# Patient Record
Sex: FEMALE | Race: WHITE | NOT HISPANIC OR LATINO | Employment: OTHER | URBAN - METROPOLITAN AREA
[De-identification: names, ages, dates, MRNs, and addresses within clinical notes are randomized per-mention and may not be internally consistent; named-entity substitution may affect disease eponyms.]

---

## 2018-04-02 ENCOUNTER — LAB VISIT (OUTPATIENT)
Dept: LAB | Facility: HOSPITAL | Age: 58
End: 2018-04-02
Attending: INTERNAL MEDICINE
Payer: MEDICARE

## 2018-04-02 LAB
ALBUMIN SERPL BCP-MCNC: 2.6 G/DL
ALP SERPL-CCNC: 47 U/L
ALT SERPL W/O P-5'-P-CCNC: 7 U/L
ANION GAP SERPL CALC-SCNC: 8 MMOL/L
AST SERPL-CCNC: 22 U/L
BASOPHILS # BLD AUTO: 0 K/UL
BASOPHILS NFR BLD: 0.5 %
BILIRUB SERPL-MCNC: 0.8 MG/DL
BILIRUB UR QL STRIP: NEGATIVE
BUN SERPL-MCNC: 15 MG/DL
CALCIUM SERPL-MCNC: 9.1 MG/DL
CHLORIDE SERPL-SCNC: 110 MMOL/L
CHOLEST SERPL-MCNC: 58 MG/DL
CHOLEST/HDLC SERPL: 6.4 {RATIO}
CLARITY UR: CLEAR
CO2 SERPL-SCNC: 23 MMOL/L
COLOR UR: YELLOW
CREAT SERPL-MCNC: 1.3 MG/DL
CREAT UR-MCNC: 68 MG/DL
DIFFERENTIAL METHOD: ABNORMAL
EOSINOPHIL # BLD AUTO: 0.2 K/UL
EOSINOPHIL NFR BLD: 2.5 %
ERYTHROCYTE [DISTWIDTH] IN BLOOD BY AUTOMATED COUNT: 15.4 %
EST. GFR  (AFRICAN AMERICAN): 53 ML/MIN/1.73 M^2
EST. GFR  (NON AFRICAN AMERICAN): 46 ML/MIN/1.73 M^2
ESTIMATED AVG GLUCOSE: 97 MG/DL
GLUCOSE SERPL-MCNC: 112 MG/DL
GLUCOSE UR QL STRIP: NEGATIVE
HBA1C MFR BLD HPLC: 5 %
HCT VFR BLD AUTO: 34.2 %
HDLC SERPL-MCNC: 9 MG/DL
HDLC SERPL: 15.5 %
HGB BLD-MCNC: 11.4 G/DL
HGB UR QL STRIP: NEGATIVE
KETONES UR QL STRIP: NEGATIVE
LDLC SERPL CALC-MCNC: 23.4 MG/DL
LEUKOCYTE ESTERASE UR QL STRIP: NEGATIVE
LYMPHOCYTES # BLD AUTO: 2.6 K/UL
LYMPHOCYTES NFR BLD: 26.4 %
MCH RBC QN AUTO: 29.4 PG
MCHC RBC AUTO-ENTMCNC: 33.5 G/DL
MCV RBC AUTO: 88 FL
MICROALBUMIN UR DL<=1MG/L-MCNC: 11 UG/ML
MICROALBUMIN/CREATININE RATIO: 16.2 UG/MG
MONOCYTES # BLD AUTO: 0.7 K/UL
MONOCYTES NFR BLD: 7.3 %
NEUTROPHILS # BLD AUTO: 6.3 K/UL
NEUTROPHILS NFR BLD: 63.3 %
NITRITE UR QL STRIP: NEGATIVE
NONHDLC SERPL-MCNC: 49 MG/DL
PH UR STRIP: 5 [PH] (ref 5–8)
PLATELET # BLD AUTO: 292 K/UL
PMV BLD AUTO: 9.3 FL
POTASSIUM SERPL-SCNC: 3.6 MMOL/L
PROT SERPL-MCNC: 7.1 G/DL
PROT UR QL STRIP: NEGATIVE
RBC # BLD AUTO: 3.9 M/UL
SODIUM SERPL-SCNC: 141 MMOL/L
SP GR UR STRIP: 1.02 (ref 1–1.03)
TRIGL SERPL-MCNC: 128 MG/DL
URN SPEC COLLECT METH UR: NORMAL
UROBILINOGEN UR STRIP-ACNC: NEGATIVE EU/DL
WBC # BLD AUTO: 9.9 K/UL

## 2018-04-02 PROCEDURE — 82043 UR ALBUMIN QUANTITATIVE: CPT

## 2018-04-02 PROCEDURE — 83036 HEMOGLOBIN GLYCOSYLATED A1C: CPT

## 2018-04-02 PROCEDURE — 85025 COMPLETE CBC W/AUTO DIFF WBC: CPT

## 2018-04-02 PROCEDURE — 80061 LIPID PANEL: CPT

## 2018-04-02 PROCEDURE — 81003 URINALYSIS AUTO W/O SCOPE: CPT

## 2018-04-02 PROCEDURE — 80053 COMPREHEN METABOLIC PANEL: CPT

## 2018-06-06 ENCOUNTER — LAB VISIT (OUTPATIENT)
Dept: LAB | Facility: HOSPITAL | Age: 58
End: 2018-06-06
Attending: INTERNAL MEDICINE
Payer: MEDICARE

## 2018-06-06 DIAGNOSIS — N18.30 CHRONIC KIDNEY DISEASE, STAGE III (MODERATE): ICD-10-CM

## 2018-06-06 DIAGNOSIS — E11.21 DIABETIC GLOMERULOPATHY: ICD-10-CM

## 2018-06-06 DIAGNOSIS — I51.9 MYXEDEMA HEART DISEASE: ICD-10-CM

## 2018-06-06 DIAGNOSIS — E78.2 MIXED HYPERLIPIDEMIA: ICD-10-CM

## 2018-06-06 DIAGNOSIS — R11.0 NAUSEA: Primary | ICD-10-CM

## 2018-06-06 DIAGNOSIS — E03.9 MYXEDEMA HEART DISEASE: ICD-10-CM

## 2018-06-06 LAB
ALBUMIN SERPL BCP-MCNC: 2.4 G/DL
ALP SERPL-CCNC: 56 U/L
ALT SERPL W/O P-5'-P-CCNC: 8 U/L
AMYLASE SERPL-CCNC: 31 U/L
ANION GAP SERPL CALC-SCNC: 9 MMOL/L
AST SERPL-CCNC: 28 U/L
BACTERIA #/AREA URNS HPF: ABNORMAL /HPF
BASOPHILS # BLD AUTO: 0 K/UL
BASOPHILS NFR BLD: 0.2 %
BILIRUB SERPL-MCNC: 1 MG/DL
BILIRUB UR QL STRIP: NEGATIVE
BUN SERPL-MCNC: 21 MG/DL
CALCIUM SERPL-MCNC: 9.3 MG/DL
CHLORIDE SERPL-SCNC: 106 MMOL/L
CHOLEST SERPL-MCNC: 65 MG/DL
CHOLEST/HDLC SERPL: 8.1 {RATIO}
CLARITY UR: CLEAR
CO2 SERPL-SCNC: 21 MMOL/L
COLOR UR: YELLOW
CREAT SERPL-MCNC: 1.6 MG/DL
CREAT UR-MCNC: 103 MG/DL
DIFFERENTIAL METHOD: ABNORMAL
EOSINOPHIL # BLD AUTO: 0.1 K/UL
EOSINOPHIL NFR BLD: 0.9 %
ERYTHROCYTE [DISTWIDTH] IN BLOOD BY AUTOMATED COUNT: 16.5 %
EST. GFR  (AFRICAN AMERICAN): 41 ML/MIN/1.73 M^2
EST. GFR  (NON AFRICAN AMERICAN): 36 ML/MIN/1.73 M^2
ESTIMATED AVG GLUCOSE: 91 MG/DL
GLUCOSE SERPL-MCNC: 109 MG/DL
GLUCOSE UR QL STRIP: NEGATIVE
HBA1C MFR BLD HPLC: 4.8 %
HCT VFR BLD AUTO: 33.2 %
HDLC SERPL-MCNC: 8 MG/DL
HDLC SERPL: 12.3 %
HGB BLD-MCNC: 11.4 G/DL
HGB UR QL STRIP: NEGATIVE
KETONES UR QL STRIP: NEGATIVE
LDLC SERPL CALC-MCNC: 31.4 MG/DL
LEUKOCYTE ESTERASE UR QL STRIP: ABNORMAL
LIPASE SERPL-CCNC: 61 U/L
LYMPHOCYTES # BLD AUTO: 3 K/UL
LYMPHOCYTES NFR BLD: 19.8 %
MCH RBC QN AUTO: 30.4 PG
MCHC RBC AUTO-ENTMCNC: 34.2 G/DL
MCV RBC AUTO: 89 FL
MICROALBUMIN UR DL<=1MG/L-MCNC: 16 UG/ML
MICROALBUMIN/CREATININE RATIO: 15.5 UG/MG
MICROSCOPIC COMMENT: ABNORMAL
MONOCYTES # BLD AUTO: 1 K/UL
MONOCYTES NFR BLD: 6.6 %
NEUTROPHILS # BLD AUTO: 10.9 K/UL
NEUTROPHILS NFR BLD: 72.5 %
NITRITE UR QL STRIP: NEGATIVE
NONHDLC SERPL-MCNC: 57 MG/DL
PH UR STRIP: 6 [PH] (ref 5–8)
PLATELET # BLD AUTO: 360 K/UL
PMV BLD AUTO: 8.1 FL
POTASSIUM SERPL-SCNC: 4.1 MMOL/L
PROT SERPL-MCNC: 7.5 G/DL
PROT UR QL STRIP: NEGATIVE
RBC # BLD AUTO: 3.74 M/UL
SODIUM SERPL-SCNC: 136 MMOL/L
SP GR UR STRIP: 1.02 (ref 1–1.03)
TRIGL SERPL-MCNC: 128 MG/DL
TSH SERPL DL<=0.005 MIU/L-ACNC: 2.1 UIU/ML
URN SPEC COLLECT METH UR: ABNORMAL
UROBILINOGEN UR STRIP-ACNC: NEGATIVE EU/DL
WBC # BLD AUTO: 15 K/UL
WBC #/AREA URNS HPF: 25 /HPF (ref 0–5)

## 2018-06-06 PROCEDURE — 83690 ASSAY OF LIPASE: CPT

## 2018-06-06 PROCEDURE — 85025 COMPLETE CBC W/AUTO DIFF WBC: CPT

## 2018-06-06 PROCEDURE — 82043 UR ALBUMIN QUANTITATIVE: CPT

## 2018-06-06 PROCEDURE — 87077 CULTURE AEROBIC IDENTIFY: CPT

## 2018-06-06 PROCEDURE — 36415 COLL VENOUS BLD VENIPUNCTURE: CPT

## 2018-06-06 PROCEDURE — 87086 URINE CULTURE/COLONY COUNT: CPT

## 2018-06-06 PROCEDURE — 87088 URINE BACTERIA CULTURE: CPT

## 2018-06-06 PROCEDURE — 80061 LIPID PANEL: CPT

## 2018-06-06 PROCEDURE — 87186 SC STD MICRODIL/AGAR DIL: CPT

## 2018-06-06 PROCEDURE — 83036 HEMOGLOBIN GLYCOSYLATED A1C: CPT

## 2018-06-06 PROCEDURE — 80053 COMPREHEN METABOLIC PANEL: CPT

## 2018-06-06 PROCEDURE — 82150 ASSAY OF AMYLASE: CPT

## 2018-06-06 PROCEDURE — 84443 ASSAY THYROID STIM HORMONE: CPT

## 2018-06-06 PROCEDURE — 81000 URINALYSIS NONAUTO W/SCOPE: CPT

## 2018-06-08 LAB — BACTERIA UR CULT: NORMAL

## 2018-06-09 ENCOUNTER — HOSPITAL ENCOUNTER (OUTPATIENT)
Dept: RADIOLOGY | Facility: HOSPITAL | Age: 58
Discharge: HOME | End: 2018-06-09
Attending: INTERNAL MEDICINE
Payer: MEDICARE

## 2018-06-09 DIAGNOSIS — R11.0 NAUSEA: ICD-10-CM

## 2018-06-09 PROCEDURE — 76705 ECHO EXAM OF ABDOMEN: CPT | Mod: TC

## 2018-06-09 PROCEDURE — 76705 ECHO EXAM OF ABDOMEN: CPT | Mod: 26,,, | Performed by: RADIOLOGY

## 2018-06-18 ENCOUNTER — HOSPITAL ENCOUNTER (OUTPATIENT)
Dept: PREADMISSION TESTING | Facility: HOSPITAL | Age: 58
Discharge: HOME | DRG: 329 | End: 2018-06-18
Attending: SURGERY
Payer: MEDICARE

## 2018-06-18 VITALS — HEIGHT: 59 IN | WEIGHT: 135 LBS | BODY MASS INDEX: 27.21 KG/M2

## 2018-06-18 DIAGNOSIS — K80.00 CALCULUS OF GALLBLADDER WITH ACUTE CHOLECYSTITIS WITHOUT OBSTRUCTION: Primary | ICD-10-CM

## 2018-06-18 LAB
ALBUMIN SERPL BCP-MCNC: 2.3 G/DL
ALP SERPL-CCNC: 60 U/L
ALT SERPL W/O P-5'-P-CCNC: 9 U/L
ANION GAP SERPL CALC-SCNC: 12 MMOL/L
AST SERPL-CCNC: 41 U/L
BASOPHILS # BLD AUTO: 0.1 K/UL
BASOPHILS NFR BLD: 0.7 %
BILIRUB SERPL-MCNC: 0.8 MG/DL
BUN SERPL-MCNC: 26 MG/DL
CALCIUM SERPL-MCNC: 9.7 MG/DL
CHLORIDE SERPL-SCNC: 108 MMOL/L
CO2 SERPL-SCNC: 16 MMOL/L
CREAT SERPL-MCNC: 1.7 MG/DL
DIFFERENTIAL METHOD: ABNORMAL
EOSINOPHIL # BLD AUTO: 0.1 K/UL
EOSINOPHIL NFR BLD: 0.9 %
ERYTHROCYTE [DISTWIDTH] IN BLOOD BY AUTOMATED COUNT: 17 %
EST. GFR  (AFRICAN AMERICAN): 38 ML/MIN/1.73 M^2
EST. GFR  (NON AFRICAN AMERICAN): 33 ML/MIN/1.73 M^2
GLUCOSE SERPL-MCNC: 122 MG/DL
HCT VFR BLD AUTO: 33.4 %
HGB BLD-MCNC: 11.1 G/DL
LYMPHOCYTES # BLD AUTO: 3.8 K/UL
LYMPHOCYTES NFR BLD: 25.3 %
MCH RBC QN AUTO: 30.1 PG
MCHC RBC AUTO-ENTMCNC: 33.4 G/DL
MCV RBC AUTO: 90 FL
MONOCYTES # BLD AUTO: 1.3 K/UL
MONOCYTES NFR BLD: 8.9 %
NEUTROPHILS # BLD AUTO: 9.7 K/UL
NEUTROPHILS NFR BLD: 64.2 %
PLATELET # BLD AUTO: 510 K/UL
PMV BLD AUTO: 7.9 FL
POTASSIUM SERPL-SCNC: 4.5 MMOL/L
PROT SERPL-MCNC: 8.2 G/DL
RBC # BLD AUTO: 3.71 M/UL
SODIUM SERPL-SCNC: 136 MMOL/L
WBC # BLD AUTO: 15.1 K/UL

## 2018-06-18 PROCEDURE — 99900104 DSU ONLY-NO CHARGE-EA ADD'L HR (STAT)

## 2018-06-18 PROCEDURE — 99900103 DSU ONLY-NO CHARGE-INITIAL HR (STAT)

## 2018-06-18 PROCEDURE — 93005 ELECTROCARDIOGRAM TRACING: CPT

## 2018-06-18 PROCEDURE — 93010 ELECTROCARDIOGRAM REPORT: CPT | Mod: ,,, | Performed by: INTERNAL MEDICINE

## 2018-06-18 PROCEDURE — 80053 COMPREHEN METABOLIC PANEL: CPT

## 2018-06-18 PROCEDURE — 36415 COLL VENOUS BLD VENIPUNCTURE: CPT

## 2018-06-18 PROCEDURE — 85025 COMPLETE CBC W/AUTO DIFF WBC: CPT

## 2018-06-18 RX ORDER — FUROSEMIDE 20 MG/1
20 TABLET ORAL 2 TIMES DAILY
COMMUNITY

## 2018-06-18 RX ORDER — VIT C/E/ZN/COPPR/LUTEIN/ZEAXAN 250MG-90MG
1000 CAPSULE ORAL DAILY
COMMUNITY

## 2018-06-18 RX ORDER — CALCIUM CARBONATE 600 MG
600 TABLET ORAL ONCE
COMMUNITY

## 2018-06-18 RX ORDER — SIMVASTATIN 10 MG/1
10 TABLET, FILM COATED ORAL NIGHTLY
COMMUNITY

## 2018-06-18 RX ORDER — SODIUM BICARBONATE 650 MG/1
650 TABLET ORAL 4 TIMES DAILY
COMMUNITY

## 2018-06-18 RX ORDER — DOCUSATE SODIUM 100 MG/1
100 CAPSULE, LIQUID FILLED ORAL 2 TIMES DAILY
COMMUNITY

## 2018-06-18 RX ORDER — LEVOTHYROXINE SODIUM 25 UG/1
25 TABLET ORAL DAILY
COMMUNITY

## 2018-06-18 RX ORDER — LATANOPROST 50 UG/ML
1 SOLUTION/ DROPS OPHTHALMIC NIGHTLY
COMMUNITY

## 2018-06-18 RX ORDER — FLUTICASONE PROPIONATE 50 MCG
1 SPRAY, SUSPENSION (ML) NASAL DAILY
COMMUNITY

## 2018-06-18 RX ORDER — BRINZOLAMIDE 10 MG/ML
1 SUSPENSION/ DROPS OPHTHALMIC 3 TIMES DAILY
COMMUNITY

## 2018-06-18 RX ORDER — FENOFIBRATE 160 MG/1
160 TABLET ORAL DAILY
COMMUNITY

## 2018-06-18 RX ORDER — DORZOLAMIDE HYDROCHLORIDE AND TIMOLOL MALEATE PRESERVATIVE FREE 20; 5 MG/ML; MG/ML
1 SOLUTION/ DROPS OPHTHALMIC 2 TIMES DAILY
COMMUNITY

## 2018-06-19 ENCOUNTER — HOSPITAL ENCOUNTER (OUTPATIENT)
Dept: RADIOLOGY | Facility: HOSPITAL | Age: 58
Discharge: HOME | DRG: 329 | End: 2018-06-19
Attending: INTERNAL MEDICINE
Payer: MEDICARE

## 2018-06-19 ENCOUNTER — ANESTHESIA EVENT (OUTPATIENT)
Dept: SURGERY | Facility: HOSPITAL | Age: 58
End: 2018-06-19

## 2018-06-19 DIAGNOSIS — R10.11 RIGHT UPPER QUADRANT PAIN: Primary | ICD-10-CM

## 2018-06-19 DIAGNOSIS — R10.11 RIGHT UPPER QUADRANT PAIN: ICD-10-CM

## 2018-06-19 DIAGNOSIS — R10.31 ABDOMINAL PAIN, RIGHT LOWER QUADRANT: ICD-10-CM

## 2018-06-19 PROCEDURE — 74176 CT ABD & PELVIS W/O CONTRAST: CPT | Mod: 26,,, | Performed by: RADIOLOGY

## 2018-06-19 PROCEDURE — 74176 CT ABD & PELVIS W/O CONTRAST: CPT | Mod: TC

## 2018-06-19 PROCEDURE — 25500020 PHARM REV CODE 255

## 2018-06-19 RX ADMIN — IOHEXOL 30 ML: 350 INJECTION, SOLUTION INTRAVENOUS at 10:06

## 2018-06-20 ENCOUNTER — HOSPITAL ENCOUNTER (INPATIENT)
Facility: HOSPITAL | Age: 58
LOS: 18 days | Discharge: NURSING, REHAB OR LONG-TERM CARE | DRG: 329 | End: 2018-07-09
Attending: INTERNAL MEDICINE | Admitting: INTERNAL MEDICINE
Payer: MEDICARE

## 2018-06-20 ENCOUNTER — ANESTHESIA (OUTPATIENT)
Dept: SURGERY | Facility: HOSPITAL | Age: 58
End: 2018-06-20

## 2018-06-20 DIAGNOSIS — C17.8: ICD-10-CM

## 2018-06-20 DIAGNOSIS — E11.8 TYPE 2 DIABETES MELLITUS WITH COMPLICATION, WITHOUT LONG-TERM CURRENT USE OF INSULIN: ICD-10-CM

## 2018-06-20 DIAGNOSIS — N17.9 AKI (ACUTE KIDNEY INJURY): ICD-10-CM

## 2018-06-20 DIAGNOSIS — Q02: ICD-10-CM

## 2018-06-20 DIAGNOSIS — R19.00 ABDOMINAL MASS, UNSPECIFIED ABDOMINAL LOCATION: ICD-10-CM

## 2018-06-20 DIAGNOSIS — R19.01 RIGHT UPPER QUADRANT ABDOMINAL MASS: ICD-10-CM

## 2018-06-20 DIAGNOSIS — R10.9 ABDOMINAL PAIN: ICD-10-CM

## 2018-06-20 DIAGNOSIS — L02.211 ABDOMINAL WALL ABSCESS: Primary | ICD-10-CM

## 2018-06-20 PROBLEM — E87.20 METABOLIC ACIDOSIS: Status: ACTIVE | Noted: 2018-06-20

## 2018-06-20 PROBLEM — E11.9 DIABETES MELLITUS: Status: ACTIVE | Noted: 2018-06-20

## 2018-06-20 LAB
CEA SERPL-MCNC: 68.9 NG/ML
CRP SERPL-MCNC: 184.3 MG/L
ESTIMATED AVG GLUCOSE: 80 MG/DL
HBA1C MFR BLD HPLC: 4.4 %
LACTATE SERPL-SCNC: 1.3 MMOL/L
POCT GLUCOSE: 102 MG/DL (ref 70–110)
POCT GLUCOSE: 95 MG/DL (ref 70–110)
TSH SERPL DL<=0.005 MIU/L-ACNC: 2.71 UIU/ML

## 2018-06-20 PROCEDURE — 63600175 PHARM REV CODE 636 W HCPCS: Performed by: INTERNAL MEDICINE

## 2018-06-20 PROCEDURE — 82378 CARCINOEMBRYONIC ANTIGEN: CPT

## 2018-06-20 PROCEDURE — G0379 DIRECT REFER HOSPITAL OBSERV: HCPCS

## 2018-06-20 PROCEDURE — 84443 ASSAY THYROID STIM HORMONE: CPT

## 2018-06-20 PROCEDURE — 87040 BLOOD CULTURE FOR BACTERIA: CPT

## 2018-06-20 PROCEDURE — 25000003 PHARM REV CODE 250: Performed by: INTERNAL MEDICINE

## 2018-06-20 PROCEDURE — 99220 PR INITIAL OBSERVATION CARE,LEVL III: CPT | Mod: ,,, | Performed by: INTERNAL MEDICINE

## 2018-06-20 PROCEDURE — 86140 C-REACTIVE PROTEIN: CPT

## 2018-06-20 PROCEDURE — 83605 ASSAY OF LACTIC ACID: CPT

## 2018-06-20 PROCEDURE — 36415 COLL VENOUS BLD VENIPUNCTURE: CPT

## 2018-06-20 PROCEDURE — 83036 HEMOGLOBIN GLYCOSYLATED A1C: CPT

## 2018-06-20 PROCEDURE — 94761 N-INVAS EAR/PLS OXIMETRY MLT: CPT

## 2018-06-20 PROCEDURE — 99222 1ST HOSP IP/OBS MODERATE 55: CPT | Mod: 57,,, | Performed by: SURGERY

## 2018-06-20 PROCEDURE — G0378 HOSPITAL OBSERVATION PER HR: HCPCS

## 2018-06-20 RX ORDER — INSULIN ASPART 100 [IU]/ML
0-5 INJECTION, SOLUTION INTRAVENOUS; SUBCUTANEOUS
Status: DISCONTINUED | OUTPATIENT
Start: 2018-06-20 | End: 2018-06-30

## 2018-06-20 RX ORDER — ENOXAPARIN SODIUM 100 MG/ML
40 INJECTION SUBCUTANEOUS ONCE
Status: COMPLETED | OUTPATIENT
Start: 2018-06-20 | End: 2018-06-20

## 2018-06-20 RX ORDER — PANTOPRAZOLE SODIUM 40 MG/1
40 TABLET, DELAYED RELEASE ORAL DAILY
Status: DISCONTINUED | OUTPATIENT
Start: 2018-06-20 | End: 2018-06-21 | Stop reason: SDUPTHER

## 2018-06-20 RX ORDER — LEVOTHYROXINE SODIUM 25 UG/1
25 TABLET ORAL
Status: DISCONTINUED | OUTPATIENT
Start: 2018-06-21 | End: 2018-07-09 | Stop reason: HOSPADM

## 2018-06-20 RX ORDER — SODIUM CHLORIDE 0.9 % (FLUSH) 0.9 %
5 SYRINGE (ML) INJECTION
Status: DISCONTINUED | OUTPATIENT
Start: 2018-06-20 | End: 2018-06-28

## 2018-06-20 RX ORDER — SODIUM CHLORIDE 450 MG/100ML
INJECTION, SOLUTION INTRAVENOUS CONTINUOUS
Status: DISCONTINUED | OUTPATIENT
Start: 2018-06-20 | End: 2018-06-25

## 2018-06-20 RX ORDER — IBUPROFEN 200 MG
24 TABLET ORAL
Status: DISCONTINUED | OUTPATIENT
Start: 2018-06-20 | End: 2018-07-09 | Stop reason: HOSPADM

## 2018-06-20 RX ORDER — SODIUM BICARBONATE 650 MG/1
650 TABLET ORAL 4 TIMES DAILY
Status: DISCONTINUED | OUTPATIENT
Start: 2018-06-20 | End: 2018-07-09 | Stop reason: HOSPADM

## 2018-06-20 RX ORDER — BRINZOLAMIDE 10 MG/ML
1 SUSPENSION/ DROPS OPHTHALMIC 3 TIMES DAILY
Status: DISCONTINUED | OUTPATIENT
Start: 2018-06-20 | End: 2018-06-26

## 2018-06-20 RX ORDER — DORZOLAMIDE HYDROCHLORIDE AND TIMOLOL MALEATE 20; 5 MG/ML; MG/ML
1 SOLUTION/ DROPS OPHTHALMIC 2 TIMES DAILY
Status: DISCONTINUED | OUTPATIENT
Start: 2018-06-20 | End: 2018-07-09 | Stop reason: HOSPADM

## 2018-06-20 RX ORDER — FENOFIBRATE 160 MG/1
160 TABLET ORAL DAILY
Status: DISCONTINUED | OUTPATIENT
Start: 2018-06-21 | End: 2018-07-09 | Stop reason: HOSPADM

## 2018-06-20 RX ORDER — ENOXAPARIN SODIUM 100 MG/ML
40 INJECTION SUBCUTANEOUS
Status: DISCONTINUED | OUTPATIENT
Start: 2018-06-20 | End: 2018-06-20

## 2018-06-20 RX ORDER — FUROSEMIDE 20 MG/1
20 TABLET ORAL 2 TIMES DAILY
Status: DISCONTINUED | OUTPATIENT
Start: 2018-06-20 | End: 2018-06-20

## 2018-06-20 RX ORDER — IBUPROFEN 200 MG
16 TABLET ORAL
Status: DISCONTINUED | OUTPATIENT
Start: 2018-06-20 | End: 2018-07-09 | Stop reason: HOSPADM

## 2018-06-20 RX ORDER — SIMVASTATIN 10 MG/1
10 TABLET, FILM COATED ORAL NIGHTLY
Status: DISCONTINUED | OUTPATIENT
Start: 2018-06-20 | End: 2018-07-09 | Stop reason: HOSPADM

## 2018-06-20 RX ORDER — CHOLECALCIFEROL (VITAMIN D3) 25 MCG
1000 TABLET ORAL DAILY
Status: DISCONTINUED | OUTPATIENT
Start: 2018-06-21 | End: 2018-07-09 | Stop reason: HOSPADM

## 2018-06-20 RX ORDER — FLUTICASONE PROPIONATE 50 MCG
1 SPRAY, SUSPENSION (ML) NASAL DAILY
Status: DISCONTINUED | OUTPATIENT
Start: 2018-06-21 | End: 2018-07-09 | Stop reason: HOSPADM

## 2018-06-20 RX ORDER — HYDROCODONE BITARTRATE AND ACETAMINOPHEN 10; 325 MG/1; MG/1
1 TABLET ORAL EVERY 6 HOURS PRN
Status: DISCONTINUED | OUTPATIENT
Start: 2018-06-20 | End: 2018-07-04

## 2018-06-20 RX ORDER — ACETAMINOPHEN 325 MG/1
650 TABLET ORAL EVERY 4 HOURS PRN
Status: DISCONTINUED | OUTPATIENT
Start: 2018-06-20 | End: 2018-06-28

## 2018-06-20 RX ORDER — ONDANSETRON 2 MG/ML
4 INJECTION INTRAMUSCULAR; INTRAVENOUS EVERY 8 HOURS PRN
Status: DISCONTINUED | OUTPATIENT
Start: 2018-06-20 | End: 2018-07-09 | Stop reason: HOSPADM

## 2018-06-20 RX ORDER — CALCIUM CARBONATE 500(1250)
500 TABLET ORAL ONCE
Status: COMPLETED | OUTPATIENT
Start: 2018-06-20 | End: 2018-06-20

## 2018-06-20 RX ORDER — LATANOPROST 50 UG/ML
1 SOLUTION/ DROPS OPHTHALMIC NIGHTLY
Status: DISCONTINUED | OUTPATIENT
Start: 2018-06-20 | End: 2018-07-09 | Stop reason: HOSPADM

## 2018-06-20 RX ORDER — GLUCAGON 1 MG
1 KIT INJECTION
Status: DISCONTINUED | OUTPATIENT
Start: 2018-06-20 | End: 2018-07-09 | Stop reason: HOSPADM

## 2018-06-20 RX ADMIN — PANTOPRAZOLE SODIUM 40 MG: 40 TABLET, DELAYED RELEASE ORAL at 03:06

## 2018-06-20 RX ADMIN — Medication 500 MG: at 04:06

## 2018-06-20 RX ADMIN — SODIUM CHLORIDE: 0.45 INJECTION, SOLUTION INTRAVENOUS at 05:06

## 2018-06-20 RX ADMIN — ENOXAPARIN SODIUM 40 MG: 100 INJECTION SUBCUTANEOUS at 05:06

## 2018-06-20 RX ADMIN — DORZOLAMIDE HYDROCHLORIDE AND TIMOLOL MALEATE 1 DROP: 20; 5 SOLUTION/ DROPS OPHTHALMIC at 08:06

## 2018-06-20 RX ADMIN — SIMVASTATIN 10 MG: 10 TABLET, FILM COATED ORAL at 08:06

## 2018-06-20 RX ADMIN — SODIUM BICARBONATE 650 MG TABLET 650 MG: at 04:06

## 2018-06-20 RX ADMIN — PIPERACILLIN SODIUM AND TAZOBACTAM SODIUM 4.5 G: 4; .5 INJECTION, POWDER, LYOPHILIZED, FOR SOLUTION INTRAVENOUS at 05:06

## 2018-06-20 RX ADMIN — LATANOPROST 1 DROP: 50 SOLUTION OPHTHALMIC at 08:06

## 2018-06-20 RX ADMIN — SODIUM BICARBONATE 650 MG TABLET 650 MG: at 08:06

## 2018-06-20 NOTE — PROGRESS NOTES
Patient has cyst wound to rt labia, bilat lower edema, cognitive deficits, congenital small head, clear lung sounds and BS in all four quads, parents at bedside, allergies noted

## 2018-06-20 NOTE — HPI
Patient is a 57 y.o. female admitted to Hospitalist Service from Dr. Garduno's office with complaint of abdominal pain. Patient reportedly has past medical history significant for Congenital microcephaly and diet controlled DM.     Patient denied chest pain, shortness of breath, abdominal pain, nausea, vomiting, headache, vision changes, focal neuro-deficits, cough or fever.     She states the pain has been present for about six months off and on.  She had an ultrasound which showed gallstones and was scheduled to have lap GB but had persistent elevated WBC and CT was ordered, which showed   CT abdomen (06-19-18): Large subphrenic mass on the right abutting could be involving the liver extending into the right lateral abdominal wall, heterogeneous and complex in appearance suggesting complex fluid collection and containing small foci of air.  Differential includes large abscess, or necrotic mass.  It is indistinguishable from, abutting adjacent colon with colon wall thickening and findings thought most suggestive of abscess secondary to contained right colon perforation from colon cancer or less likely right-sided diverticulitis.  This corresponds to findings on recent ultrasound of 6/9/2018.

## 2018-06-20 NOTE — PLAN OF CARE
06/20/18 1500   Patient Assessment/Suction   Level of Consciousness (AVPU) alert   PRE-TX-O2-ETCO2   O2 Device (Oxygen Therapy) room air   SpO2 100 %   Pulse Oximetry Type Intermittent   $ Pulse Oximetry - Multiple Charge Pulse Oximetry - Multiple   Pulse 78   Resp 18   Temp (!) 64.4 °F (18 °C)

## 2018-06-20 NOTE — ASSESSMENT & PLAN NOTE
CT reviewed with radiology.  Abscess Vs necrotic mass.  As it is only 3 cm below the skin and is 9 cm in diameter, I feel it would be best drained surgically with irrigation and placement of large drain.  Discussed with patient and parent who agree.  For OR in AM  All aspects of procedure including risks and possible complications were discussed in detail with the patient who agrees to proceed with procedure.  All questions were answered and consent was obtained. Preop orders were written.

## 2018-06-20 NOTE — SUBJECTIVE & OBJECTIVE
No current facility-administered medications on file prior to encounter.      Current Outpatient Prescriptions on File Prior to Encounter   Medication Sig    brinzolamide (AZOPT) 1 % ophthalmic suspension 1 drop 3 (three) times daily.    calcium carbonate (OS-WALLY) 600 mg calcium (1,500 mg) Tab Take 600 mg by mouth once.    cholecalciferol, vitamin D3, (VITAMIN D3) 1,000 unit capsule Take 1,000 Units by mouth once daily.    docusate sodium (COLACE) 100 MG capsule Take 100 mg by mouth 2 (two) times daily.    dorzolamide-timolol, PF, (COSOPT PF) 2-0.5 % Dpet ophthalmic solution 1 drop 2 (two) times daily.    fenofibrate (LOFIBRA) 160 MG Tab Take 160 mg by mouth once daily.    fluticasone (FLONASE) 50 mcg/actuation nasal spray 1 spray by Each Nare route once daily.    furosemide (LASIX) 20 MG tablet Take 20 mg by mouth 2 (two) times daily.    latanoprost 0.005 % ophthalmic solution 1 drop every evening.    levothyroxine (SYNTHROID) 25 MCG tablet Take 25 mcg by mouth once daily.    ranitidine (ZANTAC) 150 MG capsule Take 150 mg by mouth 2 (two) times daily.    simvastatin (ZOCOR) 10 MG tablet Take 10 mg by mouth every evening.    sodium bicarbonate 650 MG tablet Take 650 mg by mouth 4 (four) times daily.       Review of patient's allergies indicates:   Allergen Reactions    Sulfa (sulfonamide antibiotics) Rash    Tetracyclines Rash       Past Medical History:   Diagnosis Date    Congenital small head     Diabetes mellitus     dIET CONTROL     No past surgical history on file.  Family History     None        Social History Main Topics    Smoking status: Never Smoker    Smokeless tobacco: Never Used    Alcohol use No    Drug use: No    Sexual activity: Not on file     Review of Systems   Constitutional: Negative for appetite change, chills, diaphoresis, fatigue, fever and unexpected weight change.   HENT: Negative for hearing loss, sore throat, trouble swallowing and voice change.    Eyes: Negative  for visual disturbance.   Respiratory: Negative for cough, shortness of breath and wheezing.    Cardiovascular: Negative for chest pain, palpitations and leg swelling.   Gastrointestinal: Positive for abdominal pain (mild). Negative for abdominal distention, anal bleeding, blood in stool, constipation, diarrhea, nausea, rectal pain and vomiting.   Genitourinary: Negative for difficulty urinating, dysuria, flank pain, frequency, hematuria, menstrual problem and urgency.   Musculoskeletal: Negative for arthralgias, back pain, joint swelling, myalgias and neck pain.   Skin: Negative for pallor and rash.   Neurological: Negative for dizziness, syncope, weakness and headaches.   Hematological: Negative for adenopathy. Does not bruise/bleed easily.   Psychiatric/Behavioral: Negative for suicidal ideas. The patient is not nervous/anxious.      Objective:     Vital Signs (Most Recent):  Temp: 98.1 °F (36.7 °C) (06/20/18 1626)  Pulse: 82 (06/20/18 1626)  Resp: 16 (06/20/18 1626)  BP: (!) 143/65 (06/20/18 1626)  SpO2: 100 % (06/20/18 1626) Vital Signs (24h Range):  Temp:  [64.4 °F (18 °C)-98.1 °F (36.7 °C)] 98.1 °F (36.7 °C)  Pulse:  [78-87] 82  Resp:  [16-18] 16  SpO2:  [100 %] 100 %  BP: (127-143)/(63-65) 143/65        There is no height or weight on file to calculate BMI.    Physical Exam   Constitutional: She is oriented to person, place, and time. She appears well-developed and well-nourished. No distress.   HENT:   Head: Atraumatic. Microcephalic.   Right Ear: External ear normal.   Left Ear: External ear normal.   Eyes: Conjunctivae are normal. Pupils are equal, round, and reactive to light. Right eye exhibits no discharge. Left eye exhibits no discharge.   Neck: No tracheal deviation present. No thyromegaly present.   Cardiovascular: Normal rate and regular rhythm.    Pulmonary/Chest: Effort normal. No respiratory distress.   Abdominal: Soft. She exhibits mass (right flank). She exhibits no distension. There is no  guarding.       Musculoskeletal: She exhibits no edema or tenderness.   Lymphadenopathy:     She has no cervical adenopathy.   Neurological: She is alert and oriented to person, place, and time. No cranial nerve deficit.   Skin: Skin is warm and dry. No rash noted. She is not diaphoretic. No pallor.   Psychiatric: She has a normal mood and affect. Her behavior is normal. Judgment and thought content normal.   Nursing note and vitals reviewed.      Significant Labs:  CBC:   Recent Labs  Lab 06/18/18  1421   WBC 15.10*   RBC 3.71*   HGB 11.1*   HCT 33.4*   *   MCV 90   MCH 30.1   MCHC 33.4     CMP:   Recent Labs  Lab 06/18/18  1421   *   CALCIUM 9.7   ALBUMIN 2.3*   PROT 8.2      K 4.5   CO2 16*      BUN 26*   CREATININE 1.7*   ALKPHOS 60   ALT 9*   AST 41*   BILITOT 0.8     Coagulation: No results for input(s): LABPROT, INR, APTT in the last 168 hours.  No results for input(s): COLORU, CLARITYU, SPECGRAV, PHUR, PROTEINUA, GLUCOSEU, BILIRUBINCON, BLOODU, WBCU, RBCU, BACTERIA, MUCUS, NITRITE, LEUKOCYTESUR, UROBILINOGEN, HYALINECASTS in the last 168 hours.    Significant Diagnostics:  I have reviewed all pertinent imaging results/findings within the past 24 hours.

## 2018-06-20 NOTE — CONSULTS
[facility]  General Surgery  Consult Note    Patient Name: Sis Teixeira  MRN: [id number]  Code Status: Full Code  Admission Date: 6/20/2018  Hospital Length of Stay: 0 days  Attending Physician: Logan Fernandez MD  Primary Care Provider: Mann Garduno MD    Patient information was obtained from patient, parent and ER records.     Inpatient consult to General Surgery  Consult performed by: YOKASTA MARY  Consult ordered by: LOGAN FERNANDEZ        Subjective:     Principal Problem: <principal problem not specified>    History of Present Illness: Patient is a 57 y.o. female admitted to Hospitalist Service from Dr. Garduno's office with complaint of abdominal pain. Patient reportedly has past medical history significant for Congenital microcephaly and diet controlled DM.     Patient denied chest pain, shortness of breath, abdominal pain, nausea, vomiting, headache, vision changes, focal neuro-deficits, cough or fever.     She states the pain has been present for about six months off and on.  She had an ultrasound which showed gallstones and was scheduled to have lap GB but had persistent elevated WBC and CT was ordered, which showed   CT abdomen (06-19-18): Large subphrenic mass on the right abutting could be involving the liver extending into the right lateral abdominal wall, heterogeneous and complex in appearance suggesting complex fluid collection and containing small foci of air.  Differential includes large abscess, or necrotic mass.  It is indistinguishable from, abutting adjacent colon with colon wall thickening and findings thought most suggestive of abscess secondary to contained right colon perforation from colon cancer or less likely right-sided diverticulitis.  This corresponds to findings on recent ultrasound of 6/9/2018.    No current facility-administered medications on file prior to encounter.      Current Outpatient Prescriptions on File Prior to Encounter   Medication Sig     brinzolamide (AZOPT) 1 % ophthalmic suspension 1 drop 3 (three) times daily.    calcium carbonate (OS-WALLY) 600 mg calcium (1,500 mg) Tab Take 600 mg by mouth once.    cholecalciferol, vitamin D3, (VITAMIN D3) 1,000 unit capsule Take 1,000 Units by mouth once daily.    docusate sodium (COLACE) 100 MG capsule Take 100 mg by mouth 2 (two) times daily.    dorzolamide-timolol, PF, (COSOPT PF) 2-0.5 % Dpet ophthalmic solution 1 drop 2 (two) times daily.    fenofibrate (LOFIBRA) 160 MG Tab Take 160 mg by mouth once daily.    fluticasone (FLONASE) 50 mcg/actuation nasal spray 1 spray by Each Nare route once daily.    furosemide (LASIX) 20 MG tablet Take 20 mg by mouth 2 (two) times daily.    latanoprost 0.005 % ophthalmic solution 1 drop every evening.    levothyroxine (SYNTHROID) 25 MCG tablet Take 25 mcg by mouth once daily.    ranitidine (ZANTAC) 150 MG capsule Take 150 mg by mouth 2 (two) times daily.    simvastatin (ZOCOR) 10 MG tablet Take 10 mg by mouth every evening.    sodium bicarbonate 650 MG tablet Take 650 mg by mouth 4 (four) times daily.       Review of patient's allergies indicates:   Allergen Reactions    Sulfa (sulfonamide antibiotics) Rash    Tetracyclines Rash       Past Medical History:   Diagnosis Date    Congenital small head     Diabetes mellitus     dIET CONTROL     No past surgical history on file.  Family History     None        Social History Main Topics    Smoking status: Never Smoker    Smokeless tobacco: Never Used    Alcohol use No    Drug use: No    Sexual activity: Not on file     Review of Systems   Constitutional: Negative for appetite change, chills, diaphoresis, fatigue, fever and unexpected weight change.   HENT: Negative for hearing loss, sore throat, trouble swallowing and voice change.    Eyes: Negative for visual disturbance.   Respiratory: Negative for cough, shortness of breath and wheezing.    Cardiovascular: Negative for chest pain, palpitations and leg  swelling.   Gastrointestinal: Positive for abdominal pain (mild). Negative for abdominal distention, anal bleeding, blood in stool, constipation, diarrhea, nausea, rectal pain and vomiting.   Genitourinary: Negative for difficulty urinating, dysuria, flank pain, frequency, hematuria, menstrual problem and urgency.   Musculoskeletal: Negative for arthralgias, back pain, joint swelling, myalgias and neck pain.   Skin: Negative for pallor and rash.   Neurological: Negative for dizziness, syncope, weakness and headaches.   Hematological: Negative for adenopathy. Does not bruise/bleed easily.   Psychiatric/Behavioral: Negative for suicidal ideas. The patient is not nervous/anxious.      Objective:     Vital Signs (Most Recent):  Temp: 98.1 °F (36.7 °C) (06/20/18 1626)  Pulse: 82 (06/20/18 1626)  Resp: 16 (06/20/18 1626)  BP: (!) 143/65 (06/20/18 1626)  SpO2: 100 % (06/20/18 1626) Vital Signs (24h Range):  Temp:  [64.4 °F (18 °C)-98.1 °F (36.7 °C)] 98.1 °F (36.7 °C)  Pulse:  [78-87] 82  Resp:  [16-18] 16  SpO2:  [100 %] 100 %  BP: (127-143)/(63-65) 143/65        There is no height or weight on file to calculate BMI.    Physical Exam   Constitutional: She is oriented to person, place, and time. She appears well-developed and well-nourished. No distress.   HENT:   Head: Atraumatic. Microcephalic.   Right Ear: External ear normal.   Left Ear: External ear normal.   Eyes: Conjunctivae are normal. Pupils are equal, round, and reactive to light. Right eye exhibits no discharge. Left eye exhibits no discharge.   Neck: No tracheal deviation present. No thyromegaly present.   Cardiovascular: Normal rate and regular rhythm.    Pulmonary/Chest: Effort normal. No respiratory distress.   Abdominal: Soft. She exhibits mass (right flank). She exhibits no distension. There is no guarding.       Musculoskeletal: She exhibits no edema or tenderness.   Lymphadenopathy:     She has no cervical adenopathy.   Neurological: She is alert and  oriented to person, place, and time. No cranial nerve deficit.   Skin: Skin is warm and dry. No rash noted. She is not diaphoretic. No pallor.   Psychiatric: She has a normal mood and affect. Her behavior is normal. Judgment and thought content normal.   Nursing note and vitals reviewed.      Significant Labs:  CBC:   Recent Labs  Lab 06/18/18  1421   WBC 15.10*   RBC 3.71*   HGB 11.1*   HCT 33.4*   *   MCV 90   MCH 30.1   MCHC 33.4     CMP:   Recent Labs  Lab 06/18/18  1421   *   CALCIUM 9.7   ALBUMIN 2.3*   PROT 8.2      K 4.5   CO2 16*      BUN 26*   CREATININE 1.7*   ALKPHOS 60   ALT 9*   AST 41*   BILITOT 0.8     Coagulation: No results for input(s): LABPROT, INR, APTT in the last 168 hours.  No results for input(s): COLORU, CLARITYU, SPECGRAV, PHUR, PROTEINUA, GLUCOSEU, BILIRUBINCON, BLOODU, WBCU, RBCU, BACTERIA, MUCUS, NITRITE, LEUKOCYTESUR, UROBILINOGEN, HYALINECASTS in the last 168 hours.    Significant Diagnostics:  I have reviewed all pertinent imaging results/findings within the past 24 hours.    Assessment/Plan:     Abdominal pain    CT reviewed with radiology.  Abscess Vs necrotic mass.  As it is only 3 cm below the skin and is 9 cm in diameter, I feel it would be best drained surgically with irrigation and placement of large drain.  Discussed with patient and parent who agree.  For OR in AM  All aspects of procedure including risks and possible complications were discussed in detail with the patient who agrees to proceed with procedure.  All questions were answered and consent was obtained. Preop orders were written.            VTE Risk Mitigation         Ordered     enoxaparin injection 40 mg  Once      06/20/18 1608     IP VTE LOW RISK PATIENT  Once      06/20/18 3140          Thank you for your consult. I will follow-up with patient. Please contact us if you have any additional questions.    Kumar Torres MD  General Surgery  [facility]

## 2018-06-20 NOTE — CONSULTS
Chief Complaint:  Abdominal pain    HPI:  57 year old female presented to PCP office with complaints of chronic progressive moderate persistent left sided abdominal pain present for 3 months worse over 1 month associated with 46 lb weight loss over same period of time.  U/S gb showed stones but wbc count was elevated.  CT imaging done and showed large phlegmon abutting the colon and liver prompting hospitalization.      CT abd  Large subphrenic mass on the right abutting could be involving the liver extending into the right lateral abdominal wall, heterogeneous and complex in appearance suggesting complex fluid collection and containing small foci of air.  Differential includes large abscess, or necrotic mass.  It is indistinguishable from, abutting adjacent colon with colon wall thickening and findings thought most suggestive of abscess secondary to contained right colon perforation from colon cancer or less likely right-sided diverticulitis.  This corresponds to findings on recent ultrasound of 6/9/2018Large subphrenic mass on the right abutting could be involving the liver extending into the right lateral abdominal wall, heterogeneous and complex in appearance suggesting complex fluid collection and containing small foci of air.  Differential includes large abscess, or necrotic mass.  It is indistinguishable from, abutting adjacent colon with colon wall thickening and findings thought most suggestive of abscess secondary to contained right colon perforation from colon cancer or less likely right-sided diverticulitis.  This corresponds to findings on recent ultrasound of 6/9/2018    Review of Systems:  Constitutional: +weight loss  Eyes: No vision problems  ENT: No hearing problems, dysphagia  Cardiovascular: No chest pain or palpitations  Respiratory: No breathing problems or cough  GI: No diarrhea or constipation +pain  EVA: No urinary symptoms  Neurologic: No tremor or headaches  Musculoskeletal: No weakness or  pain  Integumentary: no rashes or lesions  Psychiatric: no depression or anxiety  Complete ROS performed and negative unless stated above in HPI    Past Medical History:   Diagnosis Date    Congenital small head     Diabetes mellitus     dIET CONTROL       No past surgical history on file.    No family history on file.    Social History     Social History    Marital status: Single     Spouse name: N/A    Number of children: N/A    Years of education: N/A     Occupational History    Not on file.     Social History Main Topics    Smoking status: Never Smoker    Smokeless tobacco: Never Used    Alcohol use No    Drug use: No    Sexual activity: Not on file     Other Topics Concern    Not on file     Social History Narrative    No narrative on file       Review of patient's allergies indicates:   Allergen Reactions    Sulfa (sulfonamide antibiotics) Rash    Tetracyclines Rash       No current facility-administered medications on file prior to encounter.      Current Outpatient Prescriptions on File Prior to Encounter   Medication Sig Dispense Refill    brinzolamide (AZOPT) 1 % ophthalmic suspension 1 drop 3 (three) times daily.      calcium carbonate (OS-WALLY) 600 mg calcium (1,500 mg) Tab Take 600 mg by mouth once.      cholecalciferol, vitamin D3, (VITAMIN D3) 1,000 unit capsule Take 1,000 Units by mouth once daily.      docusate sodium (COLACE) 100 MG capsule Take 100 mg by mouth 2 (two) times daily.      dorzolamide-timolol, PF, (COSOPT PF) 2-0.5 % Dpet ophthalmic solution 1 drop 2 (two) times daily.      fenofibrate (LOFIBRA) 160 MG Tab Take 160 mg by mouth once daily.      fluticasone (FLONASE) 50 mcg/actuation nasal spray 1 spray by Each Nare route once daily.      furosemide (LASIX) 20 MG tablet Take 20 mg by mouth 2 (two) times daily.      latanoprost 0.005 % ophthalmic solution 1 drop every evening.      levothyroxine (SYNTHROID) 25 MCG tablet Take 25 mcg by mouth once daily.       ranitidine (ZANTAC) 150 MG capsule Take 150 mg by mouth 2 (two) times daily.      simvastatin (ZOCOR) 10 MG tablet Take 10 mg by mouth every evening.      sodium bicarbonate 650 MG tablet Take 650 mg by mouth 4 (four) times daily.         Objective:  BP (!) 143/65 (BP Location: Right arm, Patient Position: Lying)   Pulse 82   Temp 98.1 °F (36.7 °C) (Oral)   Resp 16   SpO2 100%   Breastfeeding? No   General: wd, wn, nad  HE: ncat, perrl, eomi  ENT: op pink and moist without lesions or exudates  CV: +s1s2, no mrg, rrr  Resp: ctapb, no wrr  GI: bs active, abd soft,palpable mass in left upper/left mid abdomen  Skin: no lesions, no rash  Neuro: cn 2-12 in tact, no focal deficits, no asterixis  Psych: regular rate speech, normal affect    Labs:  Recent Results (from the past 2688 hour(s))   CBC W/ AUTO DIFFERENTIAL    Collection Time: 04/02/18  8:03 AM   Result Value Ref Range    WBC 9.90 3.90 - 12.70 K/uL    RBC 3.90 (L) 4.00 - 5.40 M/uL    Hemoglobin 11.4 (L) 12.0 - 16.0 g/dL    Hematocrit 34.2 (L) 37.0 - 48.5 %    MCV 88 82 - 98 fL    MCH 29.4 27.0 - 31.0 pg    MCHC 33.5 32.0 - 36.0 g/dL    RDW 15.4 (H) 11.5 - 14.5 %    Platelets 292 150 - 350 K/uL    MPV 9.3 9.2 - 12.9 fL    Gran # (ANC) 6.3 1.8 - 7.7 K/uL    Lymph # 2.6 1.0 - 4.8 K/uL    Mono # 0.7 0.3 - 1.0 K/uL    Eos # 0.2 0.0 - 0.5 K/uL    Baso # 0.00 0.00 - 0.20 K/uL    Gran% 63.3 38.0 - 73.0 %    Lymph% 26.4 18.0 - 48.0 %    Mono% 7.3 4.0 - 15.0 %    Eosinophil% 2.5 0.0 - 8.0 %    Basophil% 0.5 0.0 - 1.9 %    Differential Method Automated    COMPREHENSIVE METABOLIC PANEL    Collection Time: 04/02/18  8:03 AM   Result Value Ref Range    Sodium 141 136 - 145 mmol/L    Potassium 3.6 3.5 - 5.1 mmol/L    Chloride 110 95 - 110 mmol/L    CO2 23 23 - 29 mmol/L    Glucose 112 (H) 70 - 110 mg/dL    BUN, Bld 15 6 - 20 mg/dL    Creatinine 1.3 0.5 - 1.4 mg/dL    Calcium 9.1 8.7 - 10.5 mg/dL    Total Protein 7.1 6.0 - 8.4 g/dL    Albumin 2.6 (L) 3.5 - 5.2 g/dL     Total Bilirubin 0.8 0.1 - 1.0 mg/dL    Alkaline Phosphatase 47 (L) 55 - 135 U/L    AST 22 10 - 40 U/L    ALT 7 (L) 10 - 44 U/L    Anion Gap 8 8 - 16 mmol/L    eGFR if African American 53 (A) >60 mL/min/1.73 m^2    eGFR if non African American 46 (A) >60 mL/min/1.73 m^2   Hemoglobin A1c    Collection Time: 04/02/18  8:03 AM   Result Value Ref Range    Hemoglobin A1C 5.0 4.0 - 5.6 %    Estimated Avg Glucose 97 68 - 131 mg/dL   Lipid panel    Collection Time: 04/02/18  8:03 AM   Result Value Ref Range    Cholesterol 58 (L) 120 - 199 mg/dL    Triglycerides 128 30 - 150 mg/dL    HDL 9 (L) 40 - 75 mg/dL    LDL Cholesterol 23.4 (L) 63.0 - 159.0 mg/dL    HDL/Chol Ratio 15.5 (L) 20.0 - 50.0 %    Total Cholesterol/HDL Ratio 6.4 (H) 2.0 - 5.0    Non-HDL Cholesterol 49 mg/dL   COMPREHENSIVE METABOLIC PANEL    Collection Time: 04/02/18  8:03 AM   Result Value Ref Range    Sodium 141 136 - 145 mmol/L    Potassium 3.6 3.5 - 5.1 mmol/L    Chloride 109 95 - 110 mmol/L    CO2 22 (L) 23 - 29 mmol/L    Glucose 112 (H) 70 - 110 mg/dL    BUN, Bld 16 6 - 20 mg/dL    Creatinine 1.3 0.5 - 1.4 mg/dL    Calcium 8.9 8.7 - 10.5 mg/dL    Total Protein 7.1 6.0 - 8.4 g/dL    Albumin 2.6 (L) 3.5 - 5.2 g/dL    Total Bilirubin 0.8 0.1 - 1.0 mg/dL    Alkaline Phosphatase 46 (L) 55 - 135 U/L    AST 22 10 - 40 U/L    ALT 7 (L) 10 - 44 U/L    Anion Gap 10 8 - 16 mmol/L    eGFR if African American 53 (A) >60 mL/min/1.73 m^2    eGFR if non African American 46 (A) >60 mL/min/1.73 m^2   CBC W/ AUTO DIFFERENTIAL    Collection Time: 04/02/18  8:03 AM   Result Value Ref Range    WBC 10.00 3.90 - 12.70 K/uL    RBC 3.91 (L) 4.00 - 5.40 M/uL    Hemoglobin 11.4 (L) 12.0 - 16.0 g/dL    Hematocrit 34.2 (L) 37.0 - 48.5 %    MCV 88 82 - 98 fL    MCH 29.2 27.0 - 31.0 pg    MCHC 33.3 32.0 - 36.0 g/dL    RDW 15.3 (H) 11.5 - 14.5 %    Platelets 295 150 - 350 K/uL    MPV 9.4 9.2 - 12.9 fL    Gran # (ANC) 6.3 1.8 - 7.7 K/uL    Lymph # 2.8 1.0 - 4.8 K/uL    Mono # 0.7 0.3 -  1.0 K/uL    Eos # 0.2 0.0 - 0.5 K/uL    Baso # 0.10 0.00 - 0.20 K/uL    Gran% 62.6 38.0 - 73.0 %    Lymph% 27.6 18.0 - 48.0 %    Mono% 6.8 4.0 - 15.0 %    Eosinophil% 2.5 0.0 - 8.0 %    Basophil% 0.5 0.0 - 1.9 %    Differential Method Automated    Microalbumin/creatinine urine ratio    Collection Time: 04/02/18  8:37 AM   Result Value Ref Range    Microalbum.,U,Random 11.0 ug/mL    Creatinine, Random Ur 68.0 15.0 - 325.0 mg/dL    Microalb Creat Ratio 16.2 0.0 - 30.0 ug/mg   Urinalysis    Collection Time: 04/02/18  8:37 AM   Result Value Ref Range    Specimen UA Urine, Clean Catch     Color, UA Yellow Yellow, Straw, Julieta    Appearance, UA Clear Clear    pH, UA 5.0 5.0 - 8.0    Specific Gravity, UA 1.020 1.005 - 1.030    Protein, UA Negative Negative    Glucose, UA Negative Negative    Ketones, UA Negative Negative    Bilirubin (UA) Negative Negative    Occult Blood UA Negative Negative    Nitrite, UA Negative Negative    Urobilinogen, UA Negative <2.0 EU/dL    Leukocytes, UA Negative Negative   CBC auto differential    Collection Time: 06/06/18  7:48 AM   Result Value Ref Range    WBC 15.00 (H) 3.90 - 12.70 K/uL    RBC 3.74 (L) 4.00 - 5.40 M/uL    Hemoglobin 11.4 (L) 12.0 - 16.0 g/dL    Hematocrit 33.2 (L) 37.0 - 48.5 %    MCV 89 82 - 98 fL    MCH 30.4 27.0 - 31.0 pg    MCHC 34.2 32.0 - 36.0 g/dL    RDW 16.5 (H) 11.5 - 14.5 %    Platelets 360 (H) 150 - 350 K/uL    MPV 8.1 (L) 9.2 - 12.9 fL    Gran # (ANC) 10.9 (H) 1.8 - 7.7 K/uL    Lymph # 3.0 1.0 - 4.8 K/uL    Mono # 1.0 0.3 - 1.0 K/uL    Eos # 0.1 0.0 - 0.5 K/uL    Baso # 0.00 0.00 - 0.20 K/uL    Gran% 72.5 38.0 - 73.0 %    Lymph% 19.8 18.0 - 48.0 %    Mono% 6.6 4.0 - 15.0 %    Eosinophil% 0.9 0.0 - 8.0 %    Basophil% 0.2 0.0 - 1.9 %    Differential Method Automated    Comprehensive metabolic panel    Collection Time: 06/06/18  7:48 AM   Result Value Ref Range    Sodium 136 136 - 145 mmol/L    Potassium 4.1 3.5 - 5.1 mmol/L    Chloride 106 95 - 110 mmol/L    CO2  21 (L) 23 - 29 mmol/L    Glucose 109 70 - 110 mg/dL    BUN, Bld 21 (H) 6 - 20 mg/dL    Creatinine 1.6 (H) 0.5 - 1.4 mg/dL    Calcium 9.3 8.7 - 10.5 mg/dL    Total Protein 7.5 6.0 - 8.4 g/dL    Albumin 2.4 (L) 3.5 - 5.2 g/dL    Total Bilirubin 1.0 0.1 - 1.0 mg/dL    Alkaline Phosphatase 56 55 - 135 U/L    AST 28 10 - 40 U/L    ALT 8 (L) 10 - 44 U/L    Anion Gap 9 8 - 16 mmol/L    eGFR if African American 41 (A) >60 mL/min/1.73 m^2    eGFR if non African American 36 (A) >60 mL/min/1.73 m^2   HEMOGLOBIN A1C    Collection Time: 06/06/18  7:48 AM   Result Value Ref Range    Hemoglobin A1C 4.8 4.0 - 5.6 %    Estimated Avg Glucose 91 68 - 131 mg/dL   Lipid panel    Collection Time: 06/06/18  7:48 AM   Result Value Ref Range    Cholesterol 65 (L) 120 - 199 mg/dL    Triglycerides 128 30 - 150 mg/dL    HDL 8 (L) 40 - 75 mg/dL    LDL Cholesterol 31.4 (L) 63.0 - 159.0 mg/dL    HDL/Chol Ratio 12.3 (L) 20.0 - 50.0 %    Total Cholesterol/HDL Ratio 8.1 (H) 2.0 - 5.0    Non-HDL Cholesterol 57 mg/dL   TSH    Collection Time: 06/06/18  7:48 AM   Result Value Ref Range    TSH 2.103 0.400 - 4.000 uIU/mL   Amylase    Collection Time: 06/06/18  7:48 AM   Result Value Ref Range    Amylase 31 20 - 110 U/L   Lipase    Collection Time: 06/06/18  7:48 AM   Result Value Ref Range    Lipase 61 (H) 4 - 60 U/L   Urine culture    Collection Time: 06/06/18  8:08 AM   Result Value Ref Range    Urine Culture, Routine ESCHERICHIA COLI  >100,000 cfu/ml          Susceptibility    Escherichia coli - CULTURE, URINE     Amp/Sulbactam 16/8 Intermediate mcg/mL     Ampicillin >16 Resistant mcg/mL     Amox/K Clav'ate <=8/4 Sensitive mcg/mL     Ceftriaxone <=8 Sensitive mcg/mL     Cefazolin <=8 Sensitive mcg/mL     Ciprofloxacin >2 Resistant mcg/mL     Cefepime <=8 Sensitive mcg/mL     Ertapenem <=2 Sensitive mcg/mL     Nitrofurantoin <=32 Sensitive mcg/mL     Gentamicin <=4 Sensitive mcg/mL     Meropenem <=4 Sensitive mcg/mL     Piperacillin/Tazo <=16 Sensitive  mcg/mL     Trimeth/Sulfa <=2/38 Sensitive mcg/mL     Tetracycline <=4 Sensitive mcg/mL     Tobramycin <=4 Sensitive mcg/mL   Microalbumin/creatinine urine ratio    Collection Time: 06/06/18  8:09 AM   Result Value Ref Range    Microalbum.,U,Random 16.0 ug/mL    Creatinine, Random Ur 103.0 15.0 - 325.0 mg/dL    Microalb Creat Ratio 15.5 0.0 - 30.0 ug/mg   Urinalysis    Collection Time: 06/06/18  8:09 AM   Result Value Ref Range    Specimen UA Urine, Unspecified     Color, UA Yellow Yellow, Straw, Julieta    Appearance, UA Clear Clear    pH, UA 6.0 5.0 - 8.0    Specific Gravity, UA 1.020 1.005 - 1.030    Protein, UA Negative Negative    Glucose, UA Negative Negative    Ketones, UA Negative Negative    Bilirubin (UA) Negative Negative    Occult Blood UA Negative Negative    Nitrite, UA Negative Negative    Urobilinogen, UA Negative <2.0 EU/dL    Leukocytes, UA Trace (A) Negative   Urinalysis Microscopic    Collection Time: 06/06/18  8:09 AM   Result Value Ref Range    WBC, UA 25 (H) 0 - 5 /hpf    Bacteria, UA Few (A) None-Occ /hpf    Microscopic Comment SEE COMMENT    CBC auto differential    Collection Time: 06/18/18  2:21 PM   Result Value Ref Range    WBC 15.10 (H) 3.90 - 12.70 K/uL    RBC 3.71 (L) 4.00 - 5.40 M/uL    Hemoglobin 11.1 (L) 12.0 - 16.0 g/dL    Hematocrit 33.4 (L) 37.0 - 48.5 %    MCV 90 82 - 98 fL    MCH 30.1 27.0 - 31.0 pg    MCHC 33.4 32.0 - 36.0 g/dL    RDW 17.0 (H) 11.5 - 14.5 %    Platelets 510 (H) 150 - 350 K/uL    MPV 7.9 (L) 9.2 - 12.9 fL    Gran # (ANC) 9.7 (H) 1.8 - 7.7 K/uL    Lymph # 3.8 1.0 - 4.8 K/uL    Mono # 1.3 (H) 0.3 - 1.0 K/uL    Eos # 0.1 0.0 - 0.5 K/uL    Baso # 0.10 0.00 - 0.20 K/uL    Gran% 64.2 38.0 - 73.0 %    Lymph% 25.3 18.0 - 48.0 %    Mono% 8.9 4.0 - 15.0 %    Eosinophil% 0.9 0.0 - 8.0 %    Basophil% 0.7 0.0 - 1.9 %    Differential Method Automated    Comprehensive metabolic panel    Collection Time: 06/18/18  2:21 PM   Result Value Ref Range    Sodium 136 136 - 145 mmol/L     Potassium 4.5 3.5 - 5.1 mmol/L    Chloride 108 95 - 110 mmol/L    CO2 16 (L) 23 - 29 mmol/L    Glucose 122 (H) 70 - 110 mg/dL    BUN, Bld 26 (H) 6 - 20 mg/dL    Creatinine 1.7 (H) 0.5 - 1.4 mg/dL    Calcium 9.7 8.7 - 10.5 mg/dL    Total Protein 8.2 6.0 - 8.4 g/dL    Albumin 2.3 (L) 3.5 - 5.2 g/dL    Total Bilirubin 0.8 0.1 - 1.0 mg/dL    Alkaline Phosphatase 60 55 - 135 U/L    AST 41 (H) 10 - 40 U/L    ALT 9 (L) 10 - 44 U/L    Anion Gap 12 8 - 16 mmol/L    eGFR if African American 38 (A) >60 mL/min/1.73 m^2    eGFR if non African American 33 (A) >60 mL/min/1.73 m^2   POCT glucose    Collection Time: 06/20/18  5:27 PM   Result Value Ref Range    POCT Glucose 102 70 - 110 mg/dL       Diagnostic Studies:  CT imaging personally reviewed- large mass vs abscess or combination of both    Assessment:  57 F with mass/abscess abutting colon and liver    Plan:  Agree with surgery in AM  IV abx  Recommend colonoscopy as outpatient in 3-6 months after surgery when cleared by general surgery  Pt instructed to follow up with me as outpatient

## 2018-06-20 NOTE — PROGRESS NOTES
Pt admitted via direct admit from [facility]. MD office visit.  Mother and father at chairside.  Pt ambulates/transfers with one staff assist. Pt reports pain to RUQ.  Admit information to be completed. Will continue to monitor.

## 2018-06-20 NOTE — H&P
PCP: Mann Garduno MD    History & Physical    Chief Complaint: Abdominal pain    History of Present Illness:  Patient is a 57 y.o. female admitted to Hospitalist Service from Dr. Garduno's office with complaint of abdominal pain since December, 2017. Patient reportedly has past medical history significant for Congenital microcephaly and diet controlled DM. Part of the history obtained from patient's parents and Dr. Garduno. Patient has been complaining of RUQ and right flank pain for few months now. She was being worked up for cholelithiasis and possible cholecystectomy planned by Dr. Joel. Patient was note dto have UTI and leukocytosis and completed PO Cipro course. Still having leukocytosis. Patient has a low appetite and has lost almost 50 lbs in 6 months. No fever or chills. Patient denied chest pain, shortness of breath, headache, vision changes, focal neuro-deficits, cough or fever.    Past Medical History:   Diagnosis Date    Congenital small head     Diabetes mellitus     dIET CONTROL     No past surgical history on file.  No family history on file.  Social History   Substance Use Topics    Smoking status: Never Smoker    Smokeless tobacco: Never Used    Alcohol use No      Review of patient's allergies indicates:   Allergen Reactions    Sulfa (sulfonamide antibiotics) Rash    Tetracyclines Rash     PTA Medications   Medication Sig    brinzolamide (AZOPT) 1 % ophthalmic suspension 1 drop 3 (three) times daily.    calcium carbonate (OS-WALLY) 600 mg calcium (1,500 mg) Tab Take 600 mg by mouth once.    cholecalciferol, vitamin D3, (VITAMIN D3) 1,000 unit capsule Take 1,000 Units by mouth once daily.    docusate sodium (COLACE) 100 MG capsule Take 100 mg by mouth 2 (two) times daily.    dorzolamide-timolol, PF, (COSOPT PF) 2-0.5 % Dpet ophthalmic solution 1 drop 2 (two) times daily.    fenofibrate (LOFIBRA) 160 MG Tab Take 160 mg by mouth once daily.    fluticasone (FLONASE) 50 mcg/actuation  nasal spray 1 spray by Each Nare route once daily.    furosemide (LASIX) 20 MG tablet Take 20 mg by mouth 2 (two) times daily.    latanoprost 0.005 % ophthalmic solution 1 drop every evening.    levothyroxine (SYNTHROID) 25 MCG tablet Take 25 mcg by mouth once daily.    ranitidine (ZANTAC) 150 MG capsule Take 150 mg by mouth 2 (two) times daily.    simvastatin (ZOCOR) 10 MG tablet Take 10 mg by mouth every evening.    sodium bicarbonate 650 MG tablet Take 650 mg by mouth 4 (four) times daily.     Review of Systems:  Constitutional: no fever or chills. + Low appetite, weight loss 50 lbs in 6 months  Eyes: no visual changes  Ears, nose, mouth, throat, and face: no nasal congestion or sore throat  Respiratory: no cough or shorness of breath  Cardiovascular: no chest pain or palpitations  Gastrointestinal: see HPI  Genitourinary: no hematuria or dysuria  Integument/breast: no rash or pruritis  Hematologic/lymphatic: no easy bruising or lymphadenopathy  Musculoskeletal: no arthralgias or myalgias  Neurological: no seizures or tremors.  Behavioral/Psych: no auditory or visual hallucinations  Endocrine: no heat or cold intolerance     OBJECTIVE:     Vital Signs (Most Recent)  Temp: 97.8 °F (36.6 °C) (06/20/18 1410)  Pulse: 87 (06/20/18 1410)  Resp: 16 (06/20/18 1410)  BP: 127/63 (06/20/18 1410)  SpO2: 100 % (06/20/18 1410)    Physical Exam:  General appearance: well developed, appears stated age  Head: normocephalic, atraumatic, microcephally  Eyes:  conjunctivae/corneas clear. PERRL.  Nose: Nares normal. Septum midline.  Throat: lips, mucosa, and tongue normal; teeth and gums normal, no throat erythema.  Neck: supple, symmetrical, trachea midline, no JVD and thyroid not enlarged, symmetric, no tenderness/mass/nodules  Lungs:  clear to auscultation bilaterally and normal respiratory effort  Chest wall: no tenderness  Heart: regular rate and rhythm, S1, S2 normal, no murmur, click, rub or gallop  Abdomen: soft,  non-tender non-distented; bowel sounds normal; no masses,  no organomegaly  Extremities: no cyanosis, clubbing or edema.   Pulses: 2+ and symmetric  Skin: Skin color, texture, turgor normal. No rashes or lesions.  Lymph nodes: Cervical, supraclavicular, and axillary nodes normal.  Neurologic: Grossly non-focal. Answers simple question, short sentences.    Laboratory:   CBC:   Recent Labs  Lab 06/18/18  1421   WBC 15.10*   RBC 3.71*   HGB 11.1*   HCT 33.4*   *   MCV 90   MCH 30.1   MCHC 33.4     CMP:   Recent Labs  Lab 06/18/18  1421   *   CALCIUM 9.7   ALBUMIN 2.3*   PROT 8.2      K 4.5   CO2 16*      BUN 26*   CREATININE 1.7*   ALKPHOS 60   ALT 9*   AST 41*   BILITOT 0.8     Microbiology Results (last 7 days)     Procedure Component Value Units Date/Time    Blood culture [[id number]] Collected:  06/20/18 1627    Order Status:  Sent Specimen:  Blood Updated:  06/20/18 1627    Blood culture [[id number]] Collected:  06/20/18 1620    Order Status:  Sent Specimen:  Blood Updated:  06/20/18 1620    Urine culture [[id number]]     Order Status:  No result Specimen:  Urine         Hemoglobin A1C   Date Value Ref Range Status   06/06/2018 4.8 4.0 - 5.6 % Final     Comment:     ADA Screening Guidelines:  5.7-6.4%  Consistent with prediabetes  >or=6.5%  Consistent with diabetes  High levels of fetal hemoglobin interfere with the HbA1C  assay. Heterozygous hemoglobin variants (HbS, HgC, etc)do  not significantly interfere with this assay.   However, presence of multiple variants may affect accuracy.     04/02/2018 5.0 4.0 - 5.6 % Final     Comment:     According to ADA guidelines, hemoglobin A1c <7.0% represents  optimal control in non-pregnant diabetic patients. Different  metrics may apply to specific patient populations.   Standards of Medical Care in Diabetes-2016.  For the purpose of screening for the presence of diabetes:  <5.7%     Consistent with the absence of diabetes  5.7-6.4%  Consistent with  increasing risk for diabetes   (prediabetes)  >or=6.5%  Consistent with diabetes  Currently, no consensus exists for use of hemoglobin A1c  for diagnosis of diabetes for children.  This Hemoglobin A1c assay has significant interference with fetal   hemoglobin   (HbF). The results are invalid for patients with abnormal amounts of   HbF,   including those with known Hereditary Persistence   of Fetal Hemoglobin. Heterozygous hemoglobin variants (HbAS, HbAC,   HbAD, HbAE, HbA2) do not significantly interfere with this assay;   however, presence of multiple variants in a sample may impact the %   interference.       Microbiology Results (last 7 days)     ** No results found for the last 168 hours. **        Diagnostic Results:  CT abdomen (06-19-18): Large subphrenic mass on the right abutting could be involving the liver extending into the right lateral abdominal wall, heterogeneous and complex in appearance suggesting complex fluid collection and containing small foci of air.  Differential includes large abscess, or necrotic mass.  It is indistinguishable from, abutting adjacent colon with colon wall thickening and findings thought most suggestive of abscess secondary to contained right colon perforation from colon cancer or less likely right-sided diverticulitis.  This corresponds to findings on recent ultrasound of 6/9/2018.    US abdomen:   1. Cholelithiasis.  2. 7.5 x 5.4 x 7.3 cm complicated cystic structure interposed between the right hepatic lobe and right kidney, possibly a loop of bowel (such as the hepatic flexure) with internal debris.  Consider additional evaluation with contrast enhanced CT of the abdomen.    Assessment/Plan:     Active Hospital Problems    Diagnosis  POA    *Abdominal pain [R10.9]  Abdominal mass [R19.00]  Broad differential diagnosis - possible perforated diverticulum with contained abscess Vs colonic malignancy.  Discussed with Dr. Garduno, Dr. Joel and Dr. Garduno.  Dr. Zavala to  see.  Clear liquids for now, NPO after MN.  I+D planned for AM.  Check CEA.  Continue IV antibiotics.  Check CRP and TSH.    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    Not Applicable    Diabetes mellitus 2 [E11.9]  Unknown     Liquid diabetic diet.  Check blood glucose level q 6h.  Use Novolog Insulin Sliding Scale as needed.   Check HgA1c.      JAYSHREE (acute kidney injury) [N17.9]  Continue IVF hydration. Follow BMP.  Hold lasix.    Yes    Metabolic acidosis [E87.2]  Check lactate level. Follow blood and urine culture results.  Follow HCO3 level.  Yes           DVT prophylaxis: Lovenox 40 mg SQ q day.    Discussed with patient's parents in detail.      Bekah Fernandez MD  Department of Hospital Medicine   [facility]

## 2018-06-21 ENCOUNTER — ANESTHESIA EVENT (OUTPATIENT)
Dept: SURGERY | Facility: HOSPITAL | Age: 58
DRG: 329 | End: 2018-06-21
Payer: MEDICARE

## 2018-06-21 ENCOUNTER — ANESTHESIA (OUTPATIENT)
Dept: SURGERY | Facility: HOSPITAL | Age: 58
DRG: 329 | End: 2018-06-21
Payer: MEDICARE

## 2018-06-21 LAB
ABO + RH BLD: NORMAL
ALBUMIN SERPL BCP-MCNC: 1.7 G/DL
ALP SERPL-CCNC: 53 U/L
ALT SERPL W/O P-5'-P-CCNC: 8 U/L
ANION GAP SERPL CALC-SCNC: 8 MMOL/L
AST SERPL-CCNC: 28 U/L
BACTERIA #/AREA URNS HPF: NORMAL /HPF
BASOPHILS # BLD AUTO: 0 K/UL
BASOPHILS NFR BLD: 0.4 %
BILIRUB SERPL-MCNC: 0.8 MG/DL
BILIRUB UR QL STRIP: NEGATIVE
BLD GP AB SCN CELLS X3 SERPL QL: NORMAL
BUN SERPL-MCNC: 23 MG/DL
CALCIUM SERPL-MCNC: 8.7 MG/DL
CHLORIDE SERPL-SCNC: 108 MMOL/L
CLARITY UR: CLEAR
CO2 SERPL-SCNC: 20 MMOL/L
COLOR UR: YELLOW
CREAT SERPL-MCNC: 1.6 MG/DL
DIFFERENTIAL METHOD: ABNORMAL
EOSINOPHIL # BLD AUTO: 0.1 K/UL
EOSINOPHIL NFR BLD: 0.6 %
ERYTHROCYTE [DISTWIDTH] IN BLOOD BY AUTOMATED COUNT: 16.1 %
EST. GFR  (AFRICAN AMERICAN): 41 ML/MIN/1.73 M^2
EST. GFR  (NON AFRICAN AMERICAN): 36 ML/MIN/1.73 M^2
FOLATE SERPL-MCNC: 12.3 NG/ML
GLUCOSE SERPL-MCNC: 93 MG/DL
GLUCOSE UR QL STRIP: NEGATIVE
HCT VFR BLD AUTO: 26.1 %
HGB BLD-MCNC: 8.9 G/DL
HGB UR QL STRIP: NEGATIVE
IRON SERPL-MCNC: 29 UG/DL
KETONES UR QL STRIP: NEGATIVE
LEUKOCYTE ESTERASE UR QL STRIP: ABNORMAL
LYMPHOCYTES # BLD AUTO: 2.4 K/UL
LYMPHOCYTES NFR BLD: 22.8 %
MAGNESIUM SERPL-MCNC: 1.8 MG/DL
MCH RBC QN AUTO: 30.7 PG
MCHC RBC AUTO-ENTMCNC: 34.2 G/DL
MCV RBC AUTO: 90 FL
MICROSCOPIC COMMENT: NORMAL
MONOCYTES # BLD AUTO: 1.1 K/UL
MONOCYTES NFR BLD: 10 %
NEUTROPHILS # BLD AUTO: 7 K/UL
NEUTROPHILS NFR BLD: 66.2 %
NITRITE UR QL STRIP: NEGATIVE
PH UR STRIP: 7 [PH] (ref 5–8)
PLATELET # BLD AUTO: 341 K/UL
PMV BLD AUTO: 7.2 FL
POTASSIUM SERPL-SCNC: 3.7 MMOL/L
PROT SERPL-MCNC: 6 G/DL
PROT UR QL STRIP: NEGATIVE
RBC # BLD AUTO: 2.91 M/UL
SATURATED IRON: 12 %
SODIUM SERPL-SCNC: 136 MMOL/L
SP GR UR STRIP: 1.01 (ref 1–1.03)
TOTAL IRON BINDING CAPACITY: 246 UG/DL
TRANSFERRIN SERPL-MCNC: 166 MG/DL
URN SPEC COLLECT METH UR: ABNORMAL
UROBILINOGEN UR STRIP-ACNC: NEGATIVE EU/DL
VIT B12 SERPL-MCNC: 928 PG/ML
WBC # BLD AUTO: 10.6 K/UL
WBC #/AREA URNS HPF: 5 /HPF (ref 0–5)

## 2018-06-21 PROCEDURE — S0030 INJECTION, METRONIDAZOLE: HCPCS | Performed by: INTERNAL MEDICINE

## 2018-06-21 PROCEDURE — 83540 ASSAY OF IRON: CPT

## 2018-06-21 PROCEDURE — 82746 ASSAY OF FOLIC ACID SERUM: CPT

## 2018-06-21 PROCEDURE — D9220A PRA ANESTHESIA: Mod: CRNA,,, | Performed by: NURSE ANESTHETIST, CERTIFIED REGISTERED

## 2018-06-21 PROCEDURE — 87186 SC STD MICRODIL/AGAR DIL: CPT

## 2018-06-21 PROCEDURE — 87077 CULTURE AEROBIC IDENTIFY: CPT

## 2018-06-21 PROCEDURE — 25000242 PHARM REV CODE 250 ALT 637 W/ HCPCS: Performed by: INTERNAL MEDICINE

## 2018-06-21 PROCEDURE — 85025 COMPLETE CBC W/AUTO DIFF WBC: CPT

## 2018-06-21 PROCEDURE — 36415 COLL VENOUS BLD VENIPUNCTURE: CPT

## 2018-06-21 PROCEDURE — 87076 CULTURE ANAEROBE IDENT EACH: CPT

## 2018-06-21 PROCEDURE — 25000003 PHARM REV CODE 250: Performed by: INTERNAL MEDICINE

## 2018-06-21 PROCEDURE — 86920 COMPATIBILITY TEST SPIN: CPT

## 2018-06-21 PROCEDURE — 87086 URINE CULTURE/COLONY COUNT: CPT

## 2018-06-21 PROCEDURE — 36000705 HC OR TIME LEV I EA ADD 15 MIN: Performed by: SURGERY

## 2018-06-21 PROCEDURE — 99900103 DSU ONLY-NO CHARGE-INITIAL HR (STAT): Performed by: SURGERY

## 2018-06-21 PROCEDURE — 87070 CULTURE OTHR SPECIMN AEROBIC: CPT

## 2018-06-21 PROCEDURE — 86901 BLOOD TYPING SEROLOGIC RH(D): CPT

## 2018-06-21 PROCEDURE — 63600175 PHARM REV CODE 636 W HCPCS: Performed by: NURSE ANESTHETIST, CERTIFIED REGISTERED

## 2018-06-21 PROCEDURE — 94799 UNLISTED PULMONARY SVC/PX: CPT

## 2018-06-21 PROCEDURE — 87205 SMEAR GRAM STAIN: CPT

## 2018-06-21 PROCEDURE — 81000 URINALYSIS NONAUTO W/SCOPE: CPT

## 2018-06-21 PROCEDURE — 71000033 HC RECOVERY, INTIAL HOUR: Performed by: SURGERY

## 2018-06-21 PROCEDURE — 0W9F0ZZ DRAINAGE OF ABDOMINAL WALL, OPEN APPROACH: ICD-10-PCS | Performed by: SURGERY

## 2018-06-21 PROCEDURE — 25000003 PHARM REV CODE 250: Performed by: SURGERY

## 2018-06-21 PROCEDURE — 25000003 PHARM REV CODE 250: Performed by: NURSE ANESTHETIST, CERTIFIED REGISTERED

## 2018-06-21 PROCEDURE — 87075 CULTR BACTERIA EXCEPT BLOOD: CPT

## 2018-06-21 PROCEDURE — 99232 SBSQ HOSP IP/OBS MODERATE 35: CPT | Mod: ,,, | Performed by: INTERNAL MEDICINE

## 2018-06-21 PROCEDURE — 83735 ASSAY OF MAGNESIUM: CPT

## 2018-06-21 PROCEDURE — 63600175 PHARM REV CODE 636 W HCPCS: Performed by: INTERNAL MEDICINE

## 2018-06-21 PROCEDURE — 36000704 HC OR TIME LEV I 1ST 15 MIN: Performed by: SURGERY

## 2018-06-21 PROCEDURE — 80053 COMPREHEN METABOLIC PANEL: CPT

## 2018-06-21 PROCEDURE — 49060 DRAIN OPEN RETROPERI ABSCESS: CPT | Mod: ,,, | Performed by: SURGERY

## 2018-06-21 PROCEDURE — 82607 VITAMIN B-12: CPT

## 2018-06-21 PROCEDURE — D9220A PRA ANESTHESIA: Mod: ANES,,, | Performed by: ANESTHESIOLOGY

## 2018-06-21 PROCEDURE — 94761 N-INVAS EAR/PLS OXIMETRY MLT: CPT

## 2018-06-21 PROCEDURE — 37000008 HC ANESTHESIA 1ST 15 MINUTES: Performed by: SURGERY

## 2018-06-21 PROCEDURE — 12000002 HC ACUTE/MED SURGE SEMI-PRIVATE ROOM

## 2018-06-21 PROCEDURE — 37000009 HC ANESTHESIA EA ADD 15 MINS: Performed by: SURGERY

## 2018-06-21 RX ORDER — FENTANYL CITRATE 50 UG/ML
INJECTION, SOLUTION INTRAMUSCULAR; INTRAVENOUS
Status: DISCONTINUED | OUTPATIENT
Start: 2018-06-21 | End: 2018-06-21

## 2018-06-21 RX ORDER — DIPHENHYDRAMINE HYDROCHLORIDE 50 MG/ML
25 INJECTION INTRAMUSCULAR; INTRAVENOUS EVERY 6 HOURS PRN
Status: DISCONTINUED | OUTPATIENT
Start: 2018-06-21 | End: 2018-06-21

## 2018-06-21 RX ORDER — ROCURONIUM BROMIDE 10 MG/ML
INJECTION, SOLUTION INTRAVENOUS
Status: DISCONTINUED | OUTPATIENT
Start: 2018-06-21 | End: 2018-06-21

## 2018-06-21 RX ORDER — SODIUM CHLORIDE, SODIUM LACTATE, POTASSIUM CHLORIDE, CALCIUM CHLORIDE 600; 310; 30; 20 MG/100ML; MG/100ML; MG/100ML; MG/100ML
INJECTION, SOLUTION INTRAVENOUS CONTINUOUS
Status: DISCONTINUED | OUTPATIENT
Start: 2018-06-21 | End: 2018-06-21

## 2018-06-21 RX ORDER — DEXTROSE MONOHYDRATE, SODIUM CHLORIDE, AND POTASSIUM CHLORIDE 50; 1.49; 9 G/1000ML; G/1000ML; G/1000ML
INJECTION, SOLUTION INTRAVENOUS CONTINUOUS
Status: CANCELLED | OUTPATIENT
Start: 2018-06-21

## 2018-06-21 RX ORDER — METRONIDAZOLE 500 MG/100ML
500 INJECTION, SOLUTION INTRAVENOUS
Status: DISCONTINUED | OUTPATIENT
Start: 2018-06-21 | End: 2018-06-26

## 2018-06-21 RX ORDER — ACETAMINOPHEN 325 MG/1
650 TABLET ORAL EVERY 4 HOURS PRN
Status: DISCONTINUED | OUTPATIENT
Start: 2018-06-21 | End: 2018-07-04

## 2018-06-21 RX ORDER — SODIUM CHLORIDE 0.9 % (FLUSH) 0.9 %
3 SYRINGE (ML) INJECTION
Status: DISCONTINUED | OUTPATIENT
Start: 2018-06-21 | End: 2018-06-21

## 2018-06-21 RX ORDER — HYDROCODONE BITARTRATE AND ACETAMINOPHEN 5; 325 MG/1; MG/1
1 TABLET ORAL EVERY 4 HOURS PRN
Status: DISCONTINUED | OUTPATIENT
Start: 2018-06-21 | End: 2018-07-04

## 2018-06-21 RX ORDER — MIDAZOLAM HYDROCHLORIDE 1 MG/ML
INJECTION, SOLUTION INTRAMUSCULAR; INTRAVENOUS
Status: DISCONTINUED | OUTPATIENT
Start: 2018-06-21 | End: 2018-06-21

## 2018-06-21 RX ORDER — MEPERIDINE HYDROCHLORIDE 25 MG/ML
12.5 INJECTION INTRAMUSCULAR; INTRAVENOUS; SUBCUTANEOUS ONCE AS NEEDED
Status: DISCONTINUED | OUTPATIENT
Start: 2018-06-21 | End: 2018-06-21

## 2018-06-21 RX ORDER — PROPOFOL 10 MG/ML
VIAL (ML) INTRAVENOUS
Status: DISCONTINUED | OUTPATIENT
Start: 2018-06-21 | End: 2018-06-21

## 2018-06-21 RX ORDER — ACETAMINOPHEN 325 MG/1
650 TABLET ORAL EVERY 6 HOURS PRN
Status: DISCONTINUED | OUTPATIENT
Start: 2018-06-21 | End: 2018-07-04

## 2018-06-21 RX ORDER — PANTOPRAZOLE SODIUM 40 MG/10ML
40 INJECTION, POWDER, LYOPHILIZED, FOR SOLUTION INTRAVENOUS
Status: DISCONTINUED | OUTPATIENT
Start: 2018-06-22 | End: 2018-07-09 | Stop reason: HOSPADM

## 2018-06-21 RX ORDER — HYDROMORPHONE HYDROCHLORIDE 1 MG/ML
1 INJECTION, SOLUTION INTRAMUSCULAR; INTRAVENOUS; SUBCUTANEOUS
Status: DISCONTINUED | OUTPATIENT
Start: 2018-06-21 | End: 2018-06-27

## 2018-06-21 RX ORDER — PHENYLEPHRINE HYDROCHLORIDE 10 MG/ML
INJECTION INTRAVENOUS
Status: DISCONTINUED | OUTPATIENT
Start: 2018-06-21 | End: 2018-06-21

## 2018-06-21 RX ORDER — OXYCODONE HYDROCHLORIDE 5 MG/1
5 TABLET ORAL
Status: DISCONTINUED | OUTPATIENT
Start: 2018-06-21 | End: 2018-06-21

## 2018-06-21 RX ORDER — ONDANSETRON 2 MG/ML
4 INJECTION INTRAMUSCULAR; INTRAVENOUS DAILY PRN
Status: DISCONTINUED | OUTPATIENT
Start: 2018-06-21 | End: 2018-06-21

## 2018-06-21 RX ORDER — SUCCINYLCHOLINE CHLORIDE 20 MG/ML
INJECTION INTRAMUSCULAR; INTRAVENOUS
Status: DISCONTINUED | OUTPATIENT
Start: 2018-06-21 | End: 2018-06-21

## 2018-06-21 RX ORDER — ONDANSETRON HYDROCHLORIDE 2 MG/ML
INJECTION, SOLUTION INTRAMUSCULAR; INTRAVENOUS
Status: DISCONTINUED | OUTPATIENT
Start: 2018-06-21 | End: 2018-06-21

## 2018-06-21 RX ORDER — DEXAMETHASONE SODIUM PHOSPHATE 4 MG/ML
INJECTION, SOLUTION INTRA-ARTICULAR; INTRALESIONAL; INTRAMUSCULAR; INTRAVENOUS; SOFT TISSUE
Status: DISCONTINUED | OUTPATIENT
Start: 2018-06-21 | End: 2018-06-21

## 2018-06-21 RX ORDER — SODIUM CHLORIDE 0.9 % (FLUSH) 0.9 %
3 SYRINGE (ML) INJECTION EVERY 8 HOURS
Status: DISCONTINUED | OUTPATIENT
Start: 2018-06-21 | End: 2018-06-21

## 2018-06-21 RX ORDER — FENTANYL CITRATE 50 UG/ML
25 INJECTION, SOLUTION INTRAMUSCULAR; INTRAVENOUS EVERY 5 MIN PRN
Status: DISCONTINUED | OUTPATIENT
Start: 2018-06-21 | End: 2018-06-21

## 2018-06-21 RX ORDER — LIDOCAINE HCL/PF 100 MG/5ML
SYRINGE (ML) INTRAVENOUS
Status: DISCONTINUED | OUTPATIENT
Start: 2018-06-21 | End: 2018-06-21

## 2018-06-21 RX ORDER — BUPIVACAINE HYDROCHLORIDE AND EPINEPHRINE 2.5; 5 MG/ML; UG/ML
INJECTION, SOLUTION EPIDURAL; INFILTRATION; INTRACAUDAL; PERINEURAL
Status: DISCONTINUED | OUTPATIENT
Start: 2018-06-21 | End: 2018-06-21 | Stop reason: HOSPADM

## 2018-06-21 RX ORDER — ONDANSETRON 4 MG/1
8 TABLET, ORALLY DISINTEGRATING ORAL EVERY 8 HOURS PRN
Status: DISCONTINUED | OUTPATIENT
Start: 2018-06-21 | End: 2018-07-04

## 2018-06-21 RX ADMIN — SUCCINYLCHOLINE CHLORIDE 100 MG: 20 INJECTION, SOLUTION INTRAMUSCULAR; INTRAVENOUS at 01:06

## 2018-06-21 RX ADMIN — LIDOCAINE HYDROCHLORIDE 75 MG: 20 INJECTION, SOLUTION INTRAVENOUS at 01:06

## 2018-06-21 RX ADMIN — PIPERACILLIN SODIUM AND TAZOBACTAM SODIUM 4.5 G: 4; .5 INJECTION, POWDER, LYOPHILIZED, FOR SOLUTION INTRAVENOUS at 09:06

## 2018-06-21 RX ADMIN — PHENYLEPHRINE HYDROCHLORIDE 200 MCG: 10 INJECTION INTRAVENOUS at 01:06

## 2018-06-21 RX ADMIN — BRINZOLAMIDE 1 DROP: 10 SUSPENSION/ DROPS OPHTHALMIC at 05:06

## 2018-06-21 RX ADMIN — SODIUM CHLORIDE, SODIUM LACTATE, POTASSIUM CHLORIDE, AND CALCIUM CHLORIDE: .6; .31; .03; .02 INJECTION, SOLUTION INTRAVENOUS at 12:06

## 2018-06-21 RX ADMIN — FENTANYL CITRATE 50 MCG: 50 INJECTION, SOLUTION INTRAMUSCULAR; INTRAVENOUS at 01:06

## 2018-06-21 RX ADMIN — SODIUM BICARBONATE 650 MG TABLET 650 MG: at 05:06

## 2018-06-21 RX ADMIN — PROPOFOL 100 MG: 10 INJECTION, EMULSION INTRAVENOUS at 01:06

## 2018-06-21 RX ADMIN — PIPERACILLIN SODIUM AND TAZOBACTAM SODIUM 4.5 G: 4; .5 INJECTION, POWDER, LYOPHILIZED, FOR SOLUTION INTRAVENOUS at 01:06

## 2018-06-21 RX ADMIN — METRONIDAZOLE 500 MG: 500 INJECTION, SOLUTION INTRAVENOUS at 05:06

## 2018-06-21 RX ADMIN — DEXAMETHASONE SODIUM PHOSPHATE 4 MG: 4 INJECTION, SOLUTION INTRAMUSCULAR; INTRAVENOUS at 01:06

## 2018-06-21 RX ADMIN — MIDAZOLAM 2 MG: 1 INJECTION INTRAMUSCULAR; INTRAVENOUS at 01:06

## 2018-06-21 RX ADMIN — FLUTICASONE PROPIONATE 50 MCG: 50 SPRAY, METERED NASAL at 08:06

## 2018-06-21 RX ADMIN — PIPERACILLIN SODIUM AND TAZOBACTAM SODIUM 4.5 G: 4; .5 INJECTION, POWDER, LYOPHILIZED, FOR SOLUTION INTRAVENOUS at 06:06

## 2018-06-21 RX ADMIN — ROCURONIUM BROMIDE 5 MG: 10 INJECTION, SOLUTION INTRAVENOUS at 01:06

## 2018-06-21 RX ADMIN — SODIUM CHLORIDE: 0.45 INJECTION, SOLUTION INTRAVENOUS at 05:06

## 2018-06-21 RX ADMIN — LATANOPROST 1 DROP: 50 SOLUTION OPHTHALMIC at 09:06

## 2018-06-21 RX ADMIN — SODIUM BICARBONATE 650 MG TABLET 650 MG: at 09:06

## 2018-06-21 RX ADMIN — DORZOLAMIDE HYDROCHLORIDE AND TIMOLOL MALEATE 1 DROP: 20; 5 SOLUTION/ DROPS OPHTHALMIC at 08:06

## 2018-06-21 RX ADMIN — DORZOLAMIDE HYDROCHLORIDE AND TIMOLOL MALEATE 1 DROP: 20; 5 SOLUTION/ DROPS OPHTHALMIC at 09:06

## 2018-06-21 RX ADMIN — BRINZOLAMIDE 1 DROP: 10 SUSPENSION/ DROPS OPHTHALMIC at 09:06

## 2018-06-21 RX ADMIN — ONDANSETRON 4 MG: 2 INJECTION, SOLUTION INTRAMUSCULAR; INTRAVENOUS at 01:06

## 2018-06-21 RX ADMIN — SIMVASTATIN 10 MG: 10 TABLET, FILM COATED ORAL at 09:06

## 2018-06-21 RX ADMIN — PHENYLEPHRINE HYDROCHLORIDE 100 MCG: 10 INJECTION INTRAVENOUS at 01:06

## 2018-06-21 NOTE — PLAN OF CARE
I completed the assessment with the pt and her parents at bedside. The pt resides with them. She denies HH or DME. She does not drive. Verified PCP as Dr. Garduno and insurance as Medicare and Medicaid. I educated the family on the blue discharge folder and wrote my name and number on the pts white board. Mirta M[place]NDEZ [facility] [place]     06/21/18 0943   Discharge Assessment   Assessment Type Discharge Planning Assessment   Confirmed/corrected address and phone number on facesheet? Yes   Assessment information obtained from? Patient   Communicated expected length of stay with patient/caregiver no   Prior to hospitilization cognitive status: Alert/Oriented   Prior to hospitalization functional status: Independent   Current cognitive status: Alert/Oriented   Current Functional Status: Independent   Lives With parent(s)   Able to Return to Prior Arrangements yes   Is patient able to care for self after discharge? Yes   Who are your caregiver(s) and their phone number(s)? Lisa Teixeira- mother [phone]   Readmission Within The Last 30 Days no previous admission in last 30 days   Patient currently being followed by outpatient case management? No   Patient currently receives any other outside agency services? No   Equipment Currently Used at Home none   Do you have any problems affording any of your prescribed medications? No   Is the patient taking medications as prescribed? yes   Does the patient have transportation home? Yes   Transportation Available family or friend will provide   Does the patient receive services at the [facility]? No   Discharge Plan A Home with family   Discharge Plan B Home   Patient/Family In Agreement With Plan yes

## 2018-06-21 NOTE — BRIEF OP NOTE
[facility]  Brief Operative Note    SUMMARY     Surgery Date: 6/21/2018     Surgeon(s) and Role:     * Kumar Torres MD - Primary    Assisting Surgeon: None    Pre-op Diagnosis:  Abdominal wall abscess [L02.211]    Post-op Diagnosis:  Post-Op Diagnosis Codes:     * Intra Abdominal  abscess [L02.211]    Procedure(s) (LRB):  INCISION AND DRAINAGE, ABSCESS (Right)    Anesthesia: General    Description of the findings of the procedure: Large abscess extending into right retroperitoneal area    Estimated Blood Loss: 5 mL         Specimens: Cultures of abscess.  Specimen (12h ago through future)    None

## 2018-06-21 NOTE — PLAN OF CARE
Problem: Patient Care Overview  Goal: Plan of Care Review  Outcome: Ongoing (interventions implemented as appropriate)  Plan of care reviewed with mother & patient. IV fluids & IV antibiotics given as per orders. No complaints of pain during night. Instructed mother & patient about surgery tomorrow & to be NPO after MN. Verbalized understanding. HS bg 95, no ss coverage needed. Remains free from falls/injury. Instructed to call for assistance as needed during night. Verbalized understanding, call light in reach. Bed in low & locked position. Bed alarm in use.

## 2018-06-21 NOTE — ANESTHESIA POSTPROCEDURE EVALUATION
"Anesthesia Post Evaluation    Patient: Sis Teixeira    Procedure(s) Performed: Procedure(s) (LRB):  INCISION AND DRAINAGE, ABSCESS (Right)    Final Anesthesia Type: general  Patient location during evaluation: PACU  Patient participation: Yes- Able to Participate  Level of consciousness: awake and alert and oriented  Post-procedure vital signs: reviewed and stable  Pain management: adequate  Airway patency: patent  PONV status at discharge: No PONV  Anesthetic complications: no      Cardiovascular status: blood pressure returned to baseline and stable  Respiratory status: unassisted and spontaneous ventilation  Hydration status: euvolemic  Follow-up not needed.        Visit Vitals  /60   Pulse 67   Temp 36.7 °C (98 °F)   Resp 18   Ht 4' 11" (1.499 m)   Wt 61.2 kg (135 lb)   SpO2 100%   Breastfeeding? No   BMI 27.27 kg/m²       Pain/Storm Score: Pain Assessment Performed: Yes (6/21/2018  2:15 PM)  Presence of Pain: denies (6/21/2018  2:45 PM)  Storm Score: 10 (6/21/2018  2:45 PM)      "

## 2018-06-21 NOTE — TRANSFER OF CARE
Anesthesia Transfer of Care Note    Patient: Sis Teixeira    Procedure(s) Performed: Procedure(s) (LRB):  INCISION AND DRAINAGE, ABSCESS (Right)    Patient location: PACU    Anesthesia Type: general    Transport from OR: Transported from OR on 2-3 L/min O2 by NC with adequate spontaneous ventilation    Post pain: adequate analgesia    Post assessment: no apparent anesthetic complications and tolerated procedure well    Post vital signs: stable    Level of consciousness: awake, alert and oriented    Nausea/Vomiting: no nausea/vomiting    Complications: none    Transfer of care protocol was followed      Last vitals:   Visit Vitals  /63   Pulse 71   Temp 36.5 °C (97.7 °F)   Resp 20   SpO2 98%   Breastfeeding? No

## 2018-06-21 NOTE — ANESTHESIA PREPROCEDURE EVALUATION
06/21/2018  Sis Teixeira is a 57 y.o., female.    Anesthesia Evaluation    I have reviewed the Patient Summary Reports.    I have reviewed the Nursing Notes.   I have reviewed the Medications.     Review of Systems  Anesthesia Hx:  No problems with previous Anesthesia    Social:  Non-Smoker    Cardiovascular:  Cardiovascular Normal     Pulmonary:  Pulmonary Normal    Renal/:   Chronic Renal Disease    Neurological:  Neurology Normal Congenital Small Head  Diminished Cognitive ability   Endocrine:   Diabetes, well controlled, type 2        Physical Exam  General:  Well nourished    Airway/Jaw/Neck:  Airway Findings: Mouth Opening: Normal Tongue: Normal  General Airway Assessment: Adult  Oropharynx Findings:  Mallampati: II  Jaw/Neck Findings:  Neck ROM: Normal ROM     Eyes/Ears/Nose:  Eyes/Ears/Nose Findings:    Dental:  Dental Findings: In tact   Chest/Lungs:  Chest/Lungs Findings: Clear to auscultation, Normal Respiratory Rate     Heart/Vascular:  Heart Findings: Rate: Normal  Rhythm: Regular Rhythm        Mental Status:  Mental Status Findings:  Cooperative, Alert and Oriented         Anesthesia Plan  Type of Anesthesia, risks & benefits discussed:  Anesthesia Type:  general  Patient's Preference:   Intra-op Monitoring Plan:   Intra-op Monitoring Plan Comments:   Post Op Pain Control Plan: multimodal analgesia  Post Op Pain Control Plan Comments:   Induction:   IV  Beta Blocker:  Patient is not currently on a Beta-Blocker (No further documentation required).       Informed Consent: Patient representative understands risks and agrees with Anesthesia plan.  Questions answered. Anesthesia consent signed with patient representative.  ASA Score: 3     Day of Surgery Review of History & Physical:  There are no significant changes.   H&P completed by Anesthesiologist.   Anesthesia Plan Notes: Pt is cared  for my her mother and has diminished cognitive ability at exam.  H&P & Consent with mother and Pt.        Ready For Surgery From Anesthesia Perspective.

## 2018-06-21 NOTE — UM SECONDARY REVIEW
Physician Advisor External    Level of Care Issue    Approved Inpatient for admit 6/20/2018 per Dr. Renner at [facility].

## 2018-06-22 LAB
ALBUMIN SERPL BCP-MCNC: 1.5 G/DL
ALP SERPL-CCNC: 41 U/L
ALT SERPL W/O P-5'-P-CCNC: 8 U/L
ANION GAP SERPL CALC-SCNC: 9 MMOL/L
AST SERPL-CCNC: 27 U/L
BACTERIA UR CULT: NO GROWTH
BASOPHILS # BLD AUTO: 0 K/UL
BASOPHILS NFR BLD: 0.3 %
BILIRUB SERPL-MCNC: 0.5 MG/DL
BUN SERPL-MCNC: 21 MG/DL
CALCIUM SERPL-MCNC: 7.9 MG/DL
CHLORIDE SERPL-SCNC: 108 MMOL/L
CO2 SERPL-SCNC: 17 MMOL/L
CREAT SERPL-MCNC: 1.5 MG/DL
DIFFERENTIAL METHOD: ABNORMAL
EOSINOPHIL # BLD AUTO: 0 K/UL
EOSINOPHIL NFR BLD: 0.1 %
ERYTHROCYTE [DISTWIDTH] IN BLOOD BY AUTOMATED COUNT: 16.4 %
EST. GFR  (AFRICAN AMERICAN): 45 ML/MIN/1.73 M^2
EST. GFR  (NON AFRICAN AMERICAN): 39 ML/MIN/1.73 M^2
GLUCOSE SERPL-MCNC: 176 MG/DL
GRAM STN SPEC: NORMAL
HCT VFR BLD AUTO: 27.6 %
HGB BLD-MCNC: 9.2 G/DL
LYMPHOCYTES # BLD AUTO: 2.6 K/UL
LYMPHOCYTES NFR BLD: 23 %
MAGNESIUM SERPL-MCNC: 2 MG/DL
MCH RBC QN AUTO: 30.3 PG
MCHC RBC AUTO-ENTMCNC: 33.2 G/DL
MCV RBC AUTO: 91 FL
MONOCYTES # BLD AUTO: 0.6 K/UL
MONOCYTES NFR BLD: 5.6 %
NEUTROPHILS # BLD AUTO: 7.9 K/UL
NEUTROPHILS NFR BLD: 71 %
PLATELET # BLD AUTO: 415 K/UL
PMV BLD AUTO: 7.1 FL
POTASSIUM SERPL-SCNC: 3.9 MMOL/L
PROT SERPL-MCNC: 5.3 G/DL
RBC # BLD AUTO: 3.03 M/UL
SODIUM SERPL-SCNC: 134 MMOL/L
WBC # BLD AUTO: 11.1 K/UL

## 2018-06-22 PROCEDURE — 12000002 HC ACUTE/MED SURGE SEMI-PRIVATE ROOM

## 2018-06-22 PROCEDURE — 25000003 PHARM REV CODE 250: Performed by: INTERNAL MEDICINE

## 2018-06-22 PROCEDURE — 36415 COLL VENOUS BLD VENIPUNCTURE: CPT

## 2018-06-22 PROCEDURE — 99232 SBSQ HOSP IP/OBS MODERATE 35: CPT | Mod: ,,, | Performed by: INTERNAL MEDICINE

## 2018-06-22 PROCEDURE — C9113 INJ PANTOPRAZOLE SODIUM, VIA: HCPCS | Performed by: SURGERY

## 2018-06-22 PROCEDURE — C1751 CATH, INF, PER/CENT/MIDLINE: HCPCS

## 2018-06-22 PROCEDURE — 80053 COMPREHEN METABOLIC PANEL: CPT

## 2018-06-22 PROCEDURE — 36569 INSJ PICC 5 YR+ W/O IMAGING: CPT

## 2018-06-22 PROCEDURE — S0030 INJECTION, METRONIDAZOLE: HCPCS | Performed by: INTERNAL MEDICINE

## 2018-06-22 PROCEDURE — 63600175 PHARM REV CODE 636 W HCPCS: Performed by: SURGERY

## 2018-06-22 PROCEDURE — 76937 US GUIDE VASCULAR ACCESS: CPT

## 2018-06-22 PROCEDURE — 85025 COMPLETE CBC W/AUTO DIFF WBC: CPT

## 2018-06-22 PROCEDURE — 02HV33Z INSERTION OF INFUSION DEVICE INTO SUPERIOR VENA CAVA, PERCUTANEOUS APPROACH: ICD-10-PCS | Performed by: SURGERY

## 2018-06-22 PROCEDURE — 83735 ASSAY OF MAGNESIUM: CPT

## 2018-06-22 PROCEDURE — 94799 UNLISTED PULMONARY SVC/PX: CPT

## 2018-06-22 PROCEDURE — 94761 N-INVAS EAR/PLS OXIMETRY MLT: CPT

## 2018-06-22 PROCEDURE — 63600175 PHARM REV CODE 636 W HCPCS: Performed by: INTERNAL MEDICINE

## 2018-06-22 RX ADMIN — FLUTICASONE PROPIONATE 50 MCG: 50 SPRAY, METERED NASAL at 09:06

## 2018-06-22 RX ADMIN — PANTOPRAZOLE SODIUM 40 MG: 40 INJECTION, POWDER, LYOPHILIZED, FOR SOLUTION INTRAVENOUS at 07:06

## 2018-06-22 RX ADMIN — SODIUM BICARBONATE 650 MG TABLET 650 MG: at 09:06

## 2018-06-22 RX ADMIN — METRONIDAZOLE 500 MG: 500 INJECTION, SOLUTION INTRAVENOUS at 05:06

## 2018-06-22 RX ADMIN — VITAMIN D, TAB 1000IU (100/BT) 1000 UNITS: 25 TAB at 09:06

## 2018-06-22 RX ADMIN — METRONIDAZOLE 500 MG: 500 INJECTION, SOLUTION INTRAVENOUS at 01:06

## 2018-06-22 RX ADMIN — SODIUM BICARBONATE 650 MG TABLET 650 MG: at 08:06

## 2018-06-22 RX ADMIN — BRINZOLAMIDE 1 DROP: 10 SUSPENSION/ DROPS OPHTHALMIC at 03:06

## 2018-06-22 RX ADMIN — PIPERACILLIN SODIUM AND TAZOBACTAM SODIUM 4.5 G: 4; .5 INJECTION, POWDER, LYOPHILIZED, FOR SOLUTION INTRAVENOUS at 01:06

## 2018-06-22 RX ADMIN — BRINZOLAMIDE 1 DROP: 10 SUSPENSION/ DROPS OPHTHALMIC at 09:06

## 2018-06-22 RX ADMIN — SIMVASTATIN 10 MG: 10 TABLET, FILM COATED ORAL at 08:06

## 2018-06-22 RX ADMIN — SODIUM BICARBONATE 650 MG TABLET 650 MG: at 05:06

## 2018-06-22 RX ADMIN — PIPERACILLIN SODIUM AND TAZOBACTAM SODIUM 4.5 G: 4; .5 INJECTION, POWDER, LYOPHILIZED, FOR SOLUTION INTRAVENOUS at 09:06

## 2018-06-22 RX ADMIN — DORZOLAMIDE HYDROCHLORIDE AND TIMOLOL MALEATE 1 DROP: 20; 5 SOLUTION/ DROPS OPHTHALMIC at 09:06

## 2018-06-22 RX ADMIN — PIPERACILLIN SODIUM AND TAZOBACTAM SODIUM 4.5 G: 4; .5 INJECTION, POWDER, LYOPHILIZED, FOR SOLUTION INTRAVENOUS at 05:06

## 2018-06-22 RX ADMIN — LATANOPROST 1 DROP: 50 SOLUTION OPHTHALMIC at 08:06

## 2018-06-22 RX ADMIN — BRINZOLAMIDE 1 DROP: 10 SUSPENSION/ DROPS OPHTHALMIC at 08:06

## 2018-06-22 RX ADMIN — METRONIDAZOLE 500 MG: 500 INJECTION, SOLUTION INTRAVENOUS at 09:06

## 2018-06-22 RX ADMIN — LEVOTHYROXINE SODIUM 25 MCG: 25 TABLET ORAL at 07:06

## 2018-06-22 RX ADMIN — SODIUM BICARBONATE 650 MG TABLET 650 MG: at 01:06

## 2018-06-22 RX ADMIN — DORZOLAMIDE HYDROCHLORIDE AND TIMOLOL MALEATE 1 DROP: 20; 5 SOLUTION/ DROPS OPHTHALMIC at 08:06

## 2018-06-22 NOTE — PROGRESS NOTES
Progress Note  Hospital Medicine  Patient Name:Sis Teixeira  MRN:  [id number]  Patient Class: IP- Inpatient  Admit Date: 6/20/2018  Length of Stay: 1 days  Expected Discharge Date:   Attending Physician: Bekah Fernandez MD  Primary Care Provider:  Mann Garduno MD    SUBJECTIVE:     Principal Problem: Abdominal pain  Initial history of present illness: Patient is a 57 y.o. female admitted to Hospitalist Service from Dr. Garduno's office with complaint of abdominal pain since December, 2017. Patient reportedly has past medical history significant for Congenital microcephaly and diet controlled DM. Part of the history obtained from patient's parents and Dr. Garduno. Patient has been complaining of RUQ and right flank pain for few months now. She was being worked up for cholelithiasis and possible cholecystectomy planned by Dr. Joel. Patient was note dto have UTI and leukocytosis and completed PO Cipro course. Still having leukocytosis. Patient has a low appetite and has lost almost 50 lbs in 6 months. No fever or chills. Patient denied chest pain, shortness of breath, headache, vision changes, focal neuro-deficits, cough or fever.    PMH/PSH/SH/FH/Meds: reviewed.    Symptoms/Review of Systems: s/p right retroperitoneal abscess I+D. No shortness of breath, cough, chest pain or headache, fever. Chronic right sided abdominal pain is better.  Diet: Clear liquids  Activity level: Normal.    Pain:  NAD      OBJECTIVE:   Vital Signs (Most Recent):      Temp: 96.4 °F (35.8 °C) (06/22/18 0810)  Pulse: 64 (06/22/18 0810)  Resp: 18 (06/22/18 0810)  BP: 136/61 (06/22/18 0810)  SpO2: 100 % (06/22/18 0810)       Vital Signs Range (Last 24H):  Temp:  [96.4 °F (35.8 °C)-98.6 °F (37 °C)]   Pulse:  [64-76]   Resp:  [15-22]   BP: (107-142)/(52-63)   SpO2:  [96 %-100 %]     Weight: 61.4 kg (135 lb 5.8 oz)  Body mass index is 27.34 kg/m².    Intake/Output Summary (Last 24 hours) at 06/22/18 100  Last data filed at 06/22/18 9975    Gross per 24 hour   Intake             3525 ml   Output             1105 ml   Net             2420 ml     Physical Examination:  General appearance: well developed, appears stated age  Head: normocephalic, atraumatic, microcephally  Eyes:  conjunctivae/corneas clear. PERRL.  Nose: Nares normal. Septum midline.  Throat: lips, mucosa, and tongue normal; teeth and gums normal, no throat erythema.  Neck: supple, symmetrical, trachea midline, no JVD and thyroid not enlarged, symmetric, no tenderness/mass/nodules  Lungs:  clear to auscultation bilaterally and normal respiratory effort  Chest wall: no tenderness  Heart: regular rate and rhythm, S1, S2 normal, no murmur, click, rub or gallop  Abdomen: soft, non-tender non-distented; bowel sounds normal; no masses,  no organomegaly  Extremities: no cyanosis, clubbing or edema.   Pulses: 2+ and symmetric  Skin: Skin color, texture, turgor normal. No rashes or lesions.  Lymph nodes: Cervical, supraclavicular, and axillary nodes normal.  Neurologic: Grossly non-focal. Answers simple question, short sentences.     CBC:    Recent Labs  Lab 06/18/18  1421 06/21/18  0535 06/22/18  0826   WBC 15.10* 10.60 11.10   RBC 3.71* 2.91* 3.03*   HGB 11.1* 8.9* 9.2*   HCT 33.4* 26.1* 27.6*   * 341 415*   MCV 90 90 91   MCH 30.1 30.7 30.3   MCHC 33.4 34.2 33.2   BMP    Recent Labs  Lab 06/18/18  1421 06/21/18  0535 06/22/18  0540   * 93 176*    136 134*   K 4.5 3.7 3.9    108 108   CO2 16* 20* 17*   BUN 26* 23* 21*   CREATININE 1.7* 1.6* 1.5*   CALCIUM 9.7 8.7 7.9*   MG  --  1.8 2.0      Diagnostic Results:  Microbiology Results (last 7 days)     Procedure Component Value Units Date/Time    Urine culture [[id number]]     Order Status:  No result Specimen:  Urine     Gram stain [[id number]] Collected:  06/21/18 1355    Order Status:  Completed Specimen:  Abscess from Abdomen Updated:  06/22/18 0624     Gram Stain Result No WBC's      Rare Gram positive cocci       Moderate Gram negative rods    Blood culture [[id number]] Collected:  06/20/18 1627    Order Status:  Completed Specimen:  Blood from Antecubital, Right  Arm Updated:  06/21/18 2212     Blood Culture, Routine No Growth to date     Blood Culture, Routine No Growth to date    Blood culture [[id number]] Collected:  06/20/18 1620    Order Status:  Completed Specimen:  Blood from Antecubital, Left  Arm Updated:  06/21/18 2212     Blood Culture, Routine No Growth to date     Blood Culture, Routine No Growth to date    Aerobic culture [[id number]] Collected:  06/21/18 1355    Order Status:  Sent Specimen:  Abscess from Abdomen Updated:  06/21/18 2050    Culture, Anaerobic [[id number]] Collected:  06/21/18 1355    Order Status:  Sent Specimen:  Abscess from Abdomen Updated:  06/21/18 2050    Urine culture [[id number]] Collected:  06/21/18 1000    Order Status:  Sent Specimen:  Urine from Urine, Clean Catch Updated:  06/21/18 1909         CT abdomen (06-19-18): Large subphrenic mass on the right abutting could be involving the liver extending into the right lateral abdominal wall, heterogeneous and complex in appearance suggesting complex fluid collection and containing small foci of air.  Differential includes large abscess, or necrotic mass.  It is indistinguishable from, abutting adjacent colon with colon wall thickening and findings thought most suggestive of abscess secondary to contained right colon perforation from colon cancer or less likely right-sided diverticulitis.  This corresponds to findings on recent ultrasound of 6/9/2018.     US abdomen:   1. Cholelithiasis.  2. 7.5 x 5.4 x 7.3 cm complicated cystic structure interposed between the right hepatic lobe and right kidney, possibly a loop of bowel (such as the hepatic flexure) with internal debris.  Consider additional evaluation with contrast enhanced CT of the abdomen.    Assessment/Plan:      *Abdominal pain [R10.9]  Right Retroperitoneal abscess s/p I+D  [R19.00]  Follow Dr. Torres's recommendations. Case discussed with Dr. Torres, will place PICC line. Follow microbiology results.  Continue IV Zosyn and Flagyl.   Patient may need dressing change under anesthesia tomorrow.     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      Not Applicable    Diabetes mellitus 2 [E11.9]   Unknown       Liquid diabetic diet.  Check blood glucose level q 6h.  Use Novolog Insulin Sliding Scale as needed.        JAYSHREE (acute kidney injury) [N17.9]  Continue IVF hydration. Follow BMP.  Hold lasix.    Anemia - likely related to nutritional deficiencies  Follow CBC. Check Iron, TIBC, B12 and Folate.    Severe Malnutrition  Nutrition consulted. Encourage maximal PO intake. Diet supplementation ordered per nutrition approval. Will encourage PO and monitor closely for weight changes      Yes    Metabolic acidosis [E87.2] - better  Follow blood and urine culture results. Lactate WNL.  Follow HCO3 level.   Yes                DVT prophylaxis: Lovenox 40 mg SQ q day post-operatively once okay with Dr. Torres.     Discussed with patient's parents.     Bekah Fernandez MD  Department of Hospital Medicine   [facility]

## 2018-06-22 NOTE — PROGRESS NOTES
06/22/18 1120   Patient Assessment/Suction   Level of Consciousness (AVPU) alert   PRE-TX-O2-ETCO2   O2 Device (Oxygen Therapy) room air   SpO2 100 %   Pulse Oximetry Type Intermittent   $ Pulse Oximetry - Multiple Charge Pulse Oximetry - Multiple   Incentive Spirometer   $ Incentive Spirometer Charges done with encouragement   Administration (Incentive Spirometer) done with encouragement   Number of Repetitions (Incentive Spirometer) 10   Level (mL) (Incentive Spirometer) 500   Patient Tolerance fair

## 2018-06-22 NOTE — PLAN OF CARE
Problem: Patient Care Overview  Goal: Plan of Care Review  Outcome: Ongoing (interventions implemented as appropriate)  Plan of care reviewed with patient she and mother verbalized understanding alert and oriented x 2 s/p cyst drainage with Dr Torres. Dressing reinforced due to drainage. Cont on IV ABT no ADR noted. Call light within reach will cont to monitor.

## 2018-06-22 NOTE — PROGRESS NOTES
[facility] Surgery  Progress Note    Subjective:     History of Present Illness:  Patient is a 57 y.o. female admitted to Hospitalist Service from Dr. Garduno's office with complaint of abdominal pain. Patient reportedly has past medical history significant for Congenital microcephaly and diet controlled DM.     Patient denied chest pain, shortness of breath, abdominal pain, nausea, vomiting, headache, vision changes, focal neuro-deficits, cough or fever.     She states the pain has been present for about six months off and on.  She had an ultrasound which showed gallstones and was scheduled to have lap GB but had persistent elevated WBC and CT was ordered, which showed   CT abdomen (06-19-18): Large subphrenic mass on the right abutting could be involving the liver extending into the right lateral abdominal wall, heterogeneous and complex in appearance suggesting complex fluid collection and containing small foci of air.  Differential includes large abscess, or necrotic mass.  It is indistinguishable from, abutting adjacent colon with colon wall thickening and findings thought most suggestive of abscess secondary to contained right colon perforation from colon cancer or less likely right-sided diverticulitis.  This corresponds to findings on recent ultrasound of 6/9/2018.    Post-Op Info:  Procedure(s) (LRB):  INCISION AND DRAINAGE, ABSCESS (Right)   1 Day Post-Op     Interval History: POD 1  Afebrile, WBC down.  No complaints    Medications:  Continuous Infusions:   sodium chloride 0.45% 75 mL/hr at 06/21/18 1701     Scheduled Meds:   brinzolamide  1 drop Both Eyes TID    dorzolamide-timolol 2-0.5%  1 drop Both Eyes BID    fenofibrate  160 mg Oral Daily    fluticasone  1 spray Each Nare Daily    latanoprost  1 drop Both Eyes QHS    levothyroxine  25 mcg Oral Before breakfast    metronidazole  500 mg Intravenous Q8H    pantoprazole  40 mg Intravenous Before breakfast     piperacillin-tazobactam 4.5 g in dextrose 5 % 100 mL IVPB (ready to mix system)  4.5 g Intravenous Q8H    simvastatin  10 mg Oral QHS    sodium bicarbonate  650 mg Oral QID    vitamin D  1,000 Units Oral Daily     PRN Meds:acetaminophen, acetaminophen, acetaminophen, dextrose 50%, dextrose 50%, glucagon (human recombinant), glucose, glucose, HYDROcodone-acetaminophen, HYDROcodone-acetaminophen, HYDROmorphone, insulin aspart U-100, ondansetron, ondansetron, sodium chloride 0.9%     Review of patient's allergies indicates:   Allergen Reactions    Sulfa (sulfonamide antibiotics) Rash    Tetracyclines Rash     Objective:     Vital Signs (Most Recent):  Temp: 96.9 °F (36.1 °C) (06/22/18 1159)  Pulse: 70 (06/22/18 1159)  Resp: 18 (06/22/18 1159)  BP: 139/70 (06/22/18 1159)  SpO2: 98 % (06/22/18 1159) Vital Signs (24h Range):  Temp:  [96.4 °F (35.8 °C)-98 °F (36.7 °C)] 96.9 °F (36.1 °C)  Pulse:  [64-71] 70  Resp:  [16-18] 18  SpO2:  [98 %-100 %] 98 %  BP: (107-139)/(58-70) 139/70     Weight: 61.4 kg (135 lb 5.8 oz)  Body mass index is 27.34 kg/m².    Intake/Output - Last 3 Shifts       06/20 0700 - 06/21 0659 06/21 0700 - 06/22 0659 06/22 0700 - 06/23 0659    P.O. 480 250 300    I.V. (mL/kg) 600 600 (9.8) 1875 (30.5)    IV Piggyback 200  500    Total Intake(mL/kg) 1280 850 (13.9) 2675 (43.6)    Urine (mL/kg/hr) 2 750 (0.5) 350 (0.7)    Emesis/NG output  0 (0)     Blood  5 (0)     Total Output 2 755 350    Net +1278 +95 +2325           Urine Occurrence 3 x            Physical Exam   Constitutional: She is oriented to person, place, and time. She appears well-developed and well-nourished. No distress.   HENT:   Head: Normocephalic and atraumatic.   Right Ear: External ear normal.   Left Ear: External ear normal.   Eyes: Conjunctivae are normal. Pupils are equal, round, and reactive to light. Right eye exhibits no discharge. Left eye exhibits no discharge.   Neck: No tracheal deviation present. No thyromegaly present.    Cardiovascular: Normal rate and regular rhythm.    Pulmonary/Chest: Effort normal. No respiratory distress.   Abdominal: Soft. She exhibits no distension. There is no guarding.   Drainage from wound, dressing changed   Musculoskeletal: She exhibits no edema or tenderness.   Lymphadenopathy:     She has no cervical adenopathy.   Neurological: She is alert and oriented to person, place, and time. No cranial nerve deficit.   Skin: Skin is warm and dry. No rash noted. She is not diaphoretic. No pallor.   Psychiatric: She has a normal mood and affect. Her behavior is normal. Judgment and thought content normal.   Nursing note and vitals reviewed.      Significant Labs:  CBC:   Recent Labs  Lab 06/22/18  0826   WBC 11.10   RBC 3.03*   HGB 9.2*   HCT 27.6*   *   MCV 91   MCH 30.3   MCHC 33.2     CMP:   Recent Labs  Lab 06/22/18  0540   *   CALCIUM 7.9*   ALBUMIN 1.5*   PROT 5.3*   *   K 3.9   CO2 17*      BUN 21*   CREATININE 1.5*   ALKPHOS 41*   ALT 8*   AST 27   BILITOT 0.5       Significant Diagnostics:  I have reviewed all pertinent imaging results/findings within the past 24 hours.    Assessment/Plan:     * Abdominal pain    Dressing changed.  Will advance packing in AM              Kumar Torres MD  General Surgery  [facility]

## 2018-06-22 NOTE — PROCEDURES
"Sis Teixeira is a 58 y.o. female patient.    Temp: 96.9 °F (36.1 °C) (06/22/18 1159)  Pulse: 70 (06/22/18 1159)  Resp: 18 (06/22/18 1159)  BP: 139/70 (06/22/18 1159)  SpO2: 98 % (06/22/18 1159)  Weight: 61.4 kg (135 lb 5.8 oz) (06/22/18 0700)  Height: 4' 11" (149.9 cm) (06/21/18 1411)    PICC  Date/Time: 6/22/2018 5:17 PM  Performed by: EMA CLAYTON  Consent Done: Yes  Time out: Immediately prior to procedure a time out was called to verify the correct patient, procedure, equipment, support staff and site/side marked as required  Indications: med administration and vascular access  Anesthesia: local infiltration  Local anesthetic: lidocaine 1% without epinephrine  Anesthetic Total (mL): 3  Preparation: skin prepped with ChloraPrep  Skin prep agent dried: skin prep agent completely dried prior to procedure  Sterile barriers: all five maximum sterile barriers used - cap, mask, sterile gown, sterile gloves, and large sterile sheet  Hand hygiene: hand hygiene performed prior to central venous catheter insertion  Location details: right basilic  Catheter type: double lumen  Catheter size: 5 Fr  Catheter Length: 34cm    Ultrasound guidance: yes  Vessel Caliber: medium and patent, compressibility normal  Needle advanced into vessel with real time Ultrasound guidance.  Guidewire confirmed in vessel.  Image recorded and saved.  Sterile sheath used.  Number of attempts: 1  Post-procedure: blood return through all ports, chlorhexidine patch and sterile dressing applied  Estimated blood loss (mL): 1    Assessment: placement verified by x-ray, no pneumothorax on x-ray, tip termination and successful placement  Tip Termination Explanation: SVC  Complications: none          Ema Clayton  6/22/2018    "

## 2018-06-22 NOTE — PLAN OF CARE
06/21/18 2006   Patient Assessment/Suction   Level of Consciousness (AVPU) alert   Respiratory Effort Normal;Unlabored   Expansion/Accessory Muscles/Retractions expansion symmetric;no retractions;no use of accessory muscles   PRE-TX-O2-ETCO2   O2 Device (Oxygen Therapy) room air   SpO2 99 %   Pulse Oximetry Type Intermittent   Pulse 71   Resp 16   Incentive Spirometer   $ Incentive Spirometer Charges done with encouragement   Administration (Incentive Spirometer) done with encouragement   Number of Repetitions (Incentive Spirometer) 10   Level (mL) (Incentive Spirometer) 500   Patient Tolerance fair

## 2018-06-22 NOTE — SUBJECTIVE & OBJECTIVE
Interval History: POD 1  Afebrile, WBC down.  No complaints    Medications:  Continuous Infusions:   sodium chloride 0.45% 75 mL/hr at 06/21/18 1701     Scheduled Meds:   brinzolamide  1 drop Both Eyes TID    dorzolamide-timolol 2-0.5%  1 drop Both Eyes BID    fenofibrate  160 mg Oral Daily    fluticasone  1 spray Each Nare Daily    latanoprost  1 drop Both Eyes QHS    levothyroxine  25 mcg Oral Before breakfast    metronidazole  500 mg Intravenous Q8H    pantoprazole  40 mg Intravenous Before breakfast    piperacillin-tazobactam 4.5 g in dextrose 5 % 100 mL IVPB (ready to mix system)  4.5 g Intravenous Q8H    simvastatin  10 mg Oral QHS    sodium bicarbonate  650 mg Oral QID    vitamin D  1,000 Units Oral Daily     PRN Meds:acetaminophen, acetaminophen, acetaminophen, dextrose 50%, dextrose 50%, glucagon (human recombinant), glucose, glucose, HYDROcodone-acetaminophen, HYDROcodone-acetaminophen, HYDROmorphone, insulin aspart U-100, ondansetron, ondansetron, sodium chloride 0.9%     Review of patient's allergies indicates:   Allergen Reactions    Sulfa (sulfonamide antibiotics) Rash    Tetracyclines Rash     Objective:     Vital Signs (Most Recent):  Temp: 96.9 °F (36.1 °C) (06/22/18 1159)  Pulse: 70 (06/22/18 1159)  Resp: 18 (06/22/18 1159)  BP: 139/70 (06/22/18 1159)  SpO2: 98 % (06/22/18 1159) Vital Signs (24h Range):  Temp:  [96.4 °F (35.8 °C)-98 °F (36.7 °C)] 96.9 °F (36.1 °C)  Pulse:  [64-71] 70  Resp:  [16-18] 18  SpO2:  [98 %-100 %] 98 %  BP: (107-139)/(58-70) 139/70     Weight: 61.4 kg (135 lb 5.8 oz)  Body mass index is 27.34 kg/m².    Intake/Output - Last 3 Shifts       06/20 0700 - 06/21 0659 06/21 0700 - 06/22 0659 06/22 0700 - 06/23 0659    P.O. 480 250 300    I.V. (mL/kg) 600 600 (9.8) 1875 (30.5)    IV Piggyback 200  500    Total Intake(mL/kg) 1280 850 (13.9) 2675 (43.6)    Urine (mL/kg/hr) 2 750 (0.5) 350 (0.7)    Emesis/NG output  0 (0)     Blood  5 (0)     Total Output 2 755 350     Net +1278 +95 +2325           Urine Occurrence 3 x            Physical Exam   Constitutional: She is oriented to person, place, and time. She appears well-developed and well-nourished. No distress.   HENT:   Head: Normocephalic and atraumatic.   Right Ear: External ear normal.   Left Ear: External ear normal.   Eyes: Conjunctivae are normal. Pupils are equal, round, and reactive to light. Right eye exhibits no discharge. Left eye exhibits no discharge.   Neck: No tracheal deviation present. No thyromegaly present.   Cardiovascular: Normal rate and regular rhythm.    Pulmonary/Chest: Effort normal. No respiratory distress.   Abdominal: Soft. She exhibits no distension. There is no guarding.   Drainage from wound, dressing changed   Musculoskeletal: She exhibits no edema or tenderness.   Lymphadenopathy:     She has no cervical adenopathy.   Neurological: She is alert and oriented to person, place, and time. No cranial nerve deficit.   Skin: Skin is warm and dry. No rash noted. She is not diaphoretic. No pallor.   Psychiatric: She has a normal mood and affect. Her behavior is normal. Judgment and thought content normal.   Nursing note and vitals reviewed.      Significant Labs:  CBC:   Recent Labs  Lab 06/22/18  0826   WBC 11.10   RBC 3.03*   HGB 9.2*   HCT 27.6*   *   MCV 91   MCH 30.3   MCHC 33.2     CMP:   Recent Labs  Lab 06/22/18  0540   *   CALCIUM 7.9*   ALBUMIN 1.5*   PROT 5.3*   *   K 3.9   CO2 17*      BUN 21*   CREATININE 1.5*   ALKPHOS 41*   ALT 8*   AST 27   BILITOT 0.5       Significant Diagnostics:  I have reviewed all pertinent imaging results/findings within the past 24 hours.

## 2018-06-22 NOTE — PLAN OF CARE
Problem: Patient Care Overview  Goal: Plan of Care Review  Outcome: Ongoing (interventions implemented as appropriate)  Plan of care reviewed with patient she verbalized understanding. S/P cyst drainage with Dr. Torres post op day 1. Outside dressing changed per MD. Diet advanced, pt cont on IV ABT no ADR noted. PIV in right hand CDI ambulates to rest room with assist x 1. Pt remains free from falls call light within reach will cont to monitor.

## 2018-06-22 NOTE — PLAN OF CARE
Problem: Patient Care Overview  Goal: Plan of Care Review  Outcome: Ongoing (interventions implemented as appropriate)  Patient oriented to self only, alert, and VSS. IVF and abx infusing as ordered. Dressing reinforced with fluff gauze and abd pads. Pt verbalized understanding of POC.  Purposeful rounding done during shift to promote patient safety. Patient free from falls and injury during shift.  Non skid socks on when OOB.  Bed in lowest position, brakes locked, and call light within reach.  Will continue to monitor.

## 2018-06-23 LAB
ALBUMIN SERPL BCP-MCNC: 1.5 G/DL
ALP SERPL-CCNC: 46 U/L
ALT SERPL W/O P-5'-P-CCNC: 7 U/L
ANION GAP SERPL CALC-SCNC: 7 MMOL/L
AST SERPL-CCNC: 19 U/L
BASOPHILS # BLD AUTO: 0 K/UL
BASOPHILS NFR BLD: 0.2 %
BILIRUB SERPL-MCNC: 0.4 MG/DL
BUN SERPL-MCNC: 23 MG/DL
CALCIUM SERPL-MCNC: 7.5 MG/DL
CHLORIDE SERPL-SCNC: 107 MMOL/L
CO2 SERPL-SCNC: 20 MMOL/L
CREAT SERPL-MCNC: 1.4 MG/DL
DIFFERENTIAL METHOD: ABNORMAL
EOSINOPHIL # BLD AUTO: 0.1 K/UL
EOSINOPHIL NFR BLD: 1.3 %
ERYTHROCYTE [DISTWIDTH] IN BLOOD BY AUTOMATED COUNT: 15.9 %
EST. GFR  (AFRICAN AMERICAN): 48 ML/MIN/1.73 M^2
EST. GFR  (NON AFRICAN AMERICAN): 42 ML/MIN/1.73 M^2
GLUCOSE SERPL-MCNC: 101 MG/DL
HCT VFR BLD AUTO: 22 %
HGB BLD-MCNC: 7.3 G/DL
LYMPHOCYTES # BLD AUTO: 2.8 K/UL
LYMPHOCYTES NFR BLD: 33.4 %
MAGNESIUM SERPL-MCNC: 1.6 MG/DL
MCH RBC QN AUTO: 30.2 PG
MCHC RBC AUTO-ENTMCNC: 33.4 G/DL
MCV RBC AUTO: 91 FL
MONOCYTES # BLD AUTO: 0.7 K/UL
MONOCYTES NFR BLD: 8 %
NEUTROPHILS # BLD AUTO: 4.7 K/UL
NEUTROPHILS NFR BLD: 57.1 %
PLATELET # BLD AUTO: 342 K/UL
PMV BLD AUTO: 6.8 FL
POTASSIUM SERPL-SCNC: 3.1 MMOL/L
PROT SERPL-MCNC: 4.9 G/DL
RBC # BLD AUTO: 2.43 M/UL
SODIUM SERPL-SCNC: 134 MMOL/L
WBC # BLD AUTO: 8.3 K/UL

## 2018-06-23 PROCEDURE — 83735 ASSAY OF MAGNESIUM: CPT

## 2018-06-23 PROCEDURE — 85025 COMPLETE CBC W/AUTO DIFF WBC: CPT

## 2018-06-23 PROCEDURE — C9113 INJ PANTOPRAZOLE SODIUM, VIA: HCPCS | Performed by: SURGERY

## 2018-06-23 PROCEDURE — 63700000 PHARM REV CODE 250 ALT 637 W/O HCPCS: Performed by: INTERNAL MEDICINE

## 2018-06-23 PROCEDURE — 25000003 PHARM REV CODE 250: Performed by: INTERNAL MEDICINE

## 2018-06-23 PROCEDURE — 94799 UNLISTED PULMONARY SVC/PX: CPT

## 2018-06-23 PROCEDURE — S0030 INJECTION, METRONIDAZOLE: HCPCS | Performed by: INTERNAL MEDICINE

## 2018-06-23 PROCEDURE — 63600175 PHARM REV CODE 636 W HCPCS: Performed by: INTERNAL MEDICINE

## 2018-06-23 PROCEDURE — 12000002 HC ACUTE/MED SURGE SEMI-PRIVATE ROOM

## 2018-06-23 PROCEDURE — 80053 COMPREHEN METABOLIC PANEL: CPT

## 2018-06-23 PROCEDURE — 94761 N-INVAS EAR/PLS OXIMETRY MLT: CPT

## 2018-06-23 PROCEDURE — 25000003 PHARM REV CODE 250: Performed by: HOSPITALIST

## 2018-06-23 PROCEDURE — 63600175 PHARM REV CODE 636 W HCPCS: Performed by: SURGERY

## 2018-06-23 RX ORDER — POTASSIUM CHLORIDE 20 MEQ/1
40 TABLET, EXTENDED RELEASE ORAL ONCE
Status: COMPLETED | OUTPATIENT
Start: 2018-06-23 | End: 2018-06-23

## 2018-06-23 RX ADMIN — METRONIDAZOLE 500 MG: 500 INJECTION, SOLUTION INTRAVENOUS at 05:06

## 2018-06-23 RX ADMIN — FLUTICASONE PROPIONATE 50 MCG: 50 SPRAY, METERED NASAL at 08:06

## 2018-06-23 RX ADMIN — DORZOLAMIDE HYDROCHLORIDE AND TIMOLOL MALEATE 1 DROP: 20; 5 SOLUTION/ DROPS OPHTHALMIC at 08:06

## 2018-06-23 RX ADMIN — SODIUM CHLORIDE: 0.45 INJECTION, SOLUTION INTRAVENOUS at 02:06

## 2018-06-23 RX ADMIN — LEVOTHYROXINE SODIUM 25 MCG: 25 TABLET ORAL at 06:06

## 2018-06-23 RX ADMIN — BRINZOLAMIDE 1 DROP: 10 SUSPENSION/ DROPS OPHTHALMIC at 08:06

## 2018-06-23 RX ADMIN — SODIUM BICARBONATE 650 MG TABLET 650 MG: at 08:06

## 2018-06-23 RX ADMIN — POTASSIUM CHLORIDE 40 MEQ: 20 TABLET, EXTENDED RELEASE ORAL at 05:06

## 2018-06-23 RX ADMIN — FENOFIBRATE 160 MG: 160 TABLET ORAL at 08:06

## 2018-06-23 RX ADMIN — SIMVASTATIN 10 MG: 10 TABLET, FILM COATED ORAL at 08:06

## 2018-06-23 RX ADMIN — PIPERACILLIN SODIUM AND TAZOBACTAM SODIUM 4.5 G: 4; .5 INJECTION, POWDER, LYOPHILIZED, FOR SOLUTION INTRAVENOUS at 08:06

## 2018-06-23 RX ADMIN — METRONIDAZOLE 500 MG: 500 INJECTION, SOLUTION INTRAVENOUS at 08:06

## 2018-06-23 RX ADMIN — VITAMIN D, TAB 1000IU (100/BT) 1000 UNITS: 25 TAB at 08:06

## 2018-06-23 RX ADMIN — PIPERACILLIN SODIUM AND TAZOBACTAM SODIUM 4.5 G: 4; .5 INJECTION, POWDER, LYOPHILIZED, FOR SOLUTION INTRAVENOUS at 02:06

## 2018-06-23 RX ADMIN — METRONIDAZOLE 500 MG: 500 INJECTION, SOLUTION INTRAVENOUS at 02:06

## 2018-06-23 RX ADMIN — PANTOPRAZOLE SODIUM 40 MG: 40 INJECTION, POWDER, LYOPHILIZED, FOR SOLUTION INTRAVENOUS at 06:06

## 2018-06-23 RX ADMIN — SODIUM BICARBONATE 650 MG TABLET 650 MG: at 06:06

## 2018-06-23 RX ADMIN — PIPERACILLIN SODIUM AND TAZOBACTAM SODIUM 4.5 G: 4; .5 INJECTION, POWDER, LYOPHILIZED, FOR SOLUTION INTRAVENOUS at 05:06

## 2018-06-23 RX ADMIN — LATANOPROST 1 DROP: 50 SOLUTION OPHTHALMIC at 08:06

## 2018-06-23 RX ADMIN — SODIUM BICARBONATE 650 MG TABLET 650 MG: at 01:06

## 2018-06-23 NOTE — PROGRESS NOTES
06/23/18 0805   Patient Assessment/Suction   Level of Consciousness (AVPU) alert   PRE-TX-O2-ETCO2   O2 Device (Oxygen Therapy) room air   SpO2 100 %   Pulse Oximetry Type Intermittent   $ Pulse Oximetry - Multiple Charge Pulse Oximetry - Multiple   Incentive Spirometer   $ Incentive Spirometer Charges done with encouragement   Administration (Incentive Spirometer) done with encouragement   Number of Repetitions (Incentive Spirometer) 15   Level (mL) (Incentive Spirometer) 500   Patient Tolerance good

## 2018-06-23 NOTE — PLAN OF CARE
06/22/18 1930   Patient Assessment/Suction   Level of Consciousness (AVPU) alert   PRE-TX-O2-ETCO2   O2 Device (Oxygen Therapy) room air   SpO2 99 %   Pulse Oximetry Type Intermittent   Pulse 74   Resp 18   Incentive Spirometer   $ Incentive Spirometer Charges done with encouragement   Administration (Incentive Spirometer) done with encouragement   Number of Repetitions (Incentive Spirometer) 10   Level (mL) (Incentive Spirometer) 500   Patient Tolerance good

## 2018-06-23 NOTE — PLAN OF CARE
Problem: Patient Care Overview  Goal: Plan of Care Review  Outcome: Ongoing (interventions implemented as appropriate)   Resting quietly during rounding for patient safety. Family remains at bedside through out the night. Dsg to R Lateral abd. Dry & intact. Pain controlled with PRN pain medication. Antibiotic therapy in progress. No falls or trauma this shift. Family verbalizes understanding of their plan of care.

## 2018-06-23 NOTE — OP NOTE
DATE OF PROCEDURE:  06/21/2018    PREOPERATIVE DIAGNOSIS:  Abscess of the right upper quadrant extending out the   abdominal wall.    POSTOPERATIVE DIAGNOSIS:  Abscess of the right upper quadrant extending out the   abdominal wall, etiology unknown.    OPERATION:  Incision and drainage of a retroperitoneal right flank abscess.    SURGEON:  Kumar Torres M.D.    ANESTHESIA:  General.    PROCEDURE AND FINDINGS:  With the patient in the supine position with the right   flank elevated, the right flank was prepped and draped in the usual manner for   surgery.  The patient had an obvious bulging in the right subcostal area, which   on CT scan revealed a large abscess approximately 9 x 6 x 7 cm in size.  This   was aspirated with a guide needle and specimen removed for cultures and   sensitivities.  Following this, the skin and subcutaneous tissue was infiltrated   with local anesthetic solution and a transverse skin incision made over the   palpable mass.  This was carried down through the skin and subcutaneous fat to   the flank musculature.  The abscess was not through the musculature; however, it   was causing bulging of the muscles secondary to pressure.  Using a Katherine clamp,   the muscles were then  with spontaneous discharge of a large amount of   foul smelling purulent discharge.  Probably several 100 mL in size.  Using   digital manipulation, the area was stretched in order to provide a large opening   for drainage and exploration.  The abscess cavity was then suctioned using a   pool sucker.  The area copiously irrigated until clear.  As stated above, the   pus was very foul smelling consistent with anaerobic bacteria.  After digital   exploration did not reveal any entrance into the abdominal cavity itself, it was   elected to pack the wound with a Betadine gauze packing, which was done filling   the entire cavity.  Sterile dressing was applied.  The patient tolerated the   procedure well and was sent to  Recovery in stable condition.    ESTIMATED BLOOD LOSS:  25 mL.    COMPLICATIONS:  None.    DRAINS:  Open gauze packing x1.    SPECIMENS:  Consisting of cultures were sent to Pathology.      VERONICA  dd: 06/22/2018 15:28:44 (CDT)  td: 06/22/2018 19:53:10 (CDT)  Doc ID   #[id number]  Job ID #[id number]    CC:

## 2018-06-23 NOTE — PROGRESS NOTES
[facility] Surgery  Progress Note    Subjective:     History of Present Illness:  Patient is a 57 y.o. female admitted to Hospitalist Service from Dr. Garduno's office with complaint of abdominal pain. Patient reportedly has past medical history significant for Congenital microcephaly and diet controlled DM.     Patient denied chest pain, shortness of breath, abdominal pain, nausea, vomiting, headache, vision changes, focal neuro-deficits, cough or fever.     She states the pain has been present for about six months off and on.  She had an ultrasound which showed gallstones and was scheduled to have lap GB but had persistent elevated WBC and CT was ordered, which showed   CT abdomen (06-19-18): Large subphrenic mass on the right abutting could be involving the liver extending into the right lateral abdominal wall, heterogeneous and complex in appearance suggesting complex fluid collection and containing small foci of air.  Differential includes large abscess, or necrotic mass.  It is indistinguishable from, abutting adjacent colon with colon wall thickening and findings thought most suggestive of abscess secondary to contained right colon perforation from colon cancer or less likely right-sided diverticulitis.  This corresponds to findings on recent ultrasound of 6/9/2018.    Post-Op Info:  Procedure(s) (LRB):  INCISION AND DRAINAGE, ABSCESS (Right)   2 Days Post-Op     Interval History: Afebrile, feels well    Medications:  Continuous Infusions:   sodium chloride 0.45% 75 mL/hr at 06/23/18 0212     Scheduled Meds:   brinzolamide  1 drop Both Eyes TID    dorzolamide-timolol 2-0.5%  1 drop Both Eyes BID    fenofibrate  160 mg Oral Daily    fluticasone  1 spray Each Nare Daily    latanoprost  1 drop Both Eyes QHS    levothyroxine  25 mcg Oral Before breakfast    metronidazole  500 mg Intravenous Q8H    pantoprazole  40 mg Intravenous Before breakfast    piperacillin-tazobactam  4.5 g in dextrose 5 % 100 mL IVPB (ready to mix system)  4.5 g Intravenous Q8H    simvastatin  10 mg Oral QHS    sodium bicarbonate  650 mg Oral QID    vitamin D  1,000 Units Oral Daily     PRN Meds:acetaminophen, acetaminophen, acetaminophen, dextrose 50%, dextrose 50%, glucagon (human recombinant), glucose, glucose, HYDROcodone-acetaminophen, HYDROcodone-acetaminophen, HYDROmorphone, insulin aspart U-100, ondansetron, ondansetron, sodium chloride 0.9%     Review of patient's allergies indicates:   Allergen Reactions    Sulfa (sulfonamide antibiotics) Rash    Tetracyclines Rash     Objective:     Vital Signs (Most Recent):  Temp: 97.5 °F (36.4 °C) (06/23/18 0820)  Pulse: 76 (06/23/18 0820)  Resp: 16 (06/23/18 0820)  BP: (!) 112/53 (06/23/18 0820)  SpO2: 96 % (06/23/18 0820) Vital Signs (24h Range):  Temp:  [96.9 °F (36.1 °C)-98.6 °F (37 °C)] 97.5 °F (36.4 °C)  Pulse:  [68-77] 76  Resp:  [16-20] 16  SpO2:  [96 %-100 %] 96 %  BP: ()/(53-70) 112/53     Weight: 61.4 kg (135 lb 5.8 oz)  Body mass index is 27.34 kg/m².    Intake/Output - Last 3 Shifts       06/21 0700 - 06/22 0659 06/22 0700 - 06/23 0659 06/23 0700 - 06/24 0659    P.O. 250 1540     I.V. (mL/kg) 600 (9.8) 3637.5 (59.2)     IV Piggyback  1100     Total Intake(mL/kg) 850 (13.9) 6277.5 (102.2)     Urine (mL/kg/hr) 750 (0.5) 350 (0.2)     Emesis/NG output 0 (0)      Blood 5 (0)      Total Output 755 350      Net +95 +5927.5             Urine Occurrence  5 x     Stool Occurrence  3 x           Physical Exam   Constitutional: She is oriented to person, place, and time. She appears well-developed and well-nourished. No distress.   HENT:   Head: Atraumatic.   Right Ear: External ear normal.   Left Ear: External ear normal.   Eyes: Conjunctivae are normal. Pupils are equal, round, and reactive to light. Right eye exhibits no discharge. Left eye exhibits no discharge.   Neck: No tracheal deviation present. No thyromegaly present.   Cardiovascular: Normal  rate and regular rhythm.    Pulmonary/Chest: Effort normal. No respiratory distress.   Abdominal: Soft. She exhibits no distension. There is no tenderness. There is no guarding.   Wound clean, no odor   Musculoskeletal: She exhibits no edema or tenderness.   Lymphadenopathy:     She has no cervical adenopathy.   Neurological: She is alert and oriented to person, place, and time. No cranial nerve deficit.   Skin: Skin is warm and dry. No rash noted. She is not diaphoretic. No pallor.   Psychiatric: She has a normal mood and affect. Her behavior is normal. Judgment and thought content normal.   Nursing note and vitals reviewed.      Significant Labs:  CBC:   Recent Labs  Lab 06/23/18 0459   WBC 8.30   RBC 2.43*   HGB 7.3*   HCT 22.0*      MCV 91   MCH 30.2   MCHC 33.4     CMP:   Recent Labs  Lab 06/23/18 0459      CALCIUM 7.5*   ALBUMIN 1.5*   PROT 4.9*   *   K 3.1*   CO2 20*      BUN 23*   CREATININE 1.4   ALKPHOS 46*   ALT 7*   AST 19   BILITOT 0.4     Microbiology Results (last 7 days)     Procedure Component Value Units Date/Time    Aerobic culture [[id number]] Collected:  06/21/18 1355    Order Status:  Completed Specimen:  Abscess from Abdomen Updated:  06/23/18 1011     Aerobic Bacterial Culture --     GRAM NEGATIVE RYAN, LACTOSE   Many  Identification and susceptibility pending      Blood culture [[id number]] Collected:  06/20/18 1627    Order Status:  Completed Specimen:  Blood from Antecubital, Right  Arm Updated:  06/22/18 2212     Blood Culture, Routine No Growth to date     Blood Culture, Routine No Growth to date     Blood Culture, Routine No Growth to date    Blood culture [[id number]] Collected:  06/20/18 1620    Order Status:  Completed Specimen:  Blood from Antecubital, Left  Arm Updated:  06/22/18 2212     Blood Culture, Routine No Growth to date     Blood Culture, Routine No Growth to date     Blood Culture, Routine No Growth to date    Urine culture [[id number]]  Collected:  06/21/18 1000    Order Status:  Completed Specimen:  Urine from Urine, Clean Catch Updated:  06/22/18 2158     Urine Culture, Routine No growth    Urine culture [[id number]]     Order Status:  No result Specimen:  Urine     Gram stain [[id number]] Collected:  06/21/18 1355    Order Status:  Completed Specimen:  Abscess from Abdomen Updated:  06/22/18 0624     Gram Stain Result No WBC's      Rare Gram positive cocci      Moderate Gram negative rods    Culture, Anaerobic [[id number]] Collected:  06/21/18 1358    Order Status:  Sent Specimen:  Abscess from Abdomen Updated:  06/21/18 2050          Significant Diagnostics:  I have reviewed all pertinent imaging results/findings within the past 24 hours.    Assessment/Plan:     * Abdominal pain    Packing advanced, removing 2 feet of gauze.  No odor.  Cultures pending.  Redressed.                Kumar Torres MD  General Surgery  [facility]

## 2018-06-23 NOTE — SUBJECTIVE & OBJECTIVE
Interval History: Afebrile, feels well    Medications:  Continuous Infusions:   sodium chloride 0.45% 75 mL/hr at 06/23/18 0212     Scheduled Meds:   brinzolamide  1 drop Both Eyes TID    dorzolamide-timolol 2-0.5%  1 drop Both Eyes BID    fenofibrate  160 mg Oral Daily    fluticasone  1 spray Each Nare Daily    latanoprost  1 drop Both Eyes QHS    levothyroxine  25 mcg Oral Before breakfast    metronidazole  500 mg Intravenous Q8H    pantoprazole  40 mg Intravenous Before breakfast    piperacillin-tazobactam 4.5 g in dextrose 5 % 100 mL IVPB (ready to mix system)  4.5 g Intravenous Q8H    simvastatin  10 mg Oral QHS    sodium bicarbonate  650 mg Oral QID    vitamin D  1,000 Units Oral Daily     PRN Meds:acetaminophen, acetaminophen, acetaminophen, dextrose 50%, dextrose 50%, glucagon (human recombinant), glucose, glucose, HYDROcodone-acetaminophen, HYDROcodone-acetaminophen, HYDROmorphone, insulin aspart U-100, ondansetron, ondansetron, sodium chloride 0.9%     Review of patient's allergies indicates:   Allergen Reactions    Sulfa (sulfonamide antibiotics) Rash    Tetracyclines Rash     Objective:     Vital Signs (Most Recent):  Temp: 97.5 °F (36.4 °C) (06/23/18 0820)  Pulse: 76 (06/23/18 0820)  Resp: 16 (06/23/18 0820)  BP: (!) 112/53 (06/23/18 0820)  SpO2: 96 % (06/23/18 0820) Vital Signs (24h Range):  Temp:  [96.9 °F (36.1 °C)-98.6 °F (37 °C)] 97.5 °F (36.4 °C)  Pulse:  [68-77] 76  Resp:  [16-20] 16  SpO2:  [96 %-100 %] 96 %  BP: ()/(53-70) 112/53     Weight: 61.4 kg (135 lb 5.8 oz)  Body mass index is 27.34 kg/m².    Intake/Output - Last 3 Shifts       06/21 0700 - 06/22 0659 06/22 0700 - 06/23 0659 06/23 0700 - 06/24 0659    P.O. 250 1540     I.V. (mL/kg) 600 (9.8) 3637.5 (59.2)     IV Piggyback  1100     Total Intake(mL/kg) 850 (13.9) 6277.5 (102.2)     Urine (mL/kg/hr) 750 (0.5) 350 (0.2)     Emesis/NG output 0 (0)      Blood 5 (0)      Total Output 755 350      Net +95 +5927.5              Urine Occurrence  5 x     Stool Occurrence  3 x           Physical Exam   Constitutional: She is oriented to person, place, and time. She appears well-developed and well-nourished. No distress.   HENT:   Head: Atraumatic.   Right Ear: External ear normal.   Left Ear: External ear normal.   Eyes: Conjunctivae are normal. Pupils are equal, round, and reactive to light. Right eye exhibits no discharge. Left eye exhibits no discharge.   Neck: No tracheal deviation present. No thyromegaly present.   Cardiovascular: Normal rate and regular rhythm.    Pulmonary/Chest: Effort normal. No respiratory distress.   Abdominal: Soft. She exhibits no distension. There is no tenderness. There is no guarding.   Wound clean, no odor   Musculoskeletal: She exhibits no edema or tenderness.   Lymphadenopathy:     She has no cervical adenopathy.   Neurological: She is alert and oriented to person, place, and time. No cranial nerve deficit.   Skin: Skin is warm and dry. No rash noted. She is not diaphoretic. No pallor.   Psychiatric: She has a normal mood and affect. Her behavior is normal. Judgment and thought content normal.   Nursing note and vitals reviewed.      Significant Labs:  CBC:   Recent Labs  Lab 06/23/18  0459   WBC 8.30   RBC 2.43*   HGB 7.3*   HCT 22.0*      MCV 91   MCH 30.2   MCHC 33.4     CMP:   Recent Labs  Lab 06/23/18  0459      CALCIUM 7.5*   ALBUMIN 1.5*   PROT 4.9*   *   K 3.1*   CO2 20*      BUN 23*   CREATININE 1.4   ALKPHOS 46*   ALT 7*   AST 19   BILITOT 0.4     Microbiology Results (last 7 days)     Procedure Component Value Units Date/Time    Aerobic culture [[id number]] Collected:  06/21/18 1355    Order Status:  Completed Specimen:  Abscess from Abdomen Updated:  06/23/18 1011     Aerobic Bacterial Culture --     GRAM NEGATIVE RYAN, LACTOSE   Many  Identification and susceptibility pending      Blood culture [[id number]] Collected:  06/20/18 1627    Order Status:   Completed Specimen:  Blood from Antecubital, Right  Arm Updated:  06/22/18 2212     Blood Culture, Routine No Growth to date     Blood Culture, Routine No Growth to date     Blood Culture, Routine No Growth to date    Blood culture [[id number]] Collected:  06/20/18 1620    Order Status:  Completed Specimen:  Blood from Antecubital, Left  Arm Updated:  06/22/18 2212     Blood Culture, Routine No Growth to date     Blood Culture, Routine No Growth to date     Blood Culture, Routine No Growth to date    Urine culture [[id number]] Collected:  06/21/18 1000    Order Status:  Completed Specimen:  Urine from Urine, Clean Catch Updated:  06/22/18 2158     Urine Culture, Routine No growth    Urine culture [[id number]]     Order Status:  No result Specimen:  Urine     Gram stain [[id number]] Collected:  06/21/18 1355    Order Status:  Completed Specimen:  Abscess from Abdomen Updated:  06/22/18 0624     Gram Stain Result No WBC's      Rare Gram positive cocci      Moderate Gram negative rods    Culture, Anaerobic [[id number]] Collected:  06/21/18 1355    Order Status:  Sent Specimen:  Abscess from Abdomen Updated:  06/21/18 2050          Significant Diagnostics:  I have reviewed all pertinent imaging results/findings within the past 24 hours.

## 2018-06-24 LAB
ALBUMIN SERPL BCP-MCNC: 1.5 G/DL
ALP SERPL-CCNC: 32 U/L
ALT SERPL W/O P-5'-P-CCNC: 8 U/L
ANION GAP SERPL CALC-SCNC: 7 MMOL/L
AST SERPL-CCNC: 27 U/L
BASOPHILS # BLD AUTO: 0 K/UL
BASOPHILS NFR BLD: 0.4 %
BILIRUB SERPL-MCNC: 0.4 MG/DL
BLD PROD TYP BPU: NORMAL
BLOOD UNIT EXPIRATION DATE: NORMAL
BLOOD UNIT TYPE CODE: 6200
BLOOD UNIT TYPE: NORMAL
BUN SERPL-MCNC: 18 MG/DL
CALCIUM SERPL-MCNC: 7.7 MG/DL
CHLORIDE SERPL-SCNC: 111 MMOL/L
CO2 SERPL-SCNC: 21 MMOL/L
CODING SYSTEM: NORMAL
CREAT SERPL-MCNC: 1.2 MG/DL
DIFFERENTIAL METHOD: ABNORMAL
DISPENSE STATUS: NORMAL
EOSINOPHIL # BLD AUTO: 0.1 K/UL
EOSINOPHIL NFR BLD: 2.2 %
ERYTHROCYTE [DISTWIDTH] IN BLOOD BY AUTOMATED COUNT: 16.1 %
EST. GFR  (AFRICAN AMERICAN): 58 ML/MIN/1.73 M^2
EST. GFR  (NON AFRICAN AMERICAN): 50 ML/MIN/1.73 M^2
GLUCOSE SERPL-MCNC: 92 MG/DL
HCT VFR BLD AUTO: 21.6 %
HGB BLD-MCNC: 7.2 G/DL
LYMPHOCYTES # BLD AUTO: 2 K/UL
LYMPHOCYTES NFR BLD: 36.4 %
MAGNESIUM SERPL-MCNC: 1.8 MG/DL
MCH RBC QN AUTO: 29.9 PG
MCHC RBC AUTO-ENTMCNC: 33.2 G/DL
MCV RBC AUTO: 90 FL
MONOCYTES # BLD AUTO: 0.5 K/UL
MONOCYTES NFR BLD: 9.4 %
NEUTROPHILS # BLD AUTO: 2.9 K/UL
NEUTROPHILS NFR BLD: 51.6 %
NUM UNITS TRANS PACKED RBC: NORMAL
PLATELET # BLD AUTO: 327 K/UL
PMV BLD AUTO: 6.4 FL
POTASSIUM SERPL-SCNC: 3.6 MMOL/L
PROT SERPL-MCNC: 4.7 G/DL
RBC # BLD AUTO: 2.39 M/UL
SODIUM SERPL-SCNC: 139 MMOL/L
WBC # BLD AUTO: 5.6 K/UL

## 2018-06-24 PROCEDURE — 25000003 PHARM REV CODE 250: Performed by: SURGERY

## 2018-06-24 PROCEDURE — P9016 RBC LEUKOCYTES REDUCED: HCPCS

## 2018-06-24 PROCEDURE — 63600175 PHARM REV CODE 636 W HCPCS: Performed by: SURGERY

## 2018-06-24 PROCEDURE — 94799 UNLISTED PULMONARY SVC/PX: CPT

## 2018-06-24 PROCEDURE — 25000003 PHARM REV CODE 250: Performed by: INTERNAL MEDICINE

## 2018-06-24 PROCEDURE — S0030 INJECTION, METRONIDAZOLE: HCPCS | Performed by: INTERNAL MEDICINE

## 2018-06-24 PROCEDURE — 63700000 PHARM REV CODE 250 ALT 637 W/O HCPCS: Performed by: INTERNAL MEDICINE

## 2018-06-24 PROCEDURE — 80053 COMPREHEN METABOLIC PANEL: CPT

## 2018-06-24 PROCEDURE — 36430 TRANSFUSION BLD/BLD COMPNT: CPT

## 2018-06-24 PROCEDURE — 94761 N-INVAS EAR/PLS OXIMETRY MLT: CPT

## 2018-06-24 PROCEDURE — C9113 INJ PANTOPRAZOLE SODIUM, VIA: HCPCS | Performed by: SURGERY

## 2018-06-24 PROCEDURE — 63600175 PHARM REV CODE 636 W HCPCS: Performed by: INTERNAL MEDICINE

## 2018-06-24 PROCEDURE — 12000002 HC ACUTE/MED SURGE SEMI-PRIVATE ROOM

## 2018-06-24 PROCEDURE — 36415 COLL VENOUS BLD VENIPUNCTURE: CPT

## 2018-06-24 PROCEDURE — 83735 ASSAY OF MAGNESIUM: CPT

## 2018-06-24 PROCEDURE — 85025 COMPLETE CBC W/AUTO DIFF WBC: CPT

## 2018-06-24 RX ORDER — HYDROCODONE BITARTRATE AND ACETAMINOPHEN 500; 5 MG/1; MG/1
TABLET ORAL
Status: DISCONTINUED | OUTPATIENT
Start: 2018-06-24 | End: 2018-07-09 | Stop reason: HOSPADM

## 2018-06-24 RX ADMIN — PIPERACILLIN SODIUM AND TAZOBACTAM SODIUM 4.5 G: 4; .5 INJECTION, POWDER, LYOPHILIZED, FOR SOLUTION INTRAVENOUS at 09:06

## 2018-06-24 RX ADMIN — SODIUM BICARBONATE 650 MG TABLET 650 MG: at 02:06

## 2018-06-24 RX ADMIN — BRINZOLAMIDE 1 DROP: 10 SUSPENSION/ DROPS OPHTHALMIC at 08:06

## 2018-06-24 RX ADMIN — METRONIDAZOLE 500 MG: 500 INJECTION, SOLUTION INTRAVENOUS at 01:06

## 2018-06-24 RX ADMIN — HYDROCODONE BITARTRATE AND ACETAMINOPHEN 1 TABLET: 5; 325 TABLET ORAL at 09:06

## 2018-06-24 RX ADMIN — PANTOPRAZOLE SODIUM 40 MG: 40 INJECTION, POWDER, LYOPHILIZED, FOR SOLUTION INTRAVENOUS at 06:06

## 2018-06-24 RX ADMIN — DORZOLAMIDE HYDROCHLORIDE AND TIMOLOL MALEATE 1 DROP: 20; 5 SOLUTION/ DROPS OPHTHALMIC at 08:06

## 2018-06-24 RX ADMIN — METRONIDAZOLE 500 MG: 500 INJECTION, SOLUTION INTRAVENOUS at 09:06

## 2018-06-24 RX ADMIN — SODIUM BICARBONATE 650 MG TABLET 650 MG: at 04:06

## 2018-06-24 RX ADMIN — LATANOPROST 1 DROP: 50 SOLUTION OPHTHALMIC at 09:06

## 2018-06-24 RX ADMIN — SIMVASTATIN 10 MG: 10 TABLET, FILM COATED ORAL at 09:06

## 2018-06-24 RX ADMIN — FLUTICASONE PROPIONATE 50 MCG: 50 SPRAY, METERED NASAL at 08:06

## 2018-06-24 RX ADMIN — DORZOLAMIDE HYDROCHLORIDE AND TIMOLOL MALEATE 1 DROP: 20; 5 SOLUTION/ DROPS OPHTHALMIC at 09:06

## 2018-06-24 RX ADMIN — SODIUM BICARBONATE 650 MG TABLET 650 MG: at 09:06

## 2018-06-24 RX ADMIN — FENOFIBRATE 160 MG: 160 TABLET ORAL at 08:06

## 2018-06-24 RX ADMIN — PIPERACILLIN SODIUM AND TAZOBACTAM SODIUM 4.5 G: 4; .5 INJECTION, POWDER, LYOPHILIZED, FOR SOLUTION INTRAVENOUS at 04:06

## 2018-06-24 RX ADMIN — LEVOTHYROXINE SODIUM 25 MCG: 25 TABLET ORAL at 06:06

## 2018-06-24 RX ADMIN — VITAMIN D, TAB 1000IU (100/BT) 1000 UNITS: 25 TAB at 08:06

## 2018-06-24 RX ADMIN — SODIUM BICARBONATE 650 MG TABLET 650 MG: at 08:06

## 2018-06-24 RX ADMIN — METRONIDAZOLE 500 MG: 500 INJECTION, SOLUTION INTRAVENOUS at 04:06

## 2018-06-24 RX ADMIN — PIPERACILLIN SODIUM AND TAZOBACTAM SODIUM 4.5 G: 4; .5 INJECTION, POWDER, LYOPHILIZED, FOR SOLUTION INTRAVENOUS at 01:06

## 2018-06-24 RX ADMIN — BRINZOLAMIDE 1 DROP: 10 SUSPENSION/ DROPS OPHTHALMIC at 09:06

## 2018-06-24 NOTE — PROGRESS NOTES
06/24/18 0738   Patient Assessment/Suction   Level of Consciousness (AVPU) alert   PRE-TX-O2-ETCO2   O2 Device (Oxygen Therapy) room air   SpO2 100 %   Pulse Oximetry Type Intermittent   $ Pulse Oximetry - Multiple Charge Pulse Oximetry - Multiple   Incentive Spirometer   $ Incentive Spirometer Charges done with encouragement   Administration (Incentive Spirometer) done with encouragement   Number of Repetitions (Incentive Spirometer) 10   Level (mL) (Incentive Spirometer) 750   Patient Tolerance good

## 2018-06-24 NOTE — PROGRESS NOTES
Progress Note  Hospital Medicine  Patient Name:Sis Teixeira  MRN:  [id number]  Patient Class: IP- Inpatient  Admit Date: 6/20/2018  Length of Stay: 2 days  Expected Discharge Date:   Attending Physician: Bekah Fernandez MD  Primary Care Provider:  Mann Garduno MD    SUBJECTIVE:     Principal Problem: Abdominal pain  Initial history of present illness: Patient is a 57 y.o. female admitted to Hospitalist Service from Dr. Garduno's office with complaint of abdominal pain since December, 2017. Patient reportedly has past medical history significant for Congenital microcephaly and diet controlled DM. Part of the history obtained from patient's parents and Dr. Garduno. Patient has been complaining of RUQ and right flank pain for few months now. She was being worked up for cholelithiasis and possible cholecystectomy planned by Dr. Joel. Patient was noted to have UTI and leukocytosis and completed PO Cipro course. Still having leukocytosis. Patient has a low appetite and has lost almost 50 lbs in 6 months. No fever or chills. Patient denied chest pain, shortness of breath, headache, vision changes, focal neuro-deficits, cough or fever.    PMH/PSH/SH/FH/Meds: reviewed.    Symptoms/Review of Systems: No acute complaints on my evaluation.  Diet: Clear liquids  Activity level: Normal.    Pain:  NAD      OBJECTIVE:   Vital Signs (Most Recent):      Temp: 97.4 °F (36.3 °C) (06/23/18 1905)  Pulse: 73 (06/23/18 1905)  Resp: 16 (06/23/18 1905)  BP: (!) 120/57 (06/23/18 1905)  SpO2: 100 % (06/23/18 1905)       Vital Signs Range (Last 24H):  Temp:  [97 °F (36.1 °C)-98.6 °F (37 °C)]   Pulse:  [68-79]   Resp:  [16-20]   BP: ()/(53-70)   SpO2:  [96 %-100 %]     Weight: 61.4 kg (135 lb 5.8 oz)  Body mass index is 27.34 kg/m².    Intake/Output Summary (Last 24 hours) at 06/23/18 2046  Last data filed at 06/23/18 0630   Gross per 24 hour   Intake           2402.5 ml   Output                0 ml   Net           2402.5 ml      Physical Examination:  General appearance: well developed, appears stated age  Head: atraumatic, microcephaly  Eyes:  conjunctivae/corneas clear. PERRL.  Nose: Nares normal. Septum midline.  Throat: lips, mucosa, and tongue normal; teeth and gums normal, no throat erythema.  Neck: supple, symmetrical, trachea midline, no JVD and thyroid not enlarged, symmetric, no tenderness/mass/nodules  Lungs:  clear to auscultation bilaterally and normal respiratory effort  Chest wall: no tenderness  Heart: regular rate and rhythm, S1, S2 normal, no murmur, click, rub or gallop  Abdomen: soft, non-tender, surgical area bandaged.  Extremities: no cyanosis, clubbing or edema.   Pulses: 2+ and symmetric  Skin: Skin color, texture, turgor normal. No rashes or lesions.  Neurologic: Grossly non-focal. Answers simple question, short sentences.     CBC:    Recent Labs  Lab 06/21/18  0535 06/22/18  0826 06/23/18  0459   WBC 10.60 11.10 8.30   RBC 2.91* 3.03* 2.43*   HGB 8.9* 9.2* 7.3*   HCT 26.1* 27.6* 22.0*    415* 342   MCV 90 91 91   MCH 30.7 30.3 30.2   MCHC 34.2 33.2 33.4   BMP    Recent Labs  Lab 06/21/18  0535 06/22/18  0540 06/23/18  0459   GLU 93 176* 101    134* 134*   K 3.7 3.9 3.1*    108 107   CO2 20* 17* 20*   BUN 23* 21* 23*   CREATININE 1.6* 1.5* 1.4   CALCIUM 8.7 7.9* 7.5*   MG 1.8 2.0 1.6      Diagnostic Results:  Microbiology Results (last 7 days)     Procedure Component Value Units Date/Time    Aerobic culture [[id number]] Collected:  06/21/18 1355    Order Status:  Completed Specimen:  Abscess from Abdomen Updated:  06/23/18 1428     Aerobic Bacterial Culture --     GRAM NEGATIVE RYAN, LACTOSE   Many  Identification and susceptibility pending       Aerobic Bacterial Culture --     STREPTOCOCCUS CONSTELLATUS  Many  Susceptibility testing not routinely performed      Blood culture [[id number]] Collected:  06/20/18 1627    Order Status:  Completed Specimen:  Blood from Antecubital, Right  Arm  Updated:  06/22/18 2212     Blood Culture, Routine No Growth to date     Blood Culture, Routine No Growth to date     Blood Culture, Routine No Growth to date    Blood culture [[id number]] Collected:  06/20/18 1620    Order Status:  Completed Specimen:  Blood from Antecubital, Left  Arm Updated:  06/22/18 2212     Blood Culture, Routine No Growth to date     Blood Culture, Routine No Growth to date     Blood Culture, Routine No Growth to date    Urine culture [[id number]] Collected:  06/21/18 1000    Order Status:  Completed Specimen:  Urine from Urine, Clean Catch Updated:  06/22/18 2158     Urine Culture, Routine No growth    Urine culture [[id number]]     Order Status:  No result Specimen:  Urine     Gram stain [[id number]] Collected:  06/21/18 1355    Order Status:  Completed Specimen:  Abscess from Abdomen Updated:  06/22/18 0624     Gram Stain Result No WBC's      Rare Gram positive cocci      Moderate Gram negative rods    Culture, Anaerobic [[id number]] Collected:  06/21/18 1355    Order Status:  Sent Specimen:  Abscess from Abdomen Updated:  06/21/18 2050         CT abdomen (06-19-18): Large subphrenic mass on the right abutting could be involving the liver extending into the right lateral abdominal wall, heterogeneous and complex in appearance suggesting complex fluid collection and containing small foci of air.  Differential includes large abscess, or necrotic mass.  It is indistinguishable from, abutting adjacent colon with colon wall thickening and findings thought most suggestive of abscess secondary to contained right colon perforation from colon cancer or less likely right-sided diverticulitis.  This corresponds to findings on recent ultrasound of 6/9/2018.     US abdomen:   1. Cholelithiasis.  2. 7.5 x 5.4 x 7.3 cm complicated cystic structure interposed between the right hepatic lobe and right kidney, possibly a loop of bowel (such as the hepatic flexure) with internal debris.  Consider additional  evaluation with contrast enhanced CT of the abdomen.    Assessment/Plan:      *Abdominal pain [R10.9]  Right Retroperitoneal abscess s/p I+D [R19.00]  Follow Dr. Torres's recommendations. Case discussed with Dr. Torres. Follow microbiology results - GNR, lactose  from aerobic culture  Continue IV Zosyn and Flagyl.      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      Not Applicable    Diabetes mellitus 2 [E11.9]   Unknown       Liquid diabetic diet.  Check blood glucose level q 6h.  Use Novolog Insulin Sliding Scale as needed.        JAYSHREE (acute kidney injury) [N17.9]  Continue IVF hydration. Follow BMP.  Hold lasix.    Anemia - likely related to nutritional deficiencies  Follow CBC. Check Iron, TIBC, B12 and Folate.    Severe Malnutrition  Nutrition consulted. Encourage maximal PO intake. Diet supplementation ordered per nutrition approval. Will encourage PO and monitor closely for weight changes      Yes    Metabolic acidosis [E87.2] - better  Follow blood and urine culture results. Lactate WNL.  Follow HCO3 level.   Yes                DVT prophylaxis: Lovenox 40 mg SQ q day post-operatively once okay with Dr. Torres.     Plan discussed with family at bedside.     Nilesh Lin MD  Department of Hospital Medicine   [facility]

## 2018-06-24 NOTE — SUBJECTIVE & OBJECTIVE
Interval History: Doing well, no complaints    Medications:  Continuous Infusions:   sodium chloride 0.45% 75 mL/hr at 06/23/18 0212     Scheduled Meds:   brinzolamide  1 drop Both Eyes TID    dorzolamide-timolol 2-0.5%  1 drop Both Eyes BID    fenofibrate  160 mg Oral Daily    fluticasone  1 spray Each Nare Daily    latanoprost  1 drop Both Eyes QHS    levothyroxine  25 mcg Oral Before breakfast    metronidazole  500 mg Intravenous Q8H    pantoprazole  40 mg Intravenous Before breakfast    piperacillin-tazobactam 4.5 g in dextrose 5 % 100 mL IVPB (ready to mix system)  4.5 g Intravenous Q8H    simvastatin  10 mg Oral QHS    sodium bicarbonate  650 mg Oral QID    vitamin D  1,000 Units Oral Daily     PRN Meds:acetaminophen, acetaminophen, acetaminophen, dextrose 50%, dextrose 50%, glucagon (human recombinant), glucose, glucose, HYDROcodone-acetaminophen, HYDROcodone-acetaminophen, HYDROmorphone, insulin aspart U-100, ondansetron, ondansetron, sodium chloride 0.9%     Review of patient's allergies indicates:   Allergen Reactions    Sulfa (sulfonamide antibiotics) Rash    Tetracyclines Rash     Objective:     Vital Signs (Most Recent):  Temp: 98 °F (36.7 °C) (06/24/18 0757)  Pulse: 71 (06/24/18 0757)  Resp: 16 (06/24/18 0757)  BP: (!) 114/56 (06/24/18 0757)  SpO2: 100 % (06/24/18 0757) Vital Signs (24h Range):  Temp:  [96.1 °F (35.6 °C)-98 °F (36.7 °C)] 98 °F (36.7 °C)  Pulse:  [66-79] 71  Resp:  [16-20] 16  SpO2:  [98 %-100 %] 100 %  BP: (105-120)/(53-70) 114/56     Weight: 61.4 kg (135 lb 5.8 oz)  Body mass index is 27.34 kg/m².    Intake/Output - Last 3 Shifts       06/22 0700 - 06/23 0659 06/23 0700 - 06/24 0659 06/24 0700 - 06/25 0659    P.O. 1540      I.V. (mL/kg) 3637.5 (59.2)      IV Piggyback 1100      Total Intake(mL/kg) 6277.5 (102.2)      Urine (mL/kg/hr) 350 (0.2)      Total Output 350        Net +5927.5               Urine Occurrence 5 x      Stool Occurrence 3 x            Physical Exam    Constitutional: She is oriented to person, place, and time. She appears well-nourished. No distress.   HENT:   Head: Normocephalic and atraumatic.   Right Ear: External ear normal.   Left Ear: External ear normal.   Eyes: Conjunctivae are normal. Pupils are equal, round, and reactive to light. Right eye exhibits no discharge. Left eye exhibits no discharge.   Neck: No tracheal deviation present. No thyromegaly present.   Cardiovascular: Normal rate and regular rhythm.    Pulmonary/Chest: Effort normal. No respiratory distress.   Abdominal: Soft. She exhibits no distension. There is no guarding.   Musculoskeletal: She exhibits no edema or tenderness.   Lymphadenopathy:     She has no cervical adenopathy.   Neurological: She is alert and oriented to person, place, and time. No cranial nerve deficit.   Skin: Skin is warm and dry. No rash noted. She is not diaphoretic. No pallor.   Psychiatric: She has a normal mood and affect. Her behavior is normal. Judgment and thought content normal.   Nursing note and vitals reviewed.      Significant Labs:  CBC:   Recent Labs  Lab 06/24/18  0459   WBC 5.60   RBC 2.39*   HGB 7.2*   HCT 21.6*      MCV 90   MCH 29.9   MCHC 33.2     CMP:   Recent Labs  Lab 06/24/18  0459   GLU 92   CALCIUM 7.7*   ALBUMIN 1.5*   PROT 4.7*      K 3.6   CO2 21*   *   BUN 18   CREATININE 1.2   ALKPHOS 32*   ALT 8*   AST 27   BILITOT 0.4       Significant Diagnostics:  I have reviewed all pertinent imaging results/findings within the past 24 hours.

## 2018-06-24 NOTE — PROGRESS NOTES
Patient up frequent to bathroom, incision dressing changed twice, no c/o pain, makes needs known, clear speech, Pt remains free of falls, VS stable, RR even and unlabored, Abd non-distended, BS in all four quadrants, clear speech, makes needs known, bed in low position with wheels locked call light in reach.

## 2018-06-24 NOTE — PROGRESS NOTES
[facility] Surgery  Progress Note    Subjective:     History of Present Illness:  Patient is a 57 y.o. female admitted to Hospitalist Service from Dr. Garduno's office with complaint of abdominal pain. Patient reportedly has past medical history significant for Congenital microcephaly and diet controlled DM.     Patient denied chest pain, shortness of breath, abdominal pain, nausea, vomiting, headache, vision changes, focal neuro-deficits, cough or fever.     She states the pain has been present for about six months off and on.  She had an ultrasound which showed gallstones and was scheduled to have lap GB but had persistent elevated WBC and CT was ordered, which showed   CT abdomen (06-19-18): Large subphrenic mass on the right abutting could be involving the liver extending into the right lateral abdominal wall, heterogeneous and complex in appearance suggesting complex fluid collection and containing small foci of air.  Differential includes large abscess, or necrotic mass.  It is indistinguishable from, abutting adjacent colon with colon wall thickening and findings thought most suggestive of abscess secondary to contained right colon perforation from colon cancer or less likely right-sided diverticulitis.  This corresponds to findings on recent ultrasound of 6/9/2018.    Post-Op Info:  Procedure(s) (LRB):  INCISION AND DRAINAGE, ABSCESS (Right)   3 Days Post-Op     Interval History: Doing well, no complaints    Medications:  Continuous Infusions:   sodium chloride 0.45% 75 mL/hr at 06/23/18 0212     Scheduled Meds:   brinzolamide  1 drop Both Eyes TID    dorzolamide-timolol 2-0.5%  1 drop Both Eyes BID    fenofibrate  160 mg Oral Daily    fluticasone  1 spray Each Nare Daily    latanoprost  1 drop Both Eyes QHS    levothyroxine  25 mcg Oral Before breakfast    metronidazole  500 mg Intravenous Q8H    pantoprazole  40 mg Intravenous Before breakfast     piperacillin-tazobactam 4.5 g in dextrose 5 % 100 mL IVPB (ready to mix system)  4.5 g Intravenous Q8H    simvastatin  10 mg Oral QHS    sodium bicarbonate  650 mg Oral QID    vitamin D  1,000 Units Oral Daily     PRN Meds:acetaminophen, acetaminophen, acetaminophen, dextrose 50%, dextrose 50%, glucagon (human recombinant), glucose, glucose, HYDROcodone-acetaminophen, HYDROcodone-acetaminophen, HYDROmorphone, insulin aspart U-100, ondansetron, ondansetron, sodium chloride 0.9%     Review of patient's allergies indicates:   Allergen Reactions    Sulfa (sulfonamide antibiotics) Rash    Tetracyclines Rash     Objective:     Vital Signs (Most Recent):  Temp: 98 °F (36.7 °C) (06/24/18 0757)  Pulse: 71 (06/24/18 0757)  Resp: 16 (06/24/18 0757)  BP: (!) 114/56 (06/24/18 0757)  SpO2: 100 % (06/24/18 0757) Vital Signs (24h Range):  Temp:  [96.1 °F (35.6 °C)-98 °F (36.7 °C)] 98 °F (36.7 °C)  Pulse:  [66-79] 71  Resp:  [16-20] 16  SpO2:  [98 %-100 %] 100 %  BP: (105-120)/(53-70) 114/56     Weight: 61.4 kg (135 lb 5.8 oz)  Body mass index is 27.34 kg/m².    Intake/Output - Last 3 Shifts       06/22 0700 - 06/23 0659 06/23 0700 - 06/24 0659 06/24 0700 - 06/25 0659    P.O. 1540      I.V. (mL/kg) 3637.5 (59.2)      IV Piggyback 1100      Total Intake(mL/kg) 6277.5 (102.2)      Urine (mL/kg/hr) 350 (0.2)      Total Output 350        Net +5927.5               Urine Occurrence 5 x      Stool Occurrence 3 x            Physical Exam   Constitutional: She is oriented to person, place, and time. She appears well-nourished. No distress.   HENT:   Head: Normocephalic and atraumatic.   Right Ear: External ear normal.   Left Ear: External ear normal.   Eyes: Conjunctivae are normal. Pupils are equal, round, and reactive to light. Right eye exhibits no discharge. Left eye exhibits no discharge.   Neck: No tracheal deviation present. No thyromegaly present.   Cardiovascular: Normal rate and regular rhythm.    Pulmonary/Chest: Effort  normal. No respiratory distress.   Abdominal: Soft. She exhibits no distension. There is no guarding.   Musculoskeletal: She exhibits no edema or tenderness.   Lymphadenopathy:     She has no cervical adenopathy.   Neurological: She is alert and oriented to person, place, and time. No cranial nerve deficit.   Skin: Skin is warm and dry. No rash noted. She is not diaphoretic. No pallor.   Psychiatric: She has a normal mood and affect. Her behavior is normal. Judgment and thought content normal.   Nursing note and vitals reviewed.      Significant Labs:  CBC:   Recent Labs  Lab 06/24/18  0459   WBC 5.60   RBC 2.39*   HGB 7.2*   HCT 21.6*      MCV 90   MCH 29.9   MCHC 33.2     CMP:   Recent Labs  Lab 06/24/18  0459   GLU 92   CALCIUM 7.7*   ALBUMIN 1.5*   PROT 4.7*      K 3.6   CO2 21*   *   BUN 18   CREATININE 1.2   ALKPHOS 32*   ALT 8*   AST 27   BILITOT 0.4       Significant Diagnostics:  I have reviewed all pertinent imaging results/findings within the past 24 hours.    Assessment/Plan:     * Abdominal pain    Packing removed and replaced  Repeat CT in AM              Kumar Torres MD  General Surgery  [facility]

## 2018-06-24 NOTE — PLAN OF CARE
Problem: Patient Care Overview  Goal: Plan of Care Review  Outcome: Ongoing (interventions implemented as appropriate)   Resting quietly during rounding for patient safety. Pain controlled with PRN pain medication. Decreased oral intake, poor appetite. Antibiotic therapy in progress. No falls or trauma this shift. Dsg to R side dry & intact. Pt. Verbalizes understanding of their plan of care.

## 2018-06-25 PROBLEM — E43 SEVERE MALNUTRITION: Status: ACTIVE | Noted: 2018-06-25

## 2018-06-25 LAB
ALBUMIN SERPL BCP-MCNC: 1.5 G/DL
ALP SERPL-CCNC: 30 U/L
ALT SERPL W/O P-5'-P-CCNC: 8 U/L
ANION GAP SERPL CALC-SCNC: 8 MMOL/L
AST SERPL-CCNC: 24 U/L
BACTERIA BLD CULT: NORMAL
BACTERIA BLD CULT: NORMAL
BACTERIA SPEC AEROBE CULT: NORMAL
BACTERIA SPEC AEROBE CULT: NORMAL
BASOPHILS # BLD AUTO: 0 K/UL
BASOPHILS NFR BLD: 0.6 %
BILIRUB SERPL-MCNC: 0.8 MG/DL
BUN SERPL-MCNC: 15 MG/DL
CALCIUM SERPL-MCNC: 7.8 MG/DL
CHLORIDE SERPL-SCNC: 111 MMOL/L
CO2 SERPL-SCNC: 21 MMOL/L
CREAT SERPL-MCNC: 1.1 MG/DL
DIFFERENTIAL METHOD: ABNORMAL
EOSINOPHIL # BLD AUTO: 0.2 K/UL
EOSINOPHIL NFR BLD: 3 %
ERYTHROCYTE [DISTWIDTH] IN BLOOD BY AUTOMATED COUNT: 16 %
EST. GFR  (AFRICAN AMERICAN): >60 ML/MIN/1.73 M^2
EST. GFR  (NON AFRICAN AMERICAN): 56 ML/MIN/1.73 M^2
GLUCOSE SERPL-MCNC: 93 MG/DL
HCT VFR BLD AUTO: 26.5 %
HGB BLD-MCNC: 8.9 G/DL
LYMPHOCYTES # BLD AUTO: 2.7 K/UL
LYMPHOCYTES NFR BLD: 44.5 %
MAGNESIUM SERPL-MCNC: 1.6 MG/DL
MCH RBC QN AUTO: 29.9 PG
MCHC RBC AUTO-ENTMCNC: 33.5 G/DL
MCV RBC AUTO: 89 FL
MONOCYTES # BLD AUTO: 0.7 K/UL
MONOCYTES NFR BLD: 11.2 %
NEUTROPHILS # BLD AUTO: 2.4 K/UL
NEUTROPHILS NFR BLD: 40.7 %
PLATELET # BLD AUTO: 282 K/UL
PMV BLD AUTO: 6.5 FL
POTASSIUM SERPL-SCNC: 3.4 MMOL/L
PROT SERPL-MCNC: 4.7 G/DL
RBC # BLD AUTO: 2.97 M/UL
SODIUM SERPL-SCNC: 140 MMOL/L
WBC # BLD AUTO: 6 K/UL

## 2018-06-25 PROCEDURE — 63700000 PHARM REV CODE 250 ALT 637 W/O HCPCS: Performed by: INTERNAL MEDICINE

## 2018-06-25 PROCEDURE — 97802 MEDICAL NUTRITION INDIV IN: CPT

## 2018-06-25 PROCEDURE — 94799 UNLISTED PULMONARY SVC/PX: CPT

## 2018-06-25 PROCEDURE — 99232 SBSQ HOSP IP/OBS MODERATE 35: CPT | Mod: ,,, | Performed by: INTERNAL MEDICINE

## 2018-06-25 PROCEDURE — 36415 COLL VENOUS BLD VENIPUNCTURE: CPT

## 2018-06-25 PROCEDURE — 12000002 HC ACUTE/MED SURGE SEMI-PRIVATE ROOM

## 2018-06-25 PROCEDURE — 85025 COMPLETE CBC W/AUTO DIFF WBC: CPT

## 2018-06-25 PROCEDURE — 25000003 PHARM REV CODE 250: Performed by: INTERNAL MEDICINE

## 2018-06-25 PROCEDURE — 63600175 PHARM REV CODE 636 W HCPCS: Performed by: INTERNAL MEDICINE

## 2018-06-25 PROCEDURE — 25500020 PHARM REV CODE 255

## 2018-06-25 PROCEDURE — C9113 INJ PANTOPRAZOLE SODIUM, VIA: HCPCS | Performed by: SURGERY

## 2018-06-25 PROCEDURE — 94761 N-INVAS EAR/PLS OXIMETRY MLT: CPT

## 2018-06-25 PROCEDURE — S0030 INJECTION, METRONIDAZOLE: HCPCS | Performed by: INTERNAL MEDICINE

## 2018-06-25 PROCEDURE — 63600175 PHARM REV CODE 636 W HCPCS: Performed by: SURGERY

## 2018-06-25 PROCEDURE — 83735 ASSAY OF MAGNESIUM: CPT

## 2018-06-25 PROCEDURE — 25000003 PHARM REV CODE 250: Performed by: SURGERY

## 2018-06-25 PROCEDURE — 80053 COMPREHEN METABOLIC PANEL: CPT

## 2018-06-25 RX ORDER — SODIUM CHLORIDE 9 MG/ML
INJECTION, SOLUTION INTRAVENOUS
Status: DISPENSED
Start: 2018-06-25 | End: 2018-06-26

## 2018-06-25 RX ORDER — POTASSIUM CHLORIDE 20 MEQ/1
40 TABLET, EXTENDED RELEASE ORAL ONCE
Status: COMPLETED | OUTPATIENT
Start: 2018-06-25 | End: 2018-06-25

## 2018-06-25 RX ADMIN — FLUTICASONE PROPIONATE 50 MCG: 50 SPRAY, METERED NASAL at 08:06

## 2018-06-25 RX ADMIN — PIPERACILLIN SODIUM AND TAZOBACTAM SODIUM 4.5 G: 4; .5 INJECTION, POWDER, LYOPHILIZED, FOR SOLUTION INTRAVENOUS at 11:06

## 2018-06-25 RX ADMIN — FENOFIBRATE 160 MG: 160 TABLET ORAL at 08:06

## 2018-06-25 RX ADMIN — HYDROCODONE BITARTRATE AND ACETAMINOPHEN 1 TABLET: 5; 325 TABLET ORAL at 02:06

## 2018-06-25 RX ADMIN — BRINZOLAMIDE 1 DROP: 10 SUSPENSION/ DROPS OPHTHALMIC at 08:06

## 2018-06-25 RX ADMIN — PANTOPRAZOLE SODIUM 40 MG: 40 INJECTION, POWDER, LYOPHILIZED, FOR SOLUTION INTRAVENOUS at 06:06

## 2018-06-25 RX ADMIN — LATANOPROST 1 DROP: 50 SOLUTION OPHTHALMIC at 08:06

## 2018-06-25 RX ADMIN — IOHEXOL 15 ML: 350 INJECTION, SOLUTION INTRAVENOUS at 03:06

## 2018-06-25 RX ADMIN — DORZOLAMIDE HYDROCHLORIDE AND TIMOLOL MALEATE 1 DROP: 20; 5 SOLUTION/ DROPS OPHTHALMIC at 08:06

## 2018-06-25 RX ADMIN — METRONIDAZOLE 500 MG: 500 INJECTION, SOLUTION INTRAVENOUS at 02:06

## 2018-06-25 RX ADMIN — METRONIDAZOLE 500 MG: 500 INJECTION, SOLUTION INTRAVENOUS at 10:06

## 2018-06-25 RX ADMIN — SODIUM BICARBONATE 650 MG TABLET 650 MG: at 05:06

## 2018-06-25 RX ADMIN — LEVOTHYROXINE SODIUM 25 MCG: 25 TABLET ORAL at 06:06

## 2018-06-25 RX ADMIN — VITAMIN D, TAB 1000IU (100/BT) 1000 UNITS: 25 TAB at 08:06

## 2018-06-25 RX ADMIN — IOHEXOL 75 ML: 350 INJECTION, SOLUTION INTRAVENOUS at 03:06

## 2018-06-25 RX ADMIN — BRINZOLAMIDE 1 DROP: 10 SUSPENSION/ DROPS OPHTHALMIC at 02:06

## 2018-06-25 RX ADMIN — PIPERACILLIN SODIUM AND TAZOBACTAM SODIUM 4.5 G: 4; .5 INJECTION, POWDER, LYOPHILIZED, FOR SOLUTION INTRAVENOUS at 02:06

## 2018-06-25 RX ADMIN — POTASSIUM CHLORIDE 40 MEQ: 20 TABLET, EXTENDED RELEASE ORAL at 10:06

## 2018-06-25 RX ADMIN — PIPERACILLIN SODIUM AND TAZOBACTAM SODIUM 4.5 G: 4; .5 INJECTION, POWDER, LYOPHILIZED, FOR SOLUTION INTRAVENOUS at 05:06

## 2018-06-25 RX ADMIN — SODIUM BICARBONATE 650 MG TABLET 650 MG: at 08:06

## 2018-06-25 RX ADMIN — METRONIDAZOLE 500 MG: 500 INJECTION, SOLUTION INTRAVENOUS at 05:06

## 2018-06-25 RX ADMIN — SIMVASTATIN 10 MG: 10 TABLET, FILM COATED ORAL at 08:06

## 2018-06-25 NOTE — PROGRESS NOTES
Progress Note  Hospital Medicine  Patient Name:Sis Teixeira  MRN:  [id number]  Patient Class: IP- Inpatient  Admit Date: 6/20/2018  Length of Stay: 4 days  Expected Discharge Date:   Attending Physician: Bekah Fernandez MD  Primary Care Provider:  Mann Garduno MD    SUBJECTIVE:     Principal Problem: Abdominal pain  Initial history of present illness: Patient is a 57 y.o. female admitted to Hospitalist Service from Dr. Garduno's office with complaint of abdominal pain since December, 2017. Patient reportedly has past medical history significant for Congenital microcephaly and diet controlled DM. Part of the history obtained from patient's parents and Dr. Garduno. Patient has been complaining of RUQ and right flank pain for few months now. She was being worked up for cholelithiasis and possible cholecystectomy planned by Dr. Joel. Patient was note dto have UTI and leukocytosis and completed PO Cipro course. Still having leukocytosis. Patient has a low appetite and has lost almost 50 lbs in 6 months. No fever or chills. Patient denied chest pain, shortness of breath, headache, vision changes, focal neuro-deficits, cough or fever.    PMH/PSH/SH/FH/Meds: reviewed.    Symptoms/Review of Systems: s/p right retroperitoneal abscess I+D. Patient having dressing changes by Dr. Torres. Patient to get a repeat CT abdomen today. Per mother patient has been having rectal bleedings and passing blood clots for the past 2 days. No shortness of breath, cough, chest pain or headache, fever. Chronic right sided abdominal pain is better.  Diet: Diabetic diet  Activity level: Normal.    Pain:  NAD      OBJECTIVE:   Vital Signs (Most Recent):      Temp: 97 °F (36.1 °C) (06/25/18 0757)  Pulse: 66 (06/25/18 0757)  Resp: 19 (06/25/18 0757)  BP: (!) 126/57 (06/25/18 0757)  SpO2: 100 % (06/25/18 0757)       Vital Signs Range (Last 24H):  Temp:  [97 °F (36.1 °C)-98 °F (36.7 °C)]   Pulse:  [62-79]   Resp:  [16-19]   BP: (109-159)/(53-65)    SpO2:  [99 %-100 %]     Weight: 61.4 kg (135 lb 5.8 oz)  Body mass index is 27.34 kg/m².    Intake/Output Summary (Last 24 hours) at 06/25/18 1018  Last data filed at 06/25/18 0424   Gross per 24 hour   Intake          1161.67 ml   Output                0 ml   Net          1161.67 ml     Physical Examination:  General appearance: well developed, appears stated age  Head: normocephalic, atraumatic, microcephally  Eyes:  conjunctivae/corneas clear. PERRL.  Nose: Nares normal. Septum midline.  Throat: lips, mucosa, and tongue normal; teeth and gums normal, no throat erythema.  Neck: supple, symmetrical, trachea midline, no JVD and thyroid not enlarged, symmetric, no tenderness/mass/nodules  Lungs:  clear to auscultation bilaterally and normal respiratory effort  Chest wall: no tenderness  Heart: regular rate and rhythm, S1, S2 normal, no murmur, click, rub or gallop  Abdomen: soft, non-tender non-distented; bowel sounds normal; no masses,  no organomegaly. Abdominal dressing C/D/I.  Extremities: no cyanosis, clubbing or edema.   Pulses: 2+ and symmetric  Skin: Skin color, texture, turgor normal. No rashes or lesions.  Lymph nodes: Cervical, supraclavicular, and axillary nodes normal.  Neurologic: Grossly non-focal. Answers simple question, short sentences.     CBC:    Recent Labs  Lab 06/23/18 0459 06/24/18  0459 06/25/18  0411   WBC 8.30 5.60 6.00   RBC 2.43* 2.39* 2.97*   HGB 7.3* 7.2* 8.9*   HCT 22.0* 21.6* 26.5*    327 282   MCV 91 90 89   MCH 30.2 29.9 29.9   MCHC 33.4 33.2 33.5   BMP    Recent Labs  Lab 06/23/18  0459 06/24/18  0459 06/25/18  0411    92 93   * 139 140   K 3.1* 3.6 3.4*    111* 111*   CO2 20* 21* 21*   BUN 23* 18 15   CREATININE 1.4 1.2 1.1   CALCIUM 7.5* 7.7* 7.8*   MG 1.6 1.8 1.6      Diagnostic Results:  Microbiology Results (last 7 days)     Procedure Component Value Units Date/Time    Aerobic culture [[id number]]  (Susceptibility) Collected:  06/21/18 1355    Order  Status:  Completed Specimen:  Abscess from Abdomen Updated:  06/25/18 0851     Aerobic Bacterial Culture --     ESCHERICHIA COLI  Many       Aerobic Bacterial Culture --     STREPTOCOCCUS CONSTELLATUS  Many  Susceptibility testing not routinely performed      Blood culture [[id number]] Collected:  06/20/18 1627    Order Status:  Completed Specimen:  Blood from Antecubital, Right  Arm Updated:  06/24/18 2212     Blood Culture, Routine No Growth to date     Blood Culture, Routine No Growth to date     Blood Culture, Routine No Growth to date     Blood Culture, Routine No Growth to date     Blood Culture, Routine No Growth to date    Blood culture [[id number]] Collected:  06/20/18 1620    Order Status:  Completed Specimen:  Blood from Antecubital, Left  Arm Updated:  06/24/18 2212     Blood Culture, Routine No Growth to date     Blood Culture, Routine No Growth to date     Blood Culture, Routine No Growth to date     Blood Culture, Routine No Growth to date     Blood Culture, Routine No Growth to date    Urine culture [[id number]] Collected:  06/21/18 1000    Order Status:  Completed Specimen:  Urine from Urine, Clean Catch Updated:  06/22/18 2158     Urine Culture, Routine No growth    Urine culture [[id number]]     Order Status:  Canceled Specimen:  Urine     Gram stain [[id number]] Collected:  06/21/18 1355    Order Status:  Completed Specimen:  Abscess from Abdomen Updated:  06/22/18 0624     Gram Stain Result No WBC's      Rare Gram positive cocci      Moderate Gram negative rods    Culture, Anaerobic [[id number]] Collected:  06/21/18 1355    Order Status:  Sent Specimen:  Abscess from Abdomen Updated:  06/21/18 2050         CT abdomen (06-19-18): Large subphrenic mass on the right abutting could be involving the liver extending into the right lateral abdominal wall, heterogeneous and complex in appearance suggesting complex fluid collection and containing small foci of air.  Differential includes large abscess, or  necrotic mass.  It is indistinguishable from, abutting adjacent colon with colon wall thickening and findings thought most suggestive of abscess secondary to contained right colon perforation from colon cancer or less likely right-sided diverticulitis.  This corresponds to findings on recent ultrasound of 6/9/2018.     US abdomen:   1. Cholelithiasis.  2. 7.5 x 5.4 x 7.3 cm complicated cystic structure interposed between the right hepatic lobe and right kidney, possibly a loop of bowel (such as the hepatic flexure) with internal debris.  Consider additional evaluation with contrast enhanced CT of the abdomen.    Assessment/Plan:      *Abdominal pain [R10.9]  Right Retroperitoneal abscess with E. Coli sp s/p I+D [R19.00]  Follow Dr. Torres's recommendations.   Follow microbiology results - E Coli - Continue IV Zosyn and Flagyl.      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    Rectal Bleeding  Elevated CEA level  Discussed CEA results and rectal bleeding episodes. Follow CT abdomen results and he will see the patient this evening.      Not Applicable    Diabetes mellitus 2 [E11.9]   Unknown       Liquid diabetic diet.  Check blood glucose level q 6h.  Use Novolog Insulin Sliding Scale as needed.        JAYSHREE (acute kidney injury) [N17.9] - resolved  Follow BMP.  Off lasix.    Anemia - Iron deficiency anemia  Needs Iron supplementation upon DC.    Hypokalemia  Replete KCl. Follow BMP.    Severe Malnutrition  Encourage maximal PO intake. Diet supplementation ordered per nutrition approval. Will encourage PO and monitor closely for weight changes      Yes                      DVT prophylaxis: Use SCD and TEDs. Lovenox stopped due to GI bleeding.      Discussed with patient's parents.     Bekah Fernandez MD  Department of Hospital Medicine   [facility]

## 2018-06-25 NOTE — PROGRESS NOTES
"Gastroenterology    CC: rectal bleeding    S:  Pt developed several episodes of rectal bleeding last night characterized as intermittent mild some clot present associated with cramping anemia and elevated CEA.  Pt post op from abscess drainage.      Past Medical History:   Diagnosis Date    Congenital small head     Diabetes mellitus     dIET CONTROL       Review of Systems  Pt poor historian    Physical Examination  /60 mmHg  Pulse 67  Temp(Src) 97.9 °F (36.6 °C) (Oral)  Resp 15  Ht 5' 8" (1.727 m)  Wt 74.9 kg (165 lb 2 oz)  BMI 25.11 kg/m2  SpO2 100%  LMP  (LMP Unknown)  Breastfeeding? No  General appearance: alert, cooperative, no distress  HENT: Normocephalic, atraumatic, neck symmetrical, no nasal discharge   Lungs: clear to auscultation bilaterally, no dullness to percussion bilaterally  Heart: regular rate and rhythm without rub; no displacement of the PMI   Abdomen: soft, non-tender; bowel sounds normoactive; bandage to right flank.   Extremities: extremities symmetric; no clubbing, cyanosis, or edema  Neurologic: Alert and oriented X 3, normal strength   RECTAL- poor tone.  Small hemorrhoid.  Thin red blood present. No clots. No mass palpated.    Labs:  Lab Results   Component Value Date    WBC 6.00 06/25/2018    HGB 8.9 (L) 06/25/2018    HCT 26.5 (L) 06/25/2018    MCV 89 06/25/2018     06/25/2018     CMP  Sodium   Date Value Ref Range Status   06/25/2018 140 136 - 145 mmol/L Final     Potassium   Date Value Ref Range Status   06/25/2018 3.4 (L) 3.5 - 5.1 mmol/L Final     Chloride   Date Value Ref Range Status   06/25/2018 111 (H) 95 - 110 mmol/L Final     CO2   Date Value Ref Range Status   06/25/2018 21 (L) 23 - 29 mmol/L Final     Glucose   Date Value Ref Range Status   06/25/2018 93 70 - 110 mg/dL Final     BUN, Bld   Date Value Ref Range Status   06/25/2018 15 6 - 20 mg/dL Final     Creatinine   Date Value Ref Range Status   06/25/2018 1.1 0.5 - 1.4 mg/dL Final     Calcium "   Date Value Ref Range Status   06/25/2018 7.8 (L) 8.7 - 10.5 mg/dL Final     Total Protein   Date Value Ref Range Status   06/25/2018 4.7 (L) 6.0 - 8.4 g/dL Final     Albumin   Date Value Ref Range Status   06/25/2018 1.5 (L) 3.5 - 5.2 g/dL Final     Total Bilirubin   Date Value Ref Range Status   06/25/2018 0.8 0.1 - 1.0 mg/dL Final     Comment:     For infants and newborns, interpretation of results should be based  on gestational age, weight and in agreement with clinical  observations.  Premature Infant recommended reference ranges:  Up to 24 hours.............<8.0 mg/dL  Up to 48 hours............<12.0 mg/dL  3-5 days..................<15.0 mg/dL  6-29 days.................<15.0 mg/dL       Alkaline Phosphatase   Date Value Ref Range Status   06/25/2018 30 (L) 55 - 135 U/L Final     AST   Date Value Ref Range Status   06/25/2018 24 10 - 40 U/L Final     ALT   Date Value Ref Range Status   06/25/2018 8 (L) 10 - 44 U/L Final     Anion Gap   Date Value Ref Range Status   06/25/2018 8 8 - 16 mmol/L Final     eGFR if    Date Value Ref Range Status   06/25/2018 >60 >60 mL/min/1.73 m^2 Final     eGFR if non    Date Value Ref Range Status   06/25/2018 56 (A) >60 mL/min/1.73 m^2 Final     Comment:     Calculation used to obtain the estimated glomerular filtration  rate (eGFR) is the CKD-EPI equation.          Imaging:  Previously reviewed    Assessment:   58 y.o. female with large abscess post drainage with elevated CEA, anemia, bleeding.  Overall findings concerning for contained perforation of cecal mass vs less likely diverticular bleeding     Plan:  Await CT scan results  Discussed case with dr mcguire.  Possible colonoscopy in next couple days  Recommend trending h/h and transfuse to keep hgb around 8    Kirill Pulido  [facility]  Cell: [phone]

## 2018-06-25 NOTE — PLAN OF CARE
Problem: Nutrition, Imbalanced: Inadequate Oral Intake (Adult)  Intervention: Promote/Optimize Nutrition  Recommendations    1) consider relaxing diet to regular 2) continue Boost Plus with meals,  Consider POCT checks.     3) If pt continues to be unable to consume adequate energy po with wt loss, consider  Enteral: Impact Peptide 1.5 @ 10 mls/hr to advance by 10 mls Q 6 hrs to goal rate 40 mls/hr. Flush with 100 mls fluid Q 4 hrs.  At goal provides: 1440 calories, 90 gms protein, 134 gms CHO, 739 mls free water.    4 ) If unable to consume adequate energy using GI tract consider   Parenteral: Clinimix E 5/15 @ 25 mls/hr for the first 24-48 hr, then progress to goal 55 mls/hr.  Add 20% lipids (250 mls)  At goal provides 1437 calories, 66 gms protein, 1320 mls fluid, GIR 2.5.    Goals: Pt will consume/receive at least 50% EEN by RD f/u.  Nutrition Goal Status: new  Communication of [facility] Recs: reviewed with RN (POC, sticky note)

## 2018-06-25 NOTE — PLAN OF CARE
Problem: Patient Care Overview  Goal: Plan of Care Review  Outcome: Ongoing (interventions implemented as appropriate)  Patient averaging only 500-750 on IS.  Hr 70 and 02 saturation 100% on room air.

## 2018-06-25 NOTE — PLAN OF CARE
Right flank dressing dry and intact with no new drainage noted.  IV antibiotics continue as ordered.  Pain well controlled with ordered pain medicines.   Unit of PRBC's infusing at this time.  Labs, including CBC, ordered for AM.

## 2018-06-25 NOTE — CONSULTS
[facility]  Adult Nutrition  Consult Note    SUMMARY     Recommendations    1) consider relaxing diet to regular 2) continue Boost Plus with meals,  Consider POCT checks.     3) If pt continues to be unable to consume adequate energy po with wt loss, consider  Enteral: Impact Peptide 1.5 @ 10 mls/hr to advance by 10 mls Q 6 hrs to goal rate 40 mls/hr. Flush with 100 mls fluid Q 4 hrs.  At goal provides: 1440 calories, 90 gms protein, 134 gms CHO, 739 mls free water.    4 ) If unable to consume adequate energy using GI tract consider   Parenteral: Clinimix E 5/15 @ 25 mls/hr for the first 24-48 hr, then progress to goal 55 mls/hr.  Add 20% lipids (250 mls)  At goal provides 1437 calories, 66 gms protein, 1320 mls fluid, GIR 2.5.    Goals: Pt will consume/receive at least 50% EEN by RD f/u.  Nutrition Goal Status: new  Communication of AKOS[facility] Recs: reviewed with RN (POC, sticky note)    Reason for Assessment    Reason for Assessment: identified at risk by screening criteria  1. Abdominal wall abscess    2. Abdominal pain      Past Medical History:   Diagnosis Date    Congenital small head     Diabetes mellitus     dIET CONTROL     General Information Comments: Admitted with abdominal pain.  s/p surgery to drain large abscess.  Family reports poor intake x > 1 month and has not eaten well since admit.  Per MD H&P, pt has lost 50 lbs x 6 months. Pt's mom reports wt of 181 x 3 months ago.     Pt had procedure this afternoon, where a possible cecal mass vs less likely diverticular bleeding was found per MD note.Consult for PICC line sent.     Nutrition Risk Screen    Nutrition Risk Screen: other (see comments)    Nutrition/Diet History    Patient Reported Diet/Restrictions/Preferences: general  Food Preferences: No spicy foods.  Uses [place] Instant Breakfast at home to increase calories.   Do you have any cultural, spiritual, Jainism conflicts, given your current situation?: Hindu  Food  "Allergies: NKFA  Factors Affecting Nutritional Intake: abdominal pain, decreased appetite    Anthropometrics    Temp: 97.8 °F (36.6 °C)  Height Method: Stated  Height: 4' 11"  Height (inches): 59 in  Weight Method: Bed Scale  Weight: 61.4 kg (135 lb 5.8 oz)  Weight (lb): 135.36 lb  Ideal Body Weight (IBW), Female: 95 lb  % Ideal Body Weight, Female (lb): 142.48 lb  BMI (Calculated): 27.4  BMI Grade: 25 - 29.9 - overweight  Weight Loss: unintentional  Usual Body Weight (UBW), k.45 kg (per MD H&P note of wt loss of 50 lbs x 6 months)  % Usual Body Weight: 72.86  % Weight Change From Usual Weight: -27.29 %       Lab/Procedures/Meds    Pertinent Labs Reviewed: reviewed  Lab Results   Component Value Date    ALBUMIN 1.5 (L) 2018     Lab Results   Component Value Date    .3 (H) 2018     No results for input(s): POCTGLUCOSE in the last 24 hours.    Pertinent Medications Reviewed: reviewed  Scheduled Meds:   brinzolamide  1 drop Both Eyes TID    dorzolamide-timolol 2-0.5%  1 drop Both Eyes BID    fenofibrate  160 mg Oral Daily    fluticasone  1 spray Each Nare Daily    latanoprost  1 drop Both Eyes QHS    levothyroxine  25 mcg Oral Before breakfast    metronidazole  500 mg Intravenous Q8H    pantoprazole  40 mg Intravenous Before breakfast    piperacillin-tazobactam 4.5 g in dextrose 5 % 100 mL IVPB (ready to mix system)  4.5 g Intravenous Q8H    simvastatin  10 mg Oral QHS    sodium bicarbonate  650 mg Oral QID    sodium chloride 0.9%        vitamin D  1,000 Units Oral Daily     Continuous Infusions:  PRN Meds:.sodium chloride, acetaminophen, acetaminophen, acetaminophen, dextrose 50%, dextrose 50%, glucagon (human recombinant), glucose, glucose, HYDROcodone-acetaminophen, HYDROcodone-acetaminophen, HYDROmorphone, insulin aspart U-100, ondansetron, ondansetron, sodium chloride 0.9%    Physical Findings/Assessment    Overall Physical Appearance: nourished (NFPE completed.  No fat or " muscle loss noted. )  Skin:  (Wolfgang score 17)    Estimated/Assessed Needs    Weight Used For Calorie Calculations: 61.4 kg (135 lb 5.8 oz)     Energy Need Method: [place]: 1104.62 x 1.2-1.3=1326-1436     Weight Used For Protein Calculations: 61.4 kg (135 lb 5.8 oz)   1.2-1.5 gm/kg/day: 74-92 (wt loss)     Fluid Need Method: RDA Method (or per MD rec)     CHO Requirement: 45-50% EEN      Nutrition Prescription Ordered    Current Diet Order: consistent carbohydrate    Evaluation of Received Nutrient/Fluid Intake    IV Fluid (mL): 75 (mls/hr)  Energy Calories Required: not meeting needs  Protein Required: not meeting needs  Fluid Required: meeting needs  % Intake of Estimated Energy Needs: 25 - 50 %  % Meal Intake: 25 - 50 %    Nutrition Risk    Level of Risk/Frequency of Follow-up:  (2 x wkly)     Assessment and Plan    Severe malnutrition    Malnutrition in the context of acute illness    Related to (etiology):  Decreased intake 2/2 abdominal pain    Signs and Symptoms (as evidenced by):  Energy Intake: <50% of estimated energy requirement for >5 days  Body Fat Depletion: none noted  Muscle Mass Depletion: none noted  Weight Loss: 27.29% x 6 months (per MD H&P note)  Fluid Accumulation: 2+ edema    Interventions/Recommendations (treatment strategy):  1) relax diet to regular 2) continue Boost Plus, tid 3) If unable to consume adequate energy PO, recommend parenteral nutrition    Nutrition Diagnosis Status:  New                 Monitor and Evaluation    Food and Nutrient Intake: energy intake  Food and Nutrient Adminstration: diet order  Anthropometric Measurements: weight, weight change  Biochemical Data, Medical Tests and Procedures: glucose/endocrine profile, inflammatory profile  Nutrition-Focused Physical Findings: overall appearance, skin     Discharge Plan    To be determined

## 2018-06-25 NOTE — ASSESSMENT & PLAN NOTE
Malnutrition in the context of acute illness    Related to (etiology):  Decreased intake 2/2 abdominal pain    Signs and Symptoms (as evidenced by):  Energy Intake: <50% of estimated energy requirement for >5 days  Body Fat Depletion: none noted  Muscle Mass Depletion: none noted  Weight Loss: 27.29% x 6 months (per MD H&P note)  Fluid Accumulation: 2+ edema    Interventions/Recommendations (treatment strategy):  1) relax diet to regular 2) continue Boost Plus, tid 3) If unable to consume adequate energy PO, recommend parenteral nutrition    Nutrition Diagnosis Status:  New

## 2018-06-25 NOTE — PROGRESS NOTES
Progress Note  Hospital Medicine  Patient Name:Sis Teixeira  MRN:  [id number]  Patient Class: IP- Inpatient  Admit Date: 6/20/2018  Length of Stay: 3 days  Expected Discharge Date:   Attending Physician: Bekah Fernandez MD  Primary Care Provider:  Mann Garduno MD    SUBJECTIVE:     Principal Problem: Abdominal pain  Initial history of present illness: Patient is a 57 y.o. female admitted to Hospitalist Service from Dr. Garduno's office with complaint of abdominal pain since December, 2017. Patient reportedly has past medical history significant for Congenital microcephaly and diet controlled DM. Part of the history obtained from patient's parents and Dr. Garduno. Patient has been complaining of RUQ and right flank pain for few months now. She was being worked up for cholelithiasis and possible cholecystectomy planned by Dr. Joel. Patient was noted to have UTI and leukocytosis and completed PO Cipro course.. Patient has a low appetite and has lost almost 50 lbs in 6 months. No fever or chills. Patient denied chest pain, shortness of breath, headache, vision changes, focal neuro-deficits, cough or fever.    PMH/PSH/SH/FH/Meds: reviewed.    Symptoms/Review of Systems: Reportedly a small amount of blood in the stool per the mother. Patient denied any acute complaints.  Diet: Clear liquids  Activity level: Normal.    Pain:  NAD      OBJECTIVE:   Vital Signs (Most Recent):      Temp: 97 °F (36.1 °C) (06/24/18 1943)  Pulse: 75 (06/24/18 1943)  Resp: 18 (06/24/18 1943)  BP: (!) 115/54 (06/24/18 1943)  SpO2: 100 % (06/24/18 1943)       Vital Signs Range (Last 24H):  Temp:  [96.1 °F (35.6 °C)-98 °F (36.7 °C)]   Pulse:  [66-79]   Resp:  [16-20]   BP: (105-159)/(53-65)   SpO2:  [99 %-100 %]     Weight: 61.4 kg (135 lb 5.8 oz)  Body mass index is 27.34 kg/m².  No intake or output data in the 24 hours ending 06/24/18 2154  Physical Examination:  General appearance: well developed, appears stated age  Head: atraumatic,  microcephaly  Eyes:  conjunctivae/corneas clear. PERRL.  Nose: Nares normal.   Throat: lips, mucosa, and tongue normal;   Neck: supple, symmetrical, trachea midline, no JVD and thyroid not enlarged,   Lungs:  clear to auscultation bilaterally and normal respiratory effort  Chest wall: no tenderness  Heart: regular rate and rhythm, S1, S2 normal, no murmur, click, rub or gallop  Abdomen: soft, non-tender, surgical area bandaged.  Extremities: no cyanosis, clubbing or edema.   Pulses: 2+ and symmetric  Skin: Skin color, texture, turgor normal. No rashes or lesions.  Neurologic: Grossly non-focal. Answers simple question, short sentences.     CBC:    Recent Labs  Lab 06/22/18  0826 06/23/18  0459 06/24/18  0459   WBC 11.10 8.30 5.60   RBC 3.03* 2.43* 2.39*   HGB 9.2* 7.3* 7.2*   HCT 27.6* 22.0* 21.6*   * 342 327   MCV 91 91 90   MCH 30.3 30.2 29.9   MCHC 33.2 33.4 33.2   BMP    Recent Labs  Lab 06/22/18  0540 06/23/18  0459 06/24/18  0459   * 101 92   * 134* 139   K 3.9 3.1* 3.6    107 111*   CO2 17* 20* 21*   BUN 21* 23* 18   CREATININE 1.5* 1.4 1.2   CALCIUM 7.9* 7.5* 7.7*   MG 2.0 1.6 1.8      Diagnostic Results:  Microbiology Results (last 7 days)     Procedure Component Value Units Date/Time    Aerobic culture [[id number]]  (Susceptibility) Collected:  06/21/18 1355    Order Status:  Completed Specimen:  Abscess from Abdomen Updated:  06/24/18 1403     Aerobic Bacterial Culture --     ESCHERICHIA COLI  Many       Aerobic Bacterial Culture --     STREPTOCOCCUS CONSTELLATUS  Many  Susceptibility testing not routinely performed      Blood culture [[id number]] Collected:  06/20/18 1620    Order Status:  Completed Specimen:  Blood from Antecubital, Left  Arm Updated:  06/23/18 2212     Blood Culture, Routine No Growth to date     Blood Culture, Routine No Growth to date     Blood Culture, Routine No Growth to date     Blood Culture, Routine No Growth to date    Blood culture [[id number]]  Collected:  06/20/18 1627    Order Status:  Completed Specimen:  Blood from Antecubital, Right  Arm Updated:  06/23/18 2212     Blood Culture, Routine No Growth to date     Blood Culture, Routine No Growth to date     Blood Culture, Routine No Growth to date     Blood Culture, Routine No Growth to date    Urine culture [[id number]] Collected:  06/21/18 1000    Order Status:  Completed Specimen:  Urine from Urine, Clean Catch Updated:  06/22/18 2158     Urine Culture, Routine No growth    Urine culture [[id number]]     Order Status:  Canceled Specimen:  Urine     Gram stain [[id number]] Collected:  06/21/18 1355    Order Status:  Completed Specimen:  Abscess from Abdomen Updated:  06/22/18 0624     Gram Stain Result No WBC's      Rare Gram positive cocci      Moderate Gram negative rods    Culture, Anaerobic [[id number]] Collected:  06/21/18 1355    Order Status:  Sent Specimen:  Abscess from Abdomen Updated:  06/21/18 2050         CT abdomen (06-19-18): Large subphrenic mass on the right abutting could be involving the liver extending into the right lateral abdominal wall, heterogeneous and complex in appearance suggesting complex fluid collection and containing small foci of air.  Differential includes large abscess, or necrotic mass.  It is indistinguishable from, abutting adjacent colon with colon wall thickening and findings thought most suggestive of abscess secondary to contained right colon perforation from colon cancer or less likely right-sided diverticulitis.  This corresponds to findings on recent ultrasound of 6/9/2018.     US abdomen:   1. Cholelithiasis.  2. 7.5 x 5.4 x 7.3 cm complicated cystic structure interposed between the right hepatic lobe and right kidney, possibly a loop of bowel (such as the hepatic flexure) with internal debris.  Consider additional evaluation with contrast enhanced CT of the abdomen.    Assessment/Plan:      *Abdominal pain [R10.9]  Right Retroperitoneal abscess s/p I+D  [R19.00]  Follow Dr. Torres's recommendations. Case discussed with Dr. Torres. Follow microbiology results - E Coli - Continue IV Zosyn and Flagyl.      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      Not Applicable    Diabetes mellitus 2 [E11.9]   Unknown       Liquid diabetic diet.  Check blood glucose level q 6h.  Use Novolog Insulin Sliding Scale as needed.        JAYSHREE (acute kidney injury) [N17.9]  Continue IVF hydration. Follow BMP.  Hold lasix.    Anemia - likely related to nutritional deficiencies  Follow CBC. Will transfuse 1 unit of PRBC today as the h/h has dropped.    Severe Malnutrition  Nutrition consulted. Encourage maximal PO intake. Diet supplementation ordered per nutrition approval. Will encourage PO and monitor closely for weight changes      Yes    Metabolic acidosis [E87.2] - better  Follow blood and urine culture results. Lactate WNL.  Follow HCO3 level.   Yes                DVT prophylaxis: Lovenox 40 mg SQ q day post-operatively once okay with Dr. Torres.     Plan discussed with family at bedside.     Nilesh Lin MD  Department of Hospital Medicine   [facility]

## 2018-06-26 LAB
ALBUMIN SERPL BCP-MCNC: 1.6 G/DL
ALP SERPL-CCNC: 36 U/L
ALT SERPL W/O P-5'-P-CCNC: 6 U/L
ANION GAP SERPL CALC-SCNC: 7 MMOL/L
AST SERPL-CCNC: 26 U/L
BACTERIA SPEC ANAEROBE CULT: NORMAL
BASOPHILS # BLD AUTO: 0 K/UL
BASOPHILS NFR BLD: 0.6 %
BILIRUB SERPL-MCNC: 0.5 MG/DL
BUN SERPL-MCNC: 13 MG/DL
CALCIUM SERPL-MCNC: 8.1 MG/DL
CHLORIDE SERPL-SCNC: 110 MMOL/L
CO2 SERPL-SCNC: 22 MMOL/L
CREAT SERPL-MCNC: 1 MG/DL
DIFFERENTIAL METHOD: ABNORMAL
EOSINOPHIL # BLD AUTO: 0.3 K/UL
EOSINOPHIL NFR BLD: 3.7 %
ERYTHROCYTE [DISTWIDTH] IN BLOOD BY AUTOMATED COUNT: 16.5 %
EST. GFR  (AFRICAN AMERICAN): >60 ML/MIN/1.73 M^2
EST. GFR  (NON AFRICAN AMERICAN): >60 ML/MIN/1.73 M^2
GLUCOSE SERPL-MCNC: 97 MG/DL
HCT VFR BLD AUTO: 28.6 %
HGB BLD-MCNC: 9.8 G/DL
LYMPHOCYTES # BLD AUTO: 3.4 K/UL
LYMPHOCYTES NFR BLD: 41.2 %
MAGNESIUM SERPL-MCNC: 1.7 MG/DL
MCH RBC QN AUTO: 30.5 PG
MCHC RBC AUTO-ENTMCNC: 34.1 G/DL
MCV RBC AUTO: 89 FL
MONOCYTES # BLD AUTO: 0.7 K/UL
MONOCYTES NFR BLD: 8.7 %
NEUTROPHILS # BLD AUTO: 3.8 K/UL
NEUTROPHILS NFR BLD: 45.8 %
PLATELET # BLD AUTO: 311 K/UL
PMV BLD AUTO: 6.8 FL
POTASSIUM SERPL-SCNC: 4.1 MMOL/L
PROT SERPL-MCNC: 5.1 G/DL
RBC # BLD AUTO: 3.2 M/UL
SODIUM SERPL-SCNC: 139 MMOL/L
WBC # BLD AUTO: 8.2 K/UL

## 2018-06-26 PROCEDURE — 85025 COMPLETE CBC W/AUTO DIFF WBC: CPT

## 2018-06-26 PROCEDURE — C9113 INJ PANTOPRAZOLE SODIUM, VIA: HCPCS | Performed by: SURGERY

## 2018-06-26 PROCEDURE — 12000002 HC ACUTE/MED SURGE SEMI-PRIVATE ROOM

## 2018-06-26 PROCEDURE — 94799 UNLISTED PULMONARY SVC/PX: CPT

## 2018-06-26 PROCEDURE — 25000003 PHARM REV CODE 250: Performed by: SURGERY

## 2018-06-26 PROCEDURE — B4185 PARENTERAL SOL 10 GM LIPIDS: HCPCS | Performed by: INTERNAL MEDICINE

## 2018-06-26 PROCEDURE — 36415 COLL VENOUS BLD VENIPUNCTURE: CPT

## 2018-06-26 PROCEDURE — 99233 SBSQ HOSP IP/OBS HIGH 50: CPT | Mod: ,,, | Performed by: INTERNAL MEDICINE

## 2018-06-26 PROCEDURE — 25000003 PHARM REV CODE 250: Performed by: INTERNAL MEDICINE

## 2018-06-26 PROCEDURE — 63600175 PHARM REV CODE 636 W HCPCS: Performed by: INTERNAL MEDICINE

## 2018-06-26 PROCEDURE — 94761 N-INVAS EAR/PLS OXIMETRY MLT: CPT

## 2018-06-26 PROCEDURE — 83735 ASSAY OF MAGNESIUM: CPT

## 2018-06-26 PROCEDURE — 63700000 PHARM REV CODE 250 ALT 637 W/O HCPCS: Performed by: INTERNAL MEDICINE

## 2018-06-26 PROCEDURE — 80053 COMPREHEN METABOLIC PANEL: CPT

## 2018-06-26 PROCEDURE — 63600175 PHARM REV CODE 636 W HCPCS: Performed by: SURGERY

## 2018-06-26 PROCEDURE — S0030 INJECTION, METRONIDAZOLE: HCPCS | Performed by: INTERNAL MEDICINE

## 2018-06-26 RX ORDER — FUROSEMIDE 20 MG/1
20 TABLET ORAL DAILY
Status: DISCONTINUED | OUTPATIENT
Start: 2018-06-27 | End: 2018-06-29

## 2018-06-26 RX ORDER — SYRING-NEEDL,DISP,INSUL,0.3 ML 29 G X1/2"
296 SYRINGE, EMPTY DISPOSABLE MISCELLANEOUS ONCE
Status: COMPLETED | OUTPATIENT
Start: 2018-06-26 | End: 2018-06-26

## 2018-06-26 RX ORDER — FLUCONAZOLE 2 MG/ML
200 INJECTION, SOLUTION INTRAVENOUS
Status: DISCONTINUED | OUTPATIENT
Start: 2018-06-26 | End: 2018-07-09 | Stop reason: HOSPADM

## 2018-06-26 RX ORDER — BRINZOLAMIDE 10 MG/ML
1 SUSPENSION/ DROPS OPHTHALMIC 2 TIMES DAILY
Status: DISCONTINUED | OUTPATIENT
Start: 2018-06-26 | End: 2018-06-27

## 2018-06-26 RX ADMIN — BRINZOLAMIDE 1 DROP: 10 SUSPENSION/ DROPS OPHTHALMIC at 11:06

## 2018-06-26 RX ADMIN — SIMVASTATIN 10 MG: 10 TABLET, FILM COATED ORAL at 09:06

## 2018-06-26 RX ADMIN — LEVOTHYROXINE SODIUM 25 MCG: 25 TABLET ORAL at 05:06

## 2018-06-26 RX ADMIN — MAGESIUM CITRATE 296 ML: 1.75 LIQUID ORAL at 09:06

## 2018-06-26 RX ADMIN — LEUCINE, PHENYLALANINE, LYSINE, METHIONINE, ISOLEUCINE, VALINE, HISTIDINE, THREONINE, TRYPTOPHAN, ALANINE, GLYCINE, ARGININE, PROLINE, SERINE, TYROSINE, SODIUM ACETATE, DIBASIC POTASSIUM PHOSPHATE, MAGNESIUM CHLORIDE, SODIUM CHLORIDE, CALCIUM CHLORIDE, DEXTROSE
365; 280; 290; 200; 300; 290; 240; 210; 90; 1035; 515; 575; 340; 250; 20; 340; 261; 51; 59; 33; 15 INJECTION INTRAVENOUS at 05:06

## 2018-06-26 RX ADMIN — METRONIDAZOLE 500 MG: 500 INJECTION, SOLUTION INTRAVENOUS at 02:06

## 2018-06-26 RX ADMIN — FENOFIBRATE 160 MG: 160 TABLET ORAL at 08:06

## 2018-06-26 RX ADMIN — PIPERACILLIN SODIUM AND TAZOBACTAM SODIUM 4.5 G: 4; .5 INJECTION, POWDER, LYOPHILIZED, FOR SOLUTION INTRAVENOUS at 11:06

## 2018-06-26 RX ADMIN — SODIUM BICARBONATE 650 MG TABLET 650 MG: at 09:06

## 2018-06-26 RX ADMIN — HYDROCODONE BITARTRATE AND ACETAMINOPHEN 1 TABLET: 5; 325 TABLET ORAL at 09:06

## 2018-06-26 RX ADMIN — SODIUM BICARBONATE 650 MG TABLET 650 MG: at 01:06

## 2018-06-26 RX ADMIN — DORZOLAMIDE HYDROCHLORIDE AND TIMOLOL MALEATE 1 DROP: 20; 5 SOLUTION/ DROPS OPHTHALMIC at 09:06

## 2018-06-26 RX ADMIN — LATANOPROST 1 DROP: 50 SOLUTION OPHTHALMIC at 09:06

## 2018-06-26 RX ADMIN — FLUCONAZOLE IN SODIUM CHLORIDE 200 MG: 2 INJECTION, SOLUTION INTRAVENOUS at 04:06

## 2018-06-26 RX ADMIN — PANTOPRAZOLE SODIUM 40 MG: 40 INJECTION, POWDER, LYOPHILIZED, FOR SOLUTION INTRAVENOUS at 05:06

## 2018-06-26 RX ADMIN — PIPERACILLIN SODIUM AND TAZOBACTAM SODIUM 4.5 G: 4; .5 INJECTION, POWDER, LYOPHILIZED, FOR SOLUTION INTRAVENOUS at 05:06

## 2018-06-26 RX ADMIN — ONDANSETRON 4 MG: 2 INJECTION INTRAMUSCULAR; INTRAVENOUS at 09:06

## 2018-06-26 RX ADMIN — METRONIDAZOLE 500 MG: 500 INJECTION, SOLUTION INTRAVENOUS at 11:06

## 2018-06-26 RX ADMIN — FLUTICASONE PROPIONATE 50 MCG: 50 SPRAY, METERED NASAL at 08:06

## 2018-06-26 RX ADMIN — DORZOLAMIDE HYDROCHLORIDE AND TIMOLOL MALEATE 1 DROP: 20; 5 SOLUTION/ DROPS OPHTHALMIC at 08:06

## 2018-06-26 RX ADMIN — I.V. FAT EMULSION 250 ML: 20 EMULSION INTRAVENOUS at 09:06

## 2018-06-26 RX ADMIN — BRINZOLAMIDE 1 DROP: 10 SUSPENSION/ DROPS OPHTHALMIC at 09:06

## 2018-06-26 RX ADMIN — VITAMIN D, TAB 1000IU (100/BT) 1000 UNITS: 25 TAB at 08:06

## 2018-06-26 RX ADMIN — SODIUM BICARBONATE 650 MG TABLET 650 MG: at 04:06

## 2018-06-26 RX ADMIN — HYDROCODONE BITARTRATE AND ACETAMINOPHEN 1 TABLET: 5; 325 TABLET ORAL at 05:06

## 2018-06-26 RX ADMIN — SODIUM BICARBONATE 650 MG TABLET 650 MG: at 08:06

## 2018-06-26 RX ADMIN — ONDANSETRON 8 MG: 4 TABLET, ORALLY DISINTEGRATING ORAL at 07:06

## 2018-06-26 RX ADMIN — PIPERACILLIN SODIUM AND TAZOBACTAM SODIUM 4.5 G: 4; .5 INJECTION, POWDER, LYOPHILIZED, FOR SOLUTION INTRAVENOUS at 02:06

## 2018-06-26 NOTE — CONSULTS
Consult Note  Infectious Disease    Reason for Consult:  Outpatient IV antibiotics?    HPI: Sis Teixeira is a pleasant but disabled 58 y.o. female who was admitted on 6/20 with 6 months of abdominal pain. She was evaluated for gall bladder disease but a UTI delayed intervention. She has had a 50# weight loss. CT scan on admission showed a huge fluid collection in the RUQ, adjacent to thickened right colon. She was taken to surgery where this abscess drained hundreds of ccs of pus. She was treated with zosyn and flagyl. The abscess culture has grown Ecoli, strep constellatus and fusobacterium. While there was no visible connection to the colon it is presumed that she had a contained colon perforation. She has had CEA measured and it is very high, supportive of a GI malignancy. GI has seen her as well and feels that colonoscopy is currently contraindicated. She is afebrile, not eating, not spending much time out of bed. Albumin is less than 2.0.    Review of patient's allergies indicates:   Allergen Reactions    Sulfa (sulfonamide antibiotics) Rash    Tetracyclines Rash     Past Medical History:   Diagnosis Date    Congenital small head     Diabetes mellitus     dIET CONTROL     Past Surgical History:   Procedure Laterality Date    INCISION AND DRAINAGE OF ABSCESS Right 6/21/2018    Procedure: INCISION AND DRAINAGE, ABSCESS;  Surgeon: Kumar Torres MD;  Location: [facility];  Service: General;  Laterality: Right;     Social History     Social History    Marital status: Single     Spouse name: N/A    Number of children: N/A    Years of education: N/A     Social History Main Topics    Smoking status: Never Smoker    Smokeless tobacco: Never Used    Alcohol use No    Drug use: No    Sexual activity: Not Asked     Other Topics Concern    None     Social History Narrative    None     History reviewed. No pertinent family history.    Pertinent medications noted:     Review of Systems:   Unable to give a  reliable history. She does not want to eat, cannot explain why. No SOB, pain control is adequate. Does not describe nausea. No diarrhea. Family reports blood per rectum. Is not sitting up in the chair    EXAM & DIAGNOSTICS REVIEWED:   Vitals:     Temp:  [96.4 °F (35.8 °C)-98.4 °F (36.9 °C)]   Temp: 98.4 °F (36.9 °C) (06/26/18 1519)  Pulse: 80 (06/26/18 1519)  Resp: 18 (06/26/18 1519)  BP: (!) 102/50 (06/26/18 1519)  SpO2: 100 % (06/26/18 1519)    Intake/Output Summary (Last 24 hours) at 06/26/18 1533  Last data filed at 06/26/18 0600   Gross per 24 hour   Intake             5295 ml   Output                0 ml   Net             5295 ml       General:  In NAD. Looks non toxic. Alert and attentive, cooperative. Microcephaly. Childlike. Does not understand the gravity of her situation. Happy with the family present for her.   Eyes:  Anicteric, PERRL, EOMI  ENT:  Mouth w/ pink MMM, no lesions/exudate  Neck:  Trachea midline, supple, no adenopathy appreciated  Lungs: clear  Heart:  RRR, no gallop/murmur noted  Abd:  soft, mild RUQ discomfort,  ND, normal BS, no masses/organomegaly appreciated.  :  Voids  Musc:  Joints without effusion, swelling,  erythema, synovitis, but musculature is wasted and has edema  Skin:  Generally warm, dry, normal for color. No rashes. No palmar or plantar    lesions. No subungual petechiae  Wound: Right UQ wound is packed with gauze. I did not remove the packing. No abd wall cellulitis  Neuro: AAOx3, speech clear, moves all extrems equally but muscles look wasted including intrinsic muscles of hands. ?always like that or in the last few months  Extrem: Mild generalized edema,no  erythema, phlebitis, cellulitis,   VAD:  Right arm picc    Lines/Tubes/Drains:    General Labs reviewed:    Recent Labs  Lab 06/26/18  0601   WBC 8.20   RBC 3.20*   HGB 9.8*   HCT 28.6*      MCV 89   MCH 30.5   MCHC 34.1       Recent Labs  Lab 06/26/18  0601   CALCIUM 8.1*   PROT 5.1*      K 4.1   CO2  22*      BUN 13   CREATININE 1.0   ALKPHOS 36*   ALT 6*   AST 26   BILITOT 0.5     Micro:  Microbiology Results (last 7 days)     Procedure Component Value Units Date/Time    Culture, Anaerobic [[id number]] Collected:  06/21/18 1355    Order Status:  Completed Specimen:  Abscess from Abdomen Updated:  06/26/18 1421     Anaerobic Culture --     FUSOBACTERIUM SPECIES  Moderate      Blood culture [[id number]] Collected:  06/20/18 1620    Order Status:  Completed Specimen:  Blood from Antecubital, Left  Arm Updated:  06/25/18 2212     Blood Culture, Routine No growth after 5 days.    Blood culture [[id number]] Collected:  06/20/18 1627    Order Status:  Completed Specimen:  Blood from Antecubital, Right  Arm Updated:  06/25/18 2212     Blood Culture, Routine No growth after 5 days.    Aerobic culture [[id number]]  (Susceptibility) Collected:  06/21/18 1355    Order Status:  Completed Specimen:  Abscess from Abdomen Updated:  06/25/18 0851     Aerobic Bacterial Culture --     ESCHERICHIA COLI  Many       Aerobic Bacterial Culture --     STREPTOCOCCUS CONSTELLATUS  Many  Susceptibility testing not routinely performed      Urine culture [[id number]] Collected:  06/21/18 1000    Order Status:  Completed Specimen:  Urine from Urine, Clean Catch Updated:  06/22/18 2158     Urine Culture, Routine No growth    Urine culture [[id number]]     Order Status:  Canceled Specimen:  Urine     Gram stain [[id number]] Collected:  06/21/18 1355    Order Status:  Completed Specimen:  Abscess from Abdomen Updated:  06/22/18 0624     Gram Stain Result No WBC's      Rare Gram positive cocci      Moderate Gram negative rods        Imaging Reviewed:   CXR and abdominal CT      IMPRESSION & PLAN     Imp; large RUQ abscess from contained perforation of right colon(Ecoli, strep and fusobacterium = all colon organisms), with mass suspicious for colon malignancy with 50# weight loss, hypoalbuminemia and elevated CEA and LGI bleeding   : microcephaly,  childlike, parents are decision makers   : malnutrition, severe, due to prolonged illness   : anorexia due to malignancy, likely      Rec: will discuss with you. Favor resection of colon as soon as surgery deems safe              Continue zosyn, can stop flagyl            Consider TPN and add diflucan prophylaxis

## 2018-06-26 NOTE — PROGRESS NOTES
CT imaging reviewed  Contained perforation present likely related to cecal mass (elevated cea) vs less likely diverticular bleeding.      Colonoscopy contraindicated from GI perspective.  Would allow healing and repeat ct as outpatient with plan for scope in 6-8 weeks.  If endoscopy is advised by surgery would defer to general surgery.

## 2018-06-26 NOTE — PROGRESS NOTES
Progress Note  Hospital Medicine  Patient Name:Sis Teixeira  MRN:  [id number]  Patient Class: IP- Inpatient  Admit Date: 6/20/2018  Length of Stay: 5 days  Expected Discharge Date:   Attending Physician: Bekah Fernandez MD  Primary Care Provider:  Mann Garduno MD    SUBJECTIVE:     Principal Problem: Abdominal pain  Initial history of present illness: Patient is a 57 y.o. female admitted to Hospitalist Service from Dr. Garduno's office with complaint of abdominal pain since December, 2017. Patient reportedly has past medical history significant for Congenital microcephaly and diet controlled DM. Part of the history obtained from patient's parents and Dr. Garduno. Patient has been complaining of RUQ and right flank pain for few months now. She was being worked up for cholelithiasis and possible cholecystectomy planned by Dr. Joel. Patient was note dto have UTI and leukocytosis and completed PO Cipro course. Still having leukocytosis. Patient has a low appetite and has lost almost 50 lbs in 6 months. No fever or chills. Patient denied chest pain, shortness of breath, headache, vision changes, focal neuro-deficits, cough or fever.    PMH/PSH/SH/FH/Meds: reviewed.    Symptoms/Review of Systems: s/p right retroperitoneal abscess I+D. No further rectal bleeding reported. Abdominal pain is stable. No shortness of breath, cough, chest pain or headache, fever. Chronic right sided abdominal pain is better.  Diet: Diabetic diet  Activity level: Normal.    Pain:  NAD      OBJECTIVE:   Vital Signs (Most Recent):      Temp: 97 °F (36.1 °C) (06/26/18 0724)  Pulse: 71 (06/26/18 0724)  Resp: 18 (06/26/18 0724)  BP: (!) 118/57 (06/26/18 0724)  SpO2: 100 % (06/26/18 0724)       Vital Signs Range (Last 24H):  Temp:  [96.4 °F (35.8 °C)-97.8 °F (36.6 °C)]   Pulse:  [66-74]   Resp:  [14-19]   BP: ()/(46-64)   SpO2:  [96 %-100 %]     Weight: 61.4 kg (135 lb 5.8 oz)  Body mass index is 27.34 kg/m².    Intake/Output Summary (Last  24 hours) at 06/26/18 0741  Last data filed at 06/26/18 0600   Gross per 24 hour   Intake             5775 ml   Output                0 ml   Net             5775 ml     Physical Examination:  General appearance: well developed, appears stated age  Head: normocephalic, atraumatic, microcephally  Eyes:  conjunctivae/corneas clear. PERRL.  Nose: Nares normal. Septum midline.  Throat: lips, mucosa, and tongue normal; teeth and gums normal, no throat erythema.  Neck: supple, symmetrical, trachea midline, no JVD and thyroid not enlarged, symmetric, no tenderness/mass/nodules  Lungs:  clear to auscultation bilaterally and normal respiratory effort  Chest wall: no tenderness  Heart: regular rate and rhythm, S1, S2 normal, no murmur, click, rub or gallop  Abdomen: soft, non-tender non-distented; bowel sounds normal; no masses,  no organomegaly. Abdominal dressing C/D/I.  Extremities: no cyanosis, clubbing or edema.   Pulses: 2+ and symmetric  Skin: Skin color, texture, turgor normal. No rashes or lesions.  Lymph nodes: Cervical, supraclavicular, and axillary nodes normal.  Neurologic: Grossly non-focal. Answers simple question, short sentences.     CBC:    Recent Labs  Lab 06/24/18  0459 06/25/18  0411 06/26/18  0601   WBC 5.60 6.00 8.20   RBC 2.39* 2.97* 3.20*   HGB 7.2* 8.9* 9.8*   HCT 21.6* 26.5* 28.6*    282 311   MCV 90 89 89   MCH 29.9 29.9 30.5   MCHC 33.2 33.5 34.1   BMP    Recent Labs  Lab 06/24/18  0459 06/25/18  0411 06/26/18  0601   GLU 92 93 97    140 139   K 3.6 3.4* 4.1   * 111* 110   CO2 21* 21* 22*   BUN 18 15 13   CREATININE 1.2 1.1 1.0   CALCIUM 7.7* 7.8* 8.1*   MG 1.8 1.6 1.7      Diagnostic Results:  Microbiology Results (last 7 days)     Procedure Component Value Units Date/Time    Blood culture [[id number]] Collected:  06/20/18 1620    Order Status:  Completed Specimen:  Blood from Antecubital, Left  Arm Updated:  06/25/18 9303     Blood Culture, Routine No growth after 5 days.     Blood culture [[id number]] Collected:  06/20/18 1627    Order Status:  Completed Specimen:  Blood from Antecubital, Right  Arm Updated:  06/25/18 2212     Blood Culture, Routine No growth after 5 days.    Aerobic culture [[id number]]  (Susceptibility) Collected:  06/21/18 1355    Order Status:  Completed Specimen:  Abscess from Abdomen Updated:  06/25/18 0851     Aerobic Bacterial Culture --     ESCHERICHIA COLI  Many       Aerobic Bacterial Culture --     STREPTOCOCCUS CONSTELLATUS  Many  Susceptibility testing not routinely performed      Urine culture [[id number]] Collected:  06/21/18 1000    Order Status:  Completed Specimen:  Urine from Urine, Clean Catch Updated:  06/22/18 2158     Urine Culture, Routine No growth    Urine culture [[id number]]     Order Status:  Canceled Specimen:  Urine     Gram stain [[id number]] Collected:  06/21/18 1355    Order Status:  Completed Specimen:  Abscess from Abdomen Updated:  06/22/18 0624     Gram Stain Result No WBC's      Rare Gram positive cocci      Moderate Gram negative rods    Culture, Anaerobic [[id number]] Collected:  06/21/18 1355    Order Status:  Sent Specimen:  Abscess from Abdomen Updated:  06/21/18 2050         CT abdomen (06-19-18): Large subphrenic mass on the right abutting could be involving the liver extending into the right lateral abdominal wall, heterogeneous and complex in appearance suggesting complex fluid collection and containing small foci of air.  Differential includes large abscess, or necrotic mass.  It is indistinguishable from, abutting adjacent colon with colon wall thickening and findings thought most suggestive of abscess secondary to contained right colon perforation from colon cancer or less likely right-sided diverticulitis.  This corresponds to findings on recent ultrasound of 6/9/2018.     US abdomen:   1. Cholelithiasis.  2. 7.5 x 5.4 x 7.3 cm complicated cystic structure interposed between the right hepatic lobe and right kidney, possibly  a loop of bowel (such as the hepatic flexure) with internal debris.  Consider additional evaluation with contrast enhanced CT of the abdomen.    CT abdomen with contrast: Interval drainage of the large right flank abscess.  It is now predominant air-filled with small amount of residual fluid with air-fluid level.  This abuts and is indistinguishable from the right colon, distal descending duodenum, caudal tip of the liver and the wall of the adjacent colon appears thick and irregular in findings thought most suggestive of sequela from localized perforation from colon cancer.  Diverticular disease include in the differential. Small amount of ascites increased compared the prior exam.  Interval development of small left pleural effusion additional findings as detailed above including 2.2 cm calcification right side of the pelvis possibly dermoid or exophytic uterine fibroid.  Diverticulosis.  Cholelithiasis.    Assessment/Plan:      *Abdominal pain [R10.9]  Right Retroperitoneal abscess with Coliform bacteria I+D [R19.00]  Follow Dr. Torres's recommendations.   Follow microbiology results - E Coli - Continue IV Zosyn and Flagyl.    Discussed with Dr. Zavala who does not feel colonoscopy is indicated at this time. Discussed with Dr. Bautista. Will start IV TPN and will discuss with Dr. Torres for further plan of care.      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    Rectal Bleeding  Elevated CEA level  Discussed CEA results and rectal bleeding episodes. Follow CT abdomen results and he will see the patient this evening.      Not Applicable    Diabetes mellitus 2 [E11.9]   Unknown       Liquid diabetic diet.  Check blood glucose level q 6h.  Use Novolog Insulin Sliding Scale as needed.        JAYSHREE (acute kidney injury) [N17.9] - resolved  Follow BMP.  Resume Lasix at 20 mg daily.    Anemia - Iron deficiency anemia  Needs Iron supplementation upon DC.    Hypokalemia  Replete KCl. Follow BMP.    Severe  Malnutrition  Encourage maximal PO intake. Diet supplementation ordered per nutrition approval. Will encourage PO and monitor closely for weight changes      Yes                      DVT prophylaxis: Use SCD and TEDs. Lovenox stopped due to GI bleeding.      Discussed with patient's parents.   Discussed with Dr. Garduno.    Bekah Fernandez MD  Department of Hospital Medicine   [facility]

## 2018-06-26 NOTE — PLAN OF CARE
Problem: Patient Care Overview  Goal: Plan of Care Review  Outcome: Ongoing (interventions implemented as appropriate)  AA. Oriented to time and place. Up with assist to bathroom. No complaints of pain. Dx CDI. IV and atx infusing per order. PICC line KVO. Pt slept well through the night. Mother at bedside. Q2 hour rounding utilized. Pain and comfort assessed. Bed low, brakes locked, SR up, call light within reach. POC reviewed. Pt & mother verbalized understanding. Will continue to monitor.

## 2018-06-26 NOTE — PROGRESS NOTES
[facility] Surgery  Progress Note    Subjective:     History of Present Illness:  Patient is a 57 y.o. female admitted to Hospitalist Service from Dr. Garduno's office with complaint of abdominal pain. Patient reportedly has past medical history significant for Congenital microcephaly and diet controlled DM.     Patient denied chest pain, shortness of breath, abdominal pain, nausea, vomiting, headache, vision changes, focal neuro-deficits, cough or fever.     She states the pain has been present for about six months off and on.  She had an ultrasound which showed gallstones and was scheduled to have lap GB but had persistent elevated WBC and CT was ordered, which showed   CT abdomen (06-19-18): Large subphrenic mass on the right abutting could be involving the liver extending into the right lateral abdominal wall, heterogeneous and complex in appearance suggesting complex fluid collection and containing small foci of air.  Differential includes large abscess, or necrotic mass.  It is indistinguishable from, abutting adjacent colon with colon wall thickening and findings thought most suggestive of abscess secondary to contained right colon perforation from colon cancer or less likely right-sided diverticulitis.  This corresponds to findings on recent ultrasound of 6/9/2018.    Post-Op Info:  Procedure(s) (LRB):  INCISION AND DRAINAGE, ABSCESS (Right)   5 Days Post-Op     Interval History: Doing well, no recta bleeding today.  Appreciate Dr. Pulido's and Michele's notes    Medications:  Continuous Infusions:   Amino acid 5% - dextrose 15% (CLINIMIX-E) solution with additives (1L  provides 510 kcal/L dextrose, with 50 gm AA, 150 gm CHO, Na 35, K 30, Mg 5, Ca 4.5, Acetate 80, Cl 39, Phos 15) 100 mL/hr at 06/26/18 1705     Scheduled Meds:   brinzolamide  1 drop Both Eyes BID    dorzolamide-timolol 2-0.5%  1 drop Both Eyes BID    fat emulsion 20%  250 mL Intravenous Daily    fenofibrate   160 mg Oral Daily    fluconazole (DIFLUCAN) IVPB  200 mg Intravenous Q24H    fluticasone  1 spray Each Nare Daily    [START ON 6/27/2018] furosemide  20 mg Oral Daily    latanoprost  1 drop Both Eyes QHS    levothyroxine  25 mcg Oral Before breakfast    pantoprazole  40 mg Intravenous Before breakfast    piperacillin-tazobactam 4.5 g in dextrose 5 % 100 mL IVPB (ready to mix system)  4.5 g Intravenous Q8H    simvastatin  10 mg Oral QHS    sodium bicarbonate  650 mg Oral QID    vitamin D  1,000 Units Oral Daily     PRN Meds:sodium chloride, acetaminophen, acetaminophen, acetaminophen, dextrose 50%, dextrose 50%, glucagon (human recombinant), glucose, glucose, HYDROcodone-acetaminophen, HYDROcodone-acetaminophen, HYDROmorphone, insulin aspart U-100, ondansetron, ondansetron, sodium chloride 0.9%     Review of patient's allergies indicates:   Allergen Reactions    Sulfa (sulfonamide antibiotics) Rash    Tetracyclines Rash     Objective:     Vital Signs (Most Recent):  Temp: 98.4 °F (36.9 °C) (06/26/18 1519)  Pulse: 80 (06/26/18 1519)  Resp: 18 (06/26/18 1519)  BP: (!) 102/50 (06/26/18 1519)  SpO2: 100 % (06/26/18 1519) Vital Signs (24h Range):  Temp:  [96.4 °F (35.8 °C)-98.4 °F (36.9 °C)] 98.4 °F (36.9 °C)  Pulse:  [67-80] 80  Resp:  [14-18] 18  SpO2:  [96 %-100 %] 100 %  BP: (102-147)/(46-61) 102/50     Weight: 61.4 kg (135 lb 5.8 oz)  Body mass index is 27.34 kg/m².    Intake/Output - Last 3 Shifts       06/24 0700 - 06/25 0659 06/25 0700 - 06/26 0659 06/26 0700 - 06/27 0659    P.O.  750     I.V. (mL/kg) 600 (9.8) 4425 (72.1)     Blood 361.7      IV Piggyback 200 600     Total Intake(mL/kg) 1161.7 (18.9) 5775 (94.1)     Net +1161.7 +5775             Urine Occurrence 2 x 5 x     Stool Occurrence  3 x           Physical Exam   Constitutional: She is oriented to person, place, and time. She appears well-developed and well-nourished. No distress.   HENT:   Head: Atraumatic.   Right Ear: External ear normal.    Left Ear: External ear normal.   Eyes: Conjunctivae are normal. Pupils are equal, round, and reactive to light. Right eye exhibits no discharge. Left eye exhibits no discharge.   Neck: No tracheal deviation present. No thyromegaly present.   Cardiovascular: Normal rate and regular rhythm.    Pulmonary/Chest: Effort normal. No respiratory distress.   Abdominal: Soft. She exhibits no distension. There is no guarding.   Minimal drainage from wound   Musculoskeletal: She exhibits no edema or tenderness.   Lymphadenopathy:     She has no cervical adenopathy.   Neurological: She is alert and oriented to person, place, and time. No cranial nerve deficit.   Skin: Skin is warm and dry. No rash noted. She is not diaphoretic. No pallor.   Psychiatric: She has a normal mood and affect. Her behavior is normal. Judgment and thought content normal.   Nursing note and vitals reviewed.      Significant Labs:  CBC:   Recent Labs  Lab 06/26/18  0601   WBC 8.20   RBC 3.20*   HGB 9.8*   HCT 28.6*      MCV 89   MCH 30.5   MCHC 34.1     CMP:   Recent Labs  Lab 06/26/18  0601   GLU 97   CALCIUM 8.1*   ALBUMIN 1.6*   PROT 5.1*      K 4.1   CO2 22*      BUN 13   CREATININE 1.0   ALKPHOS 36*   ALT 6*   AST 26   BILITOT 0.5     Microbiology Results (last 7 days)     Procedure Component Value Units Date/Time    Culture, Anaerobic [[id number]] Collected:  06/21/18 1355    Order Status:  Completed Specimen:  Abscess from Abdomen Updated:  06/26/18 1421     Anaerobic Culture --     FUSOBACTERIUM SPECIES  Moderate      Blood culture [[id number]] Collected:  06/20/18 1620    Order Status:  Completed Specimen:  Blood from Antecubital, Left  Arm Updated:  06/25/18 2212     Blood Culture, Routine No growth after 5 days.    Blood culture [[id number]] Collected:  06/20/18 1627    Order Status:  Completed Specimen:  Blood from Antecubital, Right  Arm Updated:  06/25/18 2212     Blood Culture, Routine No growth after 5 days.    Aerobic  culture [[id number]]  (Susceptibility) Collected:  06/21/18 1355    Order Status:  Completed Specimen:  Abscess from Abdomen Updated:  06/25/18 0851     Aerobic Bacterial Culture --     ESCHERICHIA COLI  Many       Aerobic Bacterial Culture --     STREPTOCOCCUS CONSTELLATUS  Many  Susceptibility testing not routinely performed      Urine culture [[id number]] Collected:  06/21/18 1000    Order Status:  Completed Specimen:  Urine from Urine, Clean Catch Updated:  06/22/18 2158     Urine Culture, Routine No growth    Urine culture [[id number]]     Order Status:  Canceled Specimen:  Urine     Gram stain [[id number]] Collected:  06/21/18 1355    Order Status:  Completed Specimen:  Abscess from Abdomen Updated:  06/22/18 0624     Gram Stain Result No WBC's      Rare Gram positive cocci      Moderate Gram negative rods          Significant Diagnostics:  I have reviewed all pertinent imaging results/findings within the past 24 hours.    Assessment/Plan:     * Abdominal pain    Patient clinically improved.  Will get gastrografin enema tomorrow to evaluate for any evidence of impending obstruction.  Would like to defer surgery until infection is cleared unless signs of active leak on BE.  Discussed with pt, mother and Dr. Fernandez.              Kumar Torres MD  General Surgery  [facility]

## 2018-06-26 NOTE — ASSESSMENT & PLAN NOTE
Patient clinically improved.  Will get gastrografin enema tomorrow to evaluate for any evidence of impending obstruction.  Would like to defer surgery until infection is cleared unless signs of active leak on BE.  Discussed with pt, mother and Dr. Fernandez.

## 2018-06-26 NOTE — SUBJECTIVE & OBJECTIVE
Interval History: Doing well, no recta bleeding today.  Appreciate Dr. Pulido's and Michele's notes    Medications:  Continuous Infusions:   Amino acid 5% - dextrose 15% (CLINIMIX-E) solution with additives (1L  provides 510 kcal/L dextrose, with 50 gm AA, 150 gm CHO, Na 35, K 30, Mg 5, Ca 4.5, Acetate 80, Cl 39, Phos 15) 100 mL/hr at 06/26/18 1705     Scheduled Meds:   brinzolamide  1 drop Both Eyes BID    dorzolamide-timolol 2-0.5%  1 drop Both Eyes BID    fat emulsion 20%  250 mL Intravenous Daily    fenofibrate  160 mg Oral Daily    fluconazole (DIFLUCAN) IVPB  200 mg Intravenous Q24H    fluticasone  1 spray Each Nare Daily    [START ON 6/27/2018] furosemide  20 mg Oral Daily    latanoprost  1 drop Both Eyes QHS    levothyroxine  25 mcg Oral Before breakfast    pantoprazole  40 mg Intravenous Before breakfast    piperacillin-tazobactam 4.5 g in dextrose 5 % 100 mL IVPB (ready to mix system)  4.5 g Intravenous Q8H    simvastatin  10 mg Oral QHS    sodium bicarbonate  650 mg Oral QID    vitamin D  1,000 Units Oral Daily     PRN Meds:sodium chloride, acetaminophen, acetaminophen, acetaminophen, dextrose 50%, dextrose 50%, glucagon (human recombinant), glucose, glucose, HYDROcodone-acetaminophen, HYDROcodone-acetaminophen, HYDROmorphone, insulin aspart U-100, ondansetron, ondansetron, sodium chloride 0.9%     Review of patient's allergies indicates:   Allergen Reactions    Sulfa (sulfonamide antibiotics) Rash    Tetracyclines Rash     Objective:     Vital Signs (Most Recent):  Temp: 98.4 °F (36.9 °C) (06/26/18 1519)  Pulse: 80 (06/26/18 1519)  Resp: 18 (06/26/18 1519)  BP: (!) 102/50 (06/26/18 1519)  SpO2: 100 % (06/26/18 1519) Vital Signs (24h Range):  Temp:  [96.4 °F (35.8 °C)-98.4 °F (36.9 °C)] 98.4 °F (36.9 °C)  Pulse:  [67-80] 80  Resp:  [14-18] 18  SpO2:  [96 %-100 %] 100 %  BP: (102-147)/(46-61) 102/50     Weight: 61.4 kg (135 lb 5.8 oz)  Body mass index is 27.34 kg/m².    Intake/Output -  Last 3 Shifts       06/24 0700 - 06/25 0659 06/25 0700 - 06/26 0659 06/26 0700 - 06/27 0659    P.O.  750     I.V. (mL/kg) 600 (9.8) 4425 (72.1)     Blood 361.7      IV Piggyback 200 600     Total Intake(mL/kg) 1161.7 (18.9) 5775 (94.1)     Net +1161.7 +5775             Urine Occurrence 2 x 5 x     Stool Occurrence  3 x           Physical Exam   Constitutional: She is oriented to person, place, and time. She appears well-developed and well-nourished. No distress.   HENT:   Head: Atraumatic.   Right Ear: External ear normal.   Left Ear: External ear normal.   Eyes: Conjunctivae are normal. Pupils are equal, round, and reactive to light. Right eye exhibits no discharge. Left eye exhibits no discharge.   Neck: No tracheal deviation present. No thyromegaly present.   Cardiovascular: Normal rate and regular rhythm.    Pulmonary/Chest: Effort normal. No respiratory distress.   Abdominal: Soft. She exhibits no distension. There is no guarding.   Minimal drainage from wound   Musculoskeletal: She exhibits no edema or tenderness.   Lymphadenopathy:     She has no cervical adenopathy.   Neurological: She is alert and oriented to person, place, and time. No cranial nerve deficit.   Skin: Skin is warm and dry. No rash noted. She is not diaphoretic. No pallor.   Psychiatric: She has a normal mood and affect. Her behavior is normal. Judgment and thought content normal.   Nursing note and vitals reviewed.      Significant Labs:  CBC:   Recent Labs  Lab 06/26/18  0601   WBC 8.20   RBC 3.20*   HGB 9.8*   HCT 28.6*      MCV 89   MCH 30.5   MCHC 34.1     CMP:   Recent Labs  Lab 06/26/18  0601   GLU 97   CALCIUM 8.1*   ALBUMIN 1.6*   PROT 5.1*      K 4.1   CO2 22*      BUN 13   CREATININE 1.0   ALKPHOS 36*   ALT 6*   AST 26   BILITOT 0.5     Microbiology Results (last 7 days)     Procedure Component Value Units Date/Time    Culture, Anaerobic [[id number]] Collected:  06/21/18 1355    Order Status:  Completed Specimen:   Abscess from Abdomen Updated:  06/26/18 1421     Anaerobic Culture --     FUSOBACTERIUM SPECIES  Moderate      Blood culture [[id number]] Collected:  06/20/18 1620    Order Status:  Completed Specimen:  Blood from Antecubital, Left  Arm Updated:  06/25/18 2212     Blood Culture, Routine No growth after 5 days.    Blood culture [[id number]] Collected:  06/20/18 1627    Order Status:  Completed Specimen:  Blood from Antecubital, Right  Arm Updated:  06/25/18 2212     Blood Culture, Routine No growth after 5 days.    Aerobic culture [[id number]]  (Susceptibility) Collected:  06/21/18 1355    Order Status:  Completed Specimen:  Abscess from Abdomen Updated:  06/25/18 0851     Aerobic Bacterial Culture --     ESCHERICHIA COLI  Many       Aerobic Bacterial Culture --     STREPTOCOCCUS CONSTELLATUS  Many  Susceptibility testing not routinely performed      Urine culture [[id number]] Collected:  06/21/18 1000    Order Status:  Completed Specimen:  Urine from Urine, Clean Catch Updated:  06/22/18 2158     Urine Culture, Routine No growth    Urine culture [[id number]]     Order Status:  Canceled Specimen:  Urine     Gram stain [[id number]] Collected:  06/21/18 1355    Order Status:  Completed Specimen:  Abscess from Abdomen Updated:  06/22/18 0624     Gram Stain Result No WBC's      Rare Gram positive cocci      Moderate Gram negative rods          Significant Diagnostics:  I have reviewed all pertinent imaging results/findings within the past 24 hours.

## 2018-06-27 LAB
ALBUMIN SERPL BCP-MCNC: 1.5 G/DL
ALP SERPL-CCNC: 34 U/L
ALT SERPL W/O P-5'-P-CCNC: 8 U/L
ANION GAP SERPL CALC-SCNC: 5 MMOL/L
AST SERPL-CCNC: 21 U/L
BASOPHILS # BLD AUTO: 0 K/UL
BASOPHILS NFR BLD: 0.4 %
BILIRUB SERPL-MCNC: 0.3 MG/DL
BUN SERPL-MCNC: 17 MG/DL
CALCIUM SERPL-MCNC: 8.1 MG/DL
CHLORIDE SERPL-SCNC: 110 MMOL/L
CO2 SERPL-SCNC: 23 MMOL/L
CREAT SERPL-MCNC: 1.2 MG/DL
DIFFERENTIAL METHOD: ABNORMAL
EOSINOPHIL # BLD AUTO: 0.2 K/UL
EOSINOPHIL NFR BLD: 2 %
ERYTHROCYTE [DISTWIDTH] IN BLOOD BY AUTOMATED COUNT: 16.5 %
EST. GFR  (AFRICAN AMERICAN): 58 ML/MIN/1.73 M^2
EST. GFR  (NON AFRICAN AMERICAN): 50 ML/MIN/1.73 M^2
GLUCOSE SERPL-MCNC: 263 MG/DL
HCT VFR BLD AUTO: 26.8 %
HGB BLD-MCNC: 8.9 G/DL
LYMPHOCYTES # BLD AUTO: 3.1 K/UL
LYMPHOCYTES NFR BLD: 29.1 %
MAGNESIUM SERPL-MCNC: 1.9 MG/DL
MCH RBC QN AUTO: 30 PG
MCHC RBC AUTO-ENTMCNC: 33.2 G/DL
MCV RBC AUTO: 91 FL
MONOCYTES # BLD AUTO: 1 K/UL
MONOCYTES NFR BLD: 8.9 %
NEUTROPHILS # BLD AUTO: 6.4 K/UL
NEUTROPHILS NFR BLD: 59.6 %
PLATELET # BLD AUTO: 297 K/UL
PMV BLD AUTO: 6.7 FL
POCT GLUCOSE: 280 MG/DL (ref 70–110)
POTASSIUM SERPL-SCNC: 3.8 MMOL/L
PROT SERPL-MCNC: 4.8 G/DL
RBC # BLD AUTO: 2.96 M/UL
SODIUM SERPL-SCNC: 138 MMOL/L
WBC # BLD AUTO: 10.7 K/UL

## 2018-06-27 PROCEDURE — 36415 COLL VENOUS BLD VENIPUNCTURE: CPT

## 2018-06-27 PROCEDURE — 25000242 PHARM REV CODE 250 ALT 637 W/ HCPCS: Performed by: INTERNAL MEDICINE

## 2018-06-27 PROCEDURE — 63600175 PHARM REV CODE 636 W HCPCS: Performed by: SURGERY

## 2018-06-27 PROCEDURE — 63600175 PHARM REV CODE 636 W HCPCS: Performed by: INTERNAL MEDICINE

## 2018-06-27 PROCEDURE — C9113 INJ PANTOPRAZOLE SODIUM, VIA: HCPCS | Performed by: SURGERY

## 2018-06-27 PROCEDURE — 25000003 PHARM REV CODE 250: Performed by: INTERNAL MEDICINE

## 2018-06-27 PROCEDURE — 99233 SBSQ HOSP IP/OBS HIGH 50: CPT | Mod: ,,, | Performed by: INTERNAL MEDICINE

## 2018-06-27 PROCEDURE — 94761 N-INVAS EAR/PLS OXIMETRY MLT: CPT

## 2018-06-27 PROCEDURE — 99900035 HC TECH TIME PER 15 MIN (STAT)

## 2018-06-27 PROCEDURE — 80053 COMPREHEN METABOLIC PANEL: CPT

## 2018-06-27 PROCEDURE — 25000003 PHARM REV CODE 250: Performed by: SURGERY

## 2018-06-27 PROCEDURE — 25500020 PHARM REV CODE 255

## 2018-06-27 PROCEDURE — 25500020 PHARM REV CODE 255: Performed by: INTERNAL MEDICINE

## 2018-06-27 PROCEDURE — 12000002 HC ACUTE/MED SURGE SEMI-PRIVATE ROOM

## 2018-06-27 PROCEDURE — 63700000 PHARM REV CODE 250 ALT 637 W/O HCPCS: Performed by: INTERNAL MEDICINE

## 2018-06-27 PROCEDURE — 85025 COMPLETE CBC W/AUTO DIFF WBC: CPT

## 2018-06-27 PROCEDURE — 83735 ASSAY OF MAGNESIUM: CPT

## 2018-06-27 RX ORDER — HYDROMORPHONE HYDROCHLORIDE 1 MG/ML
0.5 INJECTION, SOLUTION INTRAMUSCULAR; INTRAVENOUS; SUBCUTANEOUS EVERY 6 HOURS PRN
Status: DISCONTINUED | OUTPATIENT
Start: 2018-06-27 | End: 2018-07-04

## 2018-06-27 RX ORDER — BRINZOLAMIDE 10 MG/ML
1 SUSPENSION/ DROPS OPHTHALMIC 2 TIMES DAILY
Status: DISCONTINUED | OUTPATIENT
Start: 2018-06-27 | End: 2018-07-09 | Stop reason: HOSPADM

## 2018-06-27 RX ADMIN — FLUCONAZOLE IN SODIUM CHLORIDE 200 MG: 2 INJECTION, SOLUTION INTRAVENOUS at 03:06

## 2018-06-27 RX ADMIN — PANTOPRAZOLE SODIUM 40 MG: 40 INJECTION, POWDER, LYOPHILIZED, FOR SOLUTION INTRAVENOUS at 06:06

## 2018-06-27 RX ADMIN — FENOFIBRATE 160 MG: 160 TABLET ORAL at 10:06

## 2018-06-27 RX ADMIN — DIATRIZOATE MEGLUMINE AND DIATRIZOATE SODIUM 120 ML: 660; 100 LIQUID ORAL; RECTAL at 10:06

## 2018-06-27 RX ADMIN — PIPERACILLIN SODIUM AND TAZOBACTAM SODIUM 4.5 G: 4; .5 INJECTION, POWDER, LYOPHILIZED, FOR SOLUTION INTRAVENOUS at 02:06

## 2018-06-27 RX ADMIN — FUROSEMIDE 20 MG: 20 TABLET ORAL at 10:06

## 2018-06-27 RX ADMIN — PIPERACILLIN SODIUM AND TAZOBACTAM SODIUM 4.5 G: 4; .5 INJECTION, POWDER, LYOPHILIZED, FOR SOLUTION INTRAVENOUS at 07:06

## 2018-06-27 RX ADMIN — DIATRIZOATE MEGLUMINE AND DIATRIZOATE SODIUM 120 ML: 600; 100 SOLUTION ORAL; RECTAL at 10:06

## 2018-06-27 RX ADMIN — Medication 0.5 MG: at 12:06

## 2018-06-27 RX ADMIN — INSULIN ASPART 3 UNITS: 100 INJECTION, SOLUTION INTRAVENOUS; SUBCUTANEOUS at 07:06

## 2018-06-27 RX ADMIN — LEUCINE, PHENYLALANINE, LYSINE, METHIONINE, ISOLEUCINE, VALINE, HISTIDINE, THREONINE, TRYPTOPHAN, ALANINE, GLYCINE, ARGININE, PROLINE, SERINE, TYROSINE, SODIUM ACETATE, DIBASIC POTASSIUM PHOSPHATE, MAGNESIUM CHLORIDE, SODIUM CHLORIDE, CALCIUM CHLORIDE, DEXTROSE
365; 280; 290; 200; 300; 290; 240; 210; 90; 1035; 515; 575; 340; 250; 20; 340; 261; 51; 59; 33; 15 INJECTION INTRAVENOUS at 03:06

## 2018-06-27 RX ADMIN — SODIUM BICARBONATE 650 MG TABLET 650 MG: at 10:06

## 2018-06-27 RX ADMIN — DORZOLAMIDE HYDROCHLORIDE AND TIMOLOL MALEATE 1 DROP: 20; 5 SOLUTION/ DROPS OPHTHALMIC at 09:06

## 2018-06-27 RX ADMIN — SIMVASTATIN 10 MG: 10 TABLET, FILM COATED ORAL at 09:06

## 2018-06-27 RX ADMIN — FLUTICASONE PROPIONATE 50 MCG: 50 SPRAY, METERED NASAL at 12:06

## 2018-06-27 RX ADMIN — BRINZOLAMIDE 1 DROP: 10 SUSPENSION/ DROPS OPHTHALMIC at 10:06

## 2018-06-27 RX ADMIN — LATANOPROST 1 DROP: 50 SOLUTION OPHTHALMIC at 09:06

## 2018-06-27 RX ADMIN — VITAMIN D, TAB 1000IU (100/BT) 1000 UNITS: 25 TAB at 10:06

## 2018-06-27 RX ADMIN — BRINZOLAMIDE 1 DROP: 10 SUSPENSION/ DROPS OPHTHALMIC at 09:06

## 2018-06-27 RX ADMIN — SODIUM BICARBONATE 650 MG TABLET 650 MG: at 04:06

## 2018-06-27 RX ADMIN — ONDANSETRON 4 MG: 2 INJECTION INTRAMUSCULAR; INTRAVENOUS at 03:06

## 2018-06-27 RX ADMIN — PIPERACILLIN SODIUM AND TAZOBACTAM SODIUM 4.5 G: 4; .5 INJECTION, POWDER, LYOPHILIZED, FOR SOLUTION INTRAVENOUS at 10:06

## 2018-06-27 RX ADMIN — HYDROCODONE BITARTRATE AND ACETAMINOPHEN 1 TABLET: 5; 325 TABLET ORAL at 09:06

## 2018-06-27 RX ADMIN — SODIUM BICARBONATE 650 MG TABLET 650 MG: at 09:06

## 2018-06-27 RX ADMIN — LEVOTHYROXINE SODIUM 25 MCG: 25 TABLET ORAL at 06:06

## 2018-06-27 NOTE — CONSULTS
"The  offered emotional support and spiritual care to the patient and her family, with RN present, and prayed with them at bedside at their request, asking for "God's loving, healing presence to be with Dia and her family", while acknowledging the need to "trust in God--even when we cannot know the outcome", and to understand that "sometimes 'healing' cannot mean 'cure'." The family thanked the  for care and cued the patient, who also said "thank you"; they welcomed the 's ongoing prayers and subsequent visits. The  will continue to care for patient and family.     "

## 2018-06-27 NOTE — PROGRESS NOTES
Progress Note  Hospital Medicine  Patient Name:Sis Teixeira  MRN:  [id number]  Patient Class: IP- Inpatient  Admit Date: 6/20/2018  Length of Stay: 6 days  Expected Discharge Date:   Attending Physician: Bekah Fernandez MD  Primary Care Provider:  Mann Garduno MD    SUBJECTIVE:     Principal Problem: Abdominal pain  Initial history of present illness: Patient is a 57 y.o. female admitted to Hospitalist Service from Dr. Garduno's office with complaint of abdominal pain since December, 2017. Patient reportedly has past medical history significant for Congenital microcephaly and diet controlled DM. Part of the history obtained from patient's parents and Dr. Garduno. Patient has been complaining of RUQ and right flank pain for few months now. She was being worked up for cholelithiasis and possible cholecystectomy planned by Dr. Joel. Patient was note dto have UTI and leukocytosis and completed PO Cipro course. Still having leukocytosis. Patient has a low appetite and has lost almost 50 lbs in 6 months. No fever or chills. Patient denied chest pain, shortness of breath, headache, vision changes, focal neuro-deficits, cough or fever.    PMH/PSH/SH/FH/Meds: reviewed.    Symptoms/Review of Systems: s/p right retroperitoneal abscess I+D. No further rectal bleeding reported. Afebrile. Scheduled for gastrograffin enema. Abdominal pain is stable. No shortness of breath, cough, chest pain or headache, fever. Chronic right sided abdominal pain is better.  Diet: TPN  Activity level: Normal.    Pain:  NAD      OBJECTIVE:   Vital Signs (Most Recent):      Temp: 97.5 °F (36.4 °C) (06/27/18 0825)  Pulse: 81 (06/27/18 0825)  Resp: 16 (06/27/18 0825)  BP: 130/61 (06/27/18 0825)  SpO2: 100 % (06/27/18 0825)       Vital Signs Range (Last 24H):  Temp:  [96.9 °F (36.1 °C)-98.6 °F (37 °C)]   Pulse:  [73-81]   Resp:  [16-20]   BP: (102-137)/(50-65)   SpO2:  [99 %-100 %]     Weight: 61.4 kg (135 lb 5.8 oz)  Body mass index is 27.34  kg/m².    Intake/Output Summary (Last 24 hours) at 06/27/18 1030  Last data filed at 06/27/18 0600   Gross per 24 hour   Intake           2160.5 ml   Output                7 ml   Net           2153.5 ml     Physical Examination:  General appearance: well developed, appears stated age  Head: normocephalic, atraumatic, microcephally  Eyes:  conjunctivae/corneas clear. PERRL.  Nose: Nares normal. Septum midline.  Throat: lips, mucosa, and tongue normal; teeth and gums normal, no throat erythema.  Neck: supple, symmetrical, trachea midline, no JVD and thyroid not enlarged, symmetric, no tenderness/mass/nodules  Lungs:  clear to auscultation bilaterally and normal respiratory effort  Chest wall: no tenderness  Heart: regular rate and rhythm, S1, S2 normal, no murmur, click, rub or gallop  Abdomen: soft, non-tender non-distented; bowel sounds normal; no masses,  no organomegaly. Abdominal dressing C/D/I.  Extremities: no cyanosis, clubbing or edema.   Pulses: 2+ and symmetric  Skin: Skin color, texture, turgor normal. No rashes or lesions.  Lymph nodes: Cervical, supraclavicular, and axillary nodes normal.  Neurologic: Grossly non-focal. Answers simple question, short sentences.     CBC:    Recent Labs  Lab 06/25/18 0411 06/26/18  0601 06/27/18  0453   WBC 6.00 8.20 10.70   RBC 2.97* 3.20* 2.96*   HGB 8.9* 9.8* 8.9*   HCT 26.5* 28.6* 26.8*    311 297   MCV 89 89 91   MCH 29.9 30.5 30.0   MCHC 33.5 34.1 33.2   BMP    Recent Labs  Lab 06/25/18  0411 06/26/18  0601 06/27/18  0453   GLU 93 97 263*    139 138   K 3.4* 4.1 3.8   * 110 110   CO2 21* 22* 23   BUN 15 13 17   CREATININE 1.1 1.0 1.2   CALCIUM 7.8* 8.1* 8.1*   MG 1.6 1.7 1.9      Diagnostic Results:  Microbiology Results (last 7 days)     Procedure Component Value Units Date/Time    Culture, Anaerobic [[id number]] Collected:  06/21/18 1352    Order Status:  Completed Specimen:  Abscess from Abdomen Updated:  06/26/18 1421     Anaerobic Culture --      FUSOBACTERIUM SPECIES  Moderate      Blood culture [[id number]] Collected:  06/20/18 1620    Order Status:  Completed Specimen:  Blood from Antecubital, Left  Arm Updated:  06/25/18 2212     Blood Culture, Routine No growth after 5 days.    Blood culture [[id number]] Collected:  06/20/18 1627    Order Status:  Completed Specimen:  Blood from Antecubital, Right  Arm Updated:  06/25/18 2212     Blood Culture, Routine No growth after 5 days.    Aerobic culture [[id number]]  (Susceptibility) Collected:  06/21/18 1355    Order Status:  Completed Specimen:  Abscess from Abdomen Updated:  06/25/18 0851     Aerobic Bacterial Culture --     ESCHERICHIA COLI  Many       Aerobic Bacterial Culture --     STREPTOCOCCUS CONSTELLATUS  Many  Susceptibility testing not routinely performed      Urine culture [[id number]] Collected:  06/21/18 1000    Order Status:  Completed Specimen:  Urine from Urine, Clean Catch Updated:  06/22/18 2158     Urine Culture, Routine No growth    Urine culture [[id number]]     Order Status:  Canceled Specimen:  Urine     Gram stain [[id number]] Collected:  06/21/18 1355    Order Status:  Completed Specimen:  Abscess from Abdomen Updated:  06/22/18 0624     Gram Stain Result No WBC's      Rare Gram positive cocci      Moderate Gram negative rods         CT abdomen (06-19-18): Large subphrenic mass on the right abutting could be involving the liver extending into the right lateral abdominal wall, heterogeneous and complex in appearance suggesting complex fluid collection and containing small foci of air.  Differential includes large abscess, or necrotic mass.  It is indistinguishable from, abutting adjacent colon with colon wall thickening and findings thought most suggestive of abscess secondary to contained right colon perforation from colon cancer or less likely right-sided diverticulitis.  This corresponds to findings on recent ultrasound of 6/9/2018.     US abdomen:   1. Cholelithiasis.  2. 7.5 x 5.4 x  7.3 cm complicated cystic structure interposed between the right hepatic lobe and right kidney, possibly a loop of bowel (such as the hepatic flexure) with internal debris.  Consider additional evaluation with contrast enhanced CT of the abdomen.    CT abdomen with contrast: Interval drainage of the large right flank abscess.  It is now predominant air-filled with small amount of residual fluid with air-fluid level.  This abuts and is indistinguishable from the right colon, distal descending duodenum, caudal tip of the liver and the wall of the adjacent colon appears thick and irregular in findings thought most suggestive of sequela from localized perforation from colon cancer.  Diverticular disease include in the differential. Small amount of ascites increased compared the prior exam.  Interval development of small left pleural effusion additional findings as detailed above including 2.2 cm calcification right side of the pelvis possibly dermoid or exophytic uterine fibroid.  Diverticulosis.  Cholelithiasis.    Assessment/Plan:      *Abdominal pain [R10.9]  Right Retroperitoneal abscess with Coliform bacteria I+D [R19.00]  Follow Dr. Torres's recommendations.   Follow microbiology results - E Coli - Continue IV Zosyn and Diflucan, follow Dr. Bautista's recommendations.  Continue IV TPN. Follow Gastrograffin enema results and Dr. Torres's recommendations. Case discussed with Dr. Torres and Dr. Bautista. Intestinal surgery is being planned.      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    Rectal Bleeding  Elevated CEA level  Resolved      Not Applicable    Diabetes mellitus 2 [E11.9]   Unknown       Liquid diabetic diet.  Check blood glucose level q 6h.  Use Novolog Insulin Sliding Scale as needed.        JAYSHREE (acute kidney injury) [N17.9] - resolved  Follow BMP.  Continue Lasix at 20 mg daily.    Anemia - Iron deficiency anemia  Needs Iron supplementation upon DC.    Hypokalemia  Replete KCl. Follow BMP.    Severe  Malnutrition  On TPN and Lipids.    Yes                      DVT prophylaxis: Use SCD and TEDs. Lovenox stopped due to GI bleeding.      Discussed with patient's parents. Time spent in care of the patient, counseling and coordination of care (Greater than 50% spent in direct face to face contact): 35 min.    Bekah Fernandez MD  Department of Hospital Medicine   [facility]

## 2018-06-27 NOTE — PLAN OF CARE
06/26/18 2047   Patient Assessment/Suction   Level of Consciousness (AVPU) alert   PRE-TX-O2-ETCO2   O2 Device (Oxygen Therapy) room air   SpO2 99 %   Pulse Oximetry Type Intermittent   Pulse 75   Resp 16   Incentive Spirometer   $ Incentive Spirometer Charges done with encouragement   Administration (Incentive Spirometer) done with encouragement   Number of Repetitions (Incentive Spirometer) 8   Level (mL) (Incentive Spirometer) 500   Patient Tolerance good

## 2018-06-27 NOTE — PROGRESS NOTES
Progress Note  Infectious Disease    Follow-up For:  Intra-abdominal abscess    SUBJECTIVE:   ROS:  No fever. Not eating. Vomited a little while ago, sounds bilious.   Discussed with Dr. Fernandez and maynor this am. Concur with resection of mass and perforated bowel. Family at bedside.     Antibiotics     Start     Stop Route Frequency Ordered    06/20/18 1700  piperacillin-tazobactam 4.5 g in dextrose 5 % 100 mL IVPB (ready to mix system)      -- IV Every 8 hours (non-standard times) 06/20/18 1548          Pertinent Medications noted:    EXAM & DIAGNOSTICS REVIEWED:   Vitals:     Temp:  [96.1 °F (35.6 °C)-98.6 °F (37 °C)]   Temp: 96.1 °F (35.6 °C) (06/27/18 1547)  Pulse: 69 (06/27/18 1547)  Resp: 16 (06/27/18 1547)  BP: 120/60 (06/27/18 1547)  SpO2: 99 % (06/27/18 1547)    Intake/Output Summary (Last 24 hours) at 06/27/18 1732  Last data filed at 06/27/18 0600   Gross per 24 hour   Intake           2160.5 ml   Output                7 ml   Net           2153.5 ml       General:  In NAD. Looks comfortable, and feels better since she vomited  Eyes:  Anicteric, PERRL, EOMI  ENT:  Mouth w/ pink MMM, no lesions/exudate,     Neck:  Trachea midline, supple, no adenopathy appreciated  Lungs:  clear  Heart:  RRR, no gallop/murmur noted  Abd:  soft, NT, ND, normal BS, firm RUQ, wound not disturbed  :  Voids  Musc:  Joints without effusion, erythema, synovitis, poor muscle bulk  Skin:  Generally warm, dry, normal for color. No rashes  Wound: Bandage not disturbed  Neuro: AAO, speech clear, moves all extrems equally, microcephaly, attentive, cooperative  Extrem: No pitting edema, erythema, phlebitis, cellulitis, synovitis  VAD:  picc right arm    Lines/Tubes/Drains:    General Labs reviewed:    Recent Labs  Lab 06/27/18  0453   WBC 10.70   RBC 2.96*   HGB 8.9*   HCT 26.8*      MCV 91   MCH 30.0   MCHC 33.2       Recent Labs  Lab 06/27/18  0453   CALCIUM 8.1*   PROT 4.8*      K 3.8   CO2 23      BUN 17    CREATININE 1.2   ALKPHOS 34*   ALT 8*   AST 21   BILITOT 0.3       Micro: nothing new    Imaging Reviewed:    ASSESSMENT & PLAN      Patient Active Problem List   Diagnosis    Abdominal pain    Congenital small head    Diabetes mellitus 2    JAYSHREE (acute kidney injury)    Metabolic acidosis    Abdominal mass    Severe malnutrition      Imp; large RUQ abscess from contained perforation of right colon(Ecoli, strep and fusobacterium = all colon organisms), with mass suspicious for colon malignancy with 50# weight loss, hypoalbuminemia and elevated CEA and LGI bleeding              : microcephaly, childlike, parents are decision makers              : malnutrition, severe, due to prolonged illness              : 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

## 2018-06-27 NOTE — PLAN OF CARE
Problem: Patient Care Overview  Goal: Plan of Care Review  Outcome: Ongoing (interventions implemented as appropriate)  Pt is alert, able to answer simple questions and follow simple commands at time, family at bedside, very attentive, dressing dry/intact to right upper quad and coccyx area, pain is well control with current regimen, +1 edema palpated to BLE, BLE elevated on pillows, TPN infusing without difficulty, no redness/swellilng noted to right upper arm picc site, will continue to monitor, observe and note any changes, safety maintain

## 2018-06-27 NOTE — PLAN OF CARE
Problem: Patient Care Overview  Goal: Plan of Care Review  Outcome: Ongoing (interventions implemented as appropriate)  Pt remains free from fall or injury, calls for assistance with ambulating. Pain controled with PO meds. Four loose BMs this shift, no blood noted. IV antibiotics administered per order. TPN initiated to right upper arm PICC. Bandage to right flank changed. Afebrile and VS stable.Call light in reach. Will continue to monitor.

## 2018-06-27 NOTE — PLAN OF CARE
Problem: Patient Care Overview  Goal: Plan of Care Review  Outcome: Ongoing (interventions implemented as appropriate)  Patient alert.  Disoriente to time. VSS. TPN and fats infusing.  IV abx infused as ordered.  Patient tearful throughout shift.  Pain managed with PRN medications.  1 episode of N/V.  PRN zofran given. Up to BSC because of weakness.  Anxious during shift.  Shearing injury noted to buttocks.  Applied barrier cream and mepelex.   Pt family endorses wiping patient with washcloths.  Family states they would rather clean patient and not wanting staff to clean patient.   Provided teaching about using the disposable washcloths and only wiping when completely done if they clean the patient.  Also provided teaching on allowing staff to clean patient as needed. Family Pt verbalized understanding of POC.  Purposeful rounding done during shift to promote patient safety. Patient free from falls and injury during shift.  Non skid socks on when OOB.  Bed in lowest position, brakes locked, and call light within reach.  Will continue to monitor.

## 2018-06-28 ENCOUNTER — ANESTHESIA (OUTPATIENT)
Dept: SURGERY | Facility: HOSPITAL | Age: 58
DRG: 329 | End: 2018-06-28
Payer: MEDICARE

## 2018-06-28 ENCOUNTER — ANESTHESIA EVENT (OUTPATIENT)
Dept: SURGERY | Facility: HOSPITAL | Age: 58
DRG: 329 | End: 2018-06-28
Payer: MEDICARE

## 2018-06-28 LAB
ABO + RH BLD: NORMAL
ALBUMIN SERPL BCP-MCNC: 1.5 G/DL
ALP SERPL-CCNC: 27 U/L
ALT SERPL W/O P-5'-P-CCNC: 6 U/L
ANION GAP SERPL CALC-SCNC: 2 MMOL/L
AST SERPL-CCNC: 24 U/L
BASOPHILS # BLD AUTO: 0 K/UL
BASOPHILS NFR BLD: 0.4 %
BILIRUB SERPL-MCNC: 0.5 MG/DL
BLD GP AB SCN CELLS X3 SERPL QL: NORMAL
BLD PROD TYP BPU: NORMAL
BLD PROD TYP BPU: NORMAL
BLOOD UNIT EXPIRATION DATE: NORMAL
BLOOD UNIT EXPIRATION DATE: NORMAL
BLOOD UNIT TYPE CODE: 600
BLOOD UNIT TYPE CODE: 600
BLOOD UNIT TYPE: NORMAL
BLOOD UNIT TYPE: NORMAL
BUN SERPL-MCNC: 32 MG/DL
CALCIUM SERPL-MCNC: 8.1 MG/DL
CHLORIDE SERPL-SCNC: 106 MMOL/L
CO2 SERPL-SCNC: 27 MMOL/L
CODING SYSTEM: NORMAL
CODING SYSTEM: NORMAL
CREAT SERPL-MCNC: 1.2 MG/DL
DIFFERENTIAL METHOD: ABNORMAL
DISPENSE STATUS: NORMAL
DISPENSE STATUS: NORMAL
EOSINOPHIL # BLD AUTO: 0.3 K/UL
EOSINOPHIL NFR BLD: 2.8 %
ERYTHROCYTE [DISTWIDTH] IN BLOOD BY AUTOMATED COUNT: 16.4 %
EST. GFR  (AFRICAN AMERICAN): 58 ML/MIN/1.73 M^2
EST. GFR  (NON AFRICAN AMERICAN): 50 ML/MIN/1.73 M^2
GLUCOSE SERPL-MCNC: 179 MG/DL
HCT VFR BLD AUTO: 25.9 %
HGB BLD-MCNC: 8.7 G/DL
LYMPHOCYTES # BLD AUTO: 3.8 K/UL
LYMPHOCYTES NFR BLD: 33.4 %
MAGNESIUM SERPL-MCNC: 2.1 MG/DL
MCH RBC QN AUTO: 30.7 PG
MCHC RBC AUTO-ENTMCNC: 33.7 G/DL
MCV RBC AUTO: 91 FL
MONOCYTES # BLD AUTO: 1.1 K/UL
MONOCYTES NFR BLD: 10.1 %
NEUTROPHILS # BLD AUTO: 6.1 K/UL
NEUTROPHILS NFR BLD: 53.3 %
NUM UNITS TRANS PACKED RBC: NORMAL
NUM UNITS TRANS PACKED RBC: NORMAL
PHOSPHATE SERPL-MCNC: 2.9 MG/DL
PLATELET # BLD AUTO: 251 K/UL
PMV BLD AUTO: 7.1 FL
POCT GLUCOSE: 204 MG/DL (ref 70–110)
POCT GLUCOSE: 209 MG/DL (ref 70–110)
POCT GLUCOSE: 253 MG/DL (ref 70–110)
POCT GLUCOSE: 289 MG/DL (ref 70–110)
POTASSIUM SERPL-SCNC: 4.5 MMOL/L
PROT SERPL-MCNC: 4.8 G/DL
RBC # BLD AUTO: 2.84 M/UL
SODIUM SERPL-SCNC: 135 MMOL/L
WBC # BLD AUTO: 11.4 K/UL

## 2018-06-28 PROCEDURE — 94761 N-INVAS EAR/PLS OXIMETRY MLT: CPT

## 2018-06-28 PROCEDURE — 63600175 PHARM REV CODE 636 W HCPCS: Performed by: INTERNAL MEDICINE

## 2018-06-28 PROCEDURE — 36415 COLL VENOUS BLD VENIPUNCTURE: CPT

## 2018-06-28 PROCEDURE — 80053 COMPREHEN METABOLIC PANEL: CPT

## 2018-06-28 PROCEDURE — D9220A PRA ANESTHESIA: Mod: ANES,,, | Performed by: ANESTHESIOLOGY

## 2018-06-28 PROCEDURE — 44015 INSERT NEEDLE CATH BOWEL: CPT | Mod: ,,, | Performed by: SURGERY

## 2018-06-28 PROCEDURE — 63600175 PHARM REV CODE 636 W HCPCS: Performed by: NURSE ANESTHETIST, CERTIFIED REGISTERED

## 2018-06-28 PROCEDURE — P9045 ALBUMIN (HUMAN), 5%, 250 ML: HCPCS | Performed by: NURSE ANESTHETIST, CERTIFIED REGISTERED

## 2018-06-28 PROCEDURE — 27201040 HC RC 50 FILTER

## 2018-06-28 PROCEDURE — 71000033 HC RECOVERY, INTIAL HOUR: Performed by: SURGERY

## 2018-06-28 PROCEDURE — B4185 PARENTERAL SOL 10 GM LIPIDS: HCPCS | Performed by: INTERNAL MEDICINE

## 2018-06-28 PROCEDURE — S0020 INJECTION, BUPIVICAINE HYDRO: HCPCS | Performed by: SURGERY

## 2018-06-28 PROCEDURE — 99900035 HC TECH TIME PER 15 MIN (STAT)

## 2018-06-28 PROCEDURE — 86850 RBC ANTIBODY SCREEN: CPT

## 2018-06-28 PROCEDURE — P9038 RBC IRRADIATED: HCPCS

## 2018-06-28 PROCEDURE — 71000039 HC RECOVERY, EACH ADD'L HOUR: Performed by: SURGERY

## 2018-06-28 PROCEDURE — C1729 CATH, DRAINAGE: HCPCS | Performed by: SURGERY

## 2018-06-28 PROCEDURE — 88309 TISSUE EXAM BY PATHOLOGIST: CPT | Mod: 26,,, | Performed by: PATHOLOGY

## 2018-06-28 PROCEDURE — D9220A PRA ANESTHESIA: Mod: CRNA,,, | Performed by: NURSE ANESTHETIST, CERTIFIED REGISTERED

## 2018-06-28 PROCEDURE — 25000003 PHARM REV CODE 250: Performed by: INTERNAL MEDICINE

## 2018-06-28 PROCEDURE — 97803 MED NUTRITION INDIV SUBSEQ: CPT

## 2018-06-28 PROCEDURE — 88331 PATH CONSLTJ SURG 1 BLK 1SPC: CPT | Mod: 26,,, | Performed by: PATHOLOGY

## 2018-06-28 PROCEDURE — 63600175 PHARM REV CODE 636 W HCPCS: Performed by: ANESTHESIOLOGY

## 2018-06-28 PROCEDURE — 36000709 HC OR TIME LEV III EA ADD 15 MIN: Performed by: SURGERY

## 2018-06-28 PROCEDURE — 88305 TISSUE EXAM BY PATHOLOGIST: CPT | Performed by: PATHOLOGY

## 2018-06-28 PROCEDURE — 63700000 PHARM REV CODE 250 ALT 637 W/O HCPCS: Performed by: INTERNAL MEDICINE

## 2018-06-28 PROCEDURE — 25000003 PHARM REV CODE 250: Performed by: NURSE ANESTHETIST, CERTIFIED REGISTERED

## 2018-06-28 PROCEDURE — 85025 COMPLETE CBC W/AUTO DIFF WBC: CPT

## 2018-06-28 PROCEDURE — C9113 INJ PANTOPRAZOLE SODIUM, VIA: HCPCS | Performed by: SURGERY

## 2018-06-28 PROCEDURE — 99900103 DSU ONLY-NO CHARGE-INITIAL HR (STAT): Performed by: SURGERY

## 2018-06-28 PROCEDURE — 47600 CHOLECYSTECTOMY: CPT | Mod: 51,78,, | Performed by: SURGERY

## 2018-06-28 PROCEDURE — 63600175 PHARM REV CODE 636 W HCPCS: Performed by: SURGERY

## 2018-06-28 PROCEDURE — 84100 ASSAY OF PHOSPHORUS: CPT

## 2018-06-28 PROCEDURE — 83735 ASSAY OF MAGNESIUM: CPT

## 2018-06-28 PROCEDURE — 25000003 PHARM REV CODE 250: Performed by: SURGERY

## 2018-06-28 PROCEDURE — A4217 STERILE WATER/SALINE, 500 ML: HCPCS | Performed by: INTERNAL MEDICINE

## 2018-06-28 PROCEDURE — 36000708 HC OR TIME LEV III 1ST 15 MIN: Performed by: SURGERY

## 2018-06-28 PROCEDURE — 99232 SBSQ HOSP IP/OBS MODERATE 35: CPT | Mod: ,,, | Performed by: INTERNAL MEDICINE

## 2018-06-28 PROCEDURE — 37000009 HC ANESTHESIA EA ADD 15 MINS: Performed by: SURGERY

## 2018-06-28 PROCEDURE — 44160 REMOVAL OF COLON: CPT | Mod: 78,,, | Performed by: SURGERY

## 2018-06-28 PROCEDURE — 88305 TISSUE EXAM BY PATHOLOGIST: CPT | Mod: 26,,, | Performed by: PATHOLOGY

## 2018-06-28 PROCEDURE — 37000008 HC ANESTHESIA 1ST 15 MINUTES: Performed by: SURGERY

## 2018-06-28 PROCEDURE — 88304 TISSUE EXAM BY PATHOLOGIST: CPT | Mod: 26,,, | Performed by: PATHOLOGY

## 2018-06-28 PROCEDURE — 86920 COMPATIBILITY TEST SPIN: CPT

## 2018-06-28 PROCEDURE — 12000002 HC ACUTE/MED SURGE SEMI-PRIVATE ROOM

## 2018-06-28 PROCEDURE — 88331 PATH CONSLTJ SURG 1 BLK 1SPC: CPT | Performed by: PATHOLOGY

## 2018-06-28 PROCEDURE — 25000003 PHARM REV CODE 250: Performed by: ANESTHESIOLOGY

## 2018-06-28 PROCEDURE — P9016 RBC LEUKOCYTES REDUCED: HCPCS

## 2018-06-28 PROCEDURE — 27201423 OPTIME MED/SURG SUP & DEVICES STERILE SUPPLY: Performed by: SURGERY

## 2018-06-28 PROCEDURE — 27800903 OPTIME MED/SURG SUP & DEVICES OTHER IMPLANTS: Performed by: SURGERY

## 2018-06-28 RX ORDER — FENTANYL CITRATE 50 UG/ML
INJECTION, SOLUTION INTRAMUSCULAR; INTRAVENOUS
Status: DISCONTINUED | OUTPATIENT
Start: 2018-06-28 | End: 2018-06-28

## 2018-06-28 RX ORDER — BUPIVACAINE HYDROCHLORIDE 5 MG/ML
INJECTION, SOLUTION EPIDURAL; INTRACAUDAL
Status: DISCONTINUED | OUTPATIENT
Start: 2018-06-28 | End: 2018-06-28 | Stop reason: HOSPADM

## 2018-06-28 RX ORDER — SODIUM CHLORIDE 0.9 % (FLUSH) 0.9 %
3 SYRINGE (ML) INJECTION
Status: DISCONTINUED | OUTPATIENT
Start: 2018-06-28 | End: 2018-07-09 | Stop reason: HOSPADM

## 2018-06-28 RX ORDER — HYDROMORPHONE HYDROCHLORIDE 1 MG/ML
0.2 INJECTION, SOLUTION INTRAMUSCULAR; INTRAVENOUS; SUBCUTANEOUS EVERY 5 MIN PRN
Status: DISCONTINUED | OUTPATIENT
Start: 2018-06-28 | End: 2018-06-28 | Stop reason: HOSPADM

## 2018-06-28 RX ORDER — DEXAMETHASONE SODIUM PHOSPHATE 4 MG/ML
INJECTION, SOLUTION INTRA-ARTICULAR; INTRALESIONAL; INTRAMUSCULAR; INTRAVENOUS; SOFT TISSUE
Status: DISCONTINUED | OUTPATIENT
Start: 2018-06-28 | End: 2018-06-28

## 2018-06-28 RX ORDER — ROCURONIUM BROMIDE 10 MG/ML
INJECTION, SOLUTION INTRAVENOUS
Status: DISCONTINUED | OUTPATIENT
Start: 2018-06-28 | End: 2018-06-28

## 2018-06-28 RX ORDER — LIDOCAINE HCL/PF 100 MG/5ML
SYRINGE (ML) INTRAVENOUS
Status: DISCONTINUED | OUTPATIENT
Start: 2018-06-28 | End: 2018-06-28

## 2018-06-28 RX ORDER — GLYCOPYRROLATE 0.2 MG/ML
INJECTION INTRAMUSCULAR; INTRAVENOUS
Status: DISCONTINUED | OUTPATIENT
Start: 2018-06-28 | End: 2018-06-28

## 2018-06-28 RX ORDER — PROPOFOL 10 MG/ML
VIAL (ML) INTRAVENOUS
Status: DISCONTINUED | OUTPATIENT
Start: 2018-06-28 | End: 2018-06-28

## 2018-06-28 RX ORDER — ONDANSETRON 2 MG/ML
INJECTION INTRAMUSCULAR; INTRAVENOUS
Status: DISCONTINUED | OUTPATIENT
Start: 2018-06-28 | End: 2018-06-28

## 2018-06-28 RX ORDER — PHENYLEPHRINE HYDROCHLORIDE 10 MG/ML
INJECTION INTRAVENOUS
Status: DISCONTINUED | OUTPATIENT
Start: 2018-06-28 | End: 2018-06-28

## 2018-06-28 RX ORDER — HYDROCODONE BITARTRATE AND ACETAMINOPHEN 500; 5 MG/1; MG/1
TABLET ORAL
Status: DISCONTINUED | OUTPATIENT
Start: 2018-06-28 | End: 2018-06-28 | Stop reason: HOSPADM

## 2018-06-28 RX ORDER — ALBUMIN HUMAN 50 G/1000ML
SOLUTION INTRAVENOUS CONTINUOUS PRN
Status: DISCONTINUED | OUTPATIENT
Start: 2018-06-28 | End: 2018-06-28

## 2018-06-28 RX ORDER — NEOSTIGMINE METHYLSULFATE 1 MG/ML
INJECTION, SOLUTION INTRAVENOUS
Status: DISCONTINUED | OUTPATIENT
Start: 2018-06-28 | End: 2018-06-28

## 2018-06-28 RX ORDER — SODIUM CHLORIDE, SODIUM LACTATE, POTASSIUM CHLORIDE, CALCIUM CHLORIDE 600; 310; 30; 20 MG/100ML; MG/100ML; MG/100ML; MG/100ML
INJECTION, SOLUTION INTRAVENOUS CONTINUOUS
Status: DISCONTINUED | OUTPATIENT
Start: 2018-06-28 | End: 2018-06-28

## 2018-06-28 RX ORDER — METOCLOPRAMIDE HYDROCHLORIDE 5 MG/ML
10 INJECTION INTRAMUSCULAR; INTRAVENOUS EVERY 10 MIN PRN
Status: DISCONTINUED | OUTPATIENT
Start: 2018-06-28 | End: 2018-06-28 | Stop reason: HOSPADM

## 2018-06-28 RX ORDER — MIDAZOLAM HYDROCHLORIDE 1 MG/ML
INJECTION, SOLUTION INTRAMUSCULAR; INTRAVENOUS
Status: DISCONTINUED | OUTPATIENT
Start: 2018-06-28 | End: 2018-06-28

## 2018-06-28 RX ORDER — SUCCINYLCHOLINE CHLORIDE 20 MG/ML
INJECTION INTRAMUSCULAR; INTRAVENOUS
Status: DISCONTINUED | OUTPATIENT
Start: 2018-06-28 | End: 2018-06-28

## 2018-06-28 RX ORDER — EPHEDRINE SULFATE 50 MG/ML
INJECTION, SOLUTION INTRAVENOUS
Status: DISCONTINUED | OUTPATIENT
Start: 2018-06-28 | End: 2018-06-28

## 2018-06-28 RX ADMIN — PIPERACILLIN SODIUM AND TAZOBACTAM SODIUM 4.5 G: 4; .5 INJECTION, POWDER, LYOPHILIZED, FOR SOLUTION INTRAVENOUS at 10:06

## 2018-06-28 RX ADMIN — PHENYLEPHRINE HYDROCHLORIDE 200 MCG: 10 INJECTION INTRAVENOUS at 02:06

## 2018-06-28 RX ADMIN — DORZOLAMIDE HYDROCHLORIDE AND TIMOLOL MALEATE 1 DROP: 20; 5 SOLUTION/ DROPS OPHTHALMIC at 08:06

## 2018-06-28 RX ADMIN — BRINZOLAMIDE 1 DROP: 10 SUSPENSION/ DROPS OPHTHALMIC at 12:06

## 2018-06-28 RX ADMIN — I.V. FAT EMULSION 250 ML: 20 EMULSION INTRAVENOUS at 10:06

## 2018-06-28 RX ADMIN — FENTANYL CITRATE 50 MCG: 50 INJECTION, SOLUTION INTRAMUSCULAR; INTRAVENOUS at 02:06

## 2018-06-28 RX ADMIN — SODIUM BICARBONATE 650 MG TABLET 650 MG: at 08:06

## 2018-06-28 RX ADMIN — FENTANYL CITRATE 50 MCG: 50 INJECTION, SOLUTION INTRAMUSCULAR; INTRAVENOUS at 01:06

## 2018-06-28 RX ADMIN — NEOSTIGMINE METHYLSULFATE 3 MG: 1 INJECTION INTRAVENOUS at 04:06

## 2018-06-28 RX ADMIN — FUROSEMIDE 20 MG: 20 TABLET ORAL at 08:06

## 2018-06-28 RX ADMIN — EPHEDRINE SULFATE 10 MG: 50 INJECTION, SOLUTION INTRAMUSCULAR; INTRAVENOUS; SUBCUTANEOUS at 02:06

## 2018-06-28 RX ADMIN — MIDAZOLAM 2 MG: 1 INJECTION INTRAMUSCULAR; INTRAVENOUS at 01:06

## 2018-06-28 RX ADMIN — INSULIN ASPART 3 UNITS: 100 INJECTION, SOLUTION INTRAVENOUS; SUBCUTANEOUS at 01:06

## 2018-06-28 RX ADMIN — INSULIN ASPART 2 UNITS: 100 INJECTION, SOLUTION INTRAVENOUS; SUBCUTANEOUS at 07:06

## 2018-06-28 RX ADMIN — Medication 0.2 MG: at 05:06

## 2018-06-28 RX ADMIN — LEVOTHYROXINE SODIUM 25 MCG: 25 TABLET ORAL at 05:06

## 2018-06-28 RX ADMIN — Medication 4 ML/HR: at 05:06

## 2018-06-28 RX ADMIN — DEXAMETHASONE SODIUM PHOSPHATE 4 MG: 4 INJECTION, SOLUTION INTRAMUSCULAR; INTRAVENOUS at 02:06

## 2018-06-28 RX ADMIN — INSULIN ASPART 4 UNITS: 100 INJECTION, SOLUTION INTRAVENOUS; SUBCUTANEOUS at 11:06

## 2018-06-28 RX ADMIN — SUCCINYLCHOLINE CHLORIDE 140 MG: 20 INJECTION, SOLUTION INTRAMUSCULAR; INTRAVENOUS at 01:06

## 2018-06-28 RX ADMIN — ALBUMIN (HUMAN): 12.5 SOLUTION INTRAVENOUS at 02:06

## 2018-06-28 RX ADMIN — GLYCOPYRROLATE 0.4 MG: 0.2 INJECTION, SOLUTION INTRAMUSCULAR; INTRAVENOUS at 04:06

## 2018-06-28 RX ADMIN — ROCURONIUM BROMIDE 5 MG: 10 INJECTION, SOLUTION INTRAVENOUS at 01:06

## 2018-06-28 RX ADMIN — EPHEDRINE SULFATE 15 MG: 50 INJECTION, SOLUTION INTRAMUSCULAR; INTRAVENOUS; SUBCUTANEOUS at 04:06

## 2018-06-28 RX ADMIN — ROCURONIUM BROMIDE 25 MG: 10 INJECTION, SOLUTION INTRAVENOUS at 02:06

## 2018-06-28 RX ADMIN — PIPERACILLIN SODIUM AND TAZOBACTAM SODIUM 4.5 G: 4; .5 INJECTION, POWDER, LYOPHILIZED, FOR SOLUTION INTRAVENOUS at 01:06

## 2018-06-28 RX ADMIN — FENOFIBRATE 160 MG: 160 TABLET ORAL at 08:06

## 2018-06-28 RX ADMIN — PHENYLEPHRINE HYDROCHLORIDE 0.55 MCG/KG/MIN: 10 INJECTION INTRAVENOUS at 02:06

## 2018-06-28 RX ADMIN — PHENYLEPHRINE HYDROCHLORIDE 200 MCG: 10 INJECTION INTRAVENOUS at 01:06

## 2018-06-28 RX ADMIN — Medication 0.1 MG: at 06:06

## 2018-06-28 RX ADMIN — SODIUM CHLORIDE, SODIUM LACTATE, POTASSIUM CHLORIDE, AND CALCIUM CHLORIDE: .6; .31; .03; .02 INJECTION, SOLUTION INTRAVENOUS at 01:06

## 2018-06-28 RX ADMIN — CALCIUM GLUCONATE: 94 INJECTION, SOLUTION INTRAVENOUS at 10:06

## 2018-06-28 RX ADMIN — INSULIN ASPART 3 UNITS: 100 INJECTION, SOLUTION INTRAVENOUS; SUBCUTANEOUS at 12:06

## 2018-06-28 RX ADMIN — ONDANSETRON 4 MG: 2 INJECTION, SOLUTION INTRAMUSCULAR; INTRAVENOUS at 02:06

## 2018-06-28 RX ADMIN — VITAMIN D, TAB 1000IU (100/BT) 1000 UNITS: 25 TAB at 08:06

## 2018-06-28 RX ADMIN — PROPOFOL 100 MG: 10 INJECTION, EMULSION INTRAVENOUS at 01:06

## 2018-06-28 RX ADMIN — FLUTICASONE PROPIONATE 50 MCG: 50 SPRAY, METERED NASAL at 08:06

## 2018-06-28 RX ADMIN — LEUCINE, PHENYLALANINE, LYSINE, METHIONINE, ISOLEUCINE, VALINE, HISTIDINE, THREONINE, TRYPTOPHAN, ALANINE, GLYCINE, ARGININE, PROLINE, SERINE, TYROSINE, SODIUM ACETATE, DIBASIC POTASSIUM PHOSPHATE, MAGNESIUM CHLORIDE, SODIUM CHLORIDE, CALCIUM CHLORIDE, DEXTROSE
365; 280; 290; 200; 300; 290; 240; 210; 90; 1035; 515; 575; 340; 250; 20; 340; 261; 51; 59; 33; 15 INJECTION INTRAVENOUS at 12:06

## 2018-06-28 RX ADMIN — INSULIN ASPART 2 UNITS: 100 INJECTION, SOLUTION INTRAVENOUS; SUBCUTANEOUS at 06:06

## 2018-06-28 RX ADMIN — PANTOPRAZOLE SODIUM 40 MG: 40 INJECTION, POWDER, LYOPHILIZED, FOR SOLUTION INTRAVENOUS at 05:06

## 2018-06-28 RX ADMIN — EPHEDRINE SULFATE 25 MG: 50 INJECTION, SOLUTION INTRAMUSCULAR; INTRAVENOUS; SUBCUTANEOUS at 04:06

## 2018-06-28 RX ADMIN — LIDOCAINE HYDROCHLORIDE 50 MG: 20 INJECTION, SOLUTION INTRAVENOUS at 01:06

## 2018-06-28 NOTE — TRANSFER OF CARE
"Anesthesia Transfer of Care Note    Patient: Sis Teixeira    Procedure(s) Performed: Procedure(s) (LRB):  HEMICOLECTOMY, RIGHT (N/A)  CHOLECYSTECTOMY (N/A)  INSERTION, JEJUNOSTOMY TUBE (N/A)    Patient location: PACU    Anesthesia Type: general    Transport from OR: Transported from OR on 2-3 L/min O2 by NC with adequate spontaneous ventilation    Post pain: adequate analgesia    Post assessment: tolerated procedure well and no apparent anesthetic complications    Post vital signs: stable    Level of consciousness: responds to stimulation and sedated    Nausea/Vomiting: no nausea/vomiting    Complications: none    Transfer of care protocol was followed      Last vitals:   Visit Vitals  /63   Pulse 75   Temp 36.9 °C (98.4 °F)   Resp 18   Ht 4' 11" (1.499 m)   Wt 61.4 kg (135 lb 5.8 oz)   LMP  (LMP Unknown)   SpO2 100%   Breastfeeding? No   BMI 27.34 kg/m²     "

## 2018-06-28 NOTE — ASSESSMENT & PLAN NOTE
Malnutrition in the context of acute illness    Related to (etiology):  Decreased intake 2/2 abdominal pain    Signs and Symptoms (as evidenced by):  Energy Intake: <50% of estimated energy requirement for >5 days  Body Fat Depletion: none noted  Muscle Mass Depletion: none noted  Weight Loss: 27.29% x 6 months (per MD H&P note)  Fluid Accumulation: 2+ edema    Interventions/Recommendations (treatment strategy):  1) Continue parenteral nutrition at current rate with 20% lipids (250 mls) and adjust Peptamen 1.5 Prebio to meet no more than 75% EEN x 24 hrs, then advance to no more than 100% EEN after 24 hrs in an effort to prevent refeeding syndrome.     Nutrition Diagnosis Status:  Continues

## 2018-06-28 NOTE — SUBJECTIVE & OBJECTIVE
Interval History: Doing well.  Appetite poor.  No fever.    Medications:  Continuous Infusions:   Amino acid 5% - dextrose 15% (CLINIMIX-E) solution with additives (1L  provides 510 kcal/L dextrose, with 50 gm AA, 150 gm CHO, Na 35, K 30, Mg 5, Ca 4.5, Acetate 80, Cl 39, Phos 15) 100 mL/hr at 06/27/18 1511     Scheduled Meds:   brinzolamide  1 drop Both Eyes BID    dorzolamide-timolol 2-0.5%  1 drop Both Eyes BID    fenofibrate  160 mg Oral Daily    fluconazole (DIFLUCAN) IVPB  200 mg Intravenous Q24H    fluticasone  1 spray Each Nare Daily    furosemide  20 mg Oral Daily    latanoprost  1 drop Both Eyes QHS    levothyroxine  25 mcg Oral Before breakfast    pantoprazole  40 mg Intravenous Before breakfast    piperacillin-tazobactam 4.5 g in dextrose 5 % 100 mL IVPB (ready to mix system)  4.5 g Intravenous Q8H    promethazine (PHENERGAN) IVPB  12.5 mg Intravenous Once    simvastatin  10 mg Oral QHS    sodium bicarbonate  650 mg Oral QID    vitamin D  1,000 Units Oral Daily     PRN Meds:sodium chloride, acetaminophen, acetaminophen, acetaminophen, dextrose 50%, dextrose 50%, glucagon (human recombinant), glucose, glucose, HYDROcodone-acetaminophen, HYDROcodone-acetaminophen, HYDROmorphone, insulin aspart U-100, ondansetron, ondansetron, sodium chloride 0.9%     Review of patient's allergies indicates:   Allergen Reactions    Sulfa (sulfonamide antibiotics) Rash    Tetracyclines Rash     Objective:     Vital Signs (Most Recent):  Temp: 96.4 °F (35.8 °C) (06/28/18 0417)  Pulse: 77 (06/28/18 0417)  Resp: 20 (06/28/18 0417)  BP: (!) 120/58 (06/28/18 0417)  SpO2: 100 % (06/28/18 0417) Vital Signs (24h Range):  Temp:  [96.1 °F (35.6 °C)-97.5 °F (36.4 °C)] 96.4 °F (35.8 °C)  Pulse:  [69-81] 77  Resp:  [16-20] 20  SpO2:  [98 %-100 %] 100 %  BP: (106-130)/(51-61) 120/58     Weight: 61.4 kg (135 lb 5.8 oz)  Body mass index is 27.34 kg/m².    Intake/Output - Last 3 Shifts       06/26 0700 - 06/27 0659 06/27 0700  - 06/28 0659 06/28 0700 - 06/29 0659    P.O. 300 0     IV Piggyback 400 400     TPN 1460.5 1481.7     Total Intake(mL/kg) 2160.5 (35.2) 1881.7 (30.6)     Urine (mL/kg/hr) 4 (0)      Stool 3 (0)      Total Output 7        Net +2153.5 +1881.7             Urine Occurrence 4 x 6 x     Stool Occurrence 1 x 5 x           Physical Exam   Constitutional: She is oriented to person, place, and time. She appears well-developed and well-nourished. No distress.   HENT:   Head: Normocephalic and atraumatic.   Right Ear: External ear normal.   Left Ear: External ear normal.   Eyes: Conjunctivae are normal. Pupils are equal, round, and reactive to light. Right eye exhibits no discharge. Left eye exhibits no discharge.   Neck: No tracheal deviation present. No thyromegaly present.   Cardiovascular: Normal rate and regular rhythm.    Pulmonary/Chest: Effort normal. No respiratory distress.   Abdominal: Soft. She exhibits no distension. There is no guarding.   No drainage from drain site   Musculoskeletal: She exhibits no edema or tenderness.   Lymphadenopathy:     She has no cervical adenopathy.   Neurological: She is alert and oriented to person, place, and time. No cranial nerve deficit.   Skin: Skin is warm and dry. No rash noted. She is not diaphoretic. No pallor.   Psychiatric: She has a normal mood and affect. Her behavior is normal. Judgment and thought content normal.   Nursing note and vitals reviewed.      Significant Labs:  CBC:   Recent Labs  Lab 06/28/18  0500   WBC 11.40   RBC 2.84*   HGB 8.7*   HCT 25.9*      MCV 91   MCH 30.7   MCHC 33.7       Significant Diagnostics:  I have reviewed all pertinent imaging results/findings within the past 24 hours.

## 2018-06-28 NOTE — PROGRESS NOTES
[facility] Surgery  Progress Note    Subjective:     History of Present Illness:  Patient is a 57 y.o. female admitted to Hospitalist Service from Dr. Garduno's office with complaint of abdominal pain. Patient reportedly has past medical history significant for Congenital microcephaly and diet controlled DM.     Patient denied chest pain, shortness of breath, abdominal pain, nausea, vomiting, headache, vision changes, focal neuro-deficits, cough or fever.     She states the pain has been present for about six months off and on.  She had an ultrasound which showed gallstones and was scheduled to have lap GB but had persistent elevated WBC and CT was ordered, which showed   CT abdomen (06-19-18): Large subphrenic mass on the right abutting could be involving the liver extending into the right lateral abdominal wall, heterogeneous and complex in appearance suggesting complex fluid collection and containing small foci of air.  Differential includes large abscess, or necrotic mass.  It is indistinguishable from, abutting adjacent colon with colon wall thickening and findings thought most suggestive of abscess secondary to contained right colon perforation from colon cancer or less likely right-sided diverticulitis.  This corresponds to findings on recent ultrasound of 6/9/2018.    Post-Op Info:  Procedure(s) (LRB):  INCISION AND DRAINAGE, ABSCESS (Right)   7 Days Post-Op     Interval History: Doing well.  Appetite poor.  No fever.    Medications:  Continuous Infusions:   Amino acid 5% - dextrose 15% (CLINIMIX-E) solution with additives (1L  provides 510 kcal/L dextrose, with 50 gm AA, 150 gm CHO, Na 35, K 30, Mg 5, Ca 4.5, Acetate 80, Cl 39, Phos 15) 100 mL/hr at 06/27/18 1511     Scheduled Meds:   brinzolamide  1 drop Both Eyes BID    dorzolamide-timolol 2-0.5%  1 drop Both Eyes BID    fenofibrate  160 mg Oral Daily    fluconazole (DIFLUCAN) IVPB  200 mg Intravenous Q24H    fluticasone   1 spray Each Nare Daily    furosemide  20 mg Oral Daily    latanoprost  1 drop Both Eyes QHS    levothyroxine  25 mcg Oral Before breakfast    pantoprazole  40 mg Intravenous Before breakfast    piperacillin-tazobactam 4.5 g in dextrose 5 % 100 mL IVPB (ready to mix system)  4.5 g Intravenous Q8H    promethazine (PHENERGAN) IVPB  12.5 mg Intravenous Once    simvastatin  10 mg Oral QHS    sodium bicarbonate  650 mg Oral QID    vitamin D  1,000 Units Oral Daily     PRN Meds:sodium chloride, acetaminophen, acetaminophen, acetaminophen, dextrose 50%, dextrose 50%, glucagon (human recombinant), glucose, glucose, HYDROcodone-acetaminophen, HYDROcodone-acetaminophen, HYDROmorphone, insulin aspart U-100, ondansetron, ondansetron, sodium chloride 0.9%     Review of patient's allergies indicates:   Allergen Reactions    Sulfa (sulfonamide antibiotics) Rash    Tetracyclines Rash     Objective:     Vital Signs (Most Recent):  Temp: 96.4 °F (35.8 °C) (06/28/18 0417)  Pulse: 77 (06/28/18 0417)  Resp: 20 (06/28/18 0417)  BP: (!) 120/58 (06/28/18 0417)  SpO2: 100 % (06/28/18 0417) Vital Signs (24h Range):  Temp:  [96.1 °F (35.6 °C)-97.5 °F (36.4 °C)] 96.4 °F (35.8 °C)  Pulse:  [69-81] 77  Resp:  [16-20] 20  SpO2:  [98 %-100 %] 100 %  BP: (106-130)/(51-61) 120/58     Weight: 61.4 kg (135 lb 5.8 oz)  Body mass index is 27.34 kg/m².    Intake/Output - Last 3 Shifts       06/26 0700 - 06/27 0659 06/27 0700 - 06/28 0659 06/28 0700 - 06/29 0659    P.O. 300 0     IV Piggyback 400 400     TPN 1460.5 1481.7     Total Intake(mL/kg) 2160.5 (35.2) 1881.7 (30.6)     Urine (mL/kg/hr) 4 (0)      Stool 3 (0)      Total Output 7        Net +2153.5 +1881.7             Urine Occurrence 4 x 6 x     Stool Occurrence 1 x 5 x           Physical Exam   Constitutional: She is oriented to person, place, and time. She appears well-developed and well-nourished. No distress.   HENT:   Head: Normocephalic and atraumatic.   Right Ear: External ear  normal.   Left Ear: External ear normal.   Eyes: Conjunctivae are normal. Pupils are equal, round, and reactive to light. Right eye exhibits no discharge. Left eye exhibits no discharge.   Neck: No tracheal deviation present. No thyromegaly present.   Cardiovascular: Normal rate and regular rhythm.    Pulmonary/Chest: Effort normal. No respiratory distress.   Abdominal: Soft. She exhibits no distension. There is no guarding.   No drainage from drain site   Musculoskeletal: She exhibits no edema or tenderness.   Lymphadenopathy:     She has no cervical adenopathy.   Neurological: She is alert and oriented to person, place, and time. No cranial nerve deficit.   Skin: Skin is warm and dry. No rash noted. She is not diaphoretic. No pallor.   Psychiatric: She has a normal mood and affect. Her behavior is normal. Judgment and thought content normal.   Nursing note and vitals reviewed.      Significant Labs:  CBC:   Recent Labs  Lab 06/28/18  0500   WBC 11.40   RBC 2.84*   HGB 8.7*   HCT 25.9*      MCV 91   MCH 30.7   MCHC 33.7       Significant Diagnostics:  I have reviewed all pertinent imaging results/findings within the past 24 hours.    Assessment/Plan:     * Abdominal pain    Patient clinically improved.  Gastrografin enema unable to reach right colon.  Discussed with Dr. Bautista and   Will proceed with surgery in AM - will need right hemicolectomy whether benign or malignant.  Will do cholecystectomy at same time.  All aspects of procedure including risks and possible complications were discussed in detail with the patient and parents who agrees to proceed with procedure.  All questions were answered and consent was obtained. Preop orders were written.              Kumar Torres MD  General Surgery  [facility]

## 2018-06-28 NOTE — ANESTHESIA PREPROCEDURE EVALUATION
06/28/2018  Sis Teixeira is a 58 y.o., female.    Pre-op Assessment    I have reviewed the Patient Summary Reports.     I have reviewed the Nursing Notes.   I have reviewed the Medications.     Review of Systems  Anesthesia Hx:  No problems with previous Anesthesia  Denies Family Hx of Anesthesia complications.   Denies Personal Hx of Anesthesia complications.   Social:  Non-Smoker    Hematology/Oncology:         -- Anemia: Hematology Comments: Profound hypoalbuminemia   Cardiovascular:  Cardiovascular Normal  ECG has been reviewed. Metabolic acidosis   Pulmonary:  Pulmonary Normal    Renal/:   Chronic Renal Disease    Neurological:  Neurology Normal Congenital Small Head  Diminished Cognitive ability   Endocrine:   Diabetes, well controlled, type 2        Physical Exam  General:  Well nourished    Airway/Jaw/Neck:  Airway Findings: Mouth Opening: Normal Tongue: Normal  General Airway Assessment: Adult  Oropharynx Findings:  Mallampati: II  Jaw/Neck Findings:  Neck ROM: Normal ROM     Eyes/Ears/Nose:  Eyes/Ears/Nose Findings:    Dental:  Dental Findings: In tact   Chest/Lungs:  Chest/Lungs Findings: Clear to auscultation, Normal Respiratory Rate     Heart/Vascular:  Heart Findings: Rate: Normal  Rhythm: Regular Rhythm        Mental Status:  Mental Status Findings:  Cooperative, Alert and Oriented         Anesthesia Plan  Type of Anesthesia, risks & benefits discussed:  Anesthesia Type:  general  Patient's Preference:   Intra-op Monitoring Plan:   Intra-op Monitoring Plan Comments:   Post Op Pain Control Plan: multimodal analgesia  Post Op Pain Control Plan Comments:   Induction:   IV  Beta Blocker:  Patient is not currently on a Beta-Blocker (No further documentation required).       Informed Consent: Patient representative understands risks and agrees with Anesthesia plan.  Questions answered.  Anesthesia consent signed with patient representative.  ASA Score: 3     Day of Surgery Review of History & Physical:    H&P update referred to the surgeon.     Anesthesia Plan Notes: Pt is cared for my her mother and has diminished cognitive ability at exam.  H&P & Consent with mother and Pt.        Ready For Surgery From Anesthesia Perspective.

## 2018-06-28 NOTE — BRIEF OP NOTE
[facility]  Brief Operative Note    SUMMARY     Surgery Date: 6/28/2018     Surgeon(s) and Role:     * Kumar Torres MD - Primary    Assisting Surgeon: None    Pre-op Diagnosis:  Right upper quadrant abdominal mass [R19.01]    Post-op Diagnosis:  Post-Op Diagnosis Codes:     * Right upper quadrant abdominal mass [R19.01]    Procedure(s) (LRB):  HEMICOLECTOMY, RIGHT (N/A)  CHOLECYSTECTOMY (N/A)  INSERTION, JEJUNOSTOMY TUBE (N/A)    Anesthesia: General    Description of the findings of the procedure: Large cancer of hepatic flexure adherent to duodenum and lateral wall.    Estimated Blood Loss: 800 mL         Specimens:   Specimen (12h ago through future)    Start     Ordered    06/28/18 1535  Specimen to Pathology - Surgery  Once     Comments:  Pre-op Diagnosis: Right upper quadrant abdominal mass [R19.01]Post-op Diagnosis: SAMEProcedure(s):HEMICOLECTOMY, RIGHTCHOLECYSTECTOMYINSERTION, JEJUNOSTOMY TUBE Number of specimens: 3Name of specimens: 1) right colon, 2) gallbladder, 3) biopsies of abdominal wall cavity      06/28/18 1558    06/28/18 1451  Specimen to Pathology - Surgery  Once     Comments:  Pre-op Diagnosis: Right upper quadrant abdominal mass [R19.01]Post-op Diagnosis: sameProcedure(s):HEMICOLECTOMY, RIGHT Number of specimens: 1Name of specimens: Frozen Section, Abdominal Wall Biopsy (Possible metastatic colon Cancer)      06/28/18 1451

## 2018-06-28 NOTE — PROGRESS NOTES
Progress Note  Infectious Disease    Follow-up For:  Intra-abdominal abscess    SUBJECTIVE:   ROS:  No fever. Just back from [facility]. Resting, sedated, bilious output from NGT, OnQ in place, SCDs in place, Family at the bedside, discussed.     Antibiotics     Start     Stop Route Frequency Ordered    06/20/18 1700  piperacillin-tazobactam 4.5 g in dextrose 5 % 100 mL IVPB (ready to mix system)      -- IV Every 8 hours (non-standard times) 06/20/18 1548          Pertinent Medications noted:    EXAM & DIAGNOSTICS REVIEWED:   Vitals:     Temp:  [96.3 °F (35.7 °C)-98.4 °F (36.9 °C)]   Temp: 96.3 °F (35.7 °C) (06/28/18 1841)  Pulse: 78 (06/28/18 1841)  Resp: 16 (06/28/18 1841)  BP: (!) 109/54 (06/28/18 1841)  SpO2: 98 % (06/28/18 1841)    Intake/Output Summary (Last 24 hours) at 06/28/18 1848  Last data filed at 06/28/18 1745   Gross per 24 hour   Intake             2850 ml   Output              970 ml   Net             1880 ml       General:  In NAD. Looks comfortable, sedated post op.  Eyes:  Anicteric, PERRL,  ENT:  Mouth w/ pink MMM, no lesions/exudate,     Neck:  Trachea midline, supple, no adenopathy appreciated  Lungs:  clear  Heart:  RRR, no gallop/murmur noted  Abd:  soft,  NGT to suction, bandage not disturbed. OnQ pump in place  :  patel  Musc:  Joints without effusion, erythema, synovitis, poor muscle bulk  Skin:  Generally warm, dry, normal for color. No rashes  Wound: Bandage not disturbed  Neuro: Sedated post op, microcephaly,   Extrem: No pitting edema, erythema, phlebitis, cellulitis, synovitis  VAD:  picc right arm    Lines/Tubes/Drains:    General Labs reviewed:    Recent Labs  Lab 06/28/18  0500   WBC 11.40   RBC 2.84*   HGB 8.7*   HCT 25.9*      MCV 91   MCH 30.7   MCHC 33.7       Recent Labs  Lab 06/28/18  0500   CALCIUM 8.1*   PROT 4.8*   *   K 4.5   CO2 27      BUN 32*   CREATININE 1.2   ALKPHOS 27*   ALT 6*   AST 24   BILITOT 0.5       Micro: nothing new    Imaging  Reviewed:    ASSESSMENT & PLAN      Patient Active Problem List   Diagnosis    Abdominal pain    Congenital small head    Diabetes mellitus 2    JAYSHREE (acute kidney injury)    Metabolic acidosis    Abdominal mass    Severe malnutrition      Imp; large RUQ abscess from contained perforation of right colon(Ecoli, strep and fusobacterium = all colon organisms), with mass suspicious for colon malignancy with 50# weight loss, hypoalbuminemia and elevated CEA and LGI bleeding, s/p colon resection 6/28              : microcephaly, childlike, parents are decision makers              : malnutrition, severe, due to prolonged illness              : 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

## 2018-06-28 NOTE — ASSESSMENT & PLAN NOTE
Patient clinically improved.  Gastrografin enema unable to reach right colon.  Discussed with Dr. Bautista and   Will proceed with surgery in AM - will need right hemicolectomy whether benign or malignant.  Will do cholecystectomy at same time.  All aspects of procedure including risks and possible complications were discussed in detail with the patient and parents who agrees to proceed with procedure.  All questions were answered and consent was obtained. Preop orders were written.

## 2018-06-28 NOTE — PLAN OF CARE
Problem: Patient Care Overview  Goal: Plan of Care Review  Outcome: Ongoing (interventions implemented as appropriate)  Pt up in chair, happy mood, family anxious about surgery, pt is answering simple questions and follow simple commands in today, dressing dry/intact to right upper quad and coccyx area, pain is well control with current regimen, +1 edema palpated to BLE, BLE elevated on pillows, TPN infusing without difficulty, no redness/swellilng noted to right upper arm picc site, will continue to monitor, observe and note any changes, safety maintain

## 2018-06-28 NOTE — PROGRESS NOTES
Progress Note  Hospital Medicine  Patient Name:Sis Teixeira  MRN:  [id number]  Patient Class: IP- Inpatient  Admit Date: 6/20/2018  Length of Stay: 7 days  Expected Discharge Date:   Attending Physician: Bekah Fernandez MD  Primary Care Provider:  Mann Garduno MD    SUBJECTIVE:     Principal Problem: Abdominal pain  Initial history of present illness: Patient is a 57 y.o. female admitted to Hospitalist Service from Dr. Garduno's office with complaint of abdominal pain since December, 2017. Patient reportedly has past medical history significant for Congenital microcephaly and diet controlled DM. Part of the history obtained from patient's parents and Dr. Garduno. Patient has been complaining of RUQ and right flank pain for few months now. She was being worked up for cholelithiasis and possible cholecystectomy planned by Dr. Joel. Patient was note dto have UTI and leukocytosis and completed PO Cipro course. Still having leukocytosis. Patient has a low appetite and has lost almost 50 lbs in 6 months. No fever or chills. Patient denied chest pain, shortness of breath, headache, vision changes, focal neuro-deficits, cough or fever.    PMH/PSH/SH/FH/Meds: reviewed.    Symptoms/Review of Systems: Patient is scheduled for right hemicolectomy. Abdominal pain is stable. No shortness of breath, cough, chest pain or headache, fever. Chronic right sided abdominal pain is better.  Diet: TPN. NPO  Activity level: Normal.    Pain:  NAD      OBJECTIVE:   Vital Signs (Most Recent):      Temp: 98.4 °F (36.9 °C) (06/28/18 0743)  Pulse: 82 (06/28/18 0743)  Resp: 16 (06/28/18 0743)  BP: (!) 110/58 (06/28/18 0743)  SpO2: 100 % (06/28/18 0743)       Vital Signs Range (Last 24H):  Temp:  [96.1 °F (35.6 °C)-98.4 °F (36.9 °C)]   Pulse:  [69-82]   Resp:  [16-20]   BP: (106-120)/(51-60)   SpO2:  [98 %-100 %]     Weight: 61.4 kg (135 lb 5.8 oz)  Body mass index is 27.34 kg/m².    Intake/Output Summary (Last 24 hours) at 06/28/18  0942  Last data filed at 06/28/18 0600   Gross per 24 hour   Intake          1881.67 ml   Output                0 ml   Net          1881.67 ml     Physical Examination:  General appearance: well developed, appears stated age  Head: normocephalic, atraumatic, microcephally  Eyes:  conjunctivae/corneas clear. PERRL.  Nose: Nares normal. Septum midline.  Throat: lips, mucosa, and tongue normal; teeth and gums normal, no throat erythema.  Neck: supple, symmetrical, trachea midline, no JVD and thyroid not enlarged, symmetric, no tenderness/mass/nodules  Lungs:  clear to auscultation bilaterally and normal respiratory effort  Chest wall: no tenderness  Heart: regular rate and rhythm, S1, S2 normal, no murmur, click, rub or gallop  Abdomen: soft, non-tender non-distented; bowel sounds normal; no masses,  no organomegaly. Abdominal dressing C/D/I.  Extremities: no cyanosis, clubbing or edema.   Pulses: 2+ and symmetric  Skin: Skin color, texture, turgor normal. No rashes or lesions.  Lymph nodes: Cervical, supraclavicular, and axillary nodes normal.  Neurologic: Grossly non-focal. Answers simple question, short sentences.     CBC:    Recent Labs  Lab 06/26/18  0601 06/27/18  0453 06/28/18  0500   WBC 8.20 10.70 11.40   RBC 3.20* 2.96* 2.84*   HGB 9.8* 8.9* 8.7*   HCT 28.6* 26.8* 25.9*    297 251   MCV 89 91 91   MCH 30.5 30.0 30.7   MCHC 34.1 33.2 33.7   BMP    Recent Labs  Lab 06/26/18  0601 06/27/18  0453 06/28/18  0500   GLU 97 263* 179*    138 135*   K 4.1 3.8 4.5    110 106   CO2 22* 23 27   BUN 13 17 32*   CREATININE 1.0 1.2 1.2   CALCIUM 8.1* 8.1* 8.1*   MG 1.7 1.9 2.1      Diagnostic Results:  Microbiology Results (last 7 days)     Procedure Component Value Units Date/Time    Culture, Anaerobic [[id number]] Collected:  06/21/18 1355    Order Status:  Completed Specimen:  Abscess from Abdomen Updated:  06/26/18 1421     Anaerobic Culture --     FUSOBACTERIUM SPECIES  Moderate      Blood culture  [[id number]] Collected:  06/20/18 1620    Order Status:  Completed Specimen:  Blood from Antecubital, Left  Arm Updated:  06/25/18 2212     Blood Culture, Routine No growth after 5 days.    Blood culture [[id number]] Collected:  06/20/18 1627    Order Status:  Completed Specimen:  Blood from Antecubital, Right  Arm Updated:  06/25/18 2212     Blood Culture, Routine No growth after 5 days.    Aerobic culture [[id number]]  (Susceptibility) Collected:  06/21/18 1355    Order Status:  Completed Specimen:  Abscess from Abdomen Updated:  06/25/18 0851     Aerobic Bacterial Culture --     ESCHERICHIA COLI  Many       Aerobic Bacterial Culture --     STREPTOCOCCUS CONSTELLATUS  Many  Susceptibility testing not routinely performed      Urine culture [[id number]] Collected:  06/21/18 1000    Order Status:  Completed Specimen:  Urine from Urine, Clean Catch Updated:  06/22/18 2158     Urine Culture, Routine No growth    Urine culture [[id number]]     Order Status:  Canceled Specimen:  Urine     Gram stain [[id number]] Collected:  06/21/18 1355    Order Status:  Completed Specimen:  Abscess from Abdomen Updated:  06/22/18 0624     Gram Stain Result No WBC's      Rare Gram positive cocci      Moderate Gram negative rods         CT abdomen (06-19-18): Large subphrenic mass on the right abutting could be involving the liver extending into the right lateral abdominal wall, heterogeneous and complex in appearance suggesting complex fluid collection and containing small foci of air.  Differential includes large abscess, or necrotic mass.  It is indistinguishable from, abutting adjacent colon with colon wall thickening and findings thought most suggestive of abscess secondary to contained right colon perforation from colon cancer or less likely right-sided diverticulitis.  This corresponds to findings on recent ultrasound of 6/9/2018.     US abdomen:   1. Cholelithiasis.  2. 7.5 x 5.4 x 7.3 cm complicated cystic structure interposed between  the right hepatic lobe and right kidney, possibly a loop of bowel (such as the hepatic flexure) with internal debris.  Consider additional evaluation with contrast enhanced CT of the abdomen.    CT abdomen with contrast: Interval drainage of the large right flank abscess.  It is now predominant air-filled with small amount of residual fluid with air-fluid level.  This abuts and is indistinguishable from the right colon, distal descending duodenum, caudal tip of the liver and the wall of the adjacent colon appears thick and irregular in findings thought most suggestive of sequela from localized perforation from colon cancer.  Diverticular disease include in the differential. Small amount of ascites increased compared the prior exam.  Interval development of small left pleural effusion additional findings as detailed above including 2.2 cm calcification right side of the pelvis possibly dermoid or exophytic uterine fibroid.  Diverticulosis.  Cholelithiasis.    Gastrograffin enema: Multiple attempts were made to fill the colon.  Despite the balloon inflated the rectum the patient had spasm in the sigmoid colon, expelled lung the contrast on multiple occasions.  The colon is very redundant and was able to be filled to the hepatic flexure but not more proximally and in particular the area of the suspected colon mass and abscess was not able to be opacified.  Visualized segment of colon is patent with diverticulosis    Assessment/Plan:      *Abdominal pain [R10.9]  Right Retroperitoneal abscess with Coliform bacteria I+D [R19.00]  Follow Dr. Torres's recommendations.   Follow microbiology results - E Coli - Continue IV Zosyn and Diflucan, follow Dr. Bautista's recommendations.  Continue IV TPN. Follow Dr. Torres's recommendations.     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    Rectal Bleeding  Elevated CEA level  Resolved      Not Applicable    Diabetes mellitus 2 [E11.9]   Unknown       Liquid diabetic diet.  Check  blood glucose level q 6h.  Use Novolog Insulin Sliding Scale as needed.        JAYSHREE (acute kidney injury) [N17.9] - resolved  Follow BMP.  Continue Lasix at 20 mg daily.    Anemia - Iron deficiency anemia  Needs Iron supplementation upon DC.    Hypokalemia  Replete KCl. Follow BMP.    Severe Malnutrition  On TPN and Lipids.    Yes                      DVT prophylaxis: Use SCD and TEDs. Lovenox stopped due to GI bleeding.      Discussed with patient's mother.    Bekah Fernandez MD  Department of Hospital Medicine   [facility]

## 2018-06-28 NOTE — PLAN OF CARE
Problem: Nutrition, Parenteral (Adult)  Intervention: Monitor/Manage Parenteral Nutrition Support  Recommendations    Recommend adusting TPN: ( to reduce GIR from current 4.09 to 2.25, to meet 100% EEN/EPN, to reduce calories to prevent possible refeeding syndrome, and provide EFA)    Custom TPN: Clinimix E 6.5% AA, 15% dextrose @ 55 mls/hr plus 20% lipids (250 mls)  Provides 1516 calories, 86 gms protein, 1320 mls fluid, GIR 2.25.  Goals: Pt will consume/receive at least 50% EEN by RD f/u.  Nutrition Goal Status: exceeded  Communication of RD Recs: reviewed with RN (POC, reviewed with RN)

## 2018-06-28 NOTE — CONSULTS
"  [facility]  Adult Nutrition  Consult Note    SUMMARY     Recommendations    Recommend adusting TPN: ( to reduce GIR from current 4.09 to 2.25, to meet 100% EEN/EPN, to reduce calories to prevent possible refeeding syndrome, and provide EFA)    Custom TPN: Clinimix E 6.5% AA, 15% dextrose @ 55 mls/hr plus 20% lipids (250 mls)  Provides 1516 calories, 86 gms protein, 1320 mls fluid, GIR 2.25.  Goals: Pt will consume/receive at least 50% EEN by RD f/u.  Nutrition Goal Status: exceeded  Communication of RD Recs: reviewed with RN (POC, reviewed with RN)    Reason for Assessment    Reason for Assessment: RD follow up  1. Abdominal wall abscess    2. Abdominal pain    3. Right upper quadrant abdominal mass      Past Medical History:   Diagnosis Date    Congenital small head     Diabetes mellitus     dIET CONTROL     General Information Comments: Admitted with abdominal pain.  s/p surgery to drain large abscess.  Family reports poor intake x > 1 month and has not eaten well since admit.  Per MD H&P, pt has lost 50 lbs x 6 months. Pt's mom reports wt of 181 x 3 months ago.     Pt had procedure this afternoon, where a possible cecal mass vs less likely diverticular bleeding was found per MD note.Consult for PICC line sent.   6/28/18: Pt NPO and solely on TPN. Blood sugars are running high. Calorie needs exceeded.       Nutrition Risk Screen    Nutrition Risk Screen: other (see comments)    Nutrition/Diet History    Patient Reported Diet/Restrictions/Preferences: general  Food Preferences: No spicy foods.  Uses [place] Instant Breakfast at home to increase calories.   Do you have any cultural, spiritual, Tenriism conflicts, given your current situation?: Judaism  Food Allergies: NKFA  Factors Affecting Nutritional Intake: abdominal pain, decreased appetite    Anthropometrics    Temp: 98.4 °F (36.9 °C)  Height Method: Stated  Height: 4' 11"  Height (inches): 59 in  Weight Method: Bed Scale  Weight: " 61.4 kg (135 lb 5.8 oz)  Weight (lb): 135.36 lb  Ideal Body Weight (IBW), Female: 95 lb  % Ideal Body Weight, Female (lb): 142.48 lb  BMI (Calculated): 27.4  BMI Grade: 25 - 29.9 - overweight  Weight Loss: unintentional  Usual Body Weight (UBW), k.45 kg (per MD H&P note of wt loss of 50 lbs x 6 months)  % Usual Body Weight: 72.86  % Weight Change From Usual Weight: -27.29 %       Lab/Procedures/Meds    Pertinent Labs Reviewed: reviewed  Lab Results   Component Value Date    ALBUMIN 1.5 (L) 2018     Lab Results   Component Value Date    .3 (H) 2018       Recent Labs  Lab 18  0614   POCTGLUCOSE 204*       Pertinent Medications Reviewed: reviewed  Scheduled Meds:   brinzolamide  1 drop Both Eyes BID    dorzolamide-timolol 2-0.5%  1 drop Both Eyes BID    fenofibrate  160 mg Oral Daily    fluconazole (DIFLUCAN) IVPB  200 mg Intravenous Q24H    fluticasone  1 spray Each Nare Daily    furosemide  20 mg Oral Daily    latanoprost  1 drop Both Eyes QHS    levothyroxine  25 mcg Oral Before breakfast    pantoprazole  40 mg Intravenous Before breakfast    piperacillin-tazobactam 4.5 g in dextrose 5 % 100 mL IVPB (ready to mix system)  4.5 g Intravenous Q8H    promethazine (PHENERGAN) IVPB  12.5 mg Intravenous Once    simvastatin  10 mg Oral QHS    sodium bicarbonate  650 mg Oral QID    vitamin D  1,000 Units Oral Daily     Continuous Infusions:   Amino acid 5% - dextrose 15% (CLINIMIX-E) solution with additives (1L  provides 510 kcal/L dextrose, with 50 gm AA, 150 gm CHO, Na 35, K 30, Mg 5, Ca 4.5, Acetate 80, Cl 39, Phos 15) 100 mL/hr at 18 1511    Amino acid 5% - dextrose 15% (CLINIMIX-E) solution with additives (1L  provides 510 kcal/L dextrose, with 50 gm AA, 150 gm CHO, Na 35, K 30, Mg 5, Ca 4.5, Acetate 80, Cl 39, Phos 15)       Physical Findings/Assessment    Overall Physical Appearance: nourished (NFPE completed.  No fat or muscle loss noted. )  Skin:  (Wolfgang score  17)    Estimated/Assessed Needs    Weight Used For Calorie Calculations: 61.4 kg (135 lb 5.8 oz)     Energy Need Method: Morrison- Kristen: 1104.62 x 1.3-1.4=8369-4843     Weight Used For Protein Calculations: 61.4 kg (135 lb 5.8 oz)   1.2-1.5 gm/kg/day: 74-92 (wt loss)     Fluid Need Method: RDA Method (or per MD rec)     CHO Requirement: 45-50% EEN or GIR <5      Nutrition Prescription Ordered    Current Diet Order: consistent carbohydrate    Evaluation of Received Nutrient/Fluid Intake    Energy Calories Required: exceeding needs  Protein Required: exceeding needs  Fluid Required: meeting needs  CHO Required: exceeding requirements (Glucose 263,179; POCT 280,289, 204)    Intake/Output Summary (Last 24 hours) at 06/28/18 1027  Last data filed at 06/28/18 0600   Gross per 24 hour   Intake          1881.67 ml   Output                0 ml   Net          1881.67 ml     % Intake of Estimated Energy Needs: >100%  % Meal Intake: NPO    Nutrition Risk    Level of Risk/Frequency of Follow-up:  (2 x wkly)     Assessment and Plan    Severe malnutrition    Malnutrition in the context of acute illness    Related to (etiology):  Decreased intake 2/2 abdominal pain    Signs and Symptoms (as evidenced by):  Energy Intake: <50% of estimated energy requirement for >5 days  Body Fat Depletion: none noted  Muscle Mass Depletion: none noted  Weight Loss: 27.29% x 6 months (per MD H&P note)  Fluid Accumulation: 2+ edema    Interventions/Recommendations (treatment strategy):  Continue parenteral nutrition, with adjustments to meet EEN and reduce GIR (see RD note)    Nutrition Diagnosis Status:  progressing                 Monitor and Evaluation    Food and Nutrient Intake: energy intake  Food and Nutrient Adminstration: diet order  Anthropometric Measurements: weight, weight change  Biochemical Data, Medical Tests and Procedures: glucose/endocrine profile, inflammatory profile  Nutrition-Focused Physical Findings: overall appearance, skin      Discharge Plan    To be determined    RD Follow-up?: Yes

## 2018-06-28 NOTE — PROGRESS NOTES
06/28/18 0715   Patient Assessment/Suction   Level of Consciousness (AVPU) alert   PRE-TX-O2-ETCO2   O2 Device (Oxygen Therapy) room air   SpO2 100 %   Pulse Oximetry Type Intermittent   $ Pulse Oximetry - Multiple Charge Pulse Oximetry - Multiple   Pulse 80   Resp 18

## 2018-06-28 NOTE — PROGRESS NOTES
"Gastroenterology    CC: elevated cea, mass on colon    S: pt doing well.  No overnight events.  Going to OR today    Past Medical History:   Diagnosis Date    Congenital small head     Diabetes mellitus     dIET CONTROL       Review of Systems  Complete ros perf and neg unless in hpi    Physical Examination  /60 mmHg  Pulse 67  Temp(Src) 97.9 °F (36.6 °C) (Oral)  Resp 15  Ht 5' 8" (1.727 m)  Wt 74.9 kg (165 lb 2 oz)  BMI 25.11 kg/m2  SpO2 100%  LMP  (LMP Unknown)  Breastfeeding? No  General appearance: alert, cooperative, no distress  HENT: Normocephalic, atraumatic, neck symmetrical, no nasal discharge   Lungs: clear to auscultation bilaterally, no dullness to percussion bilaterally  Heart: regular rate and rhythm without rub; no displacement of the PMI   Abdomen: soft, non-tender; bs +, bandage on    Labs:  CMP  Sodium   Date Value Ref Range Status   06/28/2018 135 (L) 136 - 145 mmol/L Final     Potassium   Date Value Ref Range Status   06/28/2018 4.5 3.5 - 5.1 mmol/L Final     Chloride   Date Value Ref Range Status   06/28/2018 106 95 - 110 mmol/L Final     CO2   Date Value Ref Range Status   06/28/2018 27 23 - 29 mmol/L Final     Glucose   Date Value Ref Range Status   06/28/2018 179 (H) 70 - 110 mg/dL Final     BUN, Bld   Date Value Ref Range Status   06/28/2018 32 (H) 6 - 20 mg/dL Final     Creatinine   Date Value Ref Range Status   06/28/2018 1.2 0.5 - 1.4 mg/dL Final     Calcium   Date Value Ref Range Status   06/28/2018 8.1 (L) 8.7 - 10.5 mg/dL Final     Total Protein   Date Value Ref Range Status   06/28/2018 4.8 (L) 6.0 - 8.4 g/dL Final     Albumin   Date Value Ref Range Status   06/28/2018 1.5 (L) 3.5 - 5.2 g/dL Final     Total Bilirubin   Date Value Ref Range Status   06/28/2018 0.5 0.1 - 1.0 mg/dL Final     Comment:     For infants and newborns, interpretation of results should be based  on gestational age, weight and in agreement with clinical  observations.  Premature Infant " recommended reference ranges:  Up to 24 hours.............<8.0 mg/dL  Up to 48 hours............<12.0 mg/dL  3-5 days..................<15.0 mg/dL  6-29 days.................<15.0 mg/dL       Alkaline Phosphatase   Date Value Ref Range Status   06/28/2018 27 (L) 55 - 135 U/L Final     AST   Date Value Ref Range Status   06/28/2018 24 10 - 40 U/L Final     ALT   Date Value Ref Range Status   06/28/2018 6 (L) 10 - 44 U/L Final     Anion Gap   Date Value Ref Range Status   06/28/2018 2 (L) 8 - 16 mmol/L Final     eGFR if    Date Value Ref Range Status   06/28/2018 58 (A) >60 mL/min/1.73 m^2 Final     eGFR if non    Date Value Ref Range Status   06/28/2018 50 (A) >60 mL/min/1.73 m^2 Final     Comment:     Calculation used to obtain the estimated glomerular filtration  rate (eGFR) is the CKD-EPI equation.        Lab Results   Component Value Date    WBC 11.40 06/28/2018    HGB 8.7 (L) 06/28/2018    HCT 25.9 (L) 06/28/2018    MCV 91 06/28/2018     06/28/2018       Imaging:  None    Assessment:   58 y.o. female with probable colon cancer, contained perforation going to OR today    Plan:  Agree with plan for ex cassandra Pulido  [facility]  Cell: [phone]

## 2018-06-28 NOTE — NURSING
Pt returned to unit from sx, midllne incision with small amount of drainage noted, JESUS drain to right quad draining dark serosanguineous fluids, j-tube to left mid quad, patel catheter draining clear yellow urine to urimeter bag, NG tube to left naire draining brownish color fluids, pt is resting, easily aroused with light stimuli, family at bedside, report given to on-coming nurse

## 2018-06-28 NOTE — PLAN OF CARE
Problem: Patient Care Overview  Goal: Plan of Care Review  Outcome: Ongoing (interventions implemented as appropriate)  Patient and parents oriented to room, call light within reach, wheels locked, bed in low position, side rails x 2, instructed to call for assistance prn. POC reviewed with patient and parents, all questions answered, verbalized understanding. Patient remained free of fall/injury. Comfort level established, c/o pain moderately controlled with prn medication. TPN infusing per order. CBG monitored, treated per ISS. Patient repositioned independently, no new breakdown noted. Patient ambulates to restroom with 1 person assist. Patient NPO since midnight. Will continue to monitor.

## 2018-06-28 NOTE — OR NURSING
Dressing noted to right side of abdomen.  No drainage on outside of dressing.  Prior to surgical procedure MD took dressing off.  Pustulant drainage noted to inside dressing.  ROD RN

## 2018-06-29 LAB
ALBUMIN SERPL BCP-MCNC: 1.8 G/DL
ALP SERPL-CCNC: 21 U/L
ALT SERPL W/O P-5'-P-CCNC: 17 U/L
ANION GAP SERPL CALC-SCNC: 7 MMOL/L
AST SERPL-CCNC: 106 U/L
BASOPHILS # BLD AUTO: 0 K/UL
BASOPHILS NFR BLD: 0.1 %
BILIRUB SERPL-MCNC: 1.5 MG/DL
BUN SERPL-MCNC: 48 MG/DL
CALCIUM SERPL-MCNC: 8.5 MG/DL
CHLORIDE SERPL-SCNC: 103 MMOL/L
CO2 SERPL-SCNC: 23 MMOL/L
CREAT SERPL-MCNC: 1.6 MG/DL
DIFFERENTIAL METHOD: ABNORMAL
EOSINOPHIL # BLD AUTO: 0 K/UL
EOSINOPHIL NFR BLD: 0 %
ERYTHROCYTE [DISTWIDTH] IN BLOOD BY AUTOMATED COUNT: 15.6 %
EST. GFR  (AFRICAN AMERICAN): 41 ML/MIN/1.73 M^2
EST. GFR  (NON AFRICAN AMERICAN): 36 ML/MIN/1.73 M^2
GLUCOSE SERPL-MCNC: 370 MG/DL
HCT VFR BLD AUTO: 31.2 %
HGB BLD-MCNC: 10.8 G/DL
LYMPHOCYTES # BLD AUTO: 2.7 K/UL
LYMPHOCYTES NFR BLD: 11.2 %
MAGNESIUM SERPL-MCNC: 2.5 MG/DL
MCH RBC QN AUTO: 31.2 PG
MCHC RBC AUTO-ENTMCNC: 34.5 G/DL
MCV RBC AUTO: 90 FL
MONOCYTES # BLD AUTO: 1.2 K/UL
MONOCYTES NFR BLD: 4.9 %
NEUTROPHILS # BLD AUTO: 19.9 K/UL
NEUTROPHILS NFR BLD: 83.8 %
PHOSPHATE SERPL-MCNC: 3.6 MG/DL
PLATELET # BLD AUTO: 238 K/UL
PMV BLD AUTO: 7.8 FL
POCT GLUCOSE: 226 MG/DL (ref 70–110)
POCT GLUCOSE: 268 MG/DL (ref 70–110)
POCT GLUCOSE: 312 MG/DL (ref 70–110)
POCT GLUCOSE: 315 MG/DL (ref 70–110)
POCT GLUCOSE: 378 MG/DL (ref 70–110)
POTASSIUM SERPL-SCNC: 5.2 MMOL/L
PROT SERPL-MCNC: 5 G/DL
RBC # BLD AUTO: 3.45 M/UL
SODIUM SERPL-SCNC: 133 MMOL/L
WBC # BLD AUTO: 23.7 K/UL

## 2018-06-29 PROCEDURE — 25000003 PHARM REV CODE 250: Performed by: INTERNAL MEDICINE

## 2018-06-29 PROCEDURE — 12000002 HC ACUTE/MED SURGE SEMI-PRIVATE ROOM

## 2018-06-29 PROCEDURE — 99900035 HC TECH TIME PER 15 MIN (STAT)

## 2018-06-29 PROCEDURE — A4217 STERILE WATER/SALINE, 500 ML: HCPCS | Performed by: INTERNAL MEDICINE

## 2018-06-29 PROCEDURE — 94761 N-INVAS EAR/PLS OXIMETRY MLT: CPT

## 2018-06-29 PROCEDURE — 80053 COMPREHEN METABOLIC PANEL: CPT

## 2018-06-29 PROCEDURE — 0FT40ZZ RESECTION OF GALLBLADDER, OPEN APPROACH: ICD-10-PCS | Performed by: SURGERY

## 2018-06-29 PROCEDURE — C9113 INJ PANTOPRAZOLE SODIUM, VIA: HCPCS | Performed by: SURGERY

## 2018-06-29 PROCEDURE — 0DHA3UZ INSERTION OF FEEDING DEVICE INTO JEJUNUM, PERCUTANEOUS APPROACH: ICD-10-PCS | Performed by: SURGERY

## 2018-06-29 PROCEDURE — 83735 ASSAY OF MAGNESIUM: CPT

## 2018-06-29 PROCEDURE — 63700000 PHARM REV CODE 250 ALT 637 W/O HCPCS: Performed by: INTERNAL MEDICINE

## 2018-06-29 PROCEDURE — 84100 ASSAY OF PHOSPHORUS: CPT

## 2018-06-29 PROCEDURE — 63600175 PHARM REV CODE 636 W HCPCS: Performed by: INTERNAL MEDICINE

## 2018-06-29 PROCEDURE — 36415 COLL VENOUS BLD VENIPUNCTURE: CPT

## 2018-06-29 PROCEDURE — 0DTF0ZZ RESECTION OF RIGHT LARGE INTESTINE, OPEN APPROACH: ICD-10-PCS | Performed by: SURGERY

## 2018-06-29 PROCEDURE — 63600175 PHARM REV CODE 636 W HCPCS: Performed by: SURGERY

## 2018-06-29 PROCEDURE — 97803 MED NUTRITION INDIV SUBSEQ: CPT

## 2018-06-29 PROCEDURE — 99233 SBSQ HOSP IP/OBS HIGH 50: CPT | Mod: ,,, | Performed by: INTERNAL MEDICINE

## 2018-06-29 PROCEDURE — 85025 COMPLETE CBC W/AUTO DIFF WBC: CPT

## 2018-06-29 RX ORDER — ENOXAPARIN SODIUM 100 MG/ML
40 INJECTION SUBCUTANEOUS EVERY 24 HOURS
Status: DISCONTINUED | OUTPATIENT
Start: 2018-06-29 | End: 2018-07-09 | Stop reason: HOSPADM

## 2018-06-29 RX ADMIN — CALCIUM GLUCONATE: 94 INJECTION, SOLUTION INTRAVENOUS at 11:06

## 2018-06-29 RX ADMIN — FLUTICASONE PROPIONATE 50 MCG: 50 SPRAY, METERED NASAL at 09:06

## 2018-06-29 RX ADMIN — Medication 0.5 MG: at 07:06

## 2018-06-29 RX ADMIN — INSULIN ASPART 3 UNITS: 100 INJECTION, SOLUTION INTRAVENOUS; SUBCUTANEOUS at 05:06

## 2018-06-29 RX ADMIN — INSULIN DETEMIR 6 UNITS: 100 INJECTION, SOLUTION SUBCUTANEOUS at 05:06

## 2018-06-29 RX ADMIN — FLUCONAZOLE IN SODIUM CHLORIDE 200 MG: 2 INJECTION, SOLUTION INTRAVENOUS at 03:06

## 2018-06-29 RX ADMIN — INSULIN ASPART 1 UNITS: 100 INJECTION, SOLUTION INTRAVENOUS; SUBCUTANEOUS at 09:06

## 2018-06-29 RX ADMIN — LEVOTHYROXINE SODIUM 25 MCG: 25 TABLET ORAL at 06:06

## 2018-06-29 RX ADMIN — FENOFIBRATE 160 MG: 160 TABLET ORAL at 09:06

## 2018-06-29 RX ADMIN — FUROSEMIDE 20 MG: 20 TABLET ORAL at 09:06

## 2018-06-29 RX ADMIN — PIPERACILLIN SODIUM AND TAZOBACTAM SODIUM 4.5 G: 4; .5 INJECTION, POWDER, LYOPHILIZED, FOR SOLUTION INTRAVENOUS at 02:06

## 2018-06-29 RX ADMIN — Medication 0.5 MG: at 09:06

## 2018-06-29 RX ADMIN — Medication 0.5 MG: at 02:06

## 2018-06-29 RX ADMIN — PIPERACILLIN SODIUM AND TAZOBACTAM SODIUM 4.5 G: 4; .5 INJECTION, POWDER, LYOPHILIZED, FOR SOLUTION INTRAVENOUS at 09:06

## 2018-06-29 RX ADMIN — PANTOPRAZOLE SODIUM 40 MG: 40 INJECTION, POWDER, LYOPHILIZED, FOR SOLUTION INTRAVENOUS at 06:06

## 2018-06-29 RX ADMIN — BRINZOLAMIDE 1 DROP: 10 SUSPENSION/ DROPS OPHTHALMIC at 09:06

## 2018-06-29 RX ADMIN — INSULIN ASPART 5 UNITS: 100 INJECTION, SOLUTION INTRAVENOUS; SUBCUTANEOUS at 06:06

## 2018-06-29 RX ADMIN — ENOXAPARIN SODIUM 40 MG: 100 INJECTION SUBCUTANEOUS at 09:06

## 2018-06-29 RX ADMIN — INSULIN ASPART 4 UNITS: 100 INJECTION, SOLUTION INTRAVENOUS; SUBCUTANEOUS at 12:06

## 2018-06-29 RX ADMIN — SODIUM CHLORIDE 500 ML: 0.9 INJECTION, SOLUTION INTRAVENOUS at 11:06

## 2018-06-29 RX ADMIN — DORZOLAMIDE HYDROCHLORIDE AND TIMOLOL MALEATE 1 DROP: 20; 5 SOLUTION/ DROPS OPHTHALMIC at 09:06

## 2018-06-29 RX ADMIN — LATANOPROST 1 DROP: 50 SOLUTION OPHTHALMIC at 09:06

## 2018-06-29 RX ADMIN — PIPERACILLIN SODIUM AND TAZOBACTAM SODIUM 4.5 G: 4; .5 INJECTION, POWDER, LYOPHILIZED, FOR SOLUTION INTRAVENOUS at 06:06

## 2018-06-29 NOTE — PROGRESS NOTES
Notified Dr. Fernandez regarding elevated CBG's. Dr. Fernandez asked this nurse to f/u with pharmacy r/t TPN dex amount. Discussed matter with pharmacy. Pharmacist will discuss. Will have RASHARD Rivas f/u.

## 2018-06-29 NOTE — CONSULTS
[facility]  Adult Nutrition  Consult Note    SUMMARY     Recommendations    Custom TPN:  4% AA, 5% dextrose @ 110 mls/hr plus 20% lipids (250 mls) and wean as enteral feeding is tolerated.   Provides: 1371 calories, 106 gms protein, 2640 mls fluid, GIR 1.49    Enteral:  Isosource 1.5 @ 15 mls/hr for 24 hrs. Then advance 10 mls/hr Q 6 hrs to goal 45 mls/hr.  Flush with 125 mls water Q 4 hrs or per MD rec.  At goal provides 1620 calories, 73 gms protein, 190 gms CHO, 825 mls free water. (plus 750 mls from flushes)    Goals: 1) Pt will consume/receive at least 50% EEN by RD f/u. 2) Pt will transition to enteral feeds within 72 hrs    Nutrition Goal Status: 1) met  2) new  Communication of RD Recs: reviewed with RN (POC, reviewed with MD)    Reason for Assessment    Reason for Assessment: Consult, RD follow up  1. Abdominal wall abscess    2. Abdominal pain    3. Right upper quadrant abdominal mass      Past Medical History:   Diagnosis Date    Congenital small head     Diabetes mellitus     dIET CONTROL     General Information Comments: Admitted with abdominal pain.  s/p surgery to drain large abscess.  Family reports poor intake x > 1 month and has not eaten well since admit.  Per MD H&P, pt has lost 50 lbs x 6 months. Pt's mom reports wt of 181 x 3 months ago.     Pt had procedure this afternoon, where a possible cecal mass vs less likely diverticular bleeding was found per MD note.Consult for PICC line sent.   6/28/18: Pt NPO and solely on TPN. Blood sugars are running high. Calorie needs exceeded.   6/29/18: Pt NPO. S/P hemicolectomy rt, cholecystectomy.  Blood sugars continue to run high, Altered electrolytes.  Possible refeeding 2/2 excess calories?  MD directed to reduce dextrose to 5% and initiate trickle feeds. Per Infectious Disease MD note pt also has JAYSHREE.     Nutrition Risk Screen    Nutrition Risk Screen: other (see comments)    Nutrition/Diet History    Patient Reported  "Diet/Restrictions/Preferences: general  Food Preferences: No spicy foods.  Uses [place] Instant Breakfast at home to increase calories.   Do you have any cultural, spiritual, Jain conflicts, given your current situation?: Uatsdin  Food Allergies: NKFA  Factors Affecting Nutritional Intake: abdominal pain, decreased appetite    Anthropometrics    Temp: 98.7 °F (37.1 °C)  Height Method: Stated  Height: 4' 11"  Height (inches): 59 in  Weight Method: Bed Scale  Weight: 61.4 kg (135 lb 5.8 oz)  Weight (lb): 135.36 lb  Ideal Body Weight (IBW), Female: 95 lb  % Ideal Body Weight, Female (lb): 142.48 lb  BMI (Calculated): 27.4  BMI Grade: 25 - 29.9 - overweight  Weight Loss: unintentional  Usual Body Weight (UBW), k.45 kg (per MD H&P note of wt loss of 50 lbs x 6 months)  % Usual Body Weight: 72.86  % Weight Change From Usual Weight: -27.29 %       Lab/Procedures/Meds    Pertinent Labs Reviewed: reviewed  Lab Results   Component Value Date    ALBUMIN 1.8 (L) 2018     Lab Results   Component Value Date    .3 (H) 2018       Recent Labs  Lab 18  0613   POCTGLUCOSE 378*       Pertinent Medications Reviewed: reviewed  Scheduled Meds:   brinzolamide  1 drop Both Eyes BID    dorzolamide-timolol 2-0.5%  1 drop Both Eyes BID    fenofibrate  160 mg Oral Daily    fluconazole (DIFLUCAN) IVPB  200 mg Intravenous Q24H    fluticasone  1 spray Each Nare Daily    latanoprost  1 drop Both Eyes QHS    levothyroxine  25 mcg Oral Before breakfast    pantoprazole  40 mg Intravenous Before breakfast    piperacillin-tazobactam 4.5 g in dextrose 5 % 100 mL IVPB (ready to mix system)  4.5 g Intravenous Q8H    promethazine (PHENERGAN) IVPB  12.5 mg Intravenous Once    simvastatin  10 mg Oral QHS    sodium bicarbonate  650 mg Oral QID    sodium chloride 0.9%  500 mL Intravenous Once    vitamin D  1,000 Units Oral Daily     Continuous Infusions:   TPN ADULT CENTRAL LINE CUSTOM 55 mL/hr at 18 " 5227     Physical Findings/Assessment    Overall Physical Appearance: nourished (NFPE completed.  No fat or muscle loss noted. )  Skin:  (Wolfgang score 17)    Estimated/Assessed Needs    Weight Used For Calorie Calculations: 61.4 kg (135 lb 5.8 oz)  Energy Need Method: S [place] Jeor: 1104.62 x 1.3-1.2=0151-7566  Weight Used For Protein Calculations: 61.4 kg (135 lb 5.8 oz)   1.0-1.2 gm/kg/day: 61-74  (until JAYSHREE resolves, then increase to 1.2-1.5 gm/kg/day)  Fluid Need Method: RDA Method (or per MD rec)  CHO Requirement: 45-50% EEN or GIR <5    Nutrition Prescription Ordered    Current Diet Order: consistent carbohydrate    Evaluation of Received Nutrient/Fluid Intake    Energy Calories Required: exceeding needs  Protein Required: exceeding needs  Fluid Required: meeting needs  CHO Required: exceeding requirements (Glucose 263,179; POCT 280,289, 204)    Intake/Output Summary (Last 24 hours) at 06/29/18 1101  Last data filed at 06/29/18 0607   Gross per 24 hour   Intake          2266.21 ml   Output             1354 ml   Net           912.21 ml     % Intake of Estimated Energy Needs: >100%  % Meal Intake: NPO    Nutrition Risk    Level of Risk/Frequency of Follow-up:  (2 x wkly)     Assessment and Plan    Severe malnutrition    Malnutrition in the context of acute illness    Related to (etiology):  Decreased intake 2/2 abdominal pain    Signs and Symptoms (as evidenced by):  Energy Intake: <50% of estimated energy requirement for >5 days  Body Fat Depletion: none noted  Muscle Mass Depletion: none noted  Weight Loss: 27.29% x 6 months (per MD H&P note)  Fluid Accumulation: 2+ edema    Interventions/Recommendations (treatment strategy):  1) Continue parenteral nutrition with current plan to wean and transition to enteral feeds via j tube    Nutrition Diagnosis Status:  progressing                 Monitor and Evaluation    Food and Nutrient Intake: energy intake  Food and Nutrient Adminstration: diet  order  Anthropometric Measurements: weight, weight change  Biochemical Data, Medical Tests and Procedures: glucose/endocrine profile, inflammatory profile  Nutrition-Focused Physical Findings: overall appearance, skin     Discharge Plan    To be determined    RD Follow-up?: Yes

## 2018-06-29 NOTE — ANESTHESIA POSTPROCEDURE EVALUATION
"Anesthesia Post Evaluation    Patient: Sis Teixeira    Procedure(s) Performed: Procedure(s) (LRB):  HEMICOLECTOMY, RIGHT (N/A)  CHOLECYSTECTOMY (N/A)  INSERTION, JEJUNOSTOMY TUBE (N/A)    Final Anesthesia Type: general  Patient location during evaluation: PACU  Patient participation: Yes- Able to Participate  Level of consciousness: awake and alert  Post-procedure vital signs: reviewed and stable  Pain management: adequate  Airway patency: patent  PONV status at discharge: No PONV  Anesthetic complications: no      Cardiovascular status: blood pressure returned to baseline  Respiratory status: unassisted  Hydration status: euvolemic  Follow-up not needed.        Visit Vitals  BP (!) 109/54 (BP Location: Left arm, Patient Position: Lying)   Pulse 78   Temp 35.7 °C (96.3 °F) (Axillary)   Resp 16   Ht 4' 11" (1.499 m)   Wt 61.4 kg (135 lb 5.8 oz)   LMP  (LMP Unknown)   SpO2 98%   Breastfeeding? No   BMI 27.34 kg/m²       Pain/Storm Score: Pain Assessment Performed: Yes (6/28/2018  6:30 PM)  Presence of Pain: non-verbal indicators absent (pt resting) (6/28/2018  6:30 PM)  Pain Rating Prior to Med Admin: 6 (6/27/2018  9:46 PM)  Pain Rating Post Med Admin: 0 (6/27/2018  1:28 PM)  Storm Score: 8 (6/28/2018  6:30 PM)      "

## 2018-06-29 NOTE — OP NOTE
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    OPERATION:  Exploratory laparotomy, right hemicolectomy, cholecystectomy, and   feeding jejunostomy.    SURGEON:  Kumar Torres M.D.    ASSISTANT:  HIMANSHU Rocha.    ANESTHESIA:  General endotracheal.    PROCEDURE AND FINDINGS:  With the patient in supine position under satisfactory   general endotracheal anesthesia, the abdomen was prepped and draped in the usual   manner for surgery.  A midline abdominal incision was made extending from the   xiphoid to below the umbilicus and carried down into the abdominal cavity.    Hemostasis maintained with electrocautery.  The abdomen was explored.  There was   a large mass in the right upper quadrant consistent with a primary colonic   carcinoma.  The patient has had had previous drainage of an abscess that was   felt to be due to perforation of this carcinoma and the patient is now   undergoing definitive resection.  The right colon was inscribed along its   retroperitoneal reflection in order to mobilize it; however, at the site of   perforation, the colon was markedly adherent to the abdominal wall and this was   fractured free from the abdominal wall using finger dissection.  It was felt   that complete curative resection of this lesion was not possible due to its   extent.  The colon was, however,  from the lateral abdominal wall.  It   was then dissected free from its adherence to the liver using electrocautery and   sharp dissection.  The colon was then  down from the liver, taking   down the hepatocolic ligaments.  The mass was peeled off of the duodenum in   order to completely remove it.  There was adherence to the serosa of the   duodenum; however, it was difficult to say whether there was actual  invasion of   the duodenum.  There was a small mucosal opening in the duodenum, which was   closed with a figure-of-eight 3-0 Vicryl.  The serosa of the duodenum was then   reapproximated with interrupted 3-0 silk sutures for additional reinforcement.    The mass was then mobilized off of the omentum and the mesentery of the   transverse and right colon was exposed.  At this point, site of transection and   anastomosis in the right transverse colon was selected.  The omentum was   severely divided using the EnSeal device.  The bowel was then transected for   subsequent anastomosis using the JUSTYN stapler.  Attention was turned toward the   distal ileum where a site for transection and anastomosis was selected and the   bowel was transected also with the JUSTYN stapling device.  The mesentery of the   bowel was then severely divided using the EnSeal device for the smaller vessels   and 0 silk ties for the larger branches of the middle colic and right colic   arteries.  Specimen was completely removed and sent for pathology.  The area was   copiously irrigated.  The previously drained abscess cavity was explored and it   appeared to be a grossly lined with starting of cancer cells.  Several   specimens were taken and sent for frozen section, which confirmed this.  At this   point, it was elected to do a cholecystectomy because of the patient's known   gallstones.  This was done from the fundus down to the neck using blunt and   sharp dissection, peeling the gallbladder out of the liver bed.  Hemostasis was   maintained with electrocautery.  The cystic artery was positively identified,   doubly clipped and divided.  The cystic artery was doubly clipped and divided.    The gallbladder was then removed and sent for pathologic examination.  The area   was irrigated and aspirated and a Surgicel packing was placed into the liver   bed.  Attention was then turned toward performing a reanastomosis of the ileum   to the transverse  colon.  This was done with the JUSTYN stapling device and the   common enterotomy was closed with a TX60 stapling device.  The mesentery was   closed with a running 3-0 silk suture.  The closure was very secure and   hemostatic.  The entire area was copiously irrigated with saline and aspirated.    A 19-Sami channel drain was placed through the previously drained opening and   placed into Peres's pouch.  It was then decided that the patient would   benefit by a feeding jejunostomy because of the possibility of duodenal   obstruction from residual tumor.  A KYLER jejunostomy tube was inserted into the   proximal jejunum after identifying the ligament of Treitz.  A pursestring suture   and the serosal tunnel was then created using 3-0 silk sutures as per protocol   for the jejunostomy tube.  This was then brought out through the rectus muscle   and the jejunum was sutured to the anterior abdominal wall with the peritoneum   and posterior fascial level with 3-0 silk sutures.  The tube was secured to the   skin.  Good flow was noted in both directions.  On-Q Painbuster pump catheters   were then tunneled on both sides of the main incision, which was closed with   multiple double running #1 PDS sutures.  The subcutaneous tissues irrigated and   the skin was closed with staples.  The wounds were washed and dried.  Sterile   dressings were applied.  The patient tolerated the procedure well and was sent   to Recovery in a stable condition.    ESTIMATED BLOOD LOSS:  Approximately 800 mL total.    COMPLICATIONS:  None.    DRAIN:  Hitesh-Strong x1 and jejunostomy tube.    SPECIMEN:  Consisting of right colon and abscess cavity lining was sent to   Pathology.      VERONICA  dd: 06/28/2018 16:48:38 (CDT)  td: 06/28/2018 22:08:21 (CD)  Doc ID   #[id number]  Job ID #[id number]    CC:

## 2018-06-29 NOTE — PLAN OF CARE
Patient is stable at this time.  VSS. Anesthesiologist at patient's bedside and is ok for patient to transfer to the floor.

## 2018-06-29 NOTE — PLAN OF CARE
"Problem: Patient Care Overview  Goal: Individualization & Mutuality  AAOx4. Midline dressing intact with scant amount of dried drainage. Frankie drain to RUQ. Pt remains NPO. Tube feedings initiated this shift. Pt tolerating without problem. BS monitored AC/HS covered with SSI. Lovenox administered this shift.  ABX infused to Right upper arm PICC per order. IVF infusing per order. IV antibiotics infused per order. VSS. Remains afebrile throughout my shift. Patient remains fall free throughout my shift. Comfort level established. Good pain control with prn medications. Pt family refused all PO meds. States "She cannot swallow with that tube." Pt family also refused getting pt out of bed with shift. And refused bath. Family remains at bedside. Bed low, brakes locked, SR up x2, call light within reach. Will continue to monitor.         "

## 2018-06-29 NOTE — PROGRESS NOTES
Progress Note  Hospital Medicine  Patient Name:Sis Teixeira  MRN:  [id number]  Patient Class: IP- Inpatient  Admit Date: 6/20/2018  Length of Stay: 8 days  Expected Discharge Date:   Attending Physician: Bekah Fernandez MD  Primary Care Provider:  Mann Garduno MD    SUBJECTIVE:     Principal Problem: Abdominal pain  Initial history of present illness: Patient is a 57 y.o. female admitted to Hospitalist Service from Dr. Garduno's office with complaint of abdominal pain since December, 2017. Patient reportedly has past medical history significant for Congenital microcephaly and diet controlled DM. Part of the history obtained from patient's parents and Dr. Garduno. Patient has been complaining of RUQ and right flank pain for few months now. She was being worked up for cholelithiasis and possible cholecystectomy planned by Dr. Joel. Patient was note dto have UTI and leukocytosis and completed PO Cipro course. Still having leukocytosis. Patient has a low appetite and has lost almost 50 lbs in 6 months. No fever or chills. Patient denied chest pain, shortness of breath, headache, vision changes, focal neuro-deficits, cough or fever.    PMH/PSH/SH/FH/Meds: reviewed.    Symptoms/Review of Systems: s/p right hemicolectomy + Cholecystectomy and J-tube placement. Abdominal pain is stable. No shortness of breath, cough, chest pain or headache, fever. Chronic right sided abdominal pain is better.  Diet: TPN. J-tube placement  Activity level: Normal.    Pain:  NAD      OBJECTIVE:   Vital Signs (Most Recent):      Temp: 96.3 °F (35.7 °C) (06/29/18 0811)  Pulse: 97 (06/29/18 0811)  Resp: 16 (06/29/18 0811)  BP: 119/82 (06/29/18 0811)  SpO2: 95 % (06/29/18 0811)       Vital Signs Range (Last 24H):  Temp:  [96.3 °F (35.7 °C)-99 °F (37.2 °C)]   Pulse:  []   Resp:  [11-23]   BP: ()/(44-82)   SpO2:  [95 %-100 %]     Weight: 61.4 kg (135 lb 5.8 oz)  Body mass index is 27.34 kg/m².    Intake/Output Summary (Last 24  hours) at 06/29/18 0949  Last data filed at 06/29/18 0607   Gross per 24 hour   Intake          2266.21 ml   Output             1354 ml   Net           912.21 ml     Physical Examination:  General appearance: well developed, appears stated age  Head: normocephalic, atraumatic, microcephally  Eyes:  conjunctivae/corneas clear. PERRL.  Nose: Nares normal. Septum midline.  Throat: lips, mucosa, and tongue normal; teeth and gums normal, no throat erythema.  Neck: supple, symmetrical, trachea midline, no JVD and thyroid not enlarged, symmetric, no tenderness/mass/nodules  Lungs:  clear to auscultation bilaterally and normal respiratory effort  Chest wall: no tenderness  Heart: regular rate and rhythm, S1, S2 normal, no murmur, click, rub or gallop  Abdomen: soft, non-tender non-distented; bowel sounds normal; no masses,  no organomegaly. Abdominal dressing C/D/I. J-tube site dressing C/D/I.  Extremities: no cyanosis, clubbing or edema.   Pulses: 2+ and symmetric  Skin: Skin color, texture, turgor normal. No rashes or lesions.  Lymph nodes: Cervical, supraclavicular, and axillary nodes normal.  Neurologic: Grossly non-focal. Answers simple question, short sentences.     CBC:    Recent Labs  Lab 06/27/18 0453 06/28/18  0500 06/29/18  0518   WBC 10.70 11.40 23.70*   RBC 2.96* 2.84* 3.45*   HGB 8.9* 8.7* 10.8*   HCT 26.8* 25.9* 31.2*    251 238   MCV 91 91 90   MCH 30.0 30.7 31.2*   MCHC 33.2 33.7 34.5   BMP    Recent Labs  Lab 06/27/18  0453 06/28/18  0500 06/29/18  0518   * 179* 370*    135* 133*   K 3.8 4.5 5.2*    106 103   CO2 23 27 23   BUN 17 32* 48*   CREATININE 1.2 1.2 1.6*   CALCIUM 8.1* 8.1* 8.5*   MG 1.9 2.1 2.5      Diagnostic Results:  Microbiology Results (last 7 days)     Procedure Component Value Units Date/Time    Culture, Anaerobic [[id number]] Collected:  06/21/18 1355    Order Status:  Completed Specimen:  Abscess from Abdomen Updated:  06/26/18 1421     Anaerobic Culture --      FUSOBACTERIUM SPECIES  Moderate      Blood culture [[id number]] Collected:  06/20/18 1620    Order Status:  Completed Specimen:  Blood from Antecubital, Left  Arm Updated:  06/25/18 2212     Blood Culture, Routine No growth after 5 days.    Blood culture [[id number]] Collected:  06/20/18 1627    Order Status:  Completed Specimen:  Blood from Antecubital, Right  Arm Updated:  06/25/18 2212     Blood Culture, Routine No growth after 5 days.    Aerobic culture [[id number]]  (Susceptibility) Collected:  06/21/18 1355    Order Status:  Completed Specimen:  Abscess from Abdomen Updated:  06/25/18 0851     Aerobic Bacterial Culture --     ESCHERICHIA COLI  Many       Aerobic Bacterial Culture --     STREPTOCOCCUS CONSTELLATUS  Many  Susceptibility testing not routinely performed      Urine culture [[id number]] Collected:  06/21/18 1000    Order Status:  Completed Specimen:  Urine from Urine, Clean Catch Updated:  06/22/18 2158     Urine Culture, Routine No growth    Urine culture [[id number]]     Order Status:  Canceled Specimen:  Urine          CT abdomen (06-19-18): Large subphrenic mass on the right abutting could be involving the liver extending into the right lateral abdominal wall, heterogeneous and complex in appearance suggesting complex fluid collection and containing small foci of air.  Differential includes large abscess, or necrotic mass.  It is indistinguishable from, abutting adjacent colon with colon wall thickening and findings thought most suggestive of abscess secondary to contained right colon perforation from colon cancer or less likely right-sided diverticulitis.  This corresponds to findings on recent ultrasound of 6/9/2018.     US abdomen:   1. Cholelithiasis.  2. 7.5 x 5.4 x 7.3 cm complicated cystic structure interposed between the right hepatic lobe and right kidney, possibly a loop of bowel (such as the hepatic flexure) with internal debris.  Consider additional evaluation with contrast enhanced CT of  the abdomen.    CT abdomen with contrast: Interval drainage of the large right flank abscess.  It is now predominant air-filled with small amount of residual fluid with air-fluid level.  This abuts and is indistinguishable from the right colon, distal descending duodenum, caudal tip of the liver and the wall of the adjacent colon appears thick and irregular in findings thought most suggestive of sequela from localized perforation from colon cancer.  Diverticular disease include in the differential. Small amount of ascites increased compared the prior exam.  Interval development of small left pleural effusion additional findings as detailed above including 2.2 cm calcification right side of the pelvis possibly dermoid or exophytic uterine fibroid.  Diverticulosis.  Cholelithiasis.    Gastrograffin enema: Multiple attempts were made to fill the colon.  Despite the balloon inflated the rectum the patient had spasm in the sigmoid colon, expelled lung the contrast on multiple occasions.  The colon is very redundant and was able to be filled to the hepatic flexure but not more proximally and in particular the area of the suspected colon mass and abscess was not able to be opacified.  Visualized segment of colon is patent with diverticulosis    Assessment/Plan:      *Abdominal pain [R10.9]  Right Retroperitoneal abscess with Coliform bacteria I+D [R19.00] s/p  right hemicolectomy + Cholecystectomy and J-tube placement  Follow Dr. Torres's recommendations. Case discussed with him.  Follow microbiology results - E Coli - Continue IV Zosyn and Diflucan, follow Dr. Bautista's recommendations.  Continue IV TPN. Starting J-tube tricking feeding today. Discussed with dietician. Follow Dr. Torres's recommendations.  Discussed with patient's parents know likelihood of colon malignancy is high, await histopathology results.     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    Rectal Bleeding  Elevated CEA level  Resolved      Not  Applicable    Diabetes mellitus 2 [E11.9]   Unknown       NPO.  Check blood glucose level q 6h.  Use Novolog Insulin Sliding Scale as needed. (u=increase to medium dose). Discussed with pharmacist to reduce Dextrose conc in TPN to 5%.       JAYSHREE (acute kidney injury) [N17.9] - resolved  Follow BMP.  Continue Lasix at 20 mg daily.    Anemia - Iron deficiency anemia  Needs Iron supplementation upon DC.    Hypokalemia  Follow BMP.    Severe Malnutrition  On TPN and Lipids.    Yes                      DVT prophylaxis: Use SCD and TEDs. Lovenox stopped due to GI bleeding.      Discussed with patient's mother and father. Time spent in care of the patient, counseling and coordination of care (Greater than 50% spent in direct face to face contact): 35 min.    Bekah Fernandez MD  Department of Hospital Medicine   [facility]

## 2018-06-29 NOTE — PLAN OF CARE
Problem: Patient Care Overview  Goal: Plan of Care Review  Outcome: Ongoing (interventions implemented as appropriate)  Patient unable to perform IS.  Explained to mother and father it was very important form patient to perform deep breathing.  Mother states patient is to sore at this time.ibuprofen  Practice deep breathing without IS.

## 2018-06-29 NOTE — PLAN OF CARE
Problem: Patient Care Overview  Goal: Plan of Care Review  Outcome: Ongoing (interventions implemented as appropriate)  Patient and family oriented to room, call light within reach, wheels locked, bed in low position, side rails x 2, instructed to call for assistance prn. POC reviewed with patient and family, all questions answered, verbalized understanding. Patient remained free of fall/injury. Comfort level established, c/o pain controlled with prn medication. IVF and abx infusing per order. Midline dressing intact, dry drainage noted. JESUS intact and patent, 9 mL out. J tube in place, clamped. On-Q in place and patent. NGT LIS. CBG monitored, treated per ISS. Last , discussed with Dr. Fernandez and pharmacy. Mehta to gravity, 200 mL out. Patient repositioned independently, no new breakdown noted. Will continue to monitor.

## 2018-06-29 NOTE — PROGRESS NOTES
06/28/18 2044   Patient Assessment/Suction   Respiratory Effort Normal;Unlabored   PRE-TX-O2-ETCO2   O2 Device (Oxygen Therapy) room air   SpO2 99 %   Pulse Oximetry Type Intermittent   Pulse 76   Resp 16   Incentive Spirometer   $ Incentive Spirometer Charges unable to perform   Administration (Incentive Spirometer) unable to perform

## 2018-06-29 NOTE — PLAN OF CARE
Problem: Nutrition, Parenteral (Adult)  Intervention: Monitor/Manage Parenteral Nutrition Support  Recommendations    Custom TPN:  4% AA, 5% dextrose @ 110 mls/hr plus 20% lipids (250 mls) and wean as enteral feeding is tolerated.   Provides: 1371 calories, 106 gms protein, 2640 mls fluid, GIR 1.49    Enteral:  Isosource 1.5 @ 15 mls/hr for 24 hrs. Then advance 10 mls/hr Q 6 hrs to goal 45 mls/hr.  Flush with 125 mls water Q 4 hrs or per MD rec.  At goal provides 1620 calories, 73 gms protein, 190 gms CHO, 825 mls free water. (plus 750 mls from flushes)    Goals: 1) Pt will consume/receive at least 50% EEN by RD f/u. 2) Pt will transition to enteral feeds within 72 hrs    Nutrition Goal Status: 1) met  2) new  Communication of RD Recs: reviewed with RN (POC, reviewed with MD)

## 2018-06-30 LAB
ALBUMIN SERPL BCP-MCNC: 1.4 G/DL
ALP SERPL-CCNC: 29 U/L
ALT SERPL W/O P-5'-P-CCNC: 14 U/L
ANION GAP SERPL CALC-SCNC: 5 MMOL/L
AST SERPL-CCNC: 53 U/L
BASOPHILS # BLD AUTO: 0.1 K/UL
BASOPHILS NFR BLD: 0.3 %
BILIRUB SERPL-MCNC: 0.9 MG/DL
BUN SERPL-MCNC: 53 MG/DL
CALCIUM SERPL-MCNC: 8.3 MG/DL
CHLORIDE SERPL-SCNC: 103 MMOL/L
CO2 SERPL-SCNC: 25 MMOL/L
CREAT SERPL-MCNC: 1.4 MG/DL
DIFFERENTIAL METHOD: ABNORMAL
EOSINOPHIL # BLD AUTO: 0.2 K/UL
EOSINOPHIL NFR BLD: 0.9 %
ERYTHROCYTE [DISTWIDTH] IN BLOOD BY AUTOMATED COUNT: 16.4 %
EST. GFR  (AFRICAN AMERICAN): 48 ML/MIN/1.73 M^2
EST. GFR  (NON AFRICAN AMERICAN): 42 ML/MIN/1.73 M^2
GLUCOSE SERPL-MCNC: 173 MG/DL
HCT VFR BLD AUTO: 26.2 %
HGB BLD-MCNC: 9 G/DL
LYMPHOCYTES # BLD AUTO: 3.6 K/UL
LYMPHOCYTES NFR BLD: 19.7 %
MAGNESIUM SERPL-MCNC: 2.2 MG/DL
MCH RBC QN AUTO: 31 PG
MCHC RBC AUTO-ENTMCNC: 34.3 G/DL
MCV RBC AUTO: 90 FL
MONOCYTES # BLD AUTO: 1.2 K/UL
MONOCYTES NFR BLD: 6.7 %
NEUTROPHILS # BLD AUTO: 13.2 K/UL
NEUTROPHILS NFR BLD: 72.4 %
PHOSPHATE SERPL-MCNC: 3 MG/DL
PLATELET # BLD AUTO: 201 K/UL
PMV BLD AUTO: 8.1 FL
POCT GLUCOSE: 185 MG/DL (ref 70–110)
POCT GLUCOSE: 207 MG/DL (ref 70–110)
POCT GLUCOSE: 227 MG/DL (ref 70–110)
POCT GLUCOSE: 230 MG/DL (ref 70–110)
POCT GLUCOSE: 256 MG/DL (ref 70–110)
POTASSIUM SERPL-SCNC: 4.6 MMOL/L
PROT SERPL-MCNC: 4.6 G/DL
RBC # BLD AUTO: 2.9 M/UL
SODIUM SERPL-SCNC: 133 MMOL/L
WBC # BLD AUTO: 18.3 K/UL

## 2018-06-30 PROCEDURE — 94761 N-INVAS EAR/PLS OXIMETRY MLT: CPT

## 2018-06-30 PROCEDURE — 63700000 PHARM REV CODE 250 ALT 637 W/O HCPCS: Performed by: INTERNAL MEDICINE

## 2018-06-30 PROCEDURE — 85025 COMPLETE CBC W/AUTO DIFF WBC: CPT

## 2018-06-30 PROCEDURE — 83735 ASSAY OF MAGNESIUM: CPT

## 2018-06-30 PROCEDURE — 99900035 HC TECH TIME PER 15 MIN (STAT)

## 2018-06-30 PROCEDURE — 25000003 PHARM REV CODE 250: Performed by: INTERNAL MEDICINE

## 2018-06-30 PROCEDURE — 12000002 HC ACUTE/MED SURGE SEMI-PRIVATE ROOM

## 2018-06-30 PROCEDURE — 84100 ASSAY OF PHOSPHORUS: CPT

## 2018-06-30 PROCEDURE — 94799 UNLISTED PULMONARY SVC/PX: CPT

## 2018-06-30 PROCEDURE — B4185 PARENTERAL SOL 10 GM LIPIDS: HCPCS | Performed by: INTERNAL MEDICINE

## 2018-06-30 PROCEDURE — 80053 COMPREHEN METABOLIC PANEL: CPT

## 2018-06-30 PROCEDURE — A4217 STERILE WATER/SALINE, 500 ML: HCPCS | Performed by: INTERNAL MEDICINE

## 2018-06-30 PROCEDURE — 36415 COLL VENOUS BLD VENIPUNCTURE: CPT

## 2018-06-30 PROCEDURE — 63600175 PHARM REV CODE 636 W HCPCS: Performed by: INTERNAL MEDICINE

## 2018-06-30 PROCEDURE — 97161 PT EVAL LOW COMPLEX 20 MIN: CPT

## 2018-06-30 PROCEDURE — C9113 INJ PANTOPRAZOLE SODIUM, VIA: HCPCS | Performed by: SURGERY

## 2018-06-30 PROCEDURE — 63600175 PHARM REV CODE 636 W HCPCS: Performed by: SURGERY

## 2018-06-30 RX ORDER — INSULIN ASPART 100 [IU]/ML
0-5 INJECTION, SOLUTION INTRAVENOUS; SUBCUTANEOUS EVERY 6 HOURS PRN
Status: DISCONTINUED | OUTPATIENT
Start: 2018-06-30 | End: 2018-07-09 | Stop reason: HOSPADM

## 2018-06-30 RX ADMIN — PIPERACILLIN SODIUM AND TAZOBACTAM SODIUM 4.5 G: 4; .5 INJECTION, POWDER, LYOPHILIZED, FOR SOLUTION INTRAVENOUS at 02:06

## 2018-06-30 RX ADMIN — SODIUM BICARBONATE 650 MG TABLET 650 MG: at 04:06

## 2018-06-30 RX ADMIN — DORZOLAMIDE HYDROCHLORIDE AND TIMOLOL MALEATE 1 DROP: 20; 5 SOLUTION/ DROPS OPHTHALMIC at 10:06

## 2018-06-30 RX ADMIN — FLUCONAZOLE IN SODIUM CHLORIDE 200 MG: 2 INJECTION, SOLUTION INTRAVENOUS at 04:06

## 2018-06-30 RX ADMIN — I.V. FAT EMULSION 250 ML: 20 EMULSION INTRAVENOUS at 12:06

## 2018-06-30 RX ADMIN — PANTOPRAZOLE SODIUM 40 MG: 40 INJECTION, POWDER, LYOPHILIZED, FOR SOLUTION INTRAVENOUS at 05:06

## 2018-06-30 RX ADMIN — PIPERACILLIN SODIUM AND TAZOBACTAM SODIUM 4.5 G: 4; .5 INJECTION, POWDER, LYOPHILIZED, FOR SOLUTION INTRAVENOUS at 10:06

## 2018-06-30 RX ADMIN — PIPERACILLIN SODIUM AND TAZOBACTAM SODIUM 4.5 G: 4; .5 INJECTION, POWDER, LYOPHILIZED, FOR SOLUTION INTRAVENOUS at 05:06

## 2018-06-30 RX ADMIN — ENOXAPARIN SODIUM 40 MG: 100 INJECTION SUBCUTANEOUS at 04:06

## 2018-06-30 RX ADMIN — Medication 0.5 MG: at 02:06

## 2018-06-30 RX ADMIN — VITAMIN D, TAB 1000IU (100/BT) 1000 UNITS: 25 TAB at 08:06

## 2018-06-30 RX ADMIN — INSULIN ASPART 1 UNITS: 100 INJECTION, SOLUTION INTRAVENOUS; SUBCUTANEOUS at 11:06

## 2018-06-30 RX ADMIN — LEVOTHYROXINE SODIUM 25 MCG: 25 TABLET ORAL at 05:06

## 2018-06-30 RX ADMIN — LATANOPROST 1 DROP: 50 SOLUTION OPHTHALMIC at 10:06

## 2018-06-30 RX ADMIN — FENOFIBRATE 160 MG: 160 TABLET ORAL at 08:06

## 2018-06-30 RX ADMIN — CALCIUM GLUCONATE: 94 INJECTION, SOLUTION INTRAVENOUS at 10:06

## 2018-06-30 RX ADMIN — SODIUM BICARBONATE 650 MG TABLET 650 MG: at 12:06

## 2018-06-30 RX ADMIN — Medication 0.5 MG: at 08:06

## 2018-06-30 RX ADMIN — INSULIN ASPART 2 UNITS: 100 INJECTION, SOLUTION INTRAVENOUS; SUBCUTANEOUS at 05:06

## 2018-06-30 RX ADMIN — INSULIN DETEMIR 6 UNITS: 100 INJECTION, SOLUTION SUBCUTANEOUS at 10:06

## 2018-06-30 RX ADMIN — SODIUM BICARBONATE 650 MG TABLET 650 MG: at 08:06

## 2018-06-30 RX ADMIN — INSULIN ASPART 3 UNITS: 100 INJECTION, SOLUTION INTRAVENOUS; SUBCUTANEOUS at 12:06

## 2018-06-30 RX ADMIN — BRINZOLAMIDE 1 DROP: 10 SUSPENSION/ DROPS OPHTHALMIC at 08:06

## 2018-06-30 RX ADMIN — SODIUM BICARBONATE 650 MG TABLET 650 MG: at 10:06

## 2018-06-30 RX ADMIN — SIMVASTATIN 10 MG: 10 TABLET, FILM COATED ORAL at 10:06

## 2018-06-30 RX ADMIN — DORZOLAMIDE HYDROCHLORIDE AND TIMOLOL MALEATE 1 DROP: 20; 5 SOLUTION/ DROPS OPHTHALMIC at 08:06

## 2018-06-30 RX ADMIN — INSULIN ASPART 1 UNITS: 100 INJECTION, SOLUTION INTRAVENOUS; SUBCUTANEOUS at 02:06

## 2018-06-30 RX ADMIN — SIMVASTATIN 10 MG: 10 TABLET, FILM COATED ORAL at 12:06

## 2018-06-30 NOTE — PROGRESS NOTES
[facility] Surgery  Progress Note    Subjective:     History of Present Illness:  Patient is a 57 y.o. female admitted to Hospitalist Service from Dr. Garduno's office with complaint of abdominal pain. Patient reportedly has past medical history significant for Congenital microcephaly and diet controlled DM.     Patient denied chest pain, shortness of breath, abdominal pain, nausea, vomiting, headache, vision changes, focal neuro-deficits, cough or fever.     She states the pain has been present for about six months off and on.  She had an ultrasound which showed gallstones and was scheduled to have lap GB but had persistent elevated WBC and CT was ordered, which showed   CT abdomen (06-19-18): Large subphrenic mass on the right abutting could be involving the liver extending into the right lateral abdominal wall, heterogeneous and complex in appearance suggesting complex fluid collection and containing small foci of air.  Differential includes large abscess, or necrotic mass.  It is indistinguishable from, abutting adjacent colon with colon wall thickening and findings thought most suggestive of abscess secondary to contained right colon perforation from colon cancer or less likely right-sided diverticulitis.  This corresponds to findings on recent ultrasound of 6/9/2018.    Post-Op Info:  Procedure(s) (LRB):  HEMICOLECTOMY, RIGHT (N/A)  CHOLECYSTECTOMY (N/A)  INSERTION, JEJUNOSTOMY TUBE (N/A)   2 Days Post-Op     Interval History: uncomfortable with NGT in place    Medications:  Continuous Infusions:   TPN ADULT CENTRAL LINE CUSTOM 110 mL/hr at 06/29/18 3032     Scheduled Meds:   brinzolamide  1 drop Both Eyes BID    dorzolamide-timolol 2-0.5%  1 drop Both Eyes BID    enoxaparin  40 mg Subcutaneous Daily    fat emulsion 20%  250 mL Intravenous Daily    fenofibrate  160 mg Oral Daily    fluconazole (DIFLUCAN) IVPB  200 mg Intravenous Q24H    fluticasone  1 spray Each Nare Daily     insulin detemir U-100  6 Units Subcutaneous QHS    latanoprost  1 drop Both Eyes QHS    levothyroxine  25 mcg Oral Before breakfast    pantoprazole  40 mg Intravenous Before breakfast    piperacillin-tazobactam 4.5 g in dextrose 5 % 100 mL IVPB (ready to mix system)  4.5 g Intravenous Q8H    promethazine (PHENERGAN) IVPB  12.5 mg Intravenous Once    simvastatin  10 mg Oral QHS    sodium bicarbonate  650 mg Oral QID    vitamin D  1,000 Units Oral Daily     PRN Meds:sodium chloride, acetaminophen, acetaminophen, dextrose 50%, dextrose 50%, glucagon (human recombinant), glucose, glucose, HYDROcodone-acetaminophen, HYDROcodone-acetaminophen, HYDROmorphone, insulin aspart U-100, ondansetron, ondansetron, sodium chloride 0.9%     Review of patient's allergies indicates:   Allergen Reactions    Sulfa (sulfonamide antibiotics) Rash    Tetracyclines Rash     Objective:     Vital Signs (Most Recent):  Temp: 98 °F (36.7 °C) (06/30/18 0829)  Pulse: 87 (06/30/18 0829)  Resp: 18 (06/30/18 0829)  BP: 135/63 (06/30/18 0829)  SpO2: 99 % (06/30/18 0829) Vital Signs (24h Range):  Temp:  [96.8 °F (36 °C)-98.4 °F (36.9 °C)] 98 °F (36.7 °C)  Pulse:  [78-87] 87  Resp:  [18-22] 18  SpO2:  [99 %-100 %] 99 %  BP: (126-135)/(57-80) 135/63     Weight: 61.4 kg (135 lb 5.8 oz)  Body mass index is 27.34 kg/m².    Intake/Output - Last 3 Shifts       06/28 0700 - 06/29 0659 06/29 0700 - 06/30 0659 06/30 0700 - 07/01 0659    P.O. 60 0     I.V. (mL/kg) 750 (12.2)      Blood 600      NG/GT  432     IV Piggyback 300 400     .2 792.6     Total Intake(mL/kg) 2266.2 (36.9) 1624.6 (26.5)     Urine (mL/kg/hr) 520 (0.4) 1275 (0.9)     Drains 34 (0) 320 (0.2)     Blood 800 (0.5)      Total Output 1354 1595      Net +912.2 +29.6                   Physical Exam   Constitutional: She appears well-developed and well-nourished. No distress.   Eyes: EOM are normal.   Neck: Normal range of motion.   Cardiovascular: Normal rate and regular  rhythm.    Pulmonary/Chest: Effort normal. No respiratory distress.   Abdominal: Soft. She exhibits no distension and no mass. There is no tenderness. There is no rebound and no guarding. No hernia.   Drain with brownish drainage   Musculoskeletal: Normal range of motion. She exhibits no edema.       Significant Labs:  CBC:   Recent Labs  Lab 06/30/18  0528   WBC 18.30*   RBC 2.90*   HGB 9.0*   HCT 26.2*      MCV 90   MCH 31.0   MCHC 34.3     BMP:   Recent Labs  Lab 06/30/18  0528   *   *   K 4.6      CO2 25   BUN 53*   CREATININE 1.4   CALCIUM 8.3*   MG 2.2       Significant Diagnostics:  I have reviewed all pertinent imaging results/findings within the past 24 hours.    Assessment/Plan:     * Abdominal pain    See below      Abdominal mass    POD 1 right colectomy/j tube abscess drainage    Ok to remove NGT (done at bedside)  Continue tubes and start increasing to goal  Ok for sips of cl for diet for now  Continue antibiotics  Continue drain            Aniceto Harris MD  General Surgery  [facility]

## 2018-06-30 NOTE — PLAN OF CARE
Problem: Patient Care Overview  Goal: Plan of Care Review  Outcome: Ongoing (interventions implemented as appropriate)  Pt remains free from fall or injury, up with assistance to the chair. NG tube removed this AM. No nausea or vomiting this shift. Pain controlled with IV meds. IV antibiotics administered per order. Turned every two hours with no new skin breakdown noted, heels elevated off the bed. Mehta catheter draining clear yellow urine to gravity. Blood glucose monitor, treated per ISS. TPN infusing to right upper arm PICC line. Isosource infusing at 25cc/hr to J tube, tolerating well with minimal residual. Afebrile and VS stable. Family at the bedside. Call light in reach. Will continue to monitor.

## 2018-06-30 NOTE — PT/OT/SLP EVAL
Physical Therapy Evaluation    Patient Name:  Sis Teixeira   MRN:  [id number]    Recommendations:     Discharge Recommendations:  home (pending progress)   Discharge Equipment Recommendations: none   Barriers to discharge: None    Assessment:     Sis Teixeira is a 58 y.o. female admitted with a medical diagnosis of Abdominal pain.  She presents with the following impairments/functional limitations:  weakness, impaired self care skills, impaired endurance, impaired functional mobilty, gait instability, impaired cognition, pain, impaired balance, decreased safety awareness Patient may benefit from skilled PT to address noted impairments and improve functional mobility.      Rehab Prognosis:  good; patient would benefit from acute skilled PT services to address these deficits and reach maximum level of function.      Recent Surgery: Procedure(s) (LRB):  HEMICOLECTOMY, RIGHT (N/A)  CHOLECYSTECTOMY (N/A)  INSERTION, JEJUNOSTOMY TUBE (N/A) 2 Days Post-Op    Plan:     During this hospitalization, patient to be seen daily to address the above listed problems via gait training, therapeutic activities, therapeutic exercises, therapeutic groups, neuromuscular re-education, wheelchair management/training  · Plan of Care Expires:  07/14/18   Plan of Care Reviewed with: patient, father, mother    Subjective     Communicated with primary nurse Theodore prior to session.  Patient found supine upon PT entry to room, agreeable to evaluation.      Chief Complaint: Pt with no stated complaints  Patient comments/goals: (none given)  Pain/Comfort:  · Pain Rating 1: other (see comments) (stating no pain at rest. Grimacing during transfer but unable to rate pain. Pt premedicated an hour before therapy.)  · Location - Orientation 1: generalized  · Location 1: abdomen  · Pain Addressed 1: Pre-medicate for activity, Reposition, Distraction, Nurse notified    Patients cultural, spiritual, Gnosticism conflicts given the current  situation: none    Living Environment:  Pt lives with mother and father, who are present for evaluation and state pt was walking independently prior to hospitalization.  Prior to admission, patients level of function was Independent.  Patient has the following equipment: none.  DME owned (not currently used): none.  Upon discharge, patient will have assistance from family.    Objective:     Patient found with: patel catheter, pressure relief boots, PICC line, JESUS drain     General Precautions: Standard, fall   Orthopedic Precautions:N/A   Braces: N/A     Exams:  · Cognitive Exam:  Patient is oriented to Person and follows 100% of 1 step commands   · RLE ROM: WFL  · RLE Strength: WFL  · LLE ROM: WFL  · LLE Strength: WFL    Functional Mobility:  · Bed Mobility:     · Supine to Sit: moderate assistance  · Transfers:     · Sit to Stand:  minimum assistance with no AD  · Bed to Chair: hand-held assist with no AD  · Gait: 4 steps to bedside chair at hand-held assistance    AM-PAC 6 CLICK MOBILITY  Total Score:12       Patient left up in chair with all lines intact, call button in reach, primary nurse Theodore notified and father present.    GOALS:    Physical Therapy Goals        Problem: Physical Therapy Goal    Goal Priority Disciplines Outcome Goal Variances Interventions   Physical Therapy Goal     PT/OT, PT Ongoing (interventions implemented as appropriate)     Description:  Goals to be met by: 18     Patient will increase functional independence with mobility by performin. Supine to sit with Modified [place]  2. Sit to stand transfer with [place]  3. Gait  x 100 feet with Supervision.                       History:     Past Medical History:   Diagnosis Date    Congenital small head     Diabetes mellitus     dIET CONTROL       Past Surgical History:   Procedure Laterality Date    INCISION AND DRAINAGE OF ABSCESS Right 2018    Procedure: INCISION AND DRAINAGE, ABSCESS;  Surgeon: Kumar BARRETT  MD Brian;  Location: [facility];  Service: General;  Laterality: Right;       Clinical Decision Making:     History  Co-morbidities and personal factors that may impact the plan of care Examination  Body Structures and Functions, activity limitations and participation restrictions that may impact the plan of care Clinical Presentation   Decision Making/ Complexity Score   Co-morbidities:   [] Time since onset of injury / illness / exacerbation  [] Status of current condition  []Patient's cognitive status and safety concerns    [] Multiple Medical Problems (see med hx)  Personal Factors:   [] Patient's age  [] Prior Level of function   [] Patient's home situation (environment and family support)  [] Patient's level of motivation  [] Expected progression of patient      HISTORY:(criteria)    [] 50736 - no personal factors/history    [] 56009 - has 1-2 personal factor/comorbidity     [] 22629 - has >3 personal factor/comorbidity     Body Regions:  [] Objective examination findings  [] Head     []  Neck  [] Trunk   [] Upper Extremity  [] Lower Extremity    Body Systems:  [] For communication ability, affect, cognition, language, and learning style: the assessment of the ability to make needs known, consciousness, orientation (person, place, and time), expected emotional /behavioral responses, and learning preferences (eg, learning barriers, education  needs)  [] For the neuromuscular system: a general assessment of gross coordinated movement (eg, balance, gait, locomotion, transfers, and transitions) and motor function  (motor control and motor learning)  [] For the musculoskeletal system: the assessment of gross symmetry, gross range of motion, gross strength, height, and weight  [] For the integumentary system: the assessment of pliability(texture), presence of scar formation, skin color, and skin integrity  [] For cardiovascular/pulmonary system: the assessment of heart rate, respiratory rate, blood pressure, and edema      Activity limitations:    [] Patient's cognitive status and saf ety concerns          [] Status of current condition      [] Weight bearing restriction  [] Cardiopulmunary Restriction    Participation Restrictions:   [] Goals and goal agreement with the patient     [] Rehab potential (prognosis) and probable outcome      Examination of Body System: (criteria)    [] 34508 - addressing 1-2 elements    [] 95538 - addressing a total of 3 or more elements     [] 47186 -  Addressing a total of 4 or more elements         Clinical Presentation: (criteria)  Choose one     On examination of body system using standardized tests and measures patient presents with (CHOOSE ONE) elements from any of the following: body structures and functions, activity limitations, and/or participation restrictions.  Leading to a clinical presentation that is considered (CHOOSE ONE)                              Clinical Decision Making  (Eval Complexity):  Choose One     Time Tracking:     PT Received On: 06/30/18  PT Start Time: 1030     PT Stop Time: 1055  PT Total Time (min): 25 min     Billable Minutes: Evaluation 25      Zhanna Green, PT  06/30/2018

## 2018-06-30 NOTE — PLAN OF CARE
Problem: Physical Therapy Goal  Goal: Physical Therapy Goal  Goals to be met by: 18     Patient will increase functional independence with mobility by performin. Supine to sit with Modified [place]  2. Sit to stand transfer with [place]  3. Gait  x 100 feet with Supervision.     Outcome: Ongoing (interventions implemented as appropriate)  PT evaluation completed.

## 2018-06-30 NOTE — PLAN OF CARE
Problem: Patient Care Overview  Goal: Plan of Care Review  Outcome: Ongoing (interventions implemented as appropriate)  AA Oriented to person. Mehta in place, intact, draining dark yellow urine. Pt turned q2 hrs. Pain controlled with prn medications.Pt NPO. NG tube to LIS. IV atx, TPN, Lipids, and tube feeding infusing per order. JESUS drain with minimal output. Midline incision with small amount of dried drainage. CBG monitored and covered as needed. Pt slept well through the night. Family at bedside. Q2 hour rounding utilized. Pain and comfort assessed. Bed low, brakes locked, SR up, call light within reach. POC reviewed. Family verbalized understanding. Will continue to monitor.

## 2018-06-30 NOTE — PROGRESS NOTES
Progress Note  Hospital Medicine  Patient Name:Sis Teixeira  MRN:  [id number]  Patient Class: IP- Inpatient  Admit Date: 6/20/2018  Length of Stay: 9 days  Expected Discharge Date:   Attending Physician: Bekah Fernandez MD  Primary Care Provider:  Mann Garduno MD    SUBJECTIVE:     Principal Problem: Abdominal pain  Initial history of present illness: Patient is a 57 y.o. female admitted to Hospitalist Service from Dr. Garduno's office with complaint of abdominal pain since December, 2017. Patient reportedly has past medical history significant for Congenital microcephaly and diet controlled DM. Part of the history obtained from patient's parents and Dr. Garduno. Patient has been complaining of RUQ and right flank pain for few months now. She was being worked up for cholelithiasis and possible cholecystectomy planned by Dr. Joel. Patient was note dto have UTI and leukocytosis and completed PO Cipro course. Still having leukocytosis. Patient has a low appetite and has lost almost 50 lbs in 6 months. No fever or chills. Patient denied chest pain, shortness of breath, headache, vision changes, focal neuro-deficits, cough or fever.    PMH/PSH/SH/FH/Meds: reviewed.    Symptoms/Review of Systems: s/p right hemicolectomy + Cholecystectomy and J-tube placement. Overall improved today with no abdominal pain and states that she feels better.  No nausea. Afebrile and improving leukocytosis.   Diet: TPN. J-tube placement  Activity level: Normal.    Pain:  NAD      OBJECTIVE:   Vital Signs (Most Recent):      Temp: 98 °F (36.7 °C) (06/30/18 0829)  Pulse: 87 (06/30/18 0829)  Resp: 18 (06/30/18 0829)  BP: 135/63 (06/30/18 0829)  SpO2: 99 % (06/30/18 0829)       Vital Signs Range (Last 24H):  Temp:  [96.8 °F (36 °C)-98.4 °F (36.9 °C)]   Pulse:  [78-87]   Resp:  [18-22]   BP: (126-135)/(57-80)   SpO2:  [99 %-100 %]     Weight: 61.4 kg (135 lb 5.8 oz)  Body mass index is 27.34 kg/m².    Intake/Output Summary (Last 24 hours) at  06/30/18 1115  Last data filed at 06/30/18 0600   Gross per 24 hour   Intake          1624.58 ml   Output             1595 ml   Net            29.58 ml     Physical Examination:  General appearance: well developed, appears stated age  Head: normocephalic, atraumatic, microcephally  Eyes:  conjunctivae/corneas clear. PERRL.  Nose: Nares normal. Septum midline.  Throat: lips, mucosa, and tongue normal; teeth and gums normal, no throat erythema.  Neck: supple, symmetrical, trachea midline, no JVD and thyroid not enlarged, symmetric, no tenderness/mass/nodules  Lungs:  clear to auscultation bilaterally and normal respiratory effort  Chest wall: no tenderness  Heart: regular rate and rhythm, S1, S2 normal, no murmur, click, rub or gallop  Abdomen: soft, non-tender non-distented; bowel sounds normal; no masses,  no organomegaly. Abdominal dressing C/D/I. J-tube site dressing C/D/I.  Extremities: no cyanosis, clubbing or edema.   Pulses: 2+ and symmetric  Skin: Skin color, texture, turgor normal. No rashes or lesions.  Lymph nodes: Cervical, supraclavicular, and axillary nodes normal.  Neurologic: Grossly non-focal. Answers simple question, short sentences.     CBC:    Recent Labs  Lab 06/28/18  0500 06/29/18  0518 06/30/18  0528   WBC 11.40 23.70* 18.30*   RBC 2.84* 3.45* 2.90*   HGB 8.7* 10.8* 9.0*   HCT 25.9* 31.2* 26.2*    238 201   MCV 91 90 90   MCH 30.7 31.2* 31.0   MCHC 33.7 34.5 34.3   BMP    Recent Labs  Lab 06/28/18  0500 06/29/18  0518 06/30/18  0528   * 370* 173*   * 133* 133*   K 4.5 5.2* 4.6    103 103   CO2 27 23 25   BUN 32* 48* 53*   CREATININE 1.2 1.6* 1.4   CALCIUM 8.1* 8.5* 8.3*   MG 2.1 2.5 2.2      Diagnostic Results:  Microbiology Results (last 7 days)     Procedure Component Value Units Date/Time    Culture, Anaerobic [[id number]] Collected:  06/21/18 1355    Order Status:  Completed Specimen:  Abscess from Abdomen Updated:  06/26/18 1421     Anaerobic Culture --      FUSOBACTERIUM SPECIES  Moderate      Blood culture [[id number]] Collected:  06/20/18 1620    Order Status:  Completed Specimen:  Blood from Antecubital, Left  Arm Updated:  06/25/18 2212     Blood Culture, Routine No growth after 5 days.    Blood culture [[id number]] Collected:  06/20/18 1627    Order Status:  Completed Specimen:  Blood from Antecubital, Right  Arm Updated:  06/25/18 2212     Blood Culture, Routine No growth after 5 days.    Aerobic culture [[id number]]  (Susceptibility) Collected:  06/21/18 1355    Order Status:  Completed Specimen:  Abscess from Abdomen Updated:  06/25/18 0851     Aerobic Bacterial Culture --     ESCHERICHIA COLI  Many       Aerobic Bacterial Culture --     STREPTOCOCCUS CONSTELLATUS  Many  Susceptibility testing not routinely performed           CT abdomen (06-19-18): Large subphrenic mass on the right abutting could be involving the liver extending into the right lateral abdominal wall, heterogeneous and complex in appearance suggesting complex fluid collection and containing small foci of air.  Differential includes large abscess, or necrotic mass.  It is indistinguishable from, abutting adjacent colon with colon wall thickening and findings thought most suggestive of abscess secondary to contained right colon perforation from colon cancer or less likely right-sided diverticulitis.  This corresponds to findings on recent ultrasound of 6/9/2018.     US abdomen:   1. Cholelithiasis.  2. 7.5 x 5.4 x 7.3 cm complicated cystic structure interposed between the right hepatic lobe and right kidney, possibly a loop of bowel (such as the hepatic flexure) with internal debris.  Consider additional evaluation with contrast enhanced CT of the abdomen.    CT abdomen with contrast: Interval drainage of the large right flank abscess.  It is now predominant air-filled with small amount of residual fluid with air-fluid level.  This abuts and is indistinguishable from the right colon, distal  descending duodenum, caudal tip of the liver and the wall of the adjacent colon appears thick and irregular in findings thought most suggestive of sequela from localized perforation from colon cancer.  Diverticular disease include in the differential. Small amount of ascites increased compared the prior exam.  Interval development of small left pleural effusion additional findings as detailed above including 2.2 cm calcification right side of the pelvis possibly dermoid or exophytic uterine fibroid.  Diverticulosis.  Cholelithiasis.    Gastrograffin enema: Multiple attempts were made to fill the colon.  Despite the balloon inflated the rectum the patient had spasm in the sigmoid colon, expelled lung the contrast on multiple occasions.  The colon is very redundant and was able to be filled to the hepatic flexure but not more proximally and in particular the area of the suspected colon mass and abscess was not able to be opacified.  Visualized segment of colon is patent with diverticulosis    Assessment/Plan:      *Abdominal pain [R10.9]  Right Retroperitoneal abscess with Coliform bacteria I+D [R19.00] s/p  right hemicolectomy + Cholecystectomy and J-tube placement  Follow Dr. Torres's recommendations. Case discussed with him.  Follow microbiology results - E Coli - Continue IV Zosyn and Diflucan, follow Dr. Bautista's recommendations.  Continue IV TPN. Jtube feeds as tolerated  Discussed with patient's parents know likelihood of colon malignancy is high, await histopathology results.     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    Rectal Bleeding  Elevated CEA level  Resolved      Not Applicable    Diabetes mellitus 2 [E11.9]   Unknown       NPO.  Check blood glucose level q 6h.  Use Novolog Insulin Sliding Scale as needed. (u=increase to medium dose). Discussed with pharmacist to reduce Dextrose conc in TPN to 5%.       JAYSHREE (acute kidney injury) [N17.9] - resolved  Follow BMP.  Continue Lasix at 20 mg  daily.    Anemia - Iron deficiency anemia  Needs Iron supplementation upon DC.    Hypokalemia  Follow BMP.    Severe Malnutrition  On TPN and Lipids.    Yes                      DVT prophylaxis: Use SCD and TEDs. Lovenox stopped due to GI bleeding.      Discussed with patient's mother and father. Time spent in care of the patient, counseling and coordination of care (Greater than 50% spent in direct face to face contact): 35 min.    Rangel Mohan MD  Department of Hospital Medicine   [facility]

## 2018-06-30 NOTE — SUBJECTIVE & OBJECTIVE
Interval History: uncomfortable with NGT in place    Medications:  Continuous Infusions:   TPN ADULT CENTRAL LINE CUSTOM 110 mL/hr at 06/29/18 3248     Scheduled Meds:   brinzolamide  1 drop Both Eyes BID    dorzolamide-timolol 2-0.5%  1 drop Both Eyes BID    enoxaparin  40 mg Subcutaneous Daily    fat emulsion 20%  250 mL Intravenous Daily    fenofibrate  160 mg Oral Daily    fluconazole (DIFLUCAN) IVPB  200 mg Intravenous Q24H    fluticasone  1 spray Each Nare Daily    insulin detemir U-100  6 Units Subcutaneous QHS    latanoprost  1 drop Both Eyes QHS    levothyroxine  25 mcg Oral Before breakfast    pantoprazole  40 mg Intravenous Before breakfast    piperacillin-tazobactam 4.5 g in dextrose 5 % 100 mL IVPB (ready to mix system)  4.5 g Intravenous Q8H    promethazine (PHENERGAN) IVPB  12.5 mg Intravenous Once    simvastatin  10 mg Oral QHS    sodium bicarbonate  650 mg Oral QID    vitamin D  1,000 Units Oral Daily     PRN Meds:sodium chloride, acetaminophen, acetaminophen, dextrose 50%, dextrose 50%, glucagon (human recombinant), glucose, glucose, HYDROcodone-acetaminophen, HYDROcodone-acetaminophen, HYDROmorphone, insulin aspart U-100, ondansetron, ondansetron, sodium chloride 0.9%     Review of patient's allergies indicates:   Allergen Reactions    Sulfa (sulfonamide antibiotics) Rash    Tetracyclines Rash     Objective:     Vital Signs (Most Recent):  Temp: 98 °F (36.7 °C) (06/30/18 0829)  Pulse: 87 (06/30/18 0829)  Resp: 18 (06/30/18 0829)  BP: 135/63 (06/30/18 0829)  SpO2: 99 % (06/30/18 0829) Vital Signs (24h Range):  Temp:  [96.8 °F (36 °C)-98.4 °F (36.9 °C)] 98 °F (36.7 °C)  Pulse:  [78-87] 87  Resp:  [18-22] 18  SpO2:  [99 %-100 %] 99 %  BP: (126-135)/(57-80) 135/63     Weight: 61.4 kg (135 lb 5.8 oz)  Body mass index is 27.34 kg/m².    Intake/Output - Last 3 Shifts       06/28 0700 - 06/29 0659 06/29 0700 - 06/30 0659 06/30 0700 - 07/01 0659    P.O. 60 0     I.V. (mL/kg) 750 (12.2)       Blood 600      NG/GT  432     IV Piggyback 300 400     .2 792.6     Total Intake(mL/kg) 2266.2 (36.9) 1624.6 (26.5)     Urine (mL/kg/hr) 520 (0.4) 1275 (0.9)     Drains 34 (0) 320 (0.2)     Blood 800 (0.5)      Total Output 1354 1595      Net +912.2 +29.6                   Physical Exam   Constitutional: She appears well-developed and well-nourished. No distress.   Eyes: EOM are normal.   Neck: Normal range of motion.   Cardiovascular: Normal rate and regular rhythm.    Pulmonary/Chest: Effort normal. No respiratory distress.   Abdominal: Soft. She exhibits no distension and no mass. There is no tenderness. There is no rebound and no guarding. No hernia.   Drain with brownish drainage   Musculoskeletal: Normal range of motion. She exhibits no edema.       Significant Labs:  CBC:   Recent Labs  Lab 06/30/18  0528   WBC 18.30*   RBC 2.90*   HGB 9.0*   HCT 26.2*      MCV 90   MCH 31.0   MCHC 34.3     BMP:   Recent Labs  Lab 06/30/18  0528   *   *   K 4.6      CO2 25   BUN 53*   CREATININE 1.4   CALCIUM 8.3*   MG 2.2       Significant Diagnostics:  I have reviewed all pertinent imaging results/findings within the past 24 hours.

## 2018-06-30 NOTE — PROGRESS NOTES
Progress Note  Infectious Disease    Follow-up For:  Intra-abdominal abscess    SUBJECTIVE:   ROS:  No fever. Awake and cooperative, withdrawn, but indicating discomfort in her abdomen. She does not really understand what has happened to her. Reviewed the detailed operative note. Has not been out of bed yet.     Antibiotics     Start     Stop Route Frequency Ordered    06/20/18 1700  piperacillin-tazobactam 4.5 g in dextrose 5 % 100 mL IVPB (ready to mix system)      -- IV Every 8 hours (non-standard times) 06/20/18 1548          Pertinent Medications noted:    EXAM & DIAGNOSTICS REVIEWED:   Vitals:     Temp:  [96.3 °F (35.7 °C)-99 °F (37.2 °C)]   Temp: 98.4 °F (36.9 °C) (06/29/18 2052)  Pulse: 82 (06/29/18 2052)  Resp: 18 (06/29/18 2052)  BP: 132/63 (06/29/18 2052)  SpO2: 99 % (06/29/18 2052)    Intake/Output Summary (Last 24 hours) at 06/29/18 2148  Last data filed at 06/29/18 1800   Gross per 24 hour   Intake          1116.21 ml   Output              879 ml   Net           237.21 ml       General:  In NAD. Looks comfortable, awake but withdrawn  Eyes:  Anicteric, PERRL,  ENT:  Mouth w/ pink MMM, no lesions/exudate,     Neck:  Trachea midline, supple, no adenopathy appreciated  Lungs:  clear  Heart:  RRR, no gallop/murmur noted  Abd:  soft,  NGT to suction, bandage not disturbed. OnQ pump in place  :  patel  Musc:  Joints without effusion, erythema, synovitis, poor muscle bulk  Skin:  Generally warm, dry, normal for color. No rashes  Wound: Bandage not disturbed  Neuro: Sedated post op, microcephaly,   Extrem: No pitting edema, erythema, phlebitis, cellulitis, synovitis  VAD:  picc right arm    Lines/Tubes/Drains:    General Labs reviewed:    Recent Labs  Lab 06/29/18 0518   WBC 23.70*   RBC 3.45*   HGB 10.8*   HCT 31.2*      MCV 90   MCH 31.2*   MCHC 34.5       Recent Labs  Lab 06/29/18 0518   CALCIUM 8.5*   PROT 5.0*   *   K 5.2*   CO2 23      BUN 48*   CREATININE 1.6*   ALKPHOS 21*   ALT  17   *   BILITOT 1.5*       Micro: nothing new    Imaging Reviewed:    ASSESSMENT & PLAN      Patient Active Problem List   Diagnosis    Abdominal pain    Congenital small head    Diabetes mellitus 2    JAYSHREE (acute kidney injury)    Metabolic acidosis    Abdominal mass    Severe malnutrition      Imp; large RUQ abscess from contained perforation of right colon(Ecoli, strep and fusobacterium = all colon organisms), with mass suspicious for colon malignancy with 50# weight loss, hypoalbuminemia and elevated CEA and LGI bleeding, s/p colon resection 6/28              : microcephaly, childlike, parents are decision makers              : malnutrition, severe, due to prolonged illness              : 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

## 2018-06-30 NOTE — PLAN OF CARE
Problem: Patient Care Overview  Goal: Plan of Care Review  Patient averaging 500ml on IS.  Hr 75 and 02 saturation 97% on room air.

## 2018-07-01 LAB
ALBUMIN SERPL BCP-MCNC: 1.4 G/DL
ALP SERPL-CCNC: 36 U/L
ALT SERPL W/O P-5'-P-CCNC: 13 U/L
ANION GAP SERPL CALC-SCNC: 6 MMOL/L
AST SERPL-CCNC: 44 U/L
BASOPHILS # BLD AUTO: 0.1 K/UL
BASOPHILS NFR BLD: 0.5 %
BILIRUB SERPL-MCNC: 1.1 MG/DL
BUN SERPL-MCNC: 50 MG/DL
CALCIUM SERPL-MCNC: 8.5 MG/DL
CHLORIDE SERPL-SCNC: 104 MMOL/L
CO2 SERPL-SCNC: 23 MMOL/L
CREAT SERPL-MCNC: 1.1 MG/DL
DIFFERENTIAL METHOD: ABNORMAL
EOSINOPHIL # BLD AUTO: 0.3 K/UL
EOSINOPHIL NFR BLD: 2.5 %
ERYTHROCYTE [DISTWIDTH] IN BLOOD BY AUTOMATED COUNT: 16 %
EST. GFR  (AFRICAN AMERICAN): >60 ML/MIN/1.73 M^2
EST. GFR  (NON AFRICAN AMERICAN): 56 ML/MIN/1.73 M^2
GLUCOSE SERPL-MCNC: 207 MG/DL
HCT VFR BLD AUTO: 26.6 %
HGB BLD-MCNC: 9.2 G/DL
LYMPHOCYTES # BLD AUTO: 2.8 K/UL
LYMPHOCYTES NFR BLD: 22.1 %
MAGNESIUM SERPL-MCNC: 2 MG/DL
MCH RBC QN AUTO: 31 PG
MCHC RBC AUTO-ENTMCNC: 34.4 G/DL
MCV RBC AUTO: 90 FL
MONOCYTES # BLD AUTO: 1.1 K/UL
MONOCYTES NFR BLD: 8.5 %
NEUTROPHILS # BLD AUTO: 8.3 K/UL
NEUTROPHILS NFR BLD: 66.4 %
PHOSPHATE SERPL-MCNC: 2.5 MG/DL
PLATELET # BLD AUTO: 162 K/UL
PMV BLD AUTO: 7.8 FL
POCT GLUCOSE: 206 MG/DL (ref 70–110)
POCT GLUCOSE: 213 MG/DL (ref 70–110)
POCT GLUCOSE: 229 MG/DL (ref 70–110)
POCT GLUCOSE: 260 MG/DL (ref 70–110)
POTASSIUM SERPL-SCNC: 4.4 MMOL/L
PROT SERPL-MCNC: 4.9 G/DL
RBC # BLD AUTO: 2.96 M/UL
SODIUM SERPL-SCNC: 133 MMOL/L
WBC # BLD AUTO: 12.6 K/UL

## 2018-07-01 PROCEDURE — 85025 COMPLETE CBC W/AUTO DIFF WBC: CPT

## 2018-07-01 PROCEDURE — 63700000 PHARM REV CODE 250 ALT 637 W/O HCPCS: Performed by: INTERNAL MEDICINE

## 2018-07-01 PROCEDURE — 63600175 PHARM REV CODE 636 W HCPCS: Performed by: INTERNAL MEDICINE

## 2018-07-01 PROCEDURE — 84100 ASSAY OF PHOSPHORUS: CPT

## 2018-07-01 PROCEDURE — 83735 ASSAY OF MAGNESIUM: CPT

## 2018-07-01 PROCEDURE — 12000002 HC ACUTE/MED SURGE SEMI-PRIVATE ROOM

## 2018-07-01 PROCEDURE — 36415 COLL VENOUS BLD VENIPUNCTURE: CPT

## 2018-07-01 PROCEDURE — C9113 INJ PANTOPRAZOLE SODIUM, VIA: HCPCS | Performed by: SURGERY

## 2018-07-01 PROCEDURE — 25000003 PHARM REV CODE 250: Performed by: INTERNAL MEDICINE

## 2018-07-01 PROCEDURE — 63600175 PHARM REV CODE 636 W HCPCS: Performed by: SURGERY

## 2018-07-01 PROCEDURE — A4217 STERILE WATER/SALINE, 500 ML: HCPCS | Performed by: INTERNAL MEDICINE

## 2018-07-01 PROCEDURE — 99900035 HC TECH TIME PER 15 MIN (STAT)

## 2018-07-01 PROCEDURE — 97110 THERAPEUTIC EXERCISES: CPT

## 2018-07-01 PROCEDURE — 25000003 PHARM REV CODE 250: Performed by: SURGERY

## 2018-07-01 PROCEDURE — 80053 COMPREHEN METABOLIC PANEL: CPT

## 2018-07-01 PROCEDURE — 97112 NEUROMUSCULAR REEDUCATION: CPT

## 2018-07-01 PROCEDURE — 97530 THERAPEUTIC ACTIVITIES: CPT

## 2018-07-01 PROCEDURE — 94761 N-INVAS EAR/PLS OXIMETRY MLT: CPT

## 2018-07-01 RX ADMIN — SODIUM BICARBONATE 650 MG TABLET 650 MG: at 09:07

## 2018-07-01 RX ADMIN — PIPERACILLIN SODIUM AND TAZOBACTAM SODIUM 4.5 G: 4; .5 INJECTION, POWDER, LYOPHILIZED, FOR SOLUTION INTRAVENOUS at 11:07

## 2018-07-01 RX ADMIN — INSULIN ASPART 3 UNITS: 100 INJECTION, SOLUTION INTRAVENOUS; SUBCUTANEOUS at 05:07

## 2018-07-01 RX ADMIN — LEVOTHYROXINE SODIUM 25 MCG: 25 TABLET ORAL at 05:07

## 2018-07-01 RX ADMIN — PIPERACILLIN SODIUM AND TAZOBACTAM SODIUM 4.5 G: 4; .5 INJECTION, POWDER, LYOPHILIZED, FOR SOLUTION INTRAVENOUS at 01:07

## 2018-07-01 RX ADMIN — ENOXAPARIN SODIUM 40 MG: 100 INJECTION SUBCUTANEOUS at 05:07

## 2018-07-01 RX ADMIN — DORZOLAMIDE HYDROCHLORIDE AND TIMOLOL MALEATE 1 DROP: 20; 5 SOLUTION/ DROPS OPHTHALMIC at 09:07

## 2018-07-01 RX ADMIN — LATANOPROST 1 DROP: 50 SOLUTION OPHTHALMIC at 09:07

## 2018-07-01 RX ADMIN — INSULIN DETEMIR 6 UNITS: 100 INJECTION, SOLUTION SUBCUTANEOUS at 09:07

## 2018-07-01 RX ADMIN — SODIUM BICARBONATE 650 MG TABLET 650 MG: at 01:07

## 2018-07-01 RX ADMIN — FLUTICASONE PROPIONATE 50 MCG: 50 SPRAY, METERED NASAL at 09:07

## 2018-07-01 RX ADMIN — INSULIN ASPART 1 UNITS: 100 INJECTION, SOLUTION INTRAVENOUS; SUBCUTANEOUS at 09:07

## 2018-07-01 RX ADMIN — VITAMIN D, TAB 1000IU (100/BT) 1000 UNITS: 25 TAB at 09:07

## 2018-07-01 RX ADMIN — PIPERACILLIN SODIUM AND TAZOBACTAM SODIUM 4.5 G: 4; .5 INJECTION, POWDER, LYOPHILIZED, FOR SOLUTION INTRAVENOUS at 05:07

## 2018-07-01 RX ADMIN — HYDROCODONE BITARTRATE AND ACETAMINOPHEN 1 TABLET: 5; 325 TABLET ORAL at 09:07

## 2018-07-01 RX ADMIN — INSULIN ASPART 2 UNITS: 100 INJECTION, SOLUTION INTRAVENOUS; SUBCUTANEOUS at 06:07

## 2018-07-01 RX ADMIN — FENOFIBRATE 160 MG: 160 TABLET ORAL at 09:07

## 2018-07-01 RX ADMIN — CALCIUM GLUCONATE: 94 INJECTION, SOLUTION INTRAVENOUS at 09:07

## 2018-07-01 RX ADMIN — FLUCONAZOLE IN SODIUM CHLORIDE 200 MG: 2 INJECTION, SOLUTION INTRAVENOUS at 05:07

## 2018-07-01 RX ADMIN — SODIUM BICARBONATE 650 MG TABLET 650 MG: at 05:07

## 2018-07-01 RX ADMIN — INSULIN ASPART 2 UNITS: 100 INJECTION, SOLUTION INTRAVENOUS; SUBCUTANEOUS at 11:07

## 2018-07-01 RX ADMIN — BRINZOLAMIDE 1 DROP: 10 SUSPENSION/ DROPS OPHTHALMIC at 09:07

## 2018-07-01 RX ADMIN — SIMVASTATIN 10 MG: 10 TABLET, FILM COATED ORAL at 09:07

## 2018-07-01 RX ADMIN — HYDROCODONE BITARTRATE AND ACETAMINOPHEN 1 TABLET: 5; 325 TABLET ORAL at 05:07

## 2018-07-01 RX ADMIN — PANTOPRAZOLE SODIUM 40 MG: 40 INJECTION, POWDER, LYOPHILIZED, FOR SOLUTION INTRAVENOUS at 05:07

## 2018-07-01 NOTE — PLAN OF CARE
Problem: Patient Care Overview  Goal: Plan of Care Review  Outcome: Ongoing (interventions implemented as appropriate)  AA Oriented to person. Mehta in place, intact, draining yellow urine. Leaking at beginning of shift. Balloon deflated and re inflated. Urine flowing freely. Pt turned q2 hrs. No complaints of pain. Pt NPO. IV atx, TPN and tube feeding infusing per order. Tube feeding increased to goal rate. PICC line dx changed. JESUS drain with dark output. Leaking to insertion and incision site. Dressing applied. Midline incision with small amount of dried drainage. CBG monitored and covered as needed. Pt slept well through the night. Family at bedside. Q2 hour rounding utilized. Pain and comfort assessed. Bed low, brakes locked, SR up, call light within reach. POC reviewed. Family verbalized understanding. Will continue to monitor.

## 2018-07-01 NOTE — SUBJECTIVE & OBJECTIVE
Interval History: some pain, no nv    Medications:  Continuous Infusions:   TPN ADULT CENTRAL LINE CUSTOM 110 mL/hr at 06/30/18 2227    TPN ADULT CENTRAL LINE CUSTOM       Scheduled Meds:   brinzolamide  1 drop Both Eyes BID    dorzolamide-timolol 2-0.5%  1 drop Both Eyes BID    enoxaparin  40 mg Subcutaneous Daily    fenofibrate  160 mg Oral Daily    fluconazole (DIFLUCAN) IVPB  200 mg Intravenous Q24H    fluticasone  1 spray Each Nare Daily    insulin detemir U-100  6 Units Subcutaneous QHS    latanoprost  1 drop Both Eyes QHS    levothyroxine  25 mcg Oral Before breakfast    pantoprazole  40 mg Intravenous Before breakfast    piperacillin-tazobactam 4.5 g in dextrose 5 % 100 mL IVPB (ready to mix system)  4.5 g Intravenous Q8H    promethazine (PHENERGAN) IVPB  12.5 mg Intravenous Once    simvastatin  10 mg Oral QHS    sodium bicarbonate  650 mg Oral QID    vitamin D  1,000 Units Oral Daily     PRN Meds:sodium chloride, acetaminophen, acetaminophen, dextrose 50%, dextrose 50%, glucagon (human recombinant), glucose, glucose, HYDROcodone-acetaminophen, HYDROcodone-acetaminophen, HYDROmorphone, insulin aspart U-100, ondansetron, ondansetron, sodium chloride 0.9%     Review of patient's allergies indicates:   Allergen Reactions    Sulfa (sulfonamide antibiotics) Rash    Tetracyclines Rash     Objective:     Vital Signs (Most Recent):  Temp: 97.5 °F (36.4 °C) (07/01/18 1132)  Pulse: 70 (07/01/18 1132)  Resp: 20 (07/01/18 1132)  BP: (!) 177/74 (07/01/18 1132)  SpO2: 99 % (07/01/18 1132) Vital Signs (24h Range):  Temp:  [97.2 °F (36.2 °C)-98.5 °F (36.9 °C)] 97.5 °F (36.4 °C)  Pulse:  [70-86] 70  Resp:  [14-20] 20  SpO2:  [96 %-100 %] 99 %  BP: (138-177)/(65-94) 177/74     Weight: 61.4 kg (135 lb 5.8 oz)  Body mass index is 27.34 kg/m².    Intake/Output - Last 3 Shifts       06/29 0700 - 06/30 0659 06/30 0700 - 07/01 0659 07/01 0700 - 07/02 0659    P.O. 0 0     NG/ 338     IV Piggyback 400 400      .6 2040.5     Total Intake(mL/kg) 1624.6 (26.5) 2778.5 (45.3)     Urine (mL/kg/hr) 1275 (0.9) 2400 (1.6)     Drains 320 (0.2) 33 (0)     Total Output 1595 2433      Net +29.6 +345.5             Urine Occurrence  1 x           Physical Exam   Constitutional: No distress.   Cardiovascular: Normal rate and regular rhythm.    Pulmonary/Chest: Effort normal. No respiratory distress.   Abdominal: Soft. She exhibits no distension and no mass. There is tenderness (incisional). There is no rebound and no guarding. No hernia.   q ball with fluid  sherie with scant dark drainage   Musculoskeletal: She exhibits edema.   Neurological: She is alert.   Skin: Skin is warm and dry. Capillary refill takes less than 2 seconds. She is not diaphoretic.       Significant Labs:  CBC:   Recent Labs  Lab 07/01/18  0512   WBC 12.60   RBC 2.96*   HGB 9.2*   HCT 26.6*      MCV 90   MCH 31.0   MCHC 34.4     BMP:   Recent Labs  Lab 07/01/18  0512   *   *   K 4.4      CO2 23   BUN 50*   CREATININE 1.1   CALCIUM 8.5*   MG 2.0       Significant Diagnostics:  I have reviewed and interpreted all pertinent imaging results/findings within the past 24 hours.

## 2018-07-01 NOTE — PLAN OF CARE
Problem: Patient Care Overview  Goal: Plan of Care Review  Outcome: Ongoing (interventions implemented as appropriate)  02 saturation 99% on room air.  Pt. Unable to perform IS at this time

## 2018-07-01 NOTE — PLAN OF CARE
Problem: Patient Care Overview  Goal: Plan of Care Review  Outcome: Ongoing (interventions implemented as appropriate)  Plan of care reviewed with patient she verbalized understanding. POD 3 for hemicolectomy/ cholecystectomy. Pt has a JESUS drain and midline incision with a J tube hooked up to Isosource 1.5 at 45cc/hr tolerating well. Also is cont on TPN via picc in RUE no ADR noted. Call light within reach will cont to monitor.

## 2018-07-01 NOTE — PROGRESS NOTES
[facility] Surgery  Progress Note    Subjective:     History of Present Illness:  Patient is a 57 y.o. female admitted to Hospitalist Service from Dr. Garduno's office with complaint of abdominal pain. Patient reportedly has past medical history significant for Congenital microcephaly and diet controlled DM.     Patient denied chest pain, shortness of breath, abdominal pain, nausea, vomiting, headache, vision changes, focal neuro-deficits, cough or fever.     She states the pain has been present for about six months off and on.  She had an ultrasound which showed gallstones and was scheduled to have lap GB but had persistent elevated WBC and CT was ordered, which showed   CT abdomen (06-19-18): Large subphrenic mass on the right abutting could be involving the liver extending into the right lateral abdominal wall, heterogeneous and complex in appearance suggesting complex fluid collection and containing small foci of air.  Differential includes large abscess, or necrotic mass.  It is indistinguishable from, abutting adjacent colon with colon wall thickening and findings thought most suggestive of abscess secondary to contained right colon perforation from colon cancer or less likely right-sided diverticulitis.  This corresponds to findings on recent ultrasound of 6/9/2018.    Post-Op Info:  Procedure(s) (LRB):  HEMICOLECTOMY, RIGHT (N/A)  CHOLECYSTECTOMY (N/A)  INSERTION, JEJUNOSTOMY TUBE (N/A)   3 Days Post-Op     Interval History: some pain, no nv    Medications:  Continuous Infusions:   TPN ADULT CENTRAL LINE CUSTOM 110 mL/hr at 06/30/18 2227    TPN ADULT CENTRAL LINE CUSTOM       Scheduled Meds:   brinzolamide  1 drop Both Eyes BID    dorzolamide-timolol 2-0.5%  1 drop Both Eyes BID    enoxaparin  40 mg Subcutaneous Daily    fenofibrate  160 mg Oral Daily    fluconazole (DIFLUCAN) IVPB  200 mg Intravenous Q24H    fluticasone  1 spray Each Nare Daily    insulin detemir U-100   6 Units Subcutaneous QHS    latanoprost  1 drop Both Eyes QHS    levothyroxine  25 mcg Oral Before breakfast    pantoprazole  40 mg Intravenous Before breakfast    piperacillin-tazobactam 4.5 g in dextrose 5 % 100 mL IVPB (ready to mix system)  4.5 g Intravenous Q8H    promethazine (PHENERGAN) IVPB  12.5 mg Intravenous Once    simvastatin  10 mg Oral QHS    sodium bicarbonate  650 mg Oral QID    vitamin D  1,000 Units Oral Daily     PRN Meds:sodium chloride, acetaminophen, acetaminophen, dextrose 50%, dextrose 50%, glucagon (human recombinant), glucose, glucose, HYDROcodone-acetaminophen, HYDROcodone-acetaminophen, HYDROmorphone, insulin aspart U-100, ondansetron, ondansetron, sodium chloride 0.9%     Review of patient's allergies indicates:   Allergen Reactions    Sulfa (sulfonamide antibiotics) Rash    Tetracyclines Rash     Objective:     Vital Signs (Most Recent):  Temp: 97.5 °F (36.4 °C) (07/01/18 1132)  Pulse: 70 (07/01/18 1132)  Resp: 20 (07/01/18 1132)  BP: (!) 177/74 (07/01/18 1132)  SpO2: 99 % (07/01/18 1132) Vital Signs (24h Range):  Temp:  [97.2 °F (36.2 °C)-98.5 °F (36.9 °C)] 97.5 °F (36.4 °C)  Pulse:  [70-86] 70  Resp:  [14-20] 20  SpO2:  [96 %-100 %] 99 %  BP: (138-177)/(65-94) 177/74     Weight: 61.4 kg (135 lb 5.8 oz)  Body mass index is 27.34 kg/m².    Intake/Output - Last 3 Shifts       06/29 0700 - 06/30 0659 06/30 0700 - 07/01 0659 07/01 0700 - 07/02 0659    P.O. 0 0     NG/ 338     IV Piggyback 400 400     .6 2040.5     Total Intake(mL/kg) 1624.6 (26.5) 2778.5 (45.3)     Urine (mL/kg/hr) 1275 (0.9) 2400 (1.6)     Drains 320 (0.2) 33 (0)     Total Output 1595 2433      Net +29.6 +345.5             Urine Occurrence  1 x           Physical Exam   Constitutional: No distress.   Cardiovascular: Normal rate and regular rhythm.    Pulmonary/Chest: Effort normal. No respiratory distress.   Abdominal: Soft. She exhibits no distension and no mass. There is tenderness (incisional).  There is no rebound and no guarding. No hernia.   q ball with fluid  sherie with scant dark drainage   Musculoskeletal: She exhibits edema.   Neurological: She is alert.   Skin: Skin is warm and dry. Capillary refill takes less than 2 seconds. She is not diaphoretic.       Significant Labs:  CBC:   Recent Labs  Lab 07/01/18 0512   WBC 12.60   RBC 2.96*   HGB 9.2*   HCT 26.6*      MCV 90   MCH 31.0   MCHC 34.4     BMP:   Recent Labs  Lab 07/01/18 0512   *   *   K 4.4      CO2 23   BUN 50*   CREATININE 1.1   CALCIUM 8.5*   MG 2.0       Significant Diagnostics:  I have reviewed and interpreted all pertinent imaging results/findings within the past 24 hours.    Assessment/Plan:     * Abdominal pain            Severe malnutrition    Tube feeds        Abdominal mass    POD 2 right colectomy/j tube abscess drainage    Continue tube feeds at goal  Ok for sips of cl for diet for now  Continue antibiotics  Continue drain  Continue patel for another day for decreased mobility            Aniceto Harris MD  General Surgery  [facility]

## 2018-07-01 NOTE — PROGRESS NOTES
Progress Note  Hospital Medicine  Patient Name:Sis Teixeira  MRN:  [id number]  Patient Class: IP- Inpatient  Admit Date: 6/20/2018  Length of Stay: 10 days  Expected Discharge Date:   Attending Physician: Bekah Fernandez MD  Primary Care Provider:  Mann Garduno MD    SUBJECTIVE:     Principal Problem: Abdominal pain  Initial history of present illness: Patient is a 57 y.o. female admitted to Hospitalist Service from Dr. Garduno's office with complaint of abdominal pain since December, 2017. Patient reportedly has past medical history significant for Congenital microcephaly and diet controlled DM. Part of the history obtained from patient's parents and Dr. Garduno. Patient has been complaining of RUQ and right flank pain for few months now. She was being worked up for cholelithiasis and possible cholecystectomy planned by Dr. Joel. Patient was note dto have UTI and leukocytosis and completed PO Cipro course. Still having leukocytosis. Patient has a low appetite and has lost almost 50 lbs in 6 months. No fever or chills. Patient denied chest pain, shortness of breath, headache, vision changes, focal neuro-deficits, cough or fever.    PMH/PSH/SH/FH/Meds: reviewed.    Symptoms/Review of Systems: s/p right hemicolectomy + Cholecystectomy and J-tube placement. Continues to improve with only mild pain after moving to chair. Otherwise no acute events.   Diet: TPN. J-tube placement  Activity level: Normal.    Pain:  NAD      OBJECTIVE:   Vital Signs (Most Recent):      Temp: 98.3 °F (36.8 °C) (07/01/18 0800)  Pulse: 85 (07/01/18 0800)  Resp: 18 (07/01/18 0800)  BP: (!) 138/94 (07/01/18 0800)  SpO2: 99 % (07/01/18 0800)       Vital Signs Range (Last 24H):  Temp:  [96.5 °F (35.8 °C)-98.5 °F (36.9 °C)]   Pulse:  [76-86]   Resp:  [14-18]   BP: (123-162)/(61-94)   SpO2:  [96 %-100 %]     Weight: 61.4 kg (135 lb 5.8 oz)  Body mass index is 27.34 kg/m².    Intake/Output Summary (Last 24 hours) at 07/01/18 1025  Last data  filed at 07/01/18 0600   Gross per 24 hour   Intake           2778.5 ml   Output             2433 ml   Net            345.5 ml     Physical Examination:  General appearance: well developed, appears stated age  Head: normocephalic, atraumatic, microcephally  Eyes:  conjunctivae/corneas clear. PERRL.  Nose: Nares normal. Septum midline.  Throat: lips, mucosa, and tongue normal; teeth and gums normal, no throat erythema.  Neck: supple, symmetrical, trachea midline, no JVD and thyroid not enlarged, symmetric, no tenderness/mass/nodules  Lungs:  clear to auscultation bilaterally and normal respiratory effort  Chest wall: no tenderness  Heart: regular rate and rhythm, S1, S2 normal, no murmur, click, rub or gallop  Abdomen: soft, non-tender non-distented; bowel sounds normal; no masses,  no organomegaly. Abdominal dressing C/D/I. J-tube site dressing C/D/I.  Extremities: no cyanosis, clubbing or edema.   Pulses: 2+ and symmetric  Skin: Skin color, texture, turgor normal. No rashes or lesions.  Lymph nodes: Cervical, supraclavicular, and axillary nodes normal.  Neurologic: Grossly non-focal. Answers simple question, short sentences.     CBC:    Recent Labs  Lab 06/29/18 0518 06/30/18 0528 07/01/18 0512   WBC 23.70* 18.30* 12.60   RBC 3.45* 2.90* 2.96*   HGB 10.8* 9.0* 9.2*   HCT 31.2* 26.2* 26.6*    201 162   MCV 90 90 90   MCH 31.2* 31.0 31.0   MCHC 34.5 34.3 34.4   BMP    Recent Labs  Lab 06/29/18 0518 06/30/18 0528 07/01/18  0512   * 173* 207*   * 133* 133*   K 5.2* 4.6 4.4    103 104   CO2 23 25 23   BUN 48* 53* 50*   CREATININE 1.6* 1.4 1.1   CALCIUM 8.5* 8.3* 8.5*   MG 2.5 2.2 2.0      Diagnostic Results:  Microbiology Results (last 7 days)     Procedure Component Value Units Date/Time    Culture, Anaerobic [[id number]] Collected:  06/21/18 1355    Order Status:  Completed Specimen:  Abscess from Abdomen Updated:  06/26/18 1421     Anaerobic Culture --     FUSOBACTERIUM  SPECIES  Moderate      Blood culture [[id number]] Collected:  06/20/18 1620    Order Status:  Completed Specimen:  Blood from Antecubital, Left  Arm Updated:  06/25/18 2212     Blood Culture, Routine No growth after 5 days.    Blood culture [[id number]] Collected:  06/20/18 1627    Order Status:  Completed Specimen:  Blood from Antecubital, Right  Arm Updated:  06/25/18 2212     Blood Culture, Routine No growth after 5 days.    Aerobic culture [[id number]]  (Susceptibility) Collected:  06/21/18 1355    Order Status:  Completed Specimen:  Abscess from Abdomen Updated:  06/25/18 0851     Aerobic Bacterial Culture --     ESCHERICHIA COLI  Many       Aerobic Bacterial Culture --     STREPTOCOCCUS CONSTELLATUS  Many  Susceptibility testing not routinely performed           CT abdomen (06-19-18): Large subphrenic mass on the right abutting could be involving the liver extending into the right lateral abdominal wall, heterogeneous and complex in appearance suggesting complex fluid collection and containing small foci of air.  Differential includes large abscess, or necrotic mass.  It is indistinguishable from, abutting adjacent colon with colon wall thickening and findings thought most suggestive of abscess secondary to contained right colon perforation from colon cancer or less likely right-sided diverticulitis.  This corresponds to findings on recent ultrasound of 6/9/2018.     US abdomen:   1. Cholelithiasis.  2. 7.5 x 5.4 x 7.3 cm complicated cystic structure interposed between the right hepatic lobe and right kidney, possibly a loop of bowel (such as the hepatic flexure) with internal debris.  Consider additional evaluation with contrast enhanced CT of the abdomen.    CT abdomen with contrast: Interval drainage of the large right flank abscess.  It is now predominant air-filled with small amount of residual fluid with air-fluid level.  This abuts and is indistinguishable from the right colon, distal descending duodenum,  caudal tip of the liver and the wall of the adjacent colon appears thick and irregular in findings thought most suggestive of sequela from localized perforation from colon cancer.  Diverticular disease include in the differential. Small amount of ascites increased compared the prior exam.  Interval development of small left pleural effusion additional findings as detailed above including 2.2 cm calcification right side of the pelvis possibly dermoid or exophytic uterine fibroid.  Diverticulosis.  Cholelithiasis.    Gastrograffin enema: Multiple attempts were made to fill the colon.  Despite the balloon inflated the rectum the patient had spasm in the sigmoid colon, expelled lung the contrast on multiple occasions.  The colon is very redundant and was able to be filled to the hepatic flexure but not more proximally and in particular the area of the suspected colon mass and abscess was not able to be opacified.  Visualized segment of colon is patent with diverticulosis    Assessment/Plan:      *Abdominal pain [R10.9]  Right Retroperitoneal abscess with Coliform bacteria I+D [R19.00] s/p  right hemicolectomy + Cholecystectomy and J-tube placement  Follow Dr. Torres's recommendations. Case discussed with him.  Follow microbiology results - E Coli - Continue IV Zosyn and Diflucan, follow Dr. Bautista's recommendations.  Continue IV TPN. Jtube feeds as tolerated  Discussed with patient's parents know likelihood of colon malignancy is high, await histopathology results.     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    Rectal Bleeding  Elevated CEA level  Resolved      Not Applicable    Diabetes mellitus 2 [E11.9]   Unknown       NPO.  Check blood glucose level q 6h.  Use Novolog Insulin Sliding Scale as needed. (u=increase to medium dose). Discussed with pharmacist to reduce Dextrose conc in TPN to 5%.       JAYSHREE (acute kidney injury) [N17.9] - resolved  Follow BMP.  Continue Lasix at 20 mg daily.    Anemia - Iron  deficiency anemia  Needs Iron supplementation upon DC.    Hypokalemia  Follow BMP.    Severe Malnutrition  On TPN and Lipids.    Yes                      DVT prophylaxis: Use SCD and TEDs. Lovenox stopped due to GI bleeding.      Discussed with patient's mother and father. Time spent in care of the patient, counseling and coordination of care (Greater than 50% spent in direct face to face contact): 35 min.    Rangel Mohan MD  Department of Hospital Medicine   [facility]

## 2018-07-01 NOTE — PLAN OF CARE
Problem: Physical Therapy Goal  Goal: Physical Therapy Goal  Goals to be met by: 18     Patient will increase functional independence with mobility by performin. Supine to sit with Modified [place]  2. Sit to stand transfer with [place]  3. Gait  x 100 feet with Supervision.      Outcome: Ongoing (interventions implemented as appropriate)  Bed mobility, transfers OOB to chair at bedside, gait training.

## 2018-07-01 NOTE — PLAN OF CARE
06/30/18 2003   Patient Assessment/Suction   Level of Consciousness (AVPU) alert   Respiratory Effort Unlabored   PRE-TX-O2-ETCO2   O2 Device (Oxygen Therapy) room air   SpO2 100 %   Pulse Oximetry Type Intermittent   $ Pulse Oximetry - Multiple Charge Pulse Oximetry - Multiple   Incentive Spirometer   $ Incentive Spirometer Charges unable to perform   Administration (Incentive Spirometer) unable to perform   Number of Repetitions (Incentive Spirometer) (deep breathing done well vs IS)

## 2018-07-01 NOTE — ASSESSMENT & PLAN NOTE
POD 2 right colectomy/j tube abscess drainage    Continue tube feeds at goal  Ok for sips of cl for diet for now  Continue antibiotics  Continue drain  Continue patel for another day for decreased mobility

## 2018-07-01 NOTE — PROGRESS NOTES
Progress Note  Infectious Disease    Follow-up For:  Intra-abdominal abscess    SUBJECTIVE:   ROS:  No fever. Awake and cooperative, withdrawn,and appears afraid to move. She is up in the chair. Denies flatus. Tolerating her tube feeds at 45 cc/hr. Parents anticipate walking with PT and removing patel tomorrow. No SOB, comfortable without oxygen. No apparent antibiotic intolerance.    Antibiotics     Start     Stop Route Frequency Ordered    06/20/18 1700  piperacillin-tazobactam 4.5 g in dextrose 5 % 100 mL IVPB (ready to mix system)      -- IV Every 8 hours (non-standard times) 06/20/18 1548          Pertinent Medications noted:    EXAM & DIAGNOSTICS REVIEWED:   Vitals:     Temp:  [97.2 °F (36.2 °C)-98.5 °F (36.9 °C)]   Temp: 97.5 °F (36.4 °C) (07/01/18 1132)  Pulse: 70 (07/01/18 1132)  Resp: 20 (07/01/18 1132)  BP: (!) 177/74 (07/01/18 1132)  SpO2: 99 % (07/01/18 1132)    Intake/Output Summary (Last 24 hours) at 07/01/18 1325  Last data filed at 07/01/18 0600   Gross per 24 hour   Intake           2778.5 ml   Output             2433 ml   Net            345.5 ml       General:  In NAD. Looks comfortable, awake but withdrawn,  Eyes:  Anicteric, PERRL,  ENT:  Mouth w/ pink MMM, no lesions/exudate,     Neck:  Trachea midline, supple, no adenopathy appreciated  Lungs:  clear  Heart:  RRR, no gallop/murmur noted  Abd:  soft, bandage not disturbed. OnQ pump in place, JESUS x 2, BS active  :  patel  Musc:  Joints without effusion, erythema, synovitis, poor muscle bulk, moves all extremities  Skin:  Generally warm, dry, normal for color. No rashes  Wound: Bandage not disturbed  Neuro: Awake, cooperative, not offering any conversation, seems sedate and afraid to move. Moves all ext   Extrem: No pitting edema, erythema, phlebitis, cellulitis, synovitis  VAD:  picc right arm    Lines/Tubes/Drains:    General Labs reviewed:    Recent Labs  Lab 07/01/18  0512   WBC 12.60   RBC 2.96*   HGB 9.2*   HCT 26.6*      MCV 90    MCH 31.0   MCHC 34.4       Recent Labs  Lab 07/01/18  0512   CALCIUM 8.5*   PROT 4.9*   *   K 4.4   CO2 23      BUN 50*   CREATININE 1.1   ALKPHOS 36*   ALT 13   AST 44*   BILITOT 1.1*       Micro: nothing new    Imaging Reviewed:    ASSESSMENT & PLAN      Patient Active Problem List   Diagnosis    Abdominal pain    Congenital small head    Diabetes mellitus 2    JAYSHREE (acute kidney injury)    Metabolic acidosis    Abdominal mass    Severe malnutrition      Imp; large RUQ abscess from contained perforation of right colon(Ecoli, strep and fusobacterium = all colon organisms), with mass suspicious for colon malignancy with 50# weight loss, hypoalbuminemia and elevated CEA and LGI bleeding, s/p colon resection 6/28              : microcephaly, childlike, parents are decision makers              : malnutrition, severe, due to prolonged illness              : 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

## 2018-07-01 NOTE — PT/OT/SLP PROGRESS
Physical Therapy Treatment    Patient Name:  Sis Teixeira   MRN:  [id number]    Recommendations:     Discharge Recommendations:   (TBD)   Discharge Equipment Recommendations: none   Barriers to discharge: None    Assessment:     Sis Teixeira is a 58 y.o. female admitted with a medical diagnosis of Abdominal pain.  She presents with the following impairments/functional limitations:  weakness, impaired endurance, impaired self care skills, impaired functional mobilty, gait instability, pain, impaired cognition .    Rehab Prognosis:  good; patient would benefit from acute skilled PT services to address these deficits and reach maximum level of function.      Recent Surgery: Procedure(s) (LRB):  HEMICOLECTOMY, RIGHT (N/A)  CHOLECYSTECTOMY (N/A)  INSERTION, JEJUNOSTOMY TUBE (N/A) 3 Days Post-Op    Plan:     During this hospitalization, patient to be seen daily to address the above listed problems via gait training, therapeutic activities, therapeutic exercises  · Plan of Care Expires:  07/14/18   Plan of Care Reviewed with: patient, mother, father    Subjective     Communicated with nurse Pinzon prior to session.  Patient found supine in bed upon PT entry to room, agreeable to treatment.      Chief Complaint: pain in abdomen with movement  Patient comments/goals: none stated  Pain/Comfort:  · Pain Rating 1: other (see comments) (did not rate)  · Location - Orientation 1: generalized  · Location 1: abdomen (pointed to area with nurse present.)  · Pain Addressed 1: Reposition, Nurse notified    Patients cultural, spiritual, Ep[employer] conflicts given the current situation: none    Objective:     Patient found with: telemetry, JESUS drain, pressure relief boots, PICC line, PEG Tube (Q ball)     General Precautions: Standard, fall   Orthopedic Precautions:N/A   Braces: N/A     Functional Mobility:  · Bed Mobility:     · Rolling Left:  maximal assistance  · Supine to Sit: maximal assistance  · Transfers:     · Sit to  Stand:  maximal assistance with no AD  · Gait: few steps to chair with Max A      AM-PAC 6 CLICK MOBILITY          Therapeutic Activities and Exercises:   Transferred to sitting EOB with Max A due to abdominal pain with movement. Required extra time to achieve upright sitting balance. Sat briefly using BUE's for support and CGA as nurse reinforced bandage. Stood at bedside with Max A and nurse changed diaper. Few steps to chair at bedside. Required A x 2 to lift to sit back ( family brought pillow for patient to sit on which is difficult for patient to maneuver on ). Nurse present throughout session. Returned to transfer BTB after 1 hour , family declined, requested she sit up longer.    Patient left up in chair with all lines intact, call button in reach, nurse Alberta  notified and family present..    GOALS:    Physical Therapy Goals        Problem: Physical Therapy Goal    Goal Priority Disciplines Outcome Goal Variances Interventions   Physical Therapy Goal     PT/OT, PT Ongoing (interventions implemented as appropriate)     Description:  Goals to be met by: 18     Patient will increase functional independence with mobility by performin. Supine to sit with Modified [place]  2. Sit to stand transfer with [place]  3. Gait  x 100 feet with Supervision.                       Time Tracking:     PT Received On: 18  PT Start Time: 920     PT Stop Time: 0950  PT Total Time (min): 30 min     Billable Minutes: Therapeutic Activity 10min, Therapeutic Exercise 10min and Neuromuscular Re-education 10min    Treatment Type: Treatment  PT/PTA: PTA     PTA Visit Number: 1     Almaz Melgar PTA  2018

## 2018-07-02 LAB
ALBUMIN SERPL BCP-MCNC: 1.3 G/DL
ALP SERPL-CCNC: 35 U/L
ALT SERPL W/O P-5'-P-CCNC: 12 U/L
ANION GAP SERPL CALC-SCNC: 5 MMOL/L
AST SERPL-CCNC: 42 U/L
BASOPHILS # BLD AUTO: 0 K/UL
BASOPHILS NFR BLD: 0.4 %
BILIRUB SERPL-MCNC: 1.2 MG/DL
BUN SERPL-MCNC: 50 MG/DL
CALCIUM SERPL-MCNC: 8.3 MG/DL
CHLORIDE SERPL-SCNC: 103 MMOL/L
CO2 SERPL-SCNC: 23 MMOL/L
CREAT SERPL-MCNC: 0.9 MG/DL
DIFFERENTIAL METHOD: ABNORMAL
EOSINOPHIL # BLD AUTO: 0.4 K/UL
EOSINOPHIL NFR BLD: 4 %
ERYTHROCYTE [DISTWIDTH] IN BLOOD BY AUTOMATED COUNT: 17.3 %
EST. GFR  (AFRICAN AMERICAN): >60 ML/MIN/1.73 M^2
EST. GFR  (NON AFRICAN AMERICAN): >60 ML/MIN/1.73 M^2
GLUCOSE SERPL-MCNC: 201 MG/DL
HCT VFR BLD AUTO: 25.2 %
HGB BLD-MCNC: 8.5 G/DL
LYMPHOCYTES # BLD AUTO: 1.7 K/UL
LYMPHOCYTES NFR BLD: 17.6 %
MAGNESIUM SERPL-MCNC: 1.8 MG/DL
MCH RBC QN AUTO: 31.3 PG
MCHC RBC AUTO-ENTMCNC: 33.7 G/DL
MCV RBC AUTO: 93 FL
MONOCYTES # BLD AUTO: 0.6 K/UL
MONOCYTES NFR BLD: 6 %
NEUTROPHILS # BLD AUTO: 6.8 K/UL
NEUTROPHILS NFR BLD: 72 %
PHOSPHATE SERPL-MCNC: 2.3 MG/DL
PLATELET # BLD AUTO: 167 K/UL
PMV BLD AUTO: 7.5 FL
POCT GLUCOSE: 153 MG/DL (ref 70–110)
POCT GLUCOSE: 198 MG/DL (ref 70–110)
POCT GLUCOSE: 246 MG/DL (ref 70–110)
POCT GLUCOSE: 251 MG/DL (ref 70–110)
POTASSIUM SERPL-SCNC: 4.3 MMOL/L
PROT SERPL-MCNC: 4.7 G/DL
RBC # BLD AUTO: 2.72 M/UL
SODIUM SERPL-SCNC: 131 MMOL/L
WBC # BLD AUTO: 9.5 K/UL

## 2018-07-02 PROCEDURE — 80053 COMPREHEN METABOLIC PANEL: CPT

## 2018-07-02 PROCEDURE — 25000003 PHARM REV CODE 250: Performed by: INTERNAL MEDICINE

## 2018-07-02 PROCEDURE — 36415 COLL VENOUS BLD VENIPUNCTURE: CPT

## 2018-07-02 PROCEDURE — 85025 COMPLETE CBC W/AUTO DIFF WBC: CPT

## 2018-07-02 PROCEDURE — 84100 ASSAY OF PHOSPHORUS: CPT

## 2018-07-02 PROCEDURE — A4217 STERILE WATER/SALINE, 500 ML: HCPCS | Performed by: INTERNAL MEDICINE

## 2018-07-02 PROCEDURE — 25000003 PHARM REV CODE 250: Performed by: SURGERY

## 2018-07-02 PROCEDURE — C9113 INJ PANTOPRAZOLE SODIUM, VIA: HCPCS | Performed by: SURGERY

## 2018-07-02 PROCEDURE — 12000002 HC ACUTE/MED SURGE SEMI-PRIVATE ROOM

## 2018-07-02 PROCEDURE — 94761 N-INVAS EAR/PLS OXIMETRY MLT: CPT

## 2018-07-02 PROCEDURE — 63600175 PHARM REV CODE 636 W HCPCS: Performed by: INTERNAL MEDICINE

## 2018-07-02 PROCEDURE — 99900035 HC TECH TIME PER 15 MIN (STAT)

## 2018-07-02 PROCEDURE — 97530 THERAPEUTIC ACTIVITIES: CPT

## 2018-07-02 PROCEDURE — 83735 ASSAY OF MAGNESIUM: CPT

## 2018-07-02 PROCEDURE — 63700000 PHARM REV CODE 250 ALT 637 W/O HCPCS: Performed by: INTERNAL MEDICINE

## 2018-07-02 PROCEDURE — 63600175 PHARM REV CODE 636 W HCPCS: Performed by: SURGERY

## 2018-07-02 PROCEDURE — 99232 SBSQ HOSP IP/OBS MODERATE 35: CPT | Mod: ,,, | Performed by: INTERNAL MEDICINE

## 2018-07-02 RX ADMIN — INSULIN DETEMIR 6 UNITS: 100 INJECTION, SOLUTION SUBCUTANEOUS at 09:07

## 2018-07-02 RX ADMIN — SODIUM BICARBONATE 650 MG TABLET 650 MG: at 06:07

## 2018-07-02 RX ADMIN — SODIUM BICARBONATE 650 MG TABLET 650 MG: at 12:07

## 2018-07-02 RX ADMIN — PIPERACILLIN SODIUM AND TAZOBACTAM SODIUM 4.5 G: 4; .5 INJECTION, POWDER, LYOPHILIZED, FOR SOLUTION INTRAVENOUS at 09:07

## 2018-07-02 RX ADMIN — VITAMIN D, TAB 1000IU (100/BT) 1000 UNITS: 25 TAB at 09:07

## 2018-07-02 RX ADMIN — ONDANSETRON 4 MG: 2 INJECTION INTRAMUSCULAR; INTRAVENOUS at 12:07

## 2018-07-02 RX ADMIN — DORZOLAMIDE HYDROCHLORIDE AND TIMOLOL MALEATE 1 DROP: 20; 5 SOLUTION/ DROPS OPHTHALMIC at 09:07

## 2018-07-02 RX ADMIN — LEVOTHYROXINE SODIUM 25 MCG: 25 TABLET ORAL at 06:07

## 2018-07-02 RX ADMIN — PIPERACILLIN SODIUM AND TAZOBACTAM SODIUM 4.5 G: 4; .5 INJECTION, POWDER, LYOPHILIZED, FOR SOLUTION INTRAVENOUS at 02:07

## 2018-07-02 RX ADMIN — PANTOPRAZOLE SODIUM 40 MG: 40 INJECTION, POWDER, LYOPHILIZED, FOR SOLUTION INTRAVENOUS at 06:07

## 2018-07-02 RX ADMIN — INSULIN ASPART 2 UNITS: 100 INJECTION, SOLUTION INTRAVENOUS; SUBCUTANEOUS at 12:07

## 2018-07-02 RX ADMIN — PIPERACILLIN SODIUM AND TAZOBACTAM SODIUM 4.5 G: 4; .5 INJECTION, POWDER, LYOPHILIZED, FOR SOLUTION INTRAVENOUS at 06:07

## 2018-07-02 RX ADMIN — BRINZOLAMIDE 1 DROP: 10 SUSPENSION/ DROPS OPHTHALMIC at 09:07

## 2018-07-02 RX ADMIN — SODIUM BICARBONATE 650 MG TABLET 650 MG: at 09:07

## 2018-07-02 RX ADMIN — SIMVASTATIN 10 MG: 10 TABLET, FILM COATED ORAL at 09:07

## 2018-07-02 RX ADMIN — HYDROCODONE BITARTRATE AND ACETAMINOPHEN 1 TABLET: 5; 325 TABLET ORAL at 09:07

## 2018-07-02 RX ADMIN — FLUTICASONE PROPIONATE 50 MCG: 50 SPRAY, METERED NASAL at 09:07

## 2018-07-02 RX ADMIN — LATANOPROST 1 DROP: 50 SOLUTION OPHTHALMIC at 09:07

## 2018-07-02 RX ADMIN — ENOXAPARIN SODIUM 40 MG: 100 INJECTION SUBCUTANEOUS at 06:07

## 2018-07-02 RX ADMIN — FLUCONAZOLE IN SODIUM CHLORIDE 200 MG: 2 INJECTION, SOLUTION INTRAVENOUS at 06:07

## 2018-07-02 RX ADMIN — FENOFIBRATE 160 MG: 160 TABLET ORAL at 09:07

## 2018-07-02 RX ADMIN — CALCIUM GLUCONATE: 94 INJECTION, SOLUTION INTRAVENOUS at 09:07

## 2018-07-02 NOTE — ASSESSMENT & PLAN NOTE
POD 4 right colectomy/j tube abscess drainage    Continue tube feeds at goal  Ok for  cl for diet for now  Continue antibiotics  Continue drain  Continue patel for another day for decreased mobility

## 2018-07-02 NOTE — SUBJECTIVE & OBJECTIVE
Interval History: POD 4 right colectomy and GB.  Confortable.  Had BM    Medications:  Continuous Infusions:   TPN ADULT CENTRAL LINE CUSTOM 110 mL/hr at 07/01/18 2145    TPN ADULT CENTRAL LINE CUSTOM       Scheduled Meds:   brinzolamide  1 drop Both Eyes BID    dorzolamide-timolol 2-0.5%  1 drop Both Eyes BID    enoxaparin  40 mg Subcutaneous Daily    fenofibrate  160 mg Oral Daily    fluconazole (DIFLUCAN) IVPB  200 mg Intravenous Q24H    fluticasone  1 spray Each Nare Daily    insulin detemir U-100  6 Units Subcutaneous QHS    latanoprost  1 drop Both Eyes QHS    levothyroxine  25 mcg Oral Before breakfast    pantoprazole  40 mg Intravenous Before breakfast    piperacillin-tazobactam 4.5 g in dextrose 5 % 100 mL IVPB (ready to mix system)  4.5 g Intravenous Q8H    promethazine (PHENERGAN) IVPB  12.5 mg Intravenous Once    simvastatin  10 mg Oral QHS    sodium bicarbonate  650 mg Oral QID    vitamin D  1,000 Units Oral Daily     PRN Meds:sodium chloride, acetaminophen, acetaminophen, dextrose 50%, dextrose 50%, glucagon (human recombinant), glucose, glucose, HYDROcodone-acetaminophen, HYDROcodone-acetaminophen, HYDROmorphone, insulin aspart U-100, ondansetron, ondansetron, sodium chloride 0.9%     Review of patient's allergies indicates:   Allergen Reactions    Sulfa (sulfonamide antibiotics) Rash    Tetracyclines Rash     Objective:     Vital Signs (Most Recent):  Temp: 96.2 °F (35.7 °C) (07/02/18 0910)  Pulse: 75 (07/02/18 0910)  Resp: 16 (07/02/18 0910)  BP: 135/63 (07/02/18 0910)  SpO2: 99 % (07/02/18 0910) Vital Signs (24h Range):  Temp:  [96.2 °F (35.7 °C)-98.1 °F (36.7 °C)] 96.2 °F (35.7 °C)  Pulse:  [69-77] 75  Resp:  [16-20] 16  SpO2:  [96 %-100 %] 99 %  BP: (111-177)/(54-74) 135/63     Weight: 61.4 kg (135 lb 5.8 oz)  Body mass index is 27.34 kg/m².    Intake/Output - Last 3 Shifts       06/30 0700 - 07/01 0659 07/01 0700 - 07/02 0659 07/02 0700 - 07/03 0659    P.O. 0 60     NG/  540     IV Piggyback 400 100     TPN 2040.5 2117.5     Total Intake(mL/kg) 2778.5 (45.3) 2817.5 (45.9)     Urine (mL/kg/hr) 2400 (1.6) 4200 (2.9)     Drains 33 (0)      Total Output 2433 4200      Net +345.5 -1382.5             Urine Occurrence 1 x            Physical Exam   Constitutional: She is oriented to person, place, and time. She appears well-developed and well-nourished. No distress.   HENT:   Head: Normocephalic and atraumatic.   Right Ear: External ear normal.   Left Ear: External ear normal.   Eyes: Conjunctivae are normal. Pupils are equal, round, and reactive to light. Right eye exhibits no discharge. Left eye exhibits no discharge.   Neck: No tracheal deviation present. No thyromegaly present.   Cardiovascular: Normal rate and regular rhythm.    Pulmonary/Chest: Effort normal. No respiratory distress.   Abdominal: Soft. She exhibits no distension. There is no guarding.   Incision clean.  OnQ out.   Musculoskeletal: She exhibits no edema or tenderness.   Lymphadenopathy:     She has no cervical adenopathy.   Neurological: She is alert and oriented to person, place, and time. No cranial nerve deficit.   Skin: Skin is warm and dry. No rash noted. She is not diaphoretic. No pallor.   Psychiatric: She has a normal mood and affect. Her behavior is normal. Judgment and thought content normal.   Nursing note and vitals reviewed.      Significant Labs:  CBC:   Recent Labs  Lab 07/02/18  0457   WBC 9.50   RBC 2.72*   HGB 8.5*   HCT 25.2*      MCV 93   MCH 31.3*   MCHC 33.7     CMP:   Recent Labs  Lab 07/02/18  0629   *   CALCIUM 8.3*   ALBUMIN 1.3*   PROT 4.7*   *   K 4.3   CO2 23      BUN 50*   CREATININE 0.9   ALKPHOS 35*   ALT 12   AST 42*   BILITOT 1.2*       Significant Diagnostics:  I have reviewed all pertinent imaging results/findings within the past 24 hours.

## 2018-07-02 NOTE — PLAN OF CARE
Problem: Physical Therapy Goal  Goal: Physical Therapy Goal  Goals to be met by: 18     Patient will increase functional independence with mobility by performin. Supine to sit with Modified [place]  2. Sit to stand transfer with [place]  3. Gait  x 100 feet with Supervision.      Outcome: Ongoing (interventions implemented as appropriate)  PT for bed mobility, transfer/gait/functional mobility training.

## 2018-07-02 NOTE — PT/OT/SLP PROGRESS
Physical Therapy Treatment    Patient Name:  Sis Teixeira   MRN:  [id number]    Recommendations:     Discharge Recommendations:   (TBD)       Assessment:     Sis Teixeira is a 58 y.o. female admitted with a medical diagnosis of Abdominal pain.  She presents with the following impairments/functional limitations:  weakness, impaired endurance, impaired self care skills, impaired functional mobilty, gait instability, impaired cognition, decreased safety awareness, pain, decreased ROM . Increased time required for mobility 2' pain.    Rehab Prognosis:  good; patient would benefit from acute skilled PT services to address these deficits and reach maximum level of function.      Recent Surgery: Procedure(s) (LRB):  HEMICOLECTOMY, RIGHT (N/A)  CHOLECYSTECTOMY (N/A)  INSERTION, JEJUNOSTOMY TUBE (N/A) 4 Days Post-Op    Plan:     During this hospitalization, patient to be seen daily to address the above listed problems via gait training, therapeutic activities, therapeutic exercises  · Plan of Care Expires:  07/14/18   Plan of Care Reviewed with: patient, mother    Subjective     Communicated with nurses Nikki and Arin prior to session.  Patient found supine upon PT entry to room, agreeable to treatment.      Chief Complaint: none expressed, grimacing noted with mobility 2' abdominal pain. per nursing and family, pt feeling upset. Pt mostly non-verbal.   Patient comments/goals: none  Pain/Comfort:  · Pain Rating 1:  (did not rate)  · Location - Orientation 1: generalized  · Location 1: abdomen  · Pain Addressed 1: Pre-medicate for activity, Reposition, Distraction, Cessation of Activity, Nurse notified    Patients cultural, spiritual, Evangelical conflicts given the current situation: none    Objective:     Patient found with: patel catheter, JESUS drain, PEG Tube, pressure relief boots, PICC line, telemetry     General Precautions: Standard, fall   Orthopedic Precautions:N/A   Braces: N/A     Functional Mobility:  · Bed  Mobility:     · Rolling Left:  maximal assistance  · Scooting: maximal assistance to EOB  · Supine to Sit: maximal assistance    · Transfers:     · Sit to Stand:  maximal assistance with no AD  · Bed to Chair: moderate assistance and of 2 persons with  no AD  using  Stand Pivot (HHA)    · Gait: 5' to chair with mod A of 2 with no AD, HHA.      Therapeutic Activities and Exercises:   pt assisted to EOB with increased time and difficulty 2' pain   Pt sat EOB with mod A for balance initially, progressing to CGA after a few minutes.   pt assisted to bedside chair   once seated in chair, pt required A of 2 to lift to scoot back in chair. Pt's family brought in personal pillow for pt to sit on, however, this makes it difficult for pt to maneuver in chair.    Patient left up in chair with all lines intact, call button in reach, nurses Nikki and Arin notified and mother present..    GOALS:    Physical Therapy Goals        Problem: Physical Therapy Goal    Goal Priority Disciplines Outcome Goal Variances Interventions   Physical Therapy Goal     PT/OT, PT Ongoing (interventions implemented as appropriate)     Description:  Goals to be met by: 18     Patient will increase functional independence with mobility by performin. Supine to sit with Modified [place]  2. Sit to stand transfer with [place]  3. Gait  x 100 feet with Supervision.                       Time Tracking:     PT Received On: 18  PT Start Time: 1326     PT Stop Time: 1350  PT Total Time (min): 24 min     Billable Minutes: Therapeutic Activity 23    Treatment Type: Treatment  PT/PTA: PTA     PTA Visit Number: 2     Dianelys Reyes PTA  2018

## 2018-07-02 NOTE — PROGRESS NOTES
"Progress Note  Infectious Disease    Follow-up For:  Intra-abdominal abscess    SUBJECTIVE:   ROS:  No fever. Awake and cooperative, withdrawn, not interested in moving, "sleepy".  Had 2 BMs. Had some nausea. . Tolerating her tube feeds at 45 cc/hr.   She did work with PT  No SOB, comfortable without oxygen. No apparent antibiotic intolerance.    Antibiotics     Start     Stop Route Frequency Ordered    06/20/18 1700  piperacillin-tazobactam 4.5 g in dextrose 5 % 100 mL IVPB (ready to mix system)      -- IV Every 8 hours (non-standard times) 06/20/18 1548          Pertinent Medications noted:    EXAM & DIAGNOSTICS REVIEWED:   Vitals:     Temp:  [96.2 °F (35.7 °C)-98.8 °F (37.1 °C)]   Temp: 96.5 °F (35.8 °C) (07/02/18 1532)  Pulse: 76 (07/02/18 1532)  Resp: 18 (07/02/18 1532)  BP: 122/61 (07/02/18 1532)  SpO2: 95 % (07/02/18 1532)    Intake/Output Summary (Last 24 hours) at 07/02/18 1832  Last data filed at 07/02/18 1807   Gross per 24 hour   Intake           1177.5 ml   Output             2052 ml   Net           -874.5 ml       General:  In NAD. Looks comfortable, awake but withdrawn, cannot comply with IS  Eyes:  Anicteric, PERRL,  ENT:  Mouth w/ pink MMM, no lesions/exudate,     Neck:  Trachea midline, supple, no adenopathy appreciated  Lungs:  Right basilar rub  Heart:  RRR, no gallop/murmur noted  Abd:  soft, bandage not disturbed. OnQ pump in place, JESUS x 2, BS active  :  patel  Musc:  Joints without effusion, erythema, synovitis, poor muscle bulk, moves all extremities  Skin:  Generally warm, dry, normal for color. No rashes  Wound: Midline incision looks great. ?leaking from JESUS related incision  Neuro: Awake, cooperative, not offering any conversation, seems sedate and afraid to move. Moves all ext on command  Extrem: No pitting edema, erythema, phlebitis, cellulitis, synovitis  VAD:  picc right arm    Lines/Tubes/Drains:    General Labs reviewed:    Recent Labs  Lab 07/02/18  0457   WBC 9.50   RBC 2.72* "   HGB 8.5*   HCT 25.2*      MCV 93   MCH 31.3*   MCHC 33.7       Recent Labs  Lab 07/02/18  0629   CALCIUM 8.3*   PROT 4.7*   *   K 4.3   CO2 23      BUN 50*   CREATININE 0.9   ALKPHOS 35*   ALT 12   AST 42*   BILITOT 1.2*       Micro: nothing new    Imaging Reviewed:    ASSESSMENT & PLAN      Patient Active Problem List   Diagnosis    Abdominal pain    Congenital small head    Diabetes mellitus 2    JAYSHREE (acute kidney injury)    Metabolic acidosis    Abdominal mass    Severe malnutrition      Imp; large RUQ abscess from contained perforation of right colon(Ecoli, strep and fusobacterium = all colon organisms), with mass suspicious for colon malignancy with 50# weight loss, hypoalbuminemia and elevated CEA and LGI bleeding, s/p colon resection 6/28              : microcephaly, childlike, withdrawn, parents are decision makers              : malnutrition, severe, due to prolonged illness, albumin 1.3 despite TF and TPN              : 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

## 2018-07-02 NOTE — PLAN OF CARE
Problem: Patient Care Overview  Goal: Plan of Care Review  Outcome: Ongoing (interventions implemented as appropriate)  Pt awake, alert, parents at bedside.  PICC in place, IVF, abx, TPN infusing throughout shift.  Feeding tube in place.  Mehta in place, clear yellow urine draining.  I/O monitored and documented.  Frequent weight shift provided, turned q2hr.  POCT bg assessed, SSI coverage given per orders.  Drain monitored for output.  Dressings monitored for drainage.  VS stable.  Hourly rounding completed.  Pt has remained free of injuries and falls throughout shift.  Environment free of clutter, side rails up x2, and call light within reach.  Plan of care has been explained to the pt and caregivers.

## 2018-07-02 NOTE — PROGRESS NOTES
Progress Note  Hospital Medicine  Patient Name:Sis Teixeira  MRN:  [id number]  Patient Class: IP- Inpatient  Admit Date: 6/20/2018  Length of Stay: 11 days  Expected Discharge Date:   Attending Physician: Bekah Fernandez MD  Primary Care Provider:  Mann Garduno MD    SUBJECTIVE:     Principal Problem: Abdominal pain  Initial history of present illness: Patient is a 57 y.o. female admitted to Hospitalist Service from Dr. Garduno's office with complaint of abdominal pain since December, 2017. Patient reportedly has past medical history significant for Congenital microcephaly and diet controlled DM. Part of the history obtained from patient's parents and Dr. Garduno. Patient has been complaining of RUQ and right flank pain for few months now. She was being worked up for cholelithiasis and possible cholecystectomy planned by Dr. Joel. Patient was note dto have UTI and leukocytosis and completed PO Cipro course. Still having leukocytosis. Patient has a low appetite and has lost almost 50 lbs in 6 months. No fever or chills. Patient denied chest pain, shortness of breath, headache, vision changes, focal neuro-deficits, cough or fever.    PMH/PSH/SH/FH/Meds: reviewed.    Symptoms/Review of Systems: s/p right hemicolectomy + Cholecystectomy and J-tube placement. Continues to improve with only mild pain after moving to chair. Otherwise no acute events.   Diet: TPN. J-tube placement  Activity level: Normal.    Pain:  NAD      OBJECTIVE:   Vital Signs (Most Recent):      Temp: 96.2 °F (35.7 °C) (07/02/18 0910)  Pulse: 75 (07/02/18 0910)  Resp: 16 (07/02/18 0910)  BP: 135/63 (07/02/18 0910)  SpO2: 99 % (07/02/18 0910)       Vital Signs Range (Last 24H):  Temp:  [96.2 °F (35.7 °C)-98.1 °F (36.7 °C)]   Pulse:  [69-77]   Resp:  [16-20]   BP: (111-177)/(54-74)   SpO2:  [96 %-100 %]     Weight: 61.4 kg (135 lb 5.8 oz)  Body mass index is 27.34 kg/m².    Intake/Output Summary (Last 24 hours) at 07/02/18 0951  Last data filed at  07/02/18 0500   Gross per 24 hour   Intake           2817.5 ml   Output             4200 ml   Net          -1382.5 ml     Physical Examination:  General appearance: well developed, appears stated age  Head: normocephalic, atraumatic, microcephally  Eyes:  conjunctivae/corneas clear. PERRL.  Nose: Nares normal. Septum midline.  Throat: lips, mucosa, and tongue normal; teeth and gums normal, no throat erythema.  Neck: supple, symmetrical, trachea midline, no JVD and thyroid not enlarged, symmetric, no tenderness/mass/nodules  Lungs:  clear to auscultation bilaterally and normal respiratory effort  Chest wall: no tenderness  Heart: regular rate and rhythm, S1, S2 normal, no murmur, click, rub or gallop  Abdomen: soft, non-tender non-distented; bowel sounds normal; no masses,  no organomegaly. Abdominal dressing C/D/I. J-tube site dressing C/D/I.  Extremities: no cyanosis, clubbing or edema.   Pulses: 2+ and symmetric  Skin: Skin color, texture, turgor normal. No rashes or lesions.  Lymph nodes: Cervical, supraclavicular, and axillary nodes normal.  Neurologic: Grossly non-focal. Answers simple question, short sentences.     CBC:    Recent Labs  Lab 06/30/18 0528 07/01/18  0512 07/02/18  0457   WBC 18.30* 12.60 9.50   RBC 2.90* 2.96* 2.72*   HGB 9.0* 9.2* 8.5*   HCT 26.2* 26.6* 25.2*    162 167   MCV 90 90 93   MCH 31.0 31.0 31.3*   MCHC 34.3 34.4 33.7   BMP    Recent Labs  Lab 06/30/18  0528 07/01/18  0512 07/02/18  0629   * 207* 201*   * 133* 131*   K 4.6 4.4 4.3    104 103   CO2 25 23 23   BUN 53* 50* 50*   CREATININE 1.4 1.1 0.9   CALCIUM 8.3* 8.5* 8.3*   MG 2.2 2.0 1.8      Diagnostic Results:  Microbiology Results (last 7 days)     Procedure Component Value Units Date/Time    Culture, Anaerobic [[id number]] Collected:  06/21/18 1355    Order Status:  Completed Specimen:  Abscess from Abdomen Updated:  06/26/18 0419     Anaerobic Culture --     FUSOBACTERIUM SPECIES  Moderate      Blood  culture [[id number]] Collected:  06/20/18 1620    Order Status:  Completed Specimen:  Blood from Antecubital, Left  Arm Updated:  06/25/18 2212     Blood Culture, Routine No growth after 5 days.    Blood culture [[id number]] Collected:  06/20/18 1627    Order Status:  Completed Specimen:  Blood from Antecubital, Right  Arm Updated:  06/25/18 2212     Blood Culture, Routine No growth after 5 days.         CT abdomen (06-19-18): Large subphrenic mass on the right abutting could be involving the liver extending into the right lateral abdominal wall, heterogeneous and complex in appearance suggesting complex fluid collection and containing small foci of air.  Differential includes large abscess, or necrotic mass.  It is indistinguishable from, abutting adjacent colon with colon wall thickening and findings thought most suggestive of abscess secondary to contained right colon perforation from colon cancer or less likely right-sided diverticulitis.  This corresponds to findings on recent ultrasound of 6/9/2018.     US abdomen:   1. Cholelithiasis.  2. 7.5 x 5.4 x 7.3 cm complicated cystic structure interposed between the right hepatic lobe and right kidney, possibly a loop of bowel (such as the hepatic flexure) with internal debris.  Consider additional evaluation with contrast enhanced CT of the abdomen.    CT abdomen with contrast: Interval drainage of the large right flank abscess.  It is now predominant air-filled with small amount of residual fluid with air-fluid level.  This abuts and is indistinguishable from the right colon, distal descending duodenum, caudal tip of the liver and the wall of the adjacent colon appears thick and irregular in findings thought most suggestive of sequela from localized perforation from colon cancer.  Diverticular disease include in the differential. Small amount of ascites increased compared the prior exam.  Interval development of small left pleural effusion additional findings as  detailed above including 2.2 cm calcification right side of the pelvis possibly dermoid or exophytic uterine fibroid.  Diverticulosis.  Cholelithiasis.    Gastrograffin enema: Multiple attempts were made to fill the colon.  Despite the balloon inflated the rectum the patient had spasm in the sigmoid colon, expelled lung the contrast on multiple occasions.  The colon is very redundant and was able to be filled to the hepatic flexure but not more proximally and in particular the area of the suspected colon mass and abscess was not able to be opacified.  Visualized segment of colon is patent with diverticulosis    Assessment/Plan:      *Abdominal pain [R10.9]  Right Retroperitoneal abscess with Coliform bacteria I+D [R19.00] s/p  right hemicolectomy + Cholecystectomy and J-tube placement  Follow Dr. Torres's recommendations.  Follow microbiology results - E Coli - Continue IV Zosyn and Diflucan, follow Dr. Bautista's recommendations.  Continue IV TPN. Jtube feeds, hopefully PO intake improves and will DC TPN soon.  Discussed with patient's parents know likelihood of colon malignancy is high, await histopathology results.     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    Rectal Bleeding  Elevated CEA level  Resolved      Not Applicable    Diabetes mellitus 2 [E11.9]   Unknown       Check blood glucose level q 6h.  Use Novolog Insulin Sliding Scale as needed.       JAYSHREE (acute kidney injury) [N17.9] - resolved  Follow BMP.  Continue Lasix at 20 mg daily.    Anemia - Iron deficiency anemia  Needs Iron supplementation upon DC.    Hypokalemia  Follow BMP.    Severe Malnutrition  On TPN and Lipids.    Yes                      DVT prophylaxis: Use SCD and TEDs. Lovenox stopped due to GI bleeding.      Discussed with patient's father.     Bekah Fernandez MD  Department of Hospital Medicine   [facility]

## 2018-07-02 NOTE — PROGRESS NOTES
[facility] Surgery  Progress Note    Subjective:     History of Present Illness:  Patient is a 57 y.o. female admitted to Hospitalist Service from Dr. Garduno's office with complaint of abdominal pain. Patient reportedly has past medical history significant for Congenital microcephaly and diet controlled DM.     Patient denied chest pain, shortness of breath, abdominal pain, nausea, vomiting, headache, vision changes, focal neuro-deficits, cough or fever.     She states the pain has been present for about six months off and on.  She had an ultrasound which showed gallstones and was scheduled to have lap GB but had persistent elevated WBC and CT was ordered, which showed   CT abdomen (06-19-18): Large subphrenic mass on the right abutting could be involving the liver extending into the right lateral abdominal wall, heterogeneous and complex in appearance suggesting complex fluid collection and containing small foci of air.  Differential includes large abscess, or necrotic mass.  It is indistinguishable from, abutting adjacent colon with colon wall thickening and findings thought most suggestive of abscess secondary to contained right colon perforation from colon cancer or less likely right-sided diverticulitis.  This corresponds to findings on recent ultrasound of 6/9/2018.    Post-Op Info:  Procedure(s) (LRB):  HEMICOLECTOMY, RIGHT (N/A)  CHOLECYSTECTOMY (N/A)  INSERTION, JEJUNOSTOMY TUBE (N/A)   4 Days Post-Op     Interval History: POD 4 right colectomy and GB.  Confortable.  Had BM    Medications:  Continuous Infusions:   TPN ADULT CENTRAL LINE CUSTOM 110 mL/hr at 07/01/18 2145    TPN ADULT CENTRAL LINE CUSTOM       Scheduled Meds:   brinzolamide  1 drop Both Eyes BID    dorzolamide-timolol 2-0.5%  1 drop Both Eyes BID    enoxaparin  40 mg Subcutaneous Daily    fenofibrate  160 mg Oral Daily    fluconazole (DIFLUCAN) IVPB  200 mg Intravenous Q24H    fluticasone  1 spray Each  Nare Daily    insulin detemir U-100  6 Units Subcutaneous QHS    latanoprost  1 drop Both Eyes QHS    levothyroxine  25 mcg Oral Before breakfast    pantoprazole  40 mg Intravenous Before breakfast    piperacillin-tazobactam 4.5 g in dextrose 5 % 100 mL IVPB (ready to mix system)  4.5 g Intravenous Q8H    promethazine (PHENERGAN) IVPB  12.5 mg Intravenous Once    simvastatin  10 mg Oral QHS    sodium bicarbonate  650 mg Oral QID    vitamin D  1,000 Units Oral Daily     PRN Meds:sodium chloride, acetaminophen, acetaminophen, dextrose 50%, dextrose 50%, glucagon (human recombinant), glucose, glucose, HYDROcodone-acetaminophen, HYDROcodone-acetaminophen, HYDROmorphone, insulin aspart U-100, ondansetron, ondansetron, sodium chloride 0.9%     Review of patient's allergies indicates:   Allergen Reactions    Sulfa (sulfonamide antibiotics) Rash    Tetracyclines Rash     Objective:     Vital Signs (Most Recent):  Temp: 96.2 °F (35.7 °C) (07/02/18 0910)  Pulse: 75 (07/02/18 0910)  Resp: 16 (07/02/18 0910)  BP: 135/63 (07/02/18 0910)  SpO2: 99 % (07/02/18 0910) Vital Signs (24h Range):  Temp:  [96.2 °F (35.7 °C)-98.1 °F (36.7 °C)] 96.2 °F (35.7 °C)  Pulse:  [69-77] 75  Resp:  [16-20] 16  SpO2:  [96 %-100 %] 99 %  BP: (111-177)/(54-74) 135/63     Weight: 61.4 kg (135 lb 5.8 oz)  Body mass index is 27.34 kg/m².    Intake/Output - Last 3 Shifts       06/30 0700 - 07/01 0659 07/01 0700 - 07/02 0659 07/02 0700 - 07/03 0659    P.O. 0 60     NG/ 540     IV Piggyback 400 100     TPN 2040.5 2117.5     Total Intake(mL/kg) 2778.5 (45.3) 2817.5 (45.9)     Urine (mL/kg/hr) 2400 (1.6) 4200 (2.9)     Drains 33 (0)      Total Output 2433 4200      Net +345.5 -1382.5             Urine Occurrence 1 x            Physical Exam   Constitutional: She is oriented to person, place, and time. She appears well-developed and well-nourished. No distress.   HENT:   Head: Normocephalic and atraumatic.   Right Ear: External ear normal.    Left Ear: External ear normal.   Eyes: Conjunctivae are normal. Pupils are equal, round, and reactive to light. Right eye exhibits no discharge. Left eye exhibits no discharge.   Neck: No tracheal deviation present. No thyromegaly present.   Cardiovascular: Normal rate and regular rhythm.    Pulmonary/Chest: Effort normal. No respiratory distress.   Abdominal: Soft. She exhibits no distension. There is no guarding.   Incision clean.  OnQ out.   Musculoskeletal: She exhibits no edema or tenderness.   Lymphadenopathy:     She has no cervical adenopathy.   Neurological: She is alert and oriented to person, place, and time. No cranial nerve deficit.   Skin: Skin is warm and dry. No rash noted. She is not diaphoretic. No pallor.   Psychiatric: She has a normal mood and affect. Her behavior is normal. Judgment and thought content normal.   Nursing note and vitals reviewed.      Significant Labs:  CBC:   Recent Labs  Lab 07/02/18  0457   WBC 9.50   RBC 2.72*   HGB 8.5*   HCT 25.2*      MCV 93   MCH 31.3*   MCHC 33.7     CMP:   Recent Labs  Lab 07/02/18  0629   *   CALCIUM 8.3*   ALBUMIN 1.3*   PROT 4.7*   *   K 4.3   CO2 23      BUN 50*   CREATININE 0.9   ALKPHOS 35*   ALT 12   AST 42*   BILITOT 1.2*       Significant Diagnostics:  I have reviewed all pertinent imaging results/findings within the past 24 hours.    Assessment/Plan:     * Abdominal pain    Patient clinically improved.  Gastrografin enema unable to reach right colon.  Discussed with Dr. Bautista and   Will proceed with surgery in AM - will need right hemicolectomy whether benign or malignant.  Will do cholecystectomy at same time.  All aspects of procedure including risks and possible complications were discussed in detail with the patient and parents who agrees to proceed with procedure.  All questions were answered and consent was obtained. Preop orders were written.          Severe malnutrition    Tube feeds         Abdominal mass    POD 4 right colectomy/j tube abscess drainage    Continue tube feeds at goal  Ok for  cl for diet for now  Continue antibiotics  Continue drain  Continue patel for another day for decreased mobility            Kumar Torres MD  General Surgery  [facility]

## 2018-07-03 LAB
ALBUMIN SERPL BCP-MCNC: 1.3 G/DL
ALP SERPL-CCNC: 38 U/L
ALT SERPL W/O P-5'-P-CCNC: 16 U/L
ANION GAP SERPL CALC-SCNC: 6 MMOL/L
AST SERPL-CCNC: 53 U/L
BASOPHILS # BLD AUTO: 0 K/UL
BASOPHILS NFR BLD: 0.5 %
BILIRUB SERPL-MCNC: 1.1 MG/DL
BUN SERPL-MCNC: 52 MG/DL
CALCIUM SERPL-MCNC: 8.2 MG/DL
CHLORIDE SERPL-SCNC: 103 MMOL/L
CO2 SERPL-SCNC: 23 MMOL/L
CREAT SERPL-MCNC: 0.9 MG/DL
DIFFERENTIAL METHOD: ABNORMAL
EOSINOPHIL # BLD AUTO: 0.3 K/UL
EOSINOPHIL NFR BLD: 2.8 %
ERYTHROCYTE [DISTWIDTH] IN BLOOD BY AUTOMATED COUNT: 17.7 %
EST. GFR  (AFRICAN AMERICAN): >60 ML/MIN/1.73 M^2
EST. GFR  (NON AFRICAN AMERICAN): >60 ML/MIN/1.73 M^2
GLUCOSE SERPL-MCNC: 153 MG/DL
HCT VFR BLD AUTO: 25.9 %
HGB BLD-MCNC: 8.6 G/DL
LYMPHOCYTES # BLD AUTO: 2.3 K/UL
LYMPHOCYTES NFR BLD: 24.1 %
MAGNESIUM SERPL-MCNC: 1.9 MG/DL
MCH RBC QN AUTO: 30.8 PG
MCHC RBC AUTO-ENTMCNC: 33.3 G/DL
MCV RBC AUTO: 92 FL
MONOCYTES # BLD AUTO: 1 K/UL
MONOCYTES NFR BLD: 10.7 %
NEUTROPHILS # BLD AUTO: 6 K/UL
NEUTROPHILS NFR BLD: 61.9 %
PHOSPHATE SERPL-MCNC: 2.8 MG/DL
PLATELET # BLD AUTO: 173 K/UL
PMV BLD AUTO: 7.7 FL
POCT GLUCOSE: 185 MG/DL (ref 70–110)
POCT GLUCOSE: 241 MG/DL (ref 70–110)
POCT GLUCOSE: 257 MG/DL (ref 70–110)
POCT GLUCOSE: 299 MG/DL (ref 70–110)
POTASSIUM SERPL-SCNC: 4 MMOL/L
PROT SERPL-MCNC: 4.8 G/DL
RBC # BLD AUTO: 2.8 M/UL
SODIUM SERPL-SCNC: 132 MMOL/L
WBC # BLD AUTO: 9.7 K/UL

## 2018-07-03 PROCEDURE — 63600175 PHARM REV CODE 636 W HCPCS: Performed by: SURGERY

## 2018-07-03 PROCEDURE — 63600175 PHARM REV CODE 636 W HCPCS: Performed by: INTERNAL MEDICINE

## 2018-07-03 PROCEDURE — 99232 SBSQ HOSP IP/OBS MODERATE 35: CPT | Mod: ,,, | Performed by: INTERNAL MEDICINE

## 2018-07-03 PROCEDURE — 36415 COLL VENOUS BLD VENIPUNCTURE: CPT

## 2018-07-03 PROCEDURE — 80053 COMPREHEN METABOLIC PANEL: CPT

## 2018-07-03 PROCEDURE — G8996 SWALLOW CURRENT STATUS: HCPCS | Mod: CJ

## 2018-07-03 PROCEDURE — 97803 MED NUTRITION INDIV SUBSEQ: CPT

## 2018-07-03 PROCEDURE — 83735 ASSAY OF MAGNESIUM: CPT

## 2018-07-03 PROCEDURE — 84100 ASSAY OF PHOSPHORUS: CPT

## 2018-07-03 PROCEDURE — G8997 SWALLOW GOAL STATUS: HCPCS | Mod: CI

## 2018-07-03 PROCEDURE — 12000002 HC ACUTE/MED SURGE SEMI-PRIVATE ROOM

## 2018-07-03 PROCEDURE — 94761 N-INVAS EAR/PLS OXIMETRY MLT: CPT

## 2018-07-03 PROCEDURE — 25000003 PHARM REV CODE 250: Performed by: INTERNAL MEDICINE

## 2018-07-03 PROCEDURE — 92610 EVALUATE SWALLOWING FUNCTION: CPT

## 2018-07-03 PROCEDURE — 97530 THERAPEUTIC ACTIVITIES: CPT

## 2018-07-03 PROCEDURE — 94799 UNLISTED PULMONARY SVC/PX: CPT

## 2018-07-03 PROCEDURE — A4217 STERILE WATER/SALINE, 500 ML: HCPCS | Performed by: INTERNAL MEDICINE

## 2018-07-03 PROCEDURE — 63700000 PHARM REV CODE 250 ALT 637 W/O HCPCS: Performed by: INTERNAL MEDICINE

## 2018-07-03 PROCEDURE — 99900035 HC TECH TIME PER 15 MIN (STAT)

## 2018-07-03 PROCEDURE — 97116 GAIT TRAINING THERAPY: CPT

## 2018-07-03 PROCEDURE — C9113 INJ PANTOPRAZOLE SODIUM, VIA: HCPCS | Performed by: SURGERY

## 2018-07-03 PROCEDURE — 85025 COMPLETE CBC W/AUTO DIFF WBC: CPT

## 2018-07-03 RX ADMIN — PIPERACILLIN SODIUM AND TAZOBACTAM SODIUM 4.5 G: 4; .5 INJECTION, POWDER, LYOPHILIZED, FOR SOLUTION INTRAVENOUS at 11:07

## 2018-07-03 RX ADMIN — PIPERACILLIN SODIUM AND TAZOBACTAM SODIUM 4.5 G: 4; .5 INJECTION, POWDER, LYOPHILIZED, FOR SOLUTION INTRAVENOUS at 02:07

## 2018-07-03 RX ADMIN — INSULIN ASPART 3 UNITS: 100 INJECTION, SOLUTION INTRAVENOUS; SUBCUTANEOUS at 04:07

## 2018-07-03 RX ADMIN — ONDANSETRON 4 MG: 2 INJECTION INTRAMUSCULAR; INTRAVENOUS at 08:07

## 2018-07-03 RX ADMIN — CALCIUM GLUCONATE: 94 INJECTION, SOLUTION INTRAVENOUS at 09:07

## 2018-07-03 RX ADMIN — Medication 0.5 MG: at 09:07

## 2018-07-03 RX ADMIN — PANTOPRAZOLE SODIUM 40 MG: 40 INJECTION, POWDER, LYOPHILIZED, FOR SOLUTION INTRAVENOUS at 06:07

## 2018-07-03 RX ADMIN — VITAMIN D, TAB 1000IU (100/BT) 1000 UNITS: 25 TAB at 08:07

## 2018-07-03 RX ADMIN — INSULIN ASPART 1 UNITS: 100 INJECTION, SOLUTION INTRAVENOUS; SUBCUTANEOUS at 11:07

## 2018-07-03 RX ADMIN — ENOXAPARIN SODIUM 40 MG: 100 INJECTION SUBCUTANEOUS at 04:07

## 2018-07-03 RX ADMIN — INSULIN ASPART 3 UNITS: 100 INJECTION, SOLUTION INTRAVENOUS; SUBCUTANEOUS at 11:07

## 2018-07-03 RX ADMIN — SODIUM BICARBONATE 650 MG TABLET 650 MG: at 05:07

## 2018-07-03 RX ADMIN — FLUCONAZOLE IN SODIUM CHLORIDE 200 MG: 2 INJECTION, SOLUTION INTRAVENOUS at 05:07

## 2018-07-03 RX ADMIN — PIPERACILLIN SODIUM AND TAZOBACTAM SODIUM 4.5 G: 4; .5 INJECTION, POWDER, LYOPHILIZED, FOR SOLUTION INTRAVENOUS at 06:07

## 2018-07-03 RX ADMIN — SODIUM BICARBONATE 650 MG TABLET 650 MG: at 08:07

## 2018-07-03 RX ADMIN — SODIUM BICARBONATE 650 MG TABLET 650 MG: at 12:07

## 2018-07-03 RX ADMIN — INSULIN DETEMIR 6 UNITS: 100 INJECTION, SOLUTION SUBCUTANEOUS at 11:07

## 2018-07-03 RX ADMIN — DORZOLAMIDE HYDROCHLORIDE AND TIMOLOL MALEATE 1 DROP: 20; 5 SOLUTION/ DROPS OPHTHALMIC at 08:07

## 2018-07-03 RX ADMIN — LATANOPROST 1 DROP: 50 SOLUTION OPHTHALMIC at 11:07

## 2018-07-03 RX ADMIN — LEVOTHYROXINE SODIUM 25 MCG: 25 TABLET ORAL at 06:07

## 2018-07-03 RX ADMIN — FENOFIBRATE 160 MG: 160 TABLET ORAL at 08:07

## 2018-07-03 RX ADMIN — FLUTICASONE PROPIONATE 50 MCG: 50 SPRAY, METERED NASAL at 08:07

## 2018-07-03 NOTE — PT/OT/SLP EVAL
"Speech Language Pathology Evaluation  Bedside Swallow    Patient Name:  Sis Teixeira   MRN:  [id number]  Admitting Diagnosis: Abdominal pain    Recommendations:                 General Recommendations:  Dysphagia therapy  Diet recommendations:   , Nunez Thick, Clears   Aspiration Precautions: Assistance with thickening liquids, Check for pocketing/oral residue, Continue alternate means of nutrition, Feed only when awake/alert, Monitor for s/s of aspiration and Standard aspiration precautions   General Precautions: Standard, fall, nectar thick  Communication strategies:  provide increased time to answer and go to room if call light pushed    History:     Past Medical History:   Diagnosis Date    Congenital small head     Diabetes mellitus     dIET CONTROL       Past Surgical History:   Procedure Laterality Date    CHOLECYSTECTOMY N/A 6/28/2018    Procedure: CHOLECYSTECTOMY;  Surgeon: Kumar Torres MD;  Location: [facility] OR;  Service: Colon and Rectal;  Laterality: N/A;    INCISION AND DRAINAGE OF ABSCESS Right 6/21/2018    Procedure: INCISION AND DRAINAGE, ABSCESS;  Surgeon: Kumar Torres MD;  Location: [facility] OR;  Service: General;  Laterality: Right;    PLACEMENT OF JEJUNOSTOMY TUBE N/A 6/28/2018    Procedure: INSERTION, JEJUNOSTOMY TUBE;  Surgeon: Kumar Torres MD;  Location: [facility] OR;  Service: Colon and Rectal;  Laterality: N/A;    RIGHT HEMICOLECTOMY N/A 6/28/2018    Procedure: HEMICOLECTOMY, RIGHT;  Surgeon: Kumar Torres MD;  Location: [facility] OR;  Service: Colon and Rectal;  Laterality: N/A;       Social History: Patient lives with family.    Prior Intubation HX:  None this admit    Modified Barium Swallow: None in EPIC    Chest X-Rays: 'PICC in good position otherwise negative portable chest." 6/22/2018    Prior diet: Regular textures and thin liquids.     Subjective     My stomach hurts, I dont want to eat. I crapped again Mama.    Objective:   Pt seen for clinical swallow eval, nursing reports "pocketing " "fluids". Family at bedside, whom report 50lb weight loss and very poor appetite since admit. Pt observed lying supine in bed. HOB raised just under 90. Pt very lethargic and exhibiting general weakness, required encouragement to complete evaluation. Pt consumed ice chip x1 with immediate yet weak cough, water with immediate wet cough, nectar x4, applesauce x2, andBoost Breeze via straw x2 with no overt s/s penetration or aspiration.     Oral Musculature Evaluation  · Oral Musculature: WFL, general weakness  · Dentition: present and adequate  · Secretion Management: adequate (excess secretions)  · Mucosal Quality: coated tongue, good  · Mandibular Strength and Mobility: WFL  · Oral Labial Strength and Mobility: impaired seal, impaired pursing  · Lingual Strength and Mobility: impaired strength, impaired protrusion  · Velar Elevation: WFL  · Buccal Strength and Mobility: WFL  · Voice Prior to PO Intake: clear    Bedside Swallow Eval:   Consistencies Assessed:  · Thin liquids via 1/2 tsp, water  · Nectar thick liquids via spoon, cup sip, and straw sip - thickened apple juice  · Puree via spoon, applesauce     Oral Phase:   · WFL  · Anterior loss  · Pocketing   Left very mild, cleared after request    Pharyngeal Phase:   · coughing/choking  · wet vocal quality after swallow    Compensatory Strategies  · Effortful swallow  · Multiple swallows    Treatment: Educ re findings, recommendations, and treatment plan and importance of oral care. Questions answered. See care plan.      Assessment:     Sis Teixeira is a 58 y.o. female with an SLP diagnosis of Dysphagia.  She presented with general weakness, lethargy, and limited acceptance of PO trials. Will follow and upgrade diet as tolerated.     Goals:    SLP Goals        Problem: SLP Goal    Goal Priority Disciplines Outcome   SLP Goal     SLP    Description:  1. Tolerate thin liquids, upgrade from nectar thick.  2. Ongoing assessment for upgraded textures once advanced " from clears per MD.                     Plan:     · Patient to be seen:  5 x/week   · Plan of Care expires:     · Plan of Care reviewed with:  patient, family   · SLP Follow-Up:  Yes       Discharge recommendations:      Barriers to Discharge:  None    Time Tracking:     SLP Treatment Date:   07/03/18  Speech Start Time:  1013  Speech Stop Time:  1045     Speech Total Time (min):  32 min    Billable Minutes: Eval Swallow and Oral Function 32    IZZY Apple  07/03/2018

## 2018-07-03 NOTE — NURSING
Notified Dietician on voicemail that patient has had multiple loose bowel movements since beginning of shift and her tube feedings may need to be adjusted. Awaiting return phone call.    Also notified Dr. Fernandez and put a telephone order for stool specimen.

## 2018-07-03 NOTE — CONSULTS
[facility]  Adult Nutrition  Consult Note    SUMMARY     Recommendations    Custom TPN:  4% AA, 5% dextrose @ 110 mls/hr wean as enteral feeding is tolerated.   Provides: 792 calories, 96 gms protein, 2400 mls fluid, GIR 1.36    Enteral:  Change to Peptamen 1.5 with Prebio @ 20 mls/hr x 24 hrs per pt tolerance.  Flush with 20-30 mls water Q 4 hrs.   Provides 720 calories, 33 gms protein, 88 gms cho, 370 mls free water. (Plus 120-180 mls water from flushes)    Total:  1512 calories, 129 gms protein, 88 gms cho.  Meets EEN for weight gain.     RD to follow.      Goals: 1) Pt will consume/receive at least 50% EEN by RD f/u. 2) Pt will transition to enteral feeds within 72 hrs    Nutrition Goal Status: 1) met  2) continues  Communication of RD Recs: reviewed with RN (POC, reviewed with MD and RN)    Reason for Assessment    Reason for Assessment: Verbal Consult, RD follow up  1. Abdominal wall abscess    2. Abdominal pain    3. Right upper quadrant abdominal mass      Past Medical History:   Diagnosis Date    Congenital small head     Diabetes mellitus     dIET CONTROL     General Information Comments: Admitted with abdominal pain.  s/p surgery to drain large abscess.  Family reports poor intake x > 1 month and has not eaten well since admit.  Per MD H&P, pt has lost 50 lbs x 6 months. Pt's mom reports wt of 181 x 3 months ago.     Pt had procedure this afternoon, where a possible cecal mass vs less likely diverticular bleeding was found per MD note.Consult for PICC line sent.   6/28/18: Pt NPO and solely on TPN. Blood sugars are running high. Calorie needs exceeded.   6/29/18: Pt NPO. S/P hemicolectomy rt, cholecystectomy.  Blood sugars continue to run high, Altered electrolytes.  Possible refeeding 2/2 excess calories?  MD directed to reduce dextrose to 5% and initiate trickle feeds. Per Infectious Disease MD note pt also has JAYSHREE.   7/3/18 RN reported pt having frequent diarrhea. (x 6 today)  "that is the same color of the TF.  Adjusted TF rec to more hydrolyzed formula at a lower rate.  Discussed with RN and MD.  Pt has now progressed to nectar thick liquids, but is not receptive to them now.  Spoke with family, encouraging trial po.    Nutrition Risk Screen    Nutrition Risk Screen: other (see comments)    Nutrition/Diet History    Patient Reported Diet/Restrictions/Preferences: general  Food Preferences: No spicy foods.  Uses [place] Instant Breakfast at home to increase calories.   Do you have any cultural, spiritual, Protestant conflicts, given your current situation?: none  Food Allergies: NKFA  Factors Affecting Nutritional Intake: abdominal pain, decreased appetite    Anthropometrics    Temp: 97.9 °F (36.6 °C)  Height Method: Stated  Height: 4' 11"  Height (inches): 59 in  Weight Method: Bed Scale  Weight: 69.9 kg (154 lb)  Weight (lb): 154 lb  Ideal Body Weight (IBW), Female: 95 lb  % Ideal Body Weight, Female (lb): 142.48 lb  BMI (Calculated): 27.4  BMI Grade: 25 - 29.9 - overweight  Weight Loss: unintentional  Usual Body Weight (UBW), k.45 kg (per MD H&P note of wt loss of 50 lbs x 6 months)  % Usual Body Weight: 72.86  % Weight Change From Usual Weight: -27.29 %       Lab/Procedures/Meds    Pertinent Labs Reviewed: reviewed  Lab Results   Component Value Date    ALBUMIN 1.3 (L) 2018     Lab Results   Component Value Date    .3 (H) 2018     POCT Glucose   Date Value Ref Range Status   2018 299 (H) 70 - 110 mg/dL Final   2018 185 (H) 70 - 110 mg/dL Final   2018 153 (H) 70 - 110 mg/dL Final   2018 198 (H) 70 - 110 mg/dL Final   2018 246 (H) 70 - 110 mg/dL Final   2018 251 (H) 70 - 110 mg/dL Final   2018 206 (H) 70 - 110 mg/dL Final   2018 260 (H) 70 - 110 mg/dL Final   2018 229 (H) 70 - 110 mg/dL Final   2018 213 (H) 70 - 110 mg/dL Final   2018 227 (H) 70 - 110 mg/dL Final   .    Pertinent Medications " Reviewed: reviewed  Scheduled Meds:   brinzolamide  1 drop Both Eyes BID    dorzolamide-timolol 2-0.5%  1 drop Both Eyes BID    enoxaparin  40 mg Subcutaneous Daily    fenofibrate  160 mg Oral Daily    fluconazole (DIFLUCAN) IVPB  200 mg Intravenous Q24H    fluticasone  1 spray Each Nare Daily    insulin detemir U-100  6 Units Subcutaneous QHS    latanoprost  1 drop Both Eyes QHS    levothyroxine  25 mcg Oral Before breakfast    pantoprazole  40 mg Intravenous Before breakfast    piperacillin-tazobactam 4.5 g in dextrose 5 % 100 mL IVPB (ready to mix system)  4.5 g Intravenous Q8H    promethazine (PHENERGAN) IVPB  12.5 mg Intravenous Once    simvastatin  10 mg Oral QHS    sodium bicarbonate  650 mg Oral QID    vitamin D  1,000 Units Oral Daily     Continuous Infusions:   TPN ADULT CENTRAL LINE CUSTOM 110 mL/hr at 07/02/18 2145    TPN ADULT CENTRAL LINE CUSTOM       Physical Findings/Assessment    Overall Physical Appearance: nourished (NFPE completed.  No fat or muscle loss noted. )  Skin:  (Wolfgang score 17)    Estimated/Assessed Needs    Weight Used For Calorie Calculations: 61.4 kg (135 lb 5.8 oz)  Energy Need Method: [facility] Jeor: 1104.62 x 1.3-1.2=3685-0413  Weight Used For Protein Calculations: 61.4 kg (135 lb 5.8 oz)   1.0-1.2 gm/kg/day: 61-74  (until JAYSHREE resolves, then increase to 1.2-1.5 gm/kg/day)  Fluid Need Method: RDA Method (or per MD rec)  CHO Requirement: 45-50% EEN or GIR <5    Nutrition Prescription Ordered    Current Diet Order: consistent carbohydrate    Evaluation of Received Nutrient/Fluid Intake    Custom TPN 4% AA, 5% dextrose @ 110 mls/hr without lipids:  Provides 792 calories, 96 gms protein, 2400 mls fluid, GIR 1.36  Enteral feeding: Isosource 1.5 @ 45 mls/hr provides Flush with 125 mls water Q 4 hrs.  Provides 1620 calories, 73 gms protein, 190 gms CHO, 825 mls free water. (plus 750 mls from flushes)  Energy Calories Required: exceeding needs  Protein Required:  exceeding needs  Fluid Required: meeting needs  CHO Required: exceeding requirements      Intake/Output Summary (Last 24 hours) at 07/03/18 1619  Last data filed at 07/03/18 0609   Gross per 24 hour   Intake             1449 ml   Output             2362 ml   Net             -913 ml     % Intake of Estimated Energy Needs: >100%  % Meal Intake: NPO    Nutrition Risk    Level of Risk/Frequency of Follow-up:  (2 x wkly)     Assessment and Plan    Severe malnutrition    Malnutrition in the context of acute illness    Related to (etiology):  Decreased intake 2/2 abdominal pain    Signs and Symptoms (as evidenced by):  Energy Intake: <50% of estimated energy requirement for >5 days  Body Fat Depletion: none noted  Muscle Mass Depletion: none noted  Weight Loss: 27.29% x 6 months (per MD H&P note)  Fluid Accumulation: 2+ edema    Interventions/Recommendations (treatment strategy):  1) Continue parenteral nutrition with current plan to wean and transition to enteral feeds via j tube    Nutrition Diagnosis Status:  Continues                 Monitor and Evaluation    Food and Nutrient Intake: energy intake  Food and Nutrient Adminstration: diet order  Anthropometric Measurements: weight, weight change  Biochemical Data, Medical Tests and Procedures: glucose/endocrine profile, inflammatory profile  Nutrition-Focused Physical Findings: overall appearance, skin     Discharge Plan    To be determined    RD Follow-up?: Yes

## 2018-07-03 NOTE — ASSESSMENT & PLAN NOTE
POD 5 right colectomy/j tube abscess drainage    Continue tube feeds at goal  Ok for po diet for now  Continue antibiotics  Continue drain  D/C patel

## 2018-07-03 NOTE — PT/OT/SLP PROGRESS
Physical Therapy Treatment    Patient Name:  Sis Teixeiar   MRN:  [id number]    Recommendations:     Discharge Recommendations:   (TBD)   Discharge Equipment Recommendations: none       Assessment:     Sis Teixeira is a 58 y.o. female admitted with a medical diagnosis of Abdominal pain.  She presents with the following impairments/functional limitations:  weakness, impaired endurance, impaired self care skills, impaired functional mobilty, gait instability, impaired balance, impaired cognition, decreased safety awareness, pain, decreased ROM . Pt able to progress to gait training.    Rehab Prognosis:  good; patient would benefit from acute skilled PT services to address these deficits and reach maximum level of function.      Recent Surgery: Procedure(s) (LRB):  HEMICOLECTOMY, RIGHT (N/A)  CHOLECYSTECTOMY (N/A)  INSERTION, JEJUNOSTOMY TUBE (N/A) 5 Days Post-Op    Plan:     During this hospitalization, patient to be seen daily to address the above listed problems via gait training, therapeutic activities, therapeutic exercises  · Plan of Care Expires:  07/14/18   Plan of Care Reviewed with: patient    Subjective     Communicated with nurse Rios prior to session.  Patient found supine upon PT entry to room, agreeable to treatment.      Chief Complaint: pt did not verbalize much; able to express pain in abdomen  Patient comments/goals: pt initially reluctant to gait, but agreeable with a little encouragement/coaxing  Pain/Comfort:  · Pain Rating 1:  (did not rate)  · Location - Orientation 1: generalized  · Location 1: abdomen  · Pain Addressed 1: Reposition, Distraction, Cessation of Activity, Nurse notified    Patients cultural, spiritual, Restorationism conflicts given the current situation: none    Objective:     Patient found with: patel catheter, JESUS drain, PEG Tube, pressure relief boots, PICC line, telemetry     General Precautions: Standard, fall   Orthopedic Precautions:N/A   Braces: N/A     Functional  Mobility:  · Bed Mobility:     · Rolling Left:  maximal assistance  · Scooting: maximal assistance to EOB  · Supine to Sit: maximal assistance     · Transfers:     · Sit to Stand:  maximal assistance  with no AD  · Bed to Chair: moderate assistance and of 2 persons for safety and HHA     · Gait: 25' around bed to chair with mod A of 2  for safety and HHA        Therapeutic Activities and Exercises:   pt assisted to EOB with increased time and difficulty 2' pain   Pt sat EOB with mod A for balance initially, progressing to CGA after a few minutes. Nurse present to take blood sugar while pt sitting EOB   Pt proceeded to gait training and assisted to chair post gait   once seated in chair, pt required A of 2 to lift to scoot back in chair.     Patient left up in chair with all lines intact, call button in reach and nurse Blanca notified and family present.    GOALS:    Physical Therapy Goals        Problem: Physical Therapy Goal    Goal Priority Disciplines Outcome Goal Variances Interventions   Physical Therapy Goal     PT/OT, PT Ongoing (interventions implemented as appropriate)     Description:  Goals to be met by: 18     Patient will increase functional independence with mobility by performin. Supine to sit with Modified [place]  2. Sit to stand transfer with [place]  3. Gait  x 100 feet with Supervision.                       Time Tracking:     PT Received On: 18  PT Start Time: 1114     PT Stop Time: 1132  PT Total Time (min): 18 min     Billable Minutes: Gait Training 10 and Therapeutic Activity 8    Treatment Type: Treatment  PT/PTA: PTA     PTA Visit Number: 3     Dianelys Reyes, BRIAN  2018

## 2018-07-03 NOTE — PLAN OF CARE
Problem: Patient Care Overview  Goal: Plan of Care Review  Outcome: Ongoing (interventions implemented as appropriate)  IS at bedside to use Q1 hrs tolerates well.

## 2018-07-03 NOTE — PLAN OF CARE
Problem: Patient Care Overview  Goal: Plan of Care Review  AAOx4, delayed responses. Family at bedside, patel catheter draining yellow urine. Pt remains on TPN and tube feeding via J tube per MD order. Medications crushed and administered through J tube per patient request. Pt stated she did not want to take medications by mouth. Dressing changed multiple times during shift due to drainage around J tube site. JESUS drain to R side of flank draining to gravity. Dressing changed to Flank area x 1 on this shift. R upper arm picc, IVF infusing per MD order. POC reviewed with pt and caregiver.Pt advanced to clear liquids today at lunch, complained of nausea after drinking sm amount of fluids, Zofran administered. Up in chair for about 2 hrs. Sacral dressing also changed, minimal amt of clear drainage noted. Bed locked and low, family at bedside, instructed family yo call with concerns.

## 2018-07-03 NOTE — SUBJECTIVE & OBJECTIVE
Interval History: POD 5.  Appetite poor.  Having diarrhea from tube feedings.    Medications:  Continuous Infusions:   TPN ADULT CENTRAL LINE CUSTOM 110 mL/hr at 07/02/18 2145    TPN ADULT CENTRAL LINE CUSTOM       Scheduled Meds:   brinzolamide  1 drop Both Eyes BID    dorzolamide-timolol 2-0.5%  1 drop Both Eyes BID    enoxaparin  40 mg Subcutaneous Daily    fenofibrate  160 mg Oral Daily    fluconazole (DIFLUCAN) IVPB  200 mg Intravenous Q24H    fluticasone  1 spray Each Nare Daily    insulin detemir U-100  6 Units Subcutaneous QHS    latanoprost  1 drop Both Eyes QHS    levothyroxine  25 mcg Oral Before breakfast    pantoprazole  40 mg Intravenous Before breakfast    piperacillin-tazobactam 4.5 g in dextrose 5 % 100 mL IVPB (ready to mix system)  4.5 g Intravenous Q8H    promethazine (PHENERGAN) IVPB  12.5 mg Intravenous Once    simvastatin  10 mg Oral QHS    sodium bicarbonate  650 mg Oral QID    vitamin D  1,000 Units Oral Daily     PRN Meds:sodium chloride, acetaminophen, acetaminophen, dextrose 50%, dextrose 50%, glucagon (human recombinant), glucose, glucose, HYDROcodone-acetaminophen, HYDROcodone-acetaminophen, HYDROmorphone, insulin aspart U-100, ondansetron, ondansetron, sodium chloride 0.9%     Review of patient's allergies indicates:   Allergen Reactions    Sulfa (sulfonamide antibiotics) Rash    Tetracyclines Rash     Objective:     Vital Signs (Most Recent):  Temp: 97.8 °F (36.6 °C) (07/03/18 1143)  Pulse: 74 (07/03/18 1143)  Resp: 18 (07/03/18 1143)  BP: (!) 177/77 (07/03/18 1143)  SpO2: 100 % (07/03/18 1143) Vital Signs (24h Range):  Temp:  [96.5 °F (35.8 °C)-97.8 °F (36.6 °C)] 97.8 °F (36.6 °C)  Pulse:  [74-88] 74  Resp:  [16-18] 18  SpO2:  [95 %-100 %] 100 %  BP: (107-177)/(54-77) 177/77     Weight: 69.9 kg (154 lb)  Body mass index is 31.1 kg/m².    Intake/Output - Last 3 Shifts       07/01 0700 - 07/02 0659 07/02 0700 - 07/03 0659 07/03 0700 - 07/04 0659    P.O. 60 120      NG/ 125     IV Piggyback 100 400     TPN 2117.5 924     Total Intake(mL/kg) 2817.5 (45.9) 1569 (25.6)     Urine (mL/kg/hr) 4200 (2.9) 2350 (1.6)     Drains  10 (0)     Stool  2 (0)     Total Output 4200 2362      Net -1382.5 -793             Stool Occurrence  3 x           Physical Exam   Constitutional: She is oriented to person, place, and time. She appears well-developed and well-nourished. No distress.   HENT:   Head: Atraumatic.   Right Ear: External ear normal.   Left Ear: External ear normal.   Eyes: Conjunctivae are normal. Pupils are equal, round, and reactive to light. Right eye exhibits no discharge. Left eye exhibits no discharge.   Neck: No tracheal deviation present. No thyromegaly present.   Cardiovascular: Normal rate and regular rhythm.    Pulmonary/Chest: Effort normal. No respiratory distress.   Abdominal: Soft. Bowel sounds are normal. She exhibits no distension. There is no guarding.   Musculoskeletal: She exhibits no edema or tenderness.   Lymphadenopathy:     She has no cervical adenopathy.   Neurological: She is alert and oriented to person, place, and time. No cranial nerve deficit.   Skin: Skin is warm and dry. No rash noted. She is not diaphoretic. No pallor.   Psychiatric: She has a normal mood and affect. Her behavior is normal. Judgment and thought content normal.   Nursing note and vitals reviewed.      Significant Labs:  CBC:   Recent Labs  Lab 07/03/18  0356   WBC 9.70   RBC 2.80*   HGB 8.6*   HCT 25.9*      MCV 92   MCH 30.8   MCHC 33.3     CMP:   Recent Labs  Lab 07/03/18  0356   *   CALCIUM 8.2*   ALBUMIN 1.3*   PROT 4.8*   *   K 4.0   CO2 23      BUN 52*   CREATININE 0.9   ALKPHOS 38*   ALT 16   AST 53*   BILITOT 1.1*       Significant Diagnostics:  I have reviewed all pertinent imaging results/findings within the past 24 hours.

## 2018-07-03 NOTE — PLAN OF CARE
Problem: Patient Care Overview  Goal: Plan of Care Review  Outcome: Revised  Pt is awake and alert, can respond, but not very verbal.  PICC in place, infusing abx and TPN as ordered.  Tube feeding at goal.  Mehta catheter maintained, emptied as needed.  BG monitored, SSI given prn as ordered.  JESUS drain monitored, emptied as needed.  Dressing monitored, change as needed.  VSS, in NAD, pt remains afebrile.  Pt remains free from injury.  Family remains at bedside.  Bed in low position, wheels locked, call light within reach.  Will continue to monitor.

## 2018-07-03 NOTE — PROGRESS NOTES
Progress Note  Hospital Medicine  Patient Name:Sis Teixeira  MRN:  [id number]  Patient Class: IP- Inpatient  Admit Date: 6/20/2018  Length of Stay: 12 days  Expected Discharge Date:   Attending Physician: Bekah Fernandez MD  Primary Care Provider:  Mann Garduno MD    SUBJECTIVE:     Principal Problem: Abdominal pain  Initial history of present illness: Patient is a 57 y.o. female admitted to Hospitalist Service from Dr. Garduno's office with complaint of abdominal pain since December, 2017. Patient reportedly has past medical history significant for Congenital microcephaly and diet controlled DM. Part of the history obtained from patient's parents and Dr. Garduno. Patient has been complaining of RUQ and right flank pain for few months now. She was being worked up for cholelithiasis and possible cholecystectomy planned by Dr. Joel. Patient was note dto have UTI and leukocytosis and completed PO Cipro course. Still having leukocytosis. Patient has a low appetite and has lost almost 50 lbs in 6 months. No fever or chills. Patient denied chest pain, shortness of breath, headache, vision changes, focal neuro-deficits, cough or fever.    PMH/PSH/SH/FH/Meds: reviewed.    Symptoms/Review of Systems: s/p right hemicolectomy + Cholecystectomy and J-tube placement. Having some wallowing difficulties. Patient has developed loose diarrhea looking similar to J-feedings. Pain controlled. Otherwise no acute events.   Diet: TPN. J-tube placement  Activity level: Normal.    Pain:  NAD      OBJECTIVE:   Vital Signs (Most Recent):      Temp: 97.2 °F (36.2 °C) (07/03/18 0833)  Pulse: 82 (07/03/18 0833)  Resp: 17 (07/03/18 0833)  BP: 133/60 (07/03/18 0833)  SpO2: 98 % (07/03/18 0833)       Vital Signs Range (Last 24H):  Temp:  [96.5 °F (35.8 °C)-98.8 °F (37.1 °C)]   Pulse:  [70-88]   Resp:  [16-18]   BP: (107-133)/(54-61)   SpO2:  [95 %-100 %]     Weight: 61.4 kg (135 lb 5.8 oz)  Body mass index is 27.34 kg/m².    Intake/Output  Summary (Last 24 hours) at 07/03/18 1032  Last data filed at 07/03/18 0609   Gross per 24 hour   Intake             1569 ml   Output             2362 ml   Net             -793 ml     Physical Examination:  General appearance: well developed, appears stated age  Head: normocephalic, atraumatic, microcephally  Eyes:  conjunctivae/corneas clear. PERRL.  Nose: Nares normal. Septum midline.  Throat: lips, mucosa, and tongue normal; teeth and gums normal, no throat erythema.  Neck: supple, symmetrical, trachea midline, no JVD and thyroid not enlarged, symmetric, no tenderness/mass/nodules  Lungs:  clear to auscultation bilaterally and normal respiratory effort  Chest wall: no tenderness  Heart: regular rate and rhythm, S1, S2 normal, no murmur, click, rub or gallop  Abdomen: soft, non-tender non-distented; bowel sounds normal; no masses,  no organomegaly. Abdominal dressing C/D/I. J-tube site dressing C/D/I.  Extremities: no cyanosis, clubbing or edema.   Pulses: 2+ and symmetric  Skin: Skin color, texture, turgor normal. No rashes or lesions.  Lymph nodes: Cervical, supraclavicular, and axillary nodes normal.  Neurologic: Grossly non-focal. Answers simple question, short sentences.     CBC:    Recent Labs  Lab 07/01/18  0512 07/02/18  0457 07/03/18  0356   WBC 12.60 9.50 9.70   RBC 2.96* 2.72* 2.80*   HGB 9.2* 8.5* 8.6*   HCT 26.6* 25.2* 25.9*    167 173   MCV 90 93 92   MCH 31.0 31.3* 30.8   MCHC 34.4 33.7 33.3   BMP    Recent Labs  Lab 07/01/18  0512 07/02/18  0629 07/03/18  0356   * 201* 153*   * 131* 132*   K 4.4 4.3 4.0    103 103   CO2 23 23 23   BUN 50* 50* 52*   CREATININE 1.1 0.9 0.9   CALCIUM 8.5* 8.3* 8.2*   MG 2.0 1.8 1.9      Diagnostic Results:  Microbiology Results (last 7 days)     Procedure Component Value Units Date/Time    Culture, Anaerobic [[id number]] Collected:  06/21/18 1355    Order Status:  Completed Specimen:  Abscess from Abdomen Updated:  06/26/18 1421     Anaerobic  Culture --     FUSOBACTERIUM SPECIES  Moderate           CT abdomen (06-19-18): Large subphrenic mass on the right abutting could be involving the liver extending into the right lateral abdominal wall, heterogeneous and complex in appearance suggesting complex fluid collection and containing small foci of air.  Differential includes large abscess, or necrotic mass.  It is indistinguishable from, abutting adjacent colon with colon wall thickening and findings thought most suggestive of abscess secondary to contained right colon perforation from colon cancer or less likely right-sided diverticulitis.  This corresponds to findings on recent ultrasound of 6/9/2018.     US abdomen:   1. Cholelithiasis.  2. 7.5 x 5.4 x 7.3 cm complicated cystic structure interposed between the right hepatic lobe and right kidney, possibly a loop of bowel (such as the hepatic flexure) with internal debris.  Consider additional evaluation with contrast enhanced CT of the abdomen.    CT abdomen with contrast: Interval drainage of the large right flank abscess.  It is now predominant air-filled with small amount of residual fluid with air-fluid level.  This abuts and is indistinguishable from the right colon, distal descending duodenum, caudal tip of the liver and the wall of the adjacent colon appears thick and irregular in findings thought most suggestive of sequela from localized perforation from colon cancer.  Diverticular disease include in the differential. Small amount of ascites increased compared the prior exam.  Interval development of small left pleural effusion additional findings as detailed above including 2.2 cm calcification right side of the pelvis possibly dermoid or exophytic uterine fibroid.  Diverticulosis.  Cholelithiasis.    Gastrograffin enema: Multiple attempts were made to fill the colon.  Despite the balloon inflated the rectum the patient had spasm in the sigmoid colon, expelled lung the contrast on multiple  occasions.  The colon is very redundant and was able to be filled to the hepatic flexure but not more proximally and in particular the area of the suspected colon mass and abscess was not able to be opacified.  Visualized segment of colon is patent with diverticulosis    Assessment/Plan:      *Abdominal pain [R10.9]  Right Retroperitoneal abscess with Coliform bacteria I+D [R19.00] s/p  right hemicolectomy + Cholecystectomy and J-tube placement  Follow Dr. Torres's recommendations.  Follow microbiology results - E Coli - Continue IV Zosyn and Diflucan, follow Dr. Bautista's recommendations.  Continue IV TPN (for another day). Jtube feeds (dietry to change formula due to diarrhea), hopefully PO intake improves.  Discussed with patient's parents know likelihood of colon malignancy is high, await histopathology results.     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    Rectal Bleeding  Elevated CEA level  Resolved      Not Applicable    Diabetes mellitus 2 [E11.9]   Unknown       Check blood glucose level q 6h.  Use Novolog Insulin Sliding Scale as needed.       JAYSHREE (acute kidney injury) [N17.9] - resolved  Follow BMP.  Continue Lasix at 20 mg daily.    Anemia - Iron deficiency anemia  Needs Iron supplementation upon DC.    Hypokalemia  Follow BMP.    Severe Malnutrition  On TPN and Lipids.    Yes                      DVT prophylaxis: Use SCD and TEDs. Lovenox stopped due to GI bleeding.      Discussed with patient's parents, answered all the questions. Patient encouraged to get OOB and participate with PT.    Bekah Fernandez MD  Department of Hospital Medicine   [facility]

## 2018-07-03 NOTE — PLAN OF CARE
Problem: SLP Goal  Goal: SLP Goal  1. Tolerate thin liquids, upgrade from nectar thick.  2. Ongoing assessment for upgraded textures once advanced from clears per MD.    Sis Teixeira is a 58 y.o. female with an SLP diagnosis of Dysphagia.  She presented with general weakness, lethargy, and limited acceptance of PO trials. REC continue clears; NECTAR thick. Will follow and upgrade diet as tolerated    Karmen Goldstein MS, CCC/SLP 7/3/2018

## 2018-07-03 NOTE — PLAN OF CARE
Problem: Physical Therapy Goal  Goal: Physical Therapy Goal  Goals to be met by: 18     Patient will increase functional independence with mobility by performin. Supine to sit with Modified [place]  2. Sit to stand transfer with [place]  3. Gait  x 100 feet with Supervision.      Outcome: Ongoing (interventions implemented as appropriate)  PT for functional mobility training, transfer OOB to chair and gait

## 2018-07-03 NOTE — NURSING
Dressing changed to R JESUS drain area, moderate amount of pink watery drainage. Notified MD of loose stools that resemble tube feeding formula. Stool sample obtained and sent to lab. Pt also having difficulty swallowing. Notified MD. Swallow study performed. Pt placed on Nectar thick liquids for precautions. Contacted dietary.

## 2018-07-03 NOTE — PROGRESS NOTES
[facility] Hospital Surgery  Progress Note    Subjective:     History of Present Illness:  Patient is a 57 y.o. female admitted to Hospitalist Service from Dr. Garduno's office with complaint of abdominal pain. Patient reportedly has past medical history significant for Congenital microcephaly and diet controlled DM.     Patient denied chest pain, shortness of breath, abdominal pain, nausea, vomiting, headache, vision changes, focal neuro-deficits, cough or fever.     She states the pain has been present for about six months off and on.  She had an ultrasound which showed gallstones and was scheduled to have lap GB but had persistent elevated WBC and CT was ordered, which showed   CT abdomen (06-19-18): Large subphrenic mass on the right abutting could be involving the liver extending into the right lateral abdominal wall, heterogeneous and complex in appearance suggesting complex fluid collection and containing small foci of air.  Differential includes large abscess, or necrotic mass.  It is indistinguishable from, abutting adjacent colon with colon wall thickening and findings thought most suggestive of abscess secondary to contained right colon perforation from colon cancer or less likely right-sided diverticulitis.  This corresponds to findings on recent ultrasound of 6/9/2018.    Post-Op Info:  Procedure(s) (LRB):  HEMICOLECTOMY, RIGHT (N/A)  CHOLECYSTECTOMY (N/A)  INSERTION, JEJUNOSTOMY TUBE (N/A)   5 Days Post-Op     Interval History: POD 5.  Appetite poor.  Having diarrhea from tube feedings.    Medications:  Continuous Infusions:   TPN ADULT CENTRAL LINE CUSTOM 110 mL/hr at 07/02/18 2145    TPN ADULT CENTRAL LINE CUSTOM       Scheduled Meds:   brinzolamide  1 drop Both Eyes BID    dorzolamide-timolol 2-0.5%  1 drop Both Eyes BID    enoxaparin  40 mg Subcutaneous Daily    fenofibrate  160 mg Oral Daily    fluconazole (DIFLUCAN) IVPB  200 mg Intravenous Q24H    fluticasone  1 spray  Each Nare Daily    insulin detemir U-100  6 Units Subcutaneous QHS    latanoprost  1 drop Both Eyes QHS    levothyroxine  25 mcg Oral Before breakfast    pantoprazole  40 mg Intravenous Before breakfast    piperacillin-tazobactam 4.5 g in dextrose 5 % 100 mL IVPB (ready to mix system)  4.5 g Intravenous Q8H    promethazine (PHENERGAN) IVPB  12.5 mg Intravenous Once    simvastatin  10 mg Oral QHS    sodium bicarbonate  650 mg Oral QID    vitamin D  1,000 Units Oral Daily     PRN Meds:sodium chloride, acetaminophen, acetaminophen, dextrose 50%, dextrose 50%, glucagon (human recombinant), glucose, glucose, HYDROcodone-acetaminophen, HYDROcodone-acetaminophen, HYDROmorphone, insulin aspart U-100, ondansetron, ondansetron, sodium chloride 0.9%     Review of patient's allergies indicates:   Allergen Reactions    Sulfa (sulfonamide antibiotics) Rash    Tetracyclines Rash     Objective:     Vital Signs (Most Recent):  Temp: 97.8 °F (36.6 °C) (07/03/18 1143)  Pulse: 74 (07/03/18 1143)  Resp: 18 (07/03/18 1143)  BP: (!) 177/77 (07/03/18 1143)  SpO2: 100 % (07/03/18 1143) Vital Signs (24h Range):  Temp:  [96.5 °F (35.8 °C)-97.8 °F (36.6 °C)] 97.8 °F (36.6 °C)  Pulse:  [74-88] 74  Resp:  [16-18] 18  SpO2:  [95 %-100 %] 100 %  BP: (107-177)/(54-77) 177/77     Weight: 69.9 kg (154 lb)  Body mass index is 31.1 kg/m².    Intake/Output - Last 3 Shifts       07/01 0700 - 07/02 0659 07/02 0700 - 07/03 0659 07/03 0700 - 07/04 0659    P.O. 60 120     NG/ 125     IV Piggyback 100 400     TPN 2117.5 924     Total Intake(mL/kg) 2817.5 (45.9) 1569 (25.6)     Urine (mL/kg/hr) 4200 (2.9) 2350 (1.6)     Drains  10 (0)     Stool  2 (0)     Total Output 4200 2362      Net -1382.5 -793             Stool Occurrence  3 x           Physical Exam   Constitutional: She is oriented to person, place, and time. She appears well-developed and well-nourished. No distress.   HENT:   Head: Atraumatic.   Right Ear: External ear normal.   Left  Ear: External ear normal.   Eyes: Conjunctivae are normal. Pupils are equal, round, and reactive to light. Right eye exhibits no discharge. Left eye exhibits no discharge.   Neck: No tracheal deviation present. No thyromegaly present.   Cardiovascular: Normal rate and regular rhythm.    Pulmonary/Chest: Effort normal. No respiratory distress.   Abdominal: Soft. Bowel sounds are normal. She exhibits no distension. There is no guarding.   Musculoskeletal: She exhibits no edema or tenderness.   Lymphadenopathy:     She has no cervical adenopathy.   Neurological: She is alert and oriented to person, place, and time. No cranial nerve deficit.   Skin: Skin is warm and dry. No rash noted. She is not diaphoretic. No pallor.   Psychiatric: She has a normal mood and affect. Her behavior is normal. Judgment and thought content normal.   Nursing note and vitals reviewed.      Significant Labs:  CBC:   Recent Labs  Lab 07/03/18  0356   WBC 9.70   RBC 2.80*   HGB 8.6*   HCT 25.9*      MCV 92   MCH 30.8   MCHC 33.3     CMP:   Recent Labs  Lab 07/03/18  0356   *   CALCIUM 8.2*   ALBUMIN 1.3*   PROT 4.8*   *   K 4.0   CO2 23      BUN 52*   CREATININE 0.9   ALKPHOS 38*   ALT 16   AST 53*   BILITOT 1.1*       Significant Diagnostics:  I have reviewed all pertinent imaging results/findings within the past 24 hours.    Assessment/Plan:     * Abdominal pain    Patient clinically improved.  Gastrografin enema unable to reach right colon.  Discussed with Dr. Bautista and   Will proceed with surgery in AM - will need right hemicolectomy whether benign or malignant.  Will do cholecystectomy at same time.  All aspects of procedure including risks and possible complications were discussed in detail with the patient and parents who agrees to proceed with procedure.  All questions were answered and consent was obtained. Preop orders were written.          Severe malnutrition    Tube feeds        Abdominal mass     POD 5 right colectomy/j tube abscess drainage    Continue tube feeds at goal  Ok for po diet for now  Continue antibiotics  Continue drain  D/C amanda Torres MD  General Surgery  [facility]

## 2018-07-03 NOTE — PLAN OF CARE
Problem: Nutrition, Parenteral (Adult)  Intervention: Monitor/Manage Parenteral Nutrition Support  Recommendations    Custom TPN:  4% AA, 5% dextrose @ 110 mls/hr wean as enteral feeding is tolerated.   Provides: 792 calories, 96 gms protein, 2400 mls fluid, GIR 1.36    Enteral:  Change to Peptamen 1.5 with Prebio @ 20 mls/hr x 24 hrs per pt tolerance.  Flush with 20-30 mls water Q 4 hrs.   Provides 720 calories, 33 gms protein, 88 gms cho, 370 mls free water. (Plus 120-180 mls water from flushes)    Total:  1512 calories, 129 gms protein, 88 gms cho.  Meets EEN for weight gain.     RD to follow.      Goals: 1) Pt will consume/receive at least 50% EEN by RD f/u. 2) Pt will transition to enteral feeds within 72 hrs    Nutrition Goal Status: 1) met  2) continues  Communication of RD Recs: reviewed with RN (POC, reviewed with MD and RN)

## 2018-07-03 NOTE — PROGRESS NOTES
Progress Note  Infectious Disease    Follow-up For:  Intra-abdominal abscess    SUBJECTIVE:   ROS:  No fever. Awake and cooperative, withdrawn, sat in the chair for about 2 hours. Can ambulate to the BR. Eating very little. Stools loose. Pain control seems adequate. No SOB, cough, nausea.     Antibiotics     Start     Stop Route Frequency Ordered    06/20/18 1700  piperacillin-tazobactam 4.5 g in dextrose 5 % 100 mL IVPB (ready to mix system)      -- IV Every 8 hours (non-standard times) 06/20/18 1548          Pertinent Medications noted:    EXAM & DIAGNOSTICS REVIEWED:   Vitals:     Temp:  [97.2 °F (36.2 °C)-97.9 °F (36.6 °C)]   Temp: 97.9 °F (36.6 °C) (07/03/18 1558)  Pulse: 76 (07/03/18 1558)  Resp: 18 (07/03/18 1558)  BP: (!) 167/65 (07/03/18 1558)  SpO2: 100 % (07/03/18 1558)    Intake/Output Summary (Last 24 hours) at 07/03/18 1745  Last data filed at 07/03/18 0609   Gross per 24 hour   Intake             1449 ml   Output             2362 ml   Net             -913 ml       General:  In NAD. Looks comfortable, awake but withdrawn,  Eyes:  Anicteric, PERRL,  ENT:  Mouth w/ pink MMM, no lesions/exudate,     Neck:  Trachea midline, supple, no adenopathy appreciated  Lungs:  clear  Heart:  RRR, no gallop/murmur noted  Abd:  soft, bandage not disturbed. OnQ pump in place, JESUS x 2, BS active  :  Mehta out  Musc:  Joints without effusion, erythema, synovitis, poor muscle bulk, moves all extremities  Skin:  Generally warm, dry, normal for color. No rashes  Wound: Midline incision looks great.   Neuro: Awake, cooperative,interacting a little more, seems sedate and afraid to move. Moves all ext on command  Extrem: No pitting edema, erythema, phlebitis, cellulitis, synovitis  VAD:  picc right arm    Lines/Tubes/Drains:    General Labs reviewed:    Recent Labs  Lab 07/03/18  0356   WBC 9.70   RBC 2.80*   HGB 8.6*   HCT 25.9*      MCV 92   MCH 30.8   MCHC 33.3       Recent Labs  Lab 07/03/18  0356   CALCIUM 8.2*    PROT 4.8*   *   K 4.0   CO2 23      BUN 52*   CREATININE 0.9   ALKPHOS 38*   ALT 16   AST 53*   BILITOT 1.1*       Micro: nothing new    Imaging Reviewed:    ASSESSMENT & PLAN      Patient Active Problem List   Diagnosis    Abdominal pain    Congenital small head    Diabetes mellitus 2    JAYSHREE (acute kidney injury)    Metabolic acidosis    Abdominal mass    Severe malnutrition      Imp; large RUQ abscess from contained perforation of right colon(Ecoli, strep and fusobacterium = all colon organisms), with adenocarcinoma,advanced stage,  with 50# weight loss, hypoalbuminemia and elevated CEA and LGI bleeding, s/p colon resection 6/28              : microcephaly, childlike, withdrawn, parents are decision makers              : malnutrition, severe, due to prolonged illness, albumin 1.3 despite TF and TPN              : 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. RN did give her a ride in the wheelchair outside of the room

## 2018-07-04 PROBLEM — C17.8: Status: ACTIVE | Noted: 2018-06-20

## 2018-07-04 PROBLEM — R60.1 ANASARCA: Status: ACTIVE | Noted: 2018-07-04

## 2018-07-04 PROBLEM — J69.0 ASPIRATION PNEUMONIA: Status: ACTIVE | Noted: 2018-07-04

## 2018-07-04 LAB
ALBUMIN SERPL BCP-MCNC: 1.3 G/DL
ALP SERPL-CCNC: 46 U/L
ALT SERPL W/O P-5'-P-CCNC: 20 U/L
ANION GAP SERPL CALC-SCNC: 7 MMOL/L
AST SERPL-CCNC: 73 U/L
BASOPHILS # BLD AUTO: 0 K/UL
BASOPHILS NFR BLD: 0.1 %
BILIRUB SERPL-MCNC: 1.3 MG/DL
BUN SERPL-MCNC: 55 MG/DL
CALCIUM SERPL-MCNC: 8.3 MG/DL
CEA SERPL-MCNC: 12.3 NG/ML
CHLORIDE SERPL-SCNC: 103 MMOL/L
CO2 SERPL-SCNC: 21 MMOL/L
CREAT SERPL-MCNC: 0.9 MG/DL
DIFFERENTIAL METHOD: ABNORMAL
EOSINOPHIL # BLD AUTO: 0.1 K/UL
EOSINOPHIL NFR BLD: 0.3 %
ERYTHROCYTE [DISTWIDTH] IN BLOOD BY AUTOMATED COUNT: 18.1 %
EST. GFR  (AFRICAN AMERICAN): >60 ML/MIN/1.73 M^2
EST. GFR  (NON AFRICAN AMERICAN): >60 ML/MIN/1.73 M^2
GLUCOSE SERPL-MCNC: 180 MG/DL
HCT VFR BLD AUTO: 26.6 %
HGB BLD-MCNC: 9 G/DL
LYMPHOCYTES # BLD AUTO: 2.1 K/UL
LYMPHOCYTES NFR BLD: 8 %
MAGNESIUM SERPL-MCNC: 2 MG/DL
MCH RBC QN AUTO: 31.3 PG
MCHC RBC AUTO-ENTMCNC: 33.9 G/DL
MCV RBC AUTO: 92 FL
MONOCYTES # BLD AUTO: 1.2 K/UL
MONOCYTES NFR BLD: 4.3 %
NEUTROPHILS # BLD AUTO: 23.5 K/UL
NEUTROPHILS NFR BLD: 87.3 %
PHOSPHATE SERPL-MCNC: 3.2 MG/DL
PLATELET # BLD AUTO: 200 K/UL
PMV BLD AUTO: 7.4 FL
POCT GLUCOSE: 145 MG/DL (ref 70–110)
POCT GLUCOSE: 157 MG/DL (ref 70–110)
POCT GLUCOSE: 181 MG/DL (ref 70–110)
POCT GLUCOSE: 198 MG/DL (ref 70–110)
POTASSIUM SERPL-SCNC: 3.9 MMOL/L
PROT SERPL-MCNC: 4.9 G/DL
RBC # BLD AUTO: 2.89 M/UL
SODIUM SERPL-SCNC: 131 MMOL/L
WBC # BLD AUTO: 26.9 K/UL

## 2018-07-04 PROCEDURE — 36415 COLL VENOUS BLD VENIPUNCTURE: CPT

## 2018-07-04 PROCEDURE — 85025 COMPLETE CBC W/AUTO DIFF WBC: CPT

## 2018-07-04 PROCEDURE — A4217 STERILE WATER/SALINE, 500 ML: HCPCS | Performed by: HOSPITALIST

## 2018-07-04 PROCEDURE — 63600175 PHARM REV CODE 636 W HCPCS: Performed by: HOSPITALIST

## 2018-07-04 PROCEDURE — 63600175 PHARM REV CODE 636 W HCPCS: Performed by: INTERNAL MEDICINE

## 2018-07-04 PROCEDURE — 63600175 PHARM REV CODE 636 W HCPCS: Performed by: SURGERY

## 2018-07-04 PROCEDURE — 25000003 PHARM REV CODE 250: Performed by: HOSPITALIST

## 2018-07-04 PROCEDURE — B4185 PARENTERAL SOL 10 GM LIPIDS: HCPCS | Performed by: HOSPITALIST

## 2018-07-04 PROCEDURE — 80053 COMPREHEN METABOLIC PANEL: CPT

## 2018-07-04 PROCEDURE — 99900035 HC TECH TIME PER 15 MIN (STAT)

## 2018-07-04 PROCEDURE — 12000002 HC ACUTE/MED SURGE SEMI-PRIVATE ROOM

## 2018-07-04 PROCEDURE — 97803 MED NUTRITION INDIV SUBSEQ: CPT

## 2018-07-04 PROCEDURE — 25500020 PHARM REV CODE 255

## 2018-07-04 PROCEDURE — C9113 INJ PANTOPRAZOLE SODIUM, VIA: HCPCS | Performed by: SURGERY

## 2018-07-04 PROCEDURE — 25000003 PHARM REV CODE 250: Performed by: INTERNAL MEDICINE

## 2018-07-04 PROCEDURE — 82378 CARCINOEMBRYONIC ANTIGEN: CPT

## 2018-07-04 PROCEDURE — 94761 N-INVAS EAR/PLS OXIMETRY MLT: CPT

## 2018-07-04 PROCEDURE — 84100 ASSAY OF PHOSPHORUS: CPT

## 2018-07-04 PROCEDURE — 83735 ASSAY OF MAGNESIUM: CPT

## 2018-07-04 RX ORDER — FUROSEMIDE 10 MG/ML
20 INJECTION INTRAMUSCULAR; INTRAVENOUS ONCE
Status: COMPLETED | OUTPATIENT
Start: 2018-07-04 | End: 2018-07-04

## 2018-07-04 RX ORDER — ACETAMINOPHEN 500 MG
1000 TABLET ORAL EVERY 6 HOURS PRN
Status: DISCONTINUED | OUTPATIENT
Start: 2018-07-04 | End: 2018-07-09 | Stop reason: HOSPADM

## 2018-07-04 RX ORDER — SODIUM CHLORIDE 9 MG/ML
INJECTION, SOLUTION INTRAVENOUS
Status: DISPENSED
Start: 2018-07-04 | End: 2018-07-04

## 2018-07-04 RX ORDER — PROCHLORPERAZINE EDISYLATE 5 MG/ML
10 INJECTION INTRAMUSCULAR; INTRAVENOUS EVERY 6 HOURS PRN
Status: DISCONTINUED | OUTPATIENT
Start: 2018-07-04 | End: 2018-07-09 | Stop reason: HOSPADM

## 2018-07-04 RX ORDER — DIPHENOXYLATE HYDROCHLORIDE AND ATROPINE SULFATE 2.5; .025 MG/1; MG/1
1 TABLET ORAL 4 TIMES DAILY PRN
Status: DISCONTINUED | OUTPATIENT
Start: 2018-07-04 | End: 2018-07-09 | Stop reason: HOSPADM

## 2018-07-04 RX ADMIN — PANTOPRAZOLE SODIUM 40 MG: 40 INJECTION, POWDER, LYOPHILIZED, FOR SOLUTION INTRAVENOUS at 05:07

## 2018-07-04 RX ADMIN — PIPERACILLIN SODIUM AND TAZOBACTAM SODIUM 4.5 G: 4; .5 INJECTION, POWDER, LYOPHILIZED, FOR SOLUTION INTRAVENOUS at 05:07

## 2018-07-04 RX ADMIN — PIPERACILLIN SODIUM AND TAZOBACTAM SODIUM 4.5 G: 4; .5 INJECTION, POWDER, LYOPHILIZED, FOR SOLUTION INTRAVENOUS at 11:07

## 2018-07-04 RX ADMIN — SOYBEAN OIL 250 ML: 20 INJECTION, SOLUTION INTRAVENOUS at 10:07

## 2018-07-04 RX ADMIN — FUROSEMIDE 20 MG: 10 INJECTION, SOLUTION INTRAVENOUS at 11:07

## 2018-07-04 RX ADMIN — FLUCONAZOLE IN SODIUM CHLORIDE 200 MG: 2 INJECTION, SOLUTION INTRAVENOUS at 06:07

## 2018-07-04 RX ADMIN — ENOXAPARIN SODIUM 40 MG: 100 INJECTION SUBCUTANEOUS at 05:07

## 2018-07-04 RX ADMIN — DORZOLAMIDE HYDROCHLORIDE AND TIMOLOL MALEATE 1 DROP: 20; 5 SOLUTION/ DROPS OPHTHALMIC at 10:07

## 2018-07-04 RX ADMIN — IOHEXOL 15 ML: 350 INJECTION, SOLUTION INTRAVENOUS at 09:07

## 2018-07-04 RX ADMIN — PIPERACILLIN SODIUM AND TAZOBACTAM SODIUM 4.5 G: 4; .5 INJECTION, POWDER, LYOPHILIZED, FOR SOLUTION INTRAVENOUS at 02:07

## 2018-07-04 RX ADMIN — CALCIUM GLUCONATE: 94 INJECTION, SOLUTION INTRAVENOUS at 10:07

## 2018-07-04 RX ADMIN — IOHEXOL 75 ML: 350 INJECTION, SOLUTION INTRAVENOUS at 09:07

## 2018-07-04 NOTE — PLAN OF CARE
Problem: Patient Care Overview  Goal: Plan of Care Review  AAOx3, Pt up as tolerated with 2 person assist, up in chair and w/c this shift. Swallow study completed today, pt put on nectar thick liquids. Mehta removed, pt voided at 530pm. Continue CBG monitoring, treated with SSI. Dressing changed to JESUS site, copious amt serous drainage noted. Dressing changed to J tube site. Pt repositioned q 2 hrs. Last BM today. Tube feeding adjusted due to intolerance. Pt  Refused to drink liquids most of shift. Pt did ambulate to restroom with 2 person assist. Bed locked and in low position, call light within reach, will continue to monitor.

## 2018-07-04 NOTE — PT/OT/SLP PROGRESS
Pt not seen d/t nursing staff asking PT to hold treatment today because of decline in condition. Per nursing- Pt scheduled for CT scan.

## 2018-07-04 NOTE — SUBJECTIVE & OBJECTIVE
Interval History:   Vomited last night.  Increased serous drainage from drain site.  WBC spiked this AM.  Connector on J-tube leaking ? cracked      Medications:  Continuous Infusions:   TPN ADULT CENTRAL LINE CUSTOM 110 mL/hr at 07/03/18 2134     Scheduled Meds:   brinzolamide  1 drop Both Eyes BID    dorzolamide-timolol 2-0.5%  1 drop Both Eyes BID    enoxaparin  40 mg Subcutaneous Daily    fenofibrate  160 mg Oral Daily    fluconazole (DIFLUCAN) IVPB  200 mg Intravenous Q24H    fluticasone  1 spray Each Nare Daily    insulin detemir U-100  6 Units Subcutaneous QHS    latanoprost  1 drop Both Eyes QHS    levothyroxine  25 mcg Oral Before breakfast    omnipaque 350 iohexol        omnipaque 350 iohexol        pantoprazole  40 mg Intravenous Before breakfast    piperacillin-tazobactam 4.5 g in dextrose 5 % 100 mL IVPB (ready to mix system)  4.5 g Intravenous Q8H    promethazine (PHENERGAN) IVPB  12.5 mg Intravenous Once    simvastatin  10 mg Oral QHS    sodium bicarbonate  650 mg Oral QID    sodium chloride 0.9%        vitamin D  1,000 Units Oral Daily     PRN Meds:sodium chloride, acetaminophen, acetaminophen, dextrose 50%, dextrose 50%, glucagon (human recombinant), glucose, glucose, HYDROcodone-acetaminophen, HYDROcodone-acetaminophen, HYDROmorphone, insulin aspart U-100, ondansetron, ondansetron, sodium chloride 0.9%     Review of patient's allergies indicates:   Allergen Reactions    Sulfa (sulfonamide antibiotics) Rash    Tetracyclines Rash     Objective:     Vital Signs (Most Recent):  Temp: 98.9 °F (37.2 °C) (07/04/18 0836)  Pulse: 90 (07/04/18 0836)  Resp: 20 (07/04/18 0836)  BP: 134/61 (07/04/18 0836)  SpO2: (!) 93 % (07/04/18 0836) Vital Signs (24h Range):  Temp:  [97.8 °F (36.6 °C)-98.9 °F (37.2 °C)] 98.9 °F (37.2 °C)  Pulse:  [74-90] 90  Resp:  [16-20] 20  SpO2:  [93 %-100 %] 93 %  BP: (124-177)/(58-77) 134/61     Weight: 69.9 kg (154 lb)  Body mass index is 31.1  kg/m².    Intake/Output - Last 3 Shifts       07/02 0700 - 07/03 0659 07/03 0700 - 07/04 0659 07/04 0700 - 07/05 0659    P.O. 120 60     I.V. (mL/kg)  10 (0.1)     NG/ 31     IV Piggyback 400 300      1413.5     Total Intake(mL/kg) 1569 (25.6) 1814.5 (26)     Urine (mL/kg/hr) 2350 (1.6) 625 (0.4)     Emesis/NG output  250 (0.1)     Drains 10 (0) 310 (0.2) 30 (0.1)    Stool 2 (0)      Total Output 2362 1185 30    Net -793 +629.5 -30           Urine Occurrence  2 x     Stool Occurrence 3 x 3 x           Physical Exam   Constitutional: She is oriented to person, place, and time. She appears well-developed and well-nourished. No distress.   HENT:   Head: Normocephalic and atraumatic.   Right Ear: External ear normal.   Left Ear: External ear normal.   Eyes: Conjunctivae are normal. Pupils are equal, round, and reactive to light. Right eye exhibits no discharge. Left eye exhibits no discharge.   Neck: No tracheal deviation present. No thyromegaly present.   Cardiovascular: Normal rate and regular rhythm.    Pulmonary/Chest: Effort normal. No respiratory distress.   Abdominal: Soft. She exhibits no distension. There is no guarding.   Musculoskeletal: She exhibits no edema or tenderness.   Lymphadenopathy:     She has no cervical adenopathy.   Neurological: She is alert and oriented to person, place, and time. No cranial nerve deficit.   Skin: Skin is warm and dry. No rash noted. She is not diaphoretic. No pallor.   Psychiatric: She has a normal mood and affect. Her behavior is normal. Judgment and thought content normal.   Nursing note and vitals reviewed.      Significant Labs:  CBC:   Recent Labs  Lab 07/04/18 0447   WBC 26.90*   RBC 2.89*   HGB 9.0*   HCT 26.6*      MCV 92   MCH 31.3*   MCHC 33.9     CMP:   Recent Labs  Lab 07/04/18 0447   *   CALCIUM 8.3*   ALBUMIN 1.3*   PROT 4.9*   *   K 3.9   CO2 21*      BUN 55*   CREATININE 0.9   ALKPHOS 46*   ALT 20   AST 73*   BILITOT  1.3*       Significant Diagnostics:  I have reviewed all pertinent imaging results/findings within the past 24 hours.

## 2018-07-04 NOTE — PT/OT/SLP PROGRESS
Pt not seen d/t nursing staff asking PT to hold treatment today because of decline. Per nursing- Pt scheduled for CT scan.

## 2018-07-04 NOTE — PLAN OF CARE
Problem: Nutrition, Parenteral (Adult)  Intervention: Monitor/Manage Parenteral Nutrition Support  Recommendations    Custom TPN: 4% AA, 9% dextrose with 20% lipids (250 mls over 12 hrs)  First 24 hrs rate of 25 mls/hr to provide 50% of EEN.  24-48 hrs rate of 60 mls/hr to provide 75% of EEN.     When medically ready, transition to enteral feeding.  Recommend  Peptamen 1.5 with Prebio @ 20 mls/hr x 24 hrs per pt tolerance.  Flush with 20-30 mls water Q 4 hrs.   Advance toward EEN per pt tolerance.    3) Follow up 7/5/18      Goals: 1) Pt will consume/receive at least 50% EEN by RD f/u. 2) Pt will transition to enteral feeds within 72 hrs    Nutrition Goal Status: 1) met  2) continues  Communication of RD Recs: reviewed with RN (POC, reviewed with MD and RN)

## 2018-07-04 NOTE — PROGRESS NOTES
Progress Note  Infectious Disease    Follow-up For:  Intra-abdominal abscess    SUBJECTIVE:   ROS:  No fever. Awake and cooperative, withdrawn, Events reviewed. Vomited x 1 and possibly/probably aspirated. No respiratory distress or fever, but WBC higher. CXR and CT reviewed. REceived lasix, voiding x 3 so far. Denies nausea at this time.     Antibiotics     Start     Stop Route Frequency Ordered    06/20/18 1700  piperacillin-tazobactam 4.5 g in dextrose 5 % 100 mL IVPB (ready to mix system)      -- IV Every 8 hours (non-standard times) 06/20/18 1548          Pertinent Medications noted:    EXAM & DIAGNOSTICS REVIEWED:   Vitals:     Temp:  [97.9 °F (36.6 °C)-98.9 °F (37.2 °C)]   Temp: 98.6 °F (37 °C) (07/04/18 1147)  Pulse: 81 (07/04/18 1147)  Resp: 16 (07/04/18 1147)  BP: 136/67 (07/04/18 1147)  SpO2: 96 % (07/04/18 1147)    Intake/Output Summary (Last 24 hours) at 07/04/18 1253  Last data filed at 07/04/18 0700   Gross per 24 hour   Intake           1814.5 ml   Output             1215 ml   Net            599.5 ml       General:  In NAD. Looks comfortable but pale, awake but withdrawn,  Eyes:  Anicteric, PERRL,  ENT:  Mouth w/ pink MMM, no lesions/exudate,     Neck:  Trachea midline, supple, no adenopathy appreciated  Lungs:  LLL crackles, no respiratory distress  Heart:  RRR, no gallop/murmur noted  Abd:  soft,  JESUS x 2, BS active, J tube feedings are on hold  :  Mehta out  Musc:  Joints without effusion, erythema, synovitis, poor muscle bulk, moves all extremities  Skin:  Generally warm, dry, normal for color. No rashes  Wound: Midline incision looks great.   Neuro: Awake, cooperative,interacting  little , seems sedate and afraid to move. Moves all ext on command  Extrem: No pitting edema, erythema, phlebitis, cellulitis, synovitis  VAD:  picc right arm    Lines/Tubes/Drains:    General Labs reviewed:    Recent Labs  Lab 07/04/18  0447   WBC 26.90*   RBC 2.89*   HGB 9.0*   HCT 26.6*      MCV 92   MCH  31.3*   MCHC 33.9       Recent Labs  Lab 07/04/18  0447   CALCIUM 8.3*   PROT 4.9*   *   K 3.9   CO2 21*      BUN 55*   CREATININE 0.9   ALKPHOS 46*   ALT 20   AST 73*   BILITOT 1.3*       Micro: nothing new    Imaging Reviewed: CXR with bilateral upper lobe interstitial changes  CT abd: dilated stomach and duodenum with air fluid levels. No abscesses.    ASSESSMENT & PLAN      Patient Active Problem List   Diagnosis    Abdominal pain    Congenital small head    Diabetes mellitus 2    JAYSHREE (acute kidney injury)    Metabolic acidosis    Abdominal mass    Severe malnutrition      Imp; large RUQ abscess from contained perforation of right colon(Ecoli, strep and fusobacterium = all colon organisms), with adenocarcinoma,advanced stage,  with 50# weight loss, hypoalbuminemia and elevated CEA and LGI bleeding, s/p colon resection 6/28              : microcephaly, childlike, withdrawn, parents are decision makers              : malnutrition, severe, due to prolonged illness, albumin 1.3 despite TF and TPN              : anorexia due to malignancy, likely  Aspiration pneumonia +/- pulmonary interstitial edema from 3rd spacing, 7/4  Ileus of stomach, duodenum=gastric outlet obstruction 7/4    Recommendation:   Continue zosyn, diflucan, TPN  Will check bladder scan

## 2018-07-04 NOTE — PROGRESS NOTES
"Progress Note  [facility] Medicine    Admit Date: 6/20/2018    SUBJECTIVE:     Follow-up For:  Carcinoma of small intestine, including duodenum      Interval history (See H&P for complete P,F,SHx) : The the patient seen and examined.  Overnight, the patient developed nausea vomiting and change in O2 saturations.  This morning WBC count is markedly elevated.  Plan of care discussed with the patient and the family at the bedside nurse in detail, as well as General surgery.  CT scan done reviewed by myself which shows evidence of bilateral lower lobe infiltrates consistent with aspiration.  Plan of care also discussed with Nutrition in detail.    Review of Systems: Systems were reviewed and otherwise negative unless indicated below.  Pain scale: 0/10  Gen- Weakness/ Fatigue  Neuro- Confusion  Resp: Cough/ SOB  GI- Nausea/Vomiting      OBJECTIVE:     Vital Signs Range (Last 24H):  Temp:  [97.9 °F (36.6 °C)-98.9 °F (37.2 °C)]   Pulse:  [76-90]   Resp:  [16-20]   BP: (124-167)/(58-67)   SpO2:  [93 %-100 %]     I & O (Last 24H):    Intake/Output Summary (Last 24 hours) at 07/04/18 1538  Last data filed at 07/04/18 1400   Gross per 24 hour   Intake           1814.5 ml   Output             1275 ml   Net            539.5 ml       Estimated body mass index is 31.1 kg/m² as calculated from the following:    Height as of this encounter: 4' 11" (1.499 m).    Weight as of this encounter: 69.9 kg (154 lb).    Physical Exam:  General exam-microcephalic  female who appears alert and oriented but nonverbal.  Responds  nonverbally to commands and questions.  Cardiovascular-regular rate rhythm, no murmurs  Respiratory-Wet, bilateral rhonchi worse in the lower lung fields with mildly increased work of breathing noted.  Abdomen-J-tube site with leakage around the J-tube as well as clear serous drainage into the JESUS drain.  Appropriate tenderness noted around J-tube site.  Bowel sounds are hypoactive.  Extremities-2+ " pretibial pitting edema noted bilaterally.    Laboratory/Diagnostic Data:  Reviewed and noted in plan where applicable- Please see chart for full lab data.    Medications:  Medication list was reviewed and changes noted under Assessment/Plan.    ASSESSMENT/PLAN:     Active Problems:    Active Hospital Problems    Diagnosis  POA    *Carcinoma of small intestine, including duodenum [C17.8] patient post op from cancer resection and J-tube placement.  Surgery for wound management and monitor patient closely.  Patient in persistent catabolic state secondary to active cancer and wound healing is likely going to be poor.  Yes    Anasarca [R60.1] patient with pulmonary edema likely secondary to volume overload from anasarca.  Will give 20 mg IV Lasix to help facilitate diuresis and monitor renal function closely.  Yes    Aspiration pneumonia [J69.0] new onset, likely secondary to vomiting overnight.  Patient already on Zosyn.  Will continue.  Does not have evidence of respiratory compromise so will not initiate steroids at this point in time.  Monitor respiratory status closely.  No    Severe malnutrition [E43] discussed with nutrition in detail.  Patient severe protein calorie malnutrition as well as severe uremia makes optimal nutritional status very difficult.  Limited on protein secondary to uremia and limited on total calorie secondary to PPN.  Discussed with pharmacy and nutrition who will make PPN to the best of their ability to give the patient is maxed that nutrition and macro nutrients as necessary.  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  May predispose patient to aspiration.  Not Applicable    Diabetes mellitus 2 [E11.9]   Patient's FSGs are controlled on current hypoglycemics.   Last A1c reviewed- Lab Results   Component Value Date    HGBA1C 4.4 06/20/2018     Most recent fingerstick glucose reviewed-   Recent Labs  Lab 07/03/18  1656 07/03/18  2339 07/04/18  0551 07/04/18  1114   POCTGLUCOSE  257* 241* 198* 157*     Current correctional scale  Low  Maintain anti-hyperglycemic dose as follows-   Antihyperglycemics     Start     Stop Route Frequency Ordered    06/30/18 0215  insulin aspart U-100 pen 0-5 Units      -- SubQ Every 6 hours PRN 06/30/18 0101    06/29/18 1612  insulin detemir U-100 pen 6 Units      -- SubQ Nightly 06/29/18 1613          Yes     dIET CONTROL      JAYSHREE (acute kidney injury) [N17.9]- Patient with JAYSHREE likely d/t IVVD  Which is currently controlled. Labs reviewed- BMP with Estimated Creatinine Clearance: 76.1 mL/min (based on SCr of 0.9 mg/dL). according to latest data. Monitor UOP and serial BMP and adjust therapy as needed. Renally dose meds for GFR listed above.    Yes      Resolved Hospital Problems    Diagnosis Date Resolved POA   No resolved problems to display.         Disposition- unsure    VTE Risk Mitigation         Ordered     enoxaparin injection 40 mg  Daily      06/29/18 1613     IP VTE HIGH RISK PATIENT  Once      06/28/18 1834     Reason for No Pharmacological VTE Prophylaxis  Once      06/28/18 1834

## 2018-07-04 NOTE — ASSESSMENT & PLAN NOTE
POD 6 right colectomy/j tube abscess drainage    Hold tube feeds.  CT abd and pelvis today to R/O collection

## 2018-07-04 NOTE — PROGRESS NOTES
[facility]  Adult Nutrition  Consult Note    SUMMARY     Recommendations    Custom TPN: 4% AA, 9% dextrose with 20% lipids (250 mls over 12 hrs)  First 24 hrs rate of 25 mls/hr to provide 50% of EEN.  24-48 hrs rate of 60 mls/hr to provide 75% of EEN.     When medically ready, transition to enteral feeding.  Recommend  Peptamen 1.5 with Prebio @ 20 mls/hr x 24 hrs per pt tolerance.  Flush with 20-30 mls water Q 4 hrs.   Advance toward EEN per pt tolerance.    3) Follow up 7/5/18      Goals: 1) Pt will consume/receive at least 50% EEN by RD f/u. 2) Pt will transition to enteral feeds within 72 hrs    Nutrition Goal Status: 1) met  2) continues  Communication of RD Recs: reviewed with RN (POC, reviewed with MD and RN)    Reason for Assessment    Reason for Assessment:  RD follow up  1. Abdominal wall abscess    2. Abdominal pain    3. Right upper quadrant abdominal mass      Past Medical History:   Diagnosis Date    Congenital small head     Diabetes mellitus     dIET CONTROL     General Information Comments: Admitted with abdominal pain.  s/p surgery to drain large abscess.  Family reports poor intake x > 1 month and has not eaten well since admit.  Per MD H&P, pt has lost 50 lbs x 6 months. Pt's mom reports wt of 181 x 3 months ago.     Pt had procedure this afternoon, where a possible cecal mass vs less likely diverticular bleeding was found per MD note.Consult for PICC line sent.   6/28/18: Pt NPO and solely on TPN. Blood sugars are running high. Calorie needs exceeded.   6/29/18: Pt NPO. S/P hemicolectomy rt, cholecystectomy.  Blood sugars continue to run high, Altered electrolytes.  Possible refeeding 2/2 excess calories?  MD directed to reduce dextrose to 5% and initiate trickle feeds. Per Infectious Disease MD note pt also has JAYSHREE.   7/3/18 RN reported pt having frequent diarrhea. (x 6 today) that is the same color of the TF.  Adjusted TF rec to more hydrolyzed formula at a lower rate.   "Discussed with RN and MD.  Pt has now progressed to nectar thick liquids, but is not receptive to them now.  Spoke with family, encouraging trial po.  18 Pt had emesis this am. TF stopped.  Discussed TPN options with MD.     Nutrition Risk Screen    Nutrition Risk Screen: other (see comments)    Nutrition/Diet History    Patient Reported Diet/Restrictions/Preferences: general  Food Preferences: No spicy foods.  Uses [place] Instant Breakfast at home to increase calories.   Do you have any cultural, spiritual, Adventist conflicts, given your current situation?: none  Food Allergies: NKFA  Factors Affecting Nutritional Intake: abdominal pain, decreased appetite    Anthropometrics    Temp: 98.9 °F (37.2 °C)  Height Method: Stated  Height: 4' 11"  Height (inches): 59 in  Weight Method: Bed Scale  Weight: 69.9 kg (154 lb)  Weight (lb): 154 lb  Ideal Body Weight (IBW), Female: 95 lb  % Ideal Body Weight, Female (lb): 142.48 lb  BMI (Calculated): 27.4  BMI Grade: 25 - 29.9 - overweight  Weight Loss: unintentional  Usual Body Weight (UBW), k.45 kg (per MD H&P note of wt loss of 50 lbs x 6 months)  % Usual Body Weight: 72.86  % Weight Change From Usual Weight: -27.29 %       Lab/Procedures/Meds    Pertinent Labs Reviewed: reviewed  Lab Results   Component Value Date    ALBUMIN 1.3 (L) 2018     Lab Results   Component Value Date    .3 (H) 2018     POCT Glucose   Date Value Ref Range Status   2018 198 (H) 70 - 110 mg/dL Final   2018 241 (H) 70 - 110 mg/dL Final   2018 257 (H) 70 - 110 mg/dL Final   2018 299 (H) 70 - 110 mg/dL Final   2018 185 (H) 70 - 110 mg/dL Final   2018 153 (H) 70 - 110 mg/dL Final   2018 198 (H) 70 - 110 mg/dL Final   2018 246 (H) 70 - 110 mg/dL Final   2018 251 (H) 70 - 110 mg/dL Final   2018 206 (H) 70 - 110 mg/dL Final   2018 260 (H) 70 - 110 mg/dL Final   2018 229 (H) 70 - 110 mg/dL Final "   .    Pertinent Medications Reviewed: reviewed  Scheduled Meds:   brinzolamide  1 drop Both Eyes BID    dorzolamide-timolol 2-0.5%  1 drop Both Eyes BID    enoxaparin  40 mg Subcutaneous Daily    fenofibrate  160 mg Oral Daily    fluconazole (DIFLUCAN) IVPB  200 mg Intravenous Q24H    fluticasone  1 spray Each Nare Daily    insulin detemir U-100  6 Units Subcutaneous QHS    latanoprost  1 drop Both Eyes QHS    levothyroxine  25 mcg Oral Before breakfast    omnipaque 350 iohexol        omnipaque 350 iohexol        pantoprazole  40 mg Intravenous Before breakfast    piperacillin-tazobactam 4.5 g in dextrose 5 % 100 mL IVPB (ready to mix system)  4.5 g Intravenous Q8H    promethazine (PHENERGAN) IVPB  12.5 mg Intravenous Once    simvastatin  10 mg Oral QHS    sodium bicarbonate  650 mg Oral QID    sodium chloride 0.9%        vitamin D  1,000 Units Oral Daily     Continuous Infusions:   TPN ADULT CENTRAL LINE CUSTOM 110 mL/hr at 07/03/18 2139     Physical Findings/Assessment    Overall Physical Appearance: nourished (NFPE completed.  No fat or muscle loss noted. )  Skin:  (Wolfgang score 17)    Estimated/Assessed Needs    Weight Used For Calorie Calculations: 61.4 kg (135 lb 5.8 oz)  Energy Need Method: [facility]: 1104.62 x 1.3-1.7=2447-0456  Weight Used For Protein Calculations: 61.4 kg (135 lb 5.8 oz)   1.0-1.2 gm/kg/day: 61-74  (until JAYSHREE resolves, then increase to 1.2-1.5 gm/kg/day)  Fluid Need Method: RDA Method (or per MD rec)  CHO Requirement: 45-50% EEN or GIR <5    Nutrition Prescription Ordered    Current Diet Order: consistent carbohydrate    Evaluation of Received Nutrient/Fluid Intake    Custom TPN 4% AA, 5% dextrose @ 110 mls/hr without lipids:  Provides 792 calories, 96 gms protein, 2400 mls fluid, GIR 1.36  Enteral feeding: Isosource 1.5 @ 45 mls/hr provides Flush with 125 mls water Q 4 hrs.  Provides 1620 calories, 73 gms protein, 190 gms CHO, 825 mls free water. (plus 750 mls  from flushes)  Energy Calories Required: exceeding needs  Protein Required: exceeding needs  Fluid Required: meeting needs  CHO Required: exceeding requirements      Intake/Output Summary (Last 24 hours) at 07/04/18 1044  Last data filed at 07/04/18 0700   Gross per 24 hour   Intake           1814.5 ml   Output             1215 ml   Net            599.5 ml     % Intake of Estimated Energy Needs: >100%  % Meal Intake: NPO    Nutrition Risk    Level of Risk/Frequency of Follow-up:  (2 x wkly)     Assessment and Plan    Severe malnutrition    Malnutrition in the context of acute illness    Related to (etiology):  Decreased intake 2/2 abdominal pain    Signs and Symptoms (as evidenced by):  Energy Intake: <50% of estimated energy requirement for >5 days  Body Fat Depletion: none noted  Muscle Mass Depletion: none noted  Weight Loss: 27.29% x 6 months (per MD H&P note)  Fluid Accumulation: 2+ edema    Interventions/Recommendations (treatment strategy):  1) Continue parenteral nutrition with current plan to wean and transition to enteral feeds via j tube    Nutrition Diagnosis Status:  Continues                 Monitor and Evaluation    Food and Nutrient Intake: energy intake  Food and Nutrient Adminstration: diet order  Anthropometric Measurements: weight, weight change  Biochemical Data, Medical Tests and Procedures: glucose/endocrine profile, inflammatory profile  Nutrition-Focused Physical Findings: overall appearance, skin     Discharge Plan    To be determined    RD Follow-up?: Yes

## 2018-07-04 NOTE — PROGRESS NOTES
[facility] Surgery  Progress Note    Subjective:     History of Present Illness:  Patient is a 57 y.o. female admitted to Hospitalist Service from Dr. Garduno's office with complaint of abdominal pain. Patient reportedly has past medical history significant for Congenital microcephaly and diet controlled DM.     Patient denied chest pain, shortness of breath, abdominal pain, nausea, vomiting, headache, vision changes, focal neuro-deficits, cough or fever.     She states the pain has been present for about six months off and on.  She had an ultrasound which showed gallstones and was scheduled to have lap GB but had persistent elevated WBC and CT was ordered, which showed   CT abdomen (06-19-18): Large subphrenic mass on the right abutting could be involving the liver extending into the right lateral abdominal wall, heterogeneous and complex in appearance suggesting complex fluid collection and containing small foci of air.  Differential includes large abscess, or necrotic mass.  It is indistinguishable from, abutting adjacent colon with colon wall thickening and findings thought most suggestive of abscess secondary to contained right colon perforation from colon cancer or less likely right-sided diverticulitis.  This corresponds to findings on recent ultrasound of 6/9/2018.    Post-Op Info:  Procedure(s) (LRB):  HEMICOLECTOMY, RIGHT (N/A)  CHOLECYSTECTOMY (N/A)  INSERTION, JEJUNOSTOMY TUBE (N/A)   6 Days Post-Op     Interval History:   Vomited last night.  Increased serous drainage from drain site.  WBC spiked this AM.  Connector on J-tube leaking ? cracked      Medications:  Continuous Infusions:   TPN ADULT CENTRAL LINE CUSTOM 110 mL/hr at 07/03/18 3990     Scheduled Meds:   brinzolamide  1 drop Both Eyes BID    dorzolamide-timolol 2-0.5%  1 drop Both Eyes BID    enoxaparin  40 mg Subcutaneous Daily    fenofibrate  160 mg Oral Daily    fluconazole (DIFLUCAN) IVPB  200 mg  Intravenous Q24H    fluticasone  1 spray Each Nare Daily    insulin detemir U-100  6 Units Subcutaneous QHS    latanoprost  1 drop Both Eyes QHS    levothyroxine  25 mcg Oral Before breakfast    omnipaque 350 iohexol        omnipaque 350 iohexol        pantoprazole  40 mg Intravenous Before breakfast    piperacillin-tazobactam 4.5 g in dextrose 5 % 100 mL IVPB (ready to mix system)  4.5 g Intravenous Q8H    promethazine (PHENERGAN) IVPB  12.5 mg Intravenous Once    simvastatin  10 mg Oral QHS    sodium bicarbonate  650 mg Oral QID    sodium chloride 0.9%        vitamin D  1,000 Units Oral Daily     PRN Meds:sodium chloride, acetaminophen, acetaminophen, dextrose 50%, dextrose 50%, glucagon (human recombinant), glucose, glucose, HYDROcodone-acetaminophen, HYDROcodone-acetaminophen, HYDROmorphone, insulin aspart U-100, ondansetron, ondansetron, sodium chloride 0.9%     Review of patient's allergies indicates:   Allergen Reactions    Sulfa (sulfonamide antibiotics) Rash    Tetracyclines Rash     Objective:     Vital Signs (Most Recent):  Temp: 98.9 °F (37.2 °C) (07/04/18 0836)  Pulse: 90 (07/04/18 0836)  Resp: 20 (07/04/18 0836)  BP: 134/61 (07/04/18 0836)  SpO2: (!) 93 % (07/04/18 0836) Vital Signs (24h Range):  Temp:  [97.8 °F (36.6 °C)-98.9 °F (37.2 °C)] 98.9 °F (37.2 °C)  Pulse:  [74-90] 90  Resp:  [16-20] 20  SpO2:  [93 %-100 %] 93 %  BP: (124-177)/(58-77) 134/61     Weight: 69.9 kg (154 lb)  Body mass index is 31.1 kg/m².    Intake/Output - Last 3 Shifts       07/02 0700 - 07/03 0659 07/03 0700 - 07/04 0659 07/04 0700 - 07/05 0659    P.O. 120 60     I.V. (mL/kg)  10 (0.1)     NG/ 31     IV Piggyback 400 300      1413.5     Total Intake(mL/kg) 1569 (25.6) 1814.5 (26)     Urine (mL/kg/hr) 2350 (1.6) 625 (0.4)     Emesis/NG output  250 (0.1)     Drains 10 (0) 310 (0.2) 30 (0.1)    Stool 2 (0)      Total Output 2362 1185 30    Net -793 +629.5 -30           Urine Occurrence  2 x     Stool  Occurrence 3 x 3 x           Physical Exam   Constitutional: She is oriented to person, place, and time. She appears well-developed and well-nourished. No distress.   HENT:   Head: Normocephalic and atraumatic.   Right Ear: External ear normal.   Left Ear: External ear normal.   Eyes: Conjunctivae are normal. Pupils are equal, round, and reactive to light. Right eye exhibits no discharge. Left eye exhibits no discharge.   Neck: No tracheal deviation present. No thyromegaly present.   Cardiovascular: Normal rate and regular rhythm.    Pulmonary/Chest: Effort normal. No respiratory distress.   Abdominal: Soft. She exhibits no distension. There is no guarding.   Musculoskeletal: She exhibits no edema or tenderness.   Lymphadenopathy:     She has no cervical adenopathy.   Neurological: She is alert and oriented to person, place, and time. No cranial nerve deficit.   Skin: Skin is warm and dry. No rash noted. She is not diaphoretic. No pallor.   Psychiatric: She has a normal mood and affect. Her behavior is normal. Judgment and thought content normal.   Nursing note and vitals reviewed.      Significant Labs:  CBC:   Recent Labs  Lab 07/04/18  0447   WBC 26.90*   RBC 2.89*   HGB 9.0*   HCT 26.6*      MCV 92   MCH 31.3*   MCHC 33.9     CMP:   Recent Labs  Lab 07/04/18  0447   *   CALCIUM 8.3*   ALBUMIN 1.3*   PROT 4.9*   *   K 3.9   CO2 21*      BUN 55*   CREATININE 0.9   ALKPHOS 46*   ALT 20   AST 73*   BILITOT 1.3*       Significant Diagnostics:  I have reviewed all pertinent imaging results/findings within the past 24 hours.    Assessment/Plan:     * Abdominal pain              Severe malnutrition    Tube feeds        Abdominal mass    POD 6 right colectomy/j tube abscess drainage    Hold tube feeds.  CT abd and pelvis today to R/O collection            Kumar Torres MD  General Surgery  [facility]

## 2018-07-04 NOTE — PROGRESS NOTES
07/03/18 1946   Patient Assessment/Suction   Level of Consciousness (AVPU) alert   PRE-TX-O2-ETCO2   O2 Device (Oxygen Therapy) room air   SpO2 100 %   Pulse Oximetry Type Intermittent   Pulse 78   Resp 18   Incentive Spirometer   $ Incentive Spirometer Charges refused  (pain)

## 2018-07-04 NOTE — PT/OT/SLP PROGRESS
Speech Language Pathology      Sis Teixeira  MRN: [id number]    Patient not seen today secondary to NPO status per RN  . Will follow-up 7/5/18.    IZZY Apple

## 2018-07-05 PROBLEM — T81.31XS SURGICAL WOUND BREAKDOWN, SEQUELA: Status: ACTIVE | Noted: 2018-07-05

## 2018-07-05 LAB
ALBUMIN SERPL BCP-MCNC: 1.4 G/DL
ALP SERPL-CCNC: 60 U/L
ALT SERPL W/O P-5'-P-CCNC: 25 U/L
ANION GAP SERPL CALC-SCNC: 9 MMOL/L
AST SERPL-CCNC: 92 U/L
BASOPHILS # BLD AUTO: 0 K/UL
BASOPHILS NFR BLD: 0.1 %
BILIRUB SERPL-MCNC: 1.2 MG/DL
BUN SERPL-MCNC: 49 MG/DL
CALCIUM SERPL-MCNC: 8.6 MG/DL
CHLORIDE SERPL-SCNC: 104 MMOL/L
CO2 SERPL-SCNC: 21 MMOL/L
CREAT SERPL-MCNC: 0.9 MG/DL
DIFFERENTIAL METHOD: ABNORMAL
EOSINOPHIL # BLD AUTO: 0.2 K/UL
EOSINOPHIL NFR BLD: 1.6 %
ERYTHROCYTE [DISTWIDTH] IN BLOOD BY AUTOMATED COUNT: 19.7 %
EST. GFR  (AFRICAN AMERICAN): >60 ML/MIN/1.73 M^2
EST. GFR  (NON AFRICAN AMERICAN): >60 ML/MIN/1.73 M^2
GLUCOSE SERPL-MCNC: 139 MG/DL
HCT VFR BLD AUTO: 27.3 %
HGB BLD-MCNC: 9.5 G/DL
LYMPHOCYTES # BLD AUTO: 3 K/UL
LYMPHOCYTES NFR BLD: 20 %
MAGNESIUM SERPL-MCNC: 2.2 MG/DL
MCH RBC QN AUTO: 32.3 PG
MCHC RBC AUTO-ENTMCNC: 34.9 G/DL
MCV RBC AUTO: 93 FL
MONOCYTES # BLD AUTO: 0.5 K/UL
MONOCYTES NFR BLD: 3.2 %
NEUTROPHILS # BLD AUTO: 11.1 K/UL
NEUTROPHILS NFR BLD: 75.1 %
PHOSPHATE SERPL-MCNC: 3.6 MG/DL
PLATELET # BLD AUTO: 238 K/UL
PMV BLD AUTO: 7.8 FL
POCT GLUCOSE: 142 MG/DL (ref 70–110)
POCT GLUCOSE: 155 MG/DL (ref 70–110)
POCT GLUCOSE: 192 MG/DL (ref 70–110)
POTASSIUM SERPL-SCNC: 3.8 MMOL/L
PROT SERPL-MCNC: 5.5 G/DL
RBC # BLD AUTO: 2.96 M/UL
SODIUM SERPL-SCNC: 134 MMOL/L
WBC # BLD AUTO: 14.8 K/UL

## 2018-07-05 PROCEDURE — 63700000 PHARM REV CODE 250 ALT 637 W/O HCPCS: Performed by: INTERNAL MEDICINE

## 2018-07-05 PROCEDURE — 83735 ASSAY OF MAGNESIUM: CPT

## 2018-07-05 PROCEDURE — 99900035 HC TECH TIME PER 15 MIN (STAT)

## 2018-07-05 PROCEDURE — 97110 THERAPEUTIC EXERCISES: CPT

## 2018-07-05 PROCEDURE — 12000002 HC ACUTE/MED SURGE SEMI-PRIVATE ROOM

## 2018-07-05 PROCEDURE — 63600175 PHARM REV CODE 636 W HCPCS: Performed by: HOSPITALIST

## 2018-07-05 PROCEDURE — 84100 ASSAY OF PHOSPHORUS: CPT

## 2018-07-05 PROCEDURE — 94799 UNLISTED PULMONARY SVC/PX: CPT

## 2018-07-05 PROCEDURE — C9113 INJ PANTOPRAZOLE SODIUM, VIA: HCPCS | Performed by: SURGERY

## 2018-07-05 PROCEDURE — 25000003 PHARM REV CODE 250: Performed by: INTERNAL MEDICINE

## 2018-07-05 PROCEDURE — 25000003 PHARM REV CODE 250: Performed by: HOSPITALIST

## 2018-07-05 PROCEDURE — 94761 N-INVAS EAR/PLS OXIMETRY MLT: CPT

## 2018-07-05 PROCEDURE — 63600175 PHARM REV CODE 636 W HCPCS: Performed by: SURGERY

## 2018-07-05 PROCEDURE — 97530 THERAPEUTIC ACTIVITIES: CPT

## 2018-07-05 PROCEDURE — 63600175 PHARM REV CODE 636 W HCPCS: Performed by: INTERNAL MEDICINE

## 2018-07-05 PROCEDURE — 80053 COMPREHEN METABOLIC PANEL: CPT

## 2018-07-05 PROCEDURE — 97116 GAIT TRAINING THERAPY: CPT

## 2018-07-05 PROCEDURE — 36415 COLL VENOUS BLD VENIPUNCTURE: CPT

## 2018-07-05 PROCEDURE — 85025 COMPLETE CBC W/AUTO DIFF WBC: CPT

## 2018-07-05 PROCEDURE — A4217 STERILE WATER/SALINE, 500 ML: HCPCS | Performed by: HOSPITALIST

## 2018-07-05 RX ORDER — FUROSEMIDE 10 MG/ML
20 INJECTION INTRAMUSCULAR; INTRAVENOUS ONCE
Status: COMPLETED | OUTPATIENT
Start: 2018-07-05 | End: 2018-07-05

## 2018-07-05 RX ADMIN — SODIUM BICARBONATE 650 MG TABLET 650 MG: at 09:07

## 2018-07-05 RX ADMIN — SODIUM BICARBONATE 650 MG TABLET 650 MG: at 02:07

## 2018-07-05 RX ADMIN — PIPERACILLIN SODIUM AND TAZOBACTAM SODIUM 4.5 G: 4; .5 INJECTION, POWDER, LYOPHILIZED, FOR SOLUTION INTRAVENOUS at 06:07

## 2018-07-05 RX ADMIN — CALCIUM GLUCONATE: 94 INJECTION, SOLUTION INTRAVENOUS at 11:07

## 2018-07-05 RX ADMIN — PANTOPRAZOLE SODIUM 40 MG: 40 INJECTION, POWDER, LYOPHILIZED, FOR SOLUTION INTRAVENOUS at 06:07

## 2018-07-05 RX ADMIN — PIPERACILLIN SODIUM AND TAZOBACTAM SODIUM 4.5 G: 4; .5 INJECTION, POWDER, LYOPHILIZED, FOR SOLUTION INTRAVENOUS at 10:07

## 2018-07-05 RX ADMIN — DORZOLAMIDE HYDROCHLORIDE AND TIMOLOL MALEATE 1 DROP: 20; 5 SOLUTION/ DROPS OPHTHALMIC at 09:07

## 2018-07-05 RX ADMIN — VITAMIN D, TAB 1000IU (100/BT) 1000 UNITS: 25 TAB at 09:07

## 2018-07-05 RX ADMIN — FUROSEMIDE 20 MG: 10 INJECTION, SOLUTION INTRAMUSCULAR; INTRAVENOUS at 02:07

## 2018-07-05 RX ADMIN — FENOFIBRATE 160 MG: 160 TABLET ORAL at 09:07

## 2018-07-05 RX ADMIN — ENOXAPARIN SODIUM 40 MG: 100 INJECTION SUBCUTANEOUS at 05:07

## 2018-07-05 RX ADMIN — BRINZOLAMIDE 1 DROP: 10 SUSPENSION/ DROPS OPHTHALMIC at 09:07

## 2018-07-05 RX ADMIN — FLUCONAZOLE IN SODIUM CHLORIDE 200 MG: 2 INJECTION, SOLUTION INTRAVENOUS at 06:07

## 2018-07-05 RX ADMIN — INSULIN DETEMIR 6 UNITS: 100 INJECTION, SOLUTION SUBCUTANEOUS at 11:07

## 2018-07-05 RX ADMIN — LATANOPROST 1 DROP: 50 SOLUTION OPHTHALMIC at 09:07

## 2018-07-05 RX ADMIN — PIPERACILLIN SODIUM AND TAZOBACTAM SODIUM 4.5 G: 4; .5 INJECTION, POWDER, LYOPHILIZED, FOR SOLUTION INTRAVENOUS at 02:07

## 2018-07-05 RX ADMIN — SIMVASTATIN 10 MG: 10 TABLET, FILM COATED ORAL at 09:07

## 2018-07-05 RX ADMIN — LEVOTHYROXINE SODIUM 25 MCG: 25 TABLET ORAL at 09:07

## 2018-07-05 RX ADMIN — SODIUM BICARBONATE 650 MG TABLET 650 MG: at 05:07

## 2018-07-05 RX ADMIN — FLUTICASONE PROPIONATE 50 MCG: 50 SPRAY, METERED NASAL at 09:07

## 2018-07-05 NOTE — NURSING
"Upon arrival into patient room, patient attempting to cough up "phlegm"; patient can get phlegm to mouth but unable to spit it out. Secured chat Dr. Graza to see about yaunker suction mouth PRN. Awaiting response, will continue to monitor.   0759-Dr. Garza gave orders to set yaunker suction up via secure chat.   "

## 2018-07-05 NOTE — PLAN OF CARE
Problem: Patient Care Overview  Goal: Plan of Care Review  Outcome: Ongoing (interventions implemented as appropriate)  POC reviewed with pt and parents at bedside, pt unable to verbalize understanding, parents verbalized understanding. Safety maintained throughout shift, bed locked and in lowest position, call light in reach, Side rails up X 2. HOB elevated 30 degrees. Pt remained free of fall/trauma. Pt denies pain this shift. CT and chest x-ray done this shift. VSS, pt remained afebrile this shift. Pt had frequent loose BM. Wound to buttocks cleansed and dressing changed. Dressing applied to blister to left thigh. PICC to Right arm clean, dry and intact, blood return noted. Pt NPO. J-tube to right abdomen in place, dark green drainage, residual 5ml, Dr. Torres aware of drainage. J-tube leaking at connection site on tube, Dr. Torres made aware, no new orders given.  IV TPN and IV abx  infused as ordered. POCT glucose checked, no coverage needed. Will continue to monitor.

## 2018-07-05 NOTE — PT/OT/SLP PROGRESS
Speech Language Pathology      Sis Teixeira  MRN: [id number]    Patient not seen today secondary to NPO. Will follow-up 7/6/18.    Karmen Goldstein, MECHE-SLP

## 2018-07-05 NOTE — PLAN OF CARE
Problem: Patient Care Overview  Goal: Plan of Care Review  Plan of care reviewed with patient and pts family. VSS. NAD noted. TPN/antibiotics infusing per MD orders. Oral meds given per J tube. J tube leaking at start of shift but replaced by Dr Smith during shift. Tube feeding restarted pt tolerating well. PICC remains in place no redness or swelling noted. No reports of N/V throughout shift. Drain pouch applied to incision site of a Hitesh-Strong drain by wound care nurse. Accuchecks performed as ordered. Pt remained free from falls/injury throughout shift. SR up x 2. Call light within reach. Family at bedside.

## 2018-07-05 NOTE — PLAN OF CARE
Problem: Patient Care Overview  Goal: Plan of Care Review  Outcome: Ongoing (interventions implemented as appropriate)  POC reviewed with patient and family at bedside.  TPN/ antibiotics infusing as ordered.  R upper arm PICC patent with no redness or swelling noted.  No reports of pain or N/V this shift.  Dressing to right side changed x3; applied kerlix fluff and abd pads.  Midline incision intact with no drainage.  Dressing to sacrum CDI.  Incontinence care performed PRN.  Accuchecks monitored.  SSI parameters not met.  J-tube leaking green thick liquid.  No new skin breakdown noted this shift.  Bed in lowest position, brakes locked, call light in reach.  Will continue to monitor.

## 2018-07-05 NOTE — SUBJECTIVE & OBJECTIVE
Interval History: Looking much better today    Medications:  Continuous Infusions:   TPN ADULT CENTRAL LINE CUSTOM 25 mL/hr at 07/04/18 2224    TPN ADULT CENTRAL LINE CUSTOM       Scheduled Meds:   brinzolamide  1 drop Both Eyes BID    dorzolamide-timolol 2-0.5%  1 drop Both Eyes BID    enoxaparin  40 mg Subcutaneous Daily    fenofibrate  160 mg Oral Daily    fluconazole (DIFLUCAN) IVPB  200 mg Intravenous Q24H    fluticasone  1 spray Each Nare Daily    insulin detemir U-100  6 Units Subcutaneous QHS    latanoprost  1 drop Both Eyes QHS    levothyroxine  25 mcg Oral Before breakfast    pantoprazole  40 mg Intravenous Before breakfast    piperacillin-tazobactam 4.5 g in dextrose 5 % 100 mL IVPB (ready to mix system)  4.5 g Intravenous Q8H    promethazine (PHENERGAN) IVPB  12.5 mg Intravenous Once    simvastatin  10 mg Oral QHS    sodium bicarbonate  650 mg Oral QID    vitamin D  1,000 Units Oral Daily     PRN Meds:sodium chloride, acetaminophen, dextrose 50%, dextrose 50%, diphenoxylate-atropine 2.5-0.025 mg, glucagon (human recombinant), glucose, glucose, insulin aspart U-100, ondansetron, prochlorperazine, sodium chloride 0.9%     Review of patient's allergies indicates:   Allergen Reactions    Sulfa (sulfonamide antibiotics) Rash    Tetracyclines Rash     Objective:     Vital Signs (Most Recent):  Temp: 97.4 °F (36.3 °C) (07/05/18 1137)  Pulse: 79 (07/05/18 1137)  Resp: 18 (07/05/18 1137)  BP: 134/62 (07/05/18 1137)  SpO2: 97 % (07/05/18 1137) Vital Signs (24h Range):  Temp:  [97.4 °F (36.3 °C)-98.7 °F (37.1 °C)] 97.4 °F (36.3 °C)  Pulse:  [79-87] 79  Resp:  [14-18] 18  SpO2:  [94 %-99 %] 97 %  BP: (130-153)/(60-65) 134/62     Weight: 69.9 kg (154 lb)  Body mass index is 31.1 kg/m².    Intake/Output - Last 3 Shifts       07/03 0700 - 07/04 0659 07/04 0700 - 07/05 0659 07/05 0700 - 07/06 0659    P.O. 60 0 0    I.V. (mL/kg) 10 (0.1)      NG/GT 31  50    IV Piggyback 300 300 300    TPN 1413.5 2101  215    Total Intake(mL/kg) 1814.5 (26) 2401 (34.3) 565 (8.1)    Urine (mL/kg/hr) 625 (0.4)      Emesis/NG output 250 (0.1)      Drains 310 (0.2) 90 (0.1)     Total Output 1185 90      Net +629.5 +2311 +565           Urine Occurrence 2 x 10 x 8 x    Stool Occurrence 3 x 4 x 1 x    Emesis Occurrence   0 x          Physical Exam   Constitutional: She is oriented to person, place, and time. She appears well-developed and well-nourished. No distress.   HENT:   Head: Normocephalic and atraumatic.   Right Ear: External ear normal.   Left Ear: External ear normal.   Eyes: Conjunctivae are normal. Pupils are equal, round, and reactive to light. Right eye exhibits no discharge. Left eye exhibits no discharge.   Neck: No tracheal deviation present. No thyromegaly present.   Cardiovascular: Normal rate and regular rhythm.    Pulmonary/Chest: Effort normal. No respiratory distress.   Abdominal: Soft. She exhibits no distension. There is no guarding.   Musculoskeletal: She exhibits no edema or tenderness.   Lymphadenopathy:     She has no cervical adenopathy.   Neurological: She is alert and oriented to person, place, and time. No cranial nerve deficit.   Skin: Skin is warm and dry. No rash noted. She is not diaphoretic. No pallor.   Psychiatric: She has a normal mood and affect. Her behavior is normal. Judgment and thought content normal.   Nursing note and vitals reviewed.      Significant Labs:  CBC:   Recent Labs  Lab 07/05/18  0518   WBC 14.80*   RBC 2.96*   HGB 9.5*   HCT 27.3*      MCV 93   MCH 32.3*   MCHC 34.9     CMP:   Recent Labs  Lab 07/05/18  0518   *   CALCIUM 8.6*   ALBUMIN 1.4*   PROT 5.5*   *   K 3.8   CO2 21*      BUN 49*   CREATININE 0.9   ALKPHOS 60   ALT 25   AST 92*   BILITOT 1.2*       Significant Diagnostics:  I have reviewed all pertinent imaging results/findings within the past 24 hours.

## 2018-07-05 NOTE — NURSING
Dr. Torres contacted to report copious leaking at JESUS drain site  and opening at JESUS drain site of 4 inch long. New orders given to D/C JESUS drain and cover with Guaze and abd pads

## 2018-07-05 NOTE — CONSULTS
Consult to Wound Care for pouching of an incision at the site of a Hitesh-Strong drain. J-P drain was removed due to problems with excessive drainage. Dr. Torres requested a pouch be applied to help contain the drainage. Assisted by Bela WETZEL. Pictures taken.    Right Flank drainage site - Clean, intact skin around incision that measured 6 cm X 0.1 cm. Serosanguinous drainage noted on the saturated dressing removed. Cleaned area with wound cleanser, dried. Applied skin barrier protectant, dried. Made a pattern with a transparent sheet, off-set the drawing onto the fistula pouch. Cut to size. Applied stoma paste to the large crease in patient's skin below the incision to help with a good seal. Placed fistula pouch over incision, obtaining a good seal. Drainage into pouch noted. The fistula pouch should hold a good seal for 5-7 days. The pouch drain is across the patient's abdomen, in order to prevent patient from lying on it.    Have left 3 more fistula pouches in patient's room, along with Cavilon Skin Protectant, a transparent pattern, and stoma paste. Demonstrated cutting the pouch opening to family member who was at bedside during the dressing change.    Recommend: Incision Right Flank Side - Empty pouch when 1/3 full. Change pouch when no longer holding a good seal, or every 5-7 days, which ever comes first. Clean area with wound cleanser, dry. Apply Stoma paste to the large crease below the incision. Using the transparent sheet, draw a 6 cm line onto the fistula pouch. Cut along the line. Apply Cavilon skin Protectant, dry. Place fistula pouch over incision, with the drainage across patient's abdomen.    Additionally, the midline incision was dry, intact, with 26 staples.    Patient's parents arrived just as the pouch was applied. The were relieved the drainage will be better controlled, than with a dressing.    Asya Manley RN, CWOCN

## 2018-07-05 NOTE — NURSING
Dressing to right side changed x3 this shift.  Each time 1 pack fluff and 3 abd pads used.  Completely saturated with serosanguineous fluid each change.

## 2018-07-05 NOTE — PT/OT/SLP PROGRESS
Physical Therapy Treatment    Patient Name:  Sis Teixeira   MRN:  [id number]    Recommendations:     Discharge Recommendations:   (TBD)   Discharge Equipment Recommendations: none       Assessment:     Sis Teixeira is a 58 y.o. female admitted with a medical diagnosis of Carcinoma of small intestine, including duodenum.  She presents with the following impairments/functional limitations:  weakness, impaired endurance, impaired self care skills, impaired functional mobilty, gait instability, impaired balance, impaired cognition, decreased safety awareness, pain, decreased ROM . Pt able to increase gait and overall activity tolerance today.    Rehab Prognosis:  good; patient would benefit from acute skilled PT services to address these deficits and reach maximum level of function.      Recent Surgery: Procedure(s) (LRB):  HEMICOLECTOMY, RIGHT (N/A)  CHOLECYSTECTOMY (N/A)  INSERTION, JEJUNOSTOMY TUBE (N/A) 7 Days Post-Op    Plan:     During this hospitalization, patient to be seen daily to address the above listed problems via gait training, therapeutic activities, therapeutic exercises  · Plan of Care Expires:  07/14/18   Plan of Care Reviewed with: patient, family, mother    Subjective     Communicated with nurses Bela and April prior to session.  Patient found supine upon PT entry to room, agreeable to treatment.      Chief Complaint: abdominal pain  Patient comments/goals: pt agreeable to OOB transfer. Pt expressed that it feels good to move around  Pain/Comfort:  · Pain Rating 1:  (did not rate, pointed to and expressed abdominal pain)  · Location - Orientation 1: generalized  · Location 1: abdomen  · Pain Addressed 1: Reposition, Distraction, Cessation of Activity, Nurse notified    Patients cultural, spiritual, Jewish conflicts given the current situation: none    Objective:     Patient found with: PEG Tube, pressure relief boots, PICC line, telemetry     General Precautions: Standard, fall    Orthopedic Precautions:N/A   Braces: N/A     Functional Mobility:  · Bed Mobility:     · Rolling Left:  maximal assistance  · Scooting: minimum assistance, moderate assistance and to EOB  · Supine to Sit: moderate assistance     · Transfers:     · Sit to Stand:  moderate assistance and of 2 persons with hand-held assist  · Bed to Chair: minimum assistance with  hand-held assist  using  Stand Pivot   ·   · Gait: 25' within room with min A, HHA of 2 for safety/stability and IV management        Therapeutic Activities and Exercises:   pt assisted to EOB with increased time and difficulty 2' pain   pt sat EOB with CGA for balance and min A to scoot closer to EOB and get feet on floor   pt then proceeded to gait training   pt assisted to bedside chair with personal pillow underneath pt, attempted to scoot pt in chair, however was unable 2' improper fit. Pt assisted to standing again with mod A of 2, personal pillow was taken out of chair and pt was assisted to chair  again.     Seated BLE therex: AP, LAQ, hip flex, hip abd/add x 10 reps each      Patient left up in chair with all lines intact, call button in reach, nursing notified and family present..    GOALS:    Physical Therapy Goals        Problem: Physical Therapy Goal    Goal Priority Disciplines Outcome Goal Variances Interventions   Physical Therapy Goal     PT/OT, PT Ongoing (interventions implemented as appropriate)     Description:  Goals to be met by: 18     Patient will increase functional independence with mobility by performin. Supine to sit with Modified [place]  2. Sit to stand transfer with [place]  3. Gait  x 100 feet with Supervision.                       Time Tracking:     PT Received On: 18  PT Start Time: 1320     PT Stop Time: 1346  PT Total Time (min): 26 min     Billable Minutes: Gait Training 8, Therapeutic Activity 8 and Therapeutic Exercise 8    Treatment Type: Treatment  PT/PTA: PTA     PTA Visit Number: 4      Dianelys Reyes, PTA  07/05/2018

## 2018-07-05 NOTE — PROGRESS NOTES
07/04/18 1955   Patient Assessment/Suction   Level of Consciousness (AVPU) alert   PRE-TX-O2-ETCO2   O2 Device (Oxygen Therapy) room air   SpO2 99 %   Pulse Oximetry Type Intermittent   Pulse 84   Resp 18   Incentive Spirometer   $ Incentive Spirometer Charges refused

## 2018-07-05 NOTE — PLAN OF CARE
Problem: Physical Therapy Goal  Goal: Physical Therapy Goal  Goals to be met by: 18     Patient will increase functional independence with mobility by performin. Supine to sit with Modified [place]  2. Sit to stand transfer with [place]  3. Gait  x 100 feet with Supervision.      Outcome: Ongoing (interventions implemented as appropriate)  PT for functional mobility training, transfer to chair, gait training and therex

## 2018-07-06 PROBLEM — N17.9 AKI (ACUTE KIDNEY INJURY): Status: RESOLVED | Noted: 2018-06-20 | Resolved: 2018-07-06

## 2018-07-06 LAB
ALBUMIN SERPL BCP-MCNC: 1.3 G/DL
ALP SERPL-CCNC: 75 U/L
ALT SERPL W/O P-5'-P-CCNC: 27 U/L
ANION GAP SERPL CALC-SCNC: 9 MMOL/L
AST SERPL-CCNC: 79 U/L
BASOPHILS # BLD AUTO: 0 K/UL
BASOPHILS NFR BLD: 0.5 %
BILIRUB SERPL-MCNC: 1 MG/DL
BUN SERPL-MCNC: 44 MG/DL
CALCIUM SERPL-MCNC: 8.3 MG/DL
CHLORIDE SERPL-SCNC: 108 MMOL/L
CO2 SERPL-SCNC: 21 MMOL/L
CREAT SERPL-MCNC: 0.9 MG/DL
DIFFERENTIAL METHOD: ABNORMAL
EOSINOPHIL # BLD AUTO: 0.2 K/UL
EOSINOPHIL NFR BLD: 1.9 %
ERYTHROCYTE [DISTWIDTH] IN BLOOD BY AUTOMATED COUNT: 20.1 %
EST. GFR  (AFRICAN AMERICAN): >60 ML/MIN/1.73 M^2
EST. GFR  (NON AFRICAN AMERICAN): >60 ML/MIN/1.73 M^2
GLUCOSE SERPL-MCNC: 125 MG/DL
HCT VFR BLD AUTO: 25.9 %
HGB BLD-MCNC: 8.8 G/DL
LYMPHOCYTES # BLD AUTO: 2 K/UL
LYMPHOCYTES NFR BLD: 20.2 %
MAGNESIUM SERPL-MCNC: 2.1 MG/DL
MCH RBC QN AUTO: 31.9 PG
MCHC RBC AUTO-ENTMCNC: 34.2 G/DL
MCV RBC AUTO: 93 FL
MONOCYTES # BLD AUTO: 0.8 K/UL
MONOCYTES NFR BLD: 7.7 %
NEUTROPHILS # BLD AUTO: 7 K/UL
NEUTROPHILS NFR BLD: 69.7 %
PHOSPHATE SERPL-MCNC: 3.3 MG/DL
PLATELET # BLD AUTO: 221 K/UL
PMV BLD AUTO: 7.9 FL
POCT GLUCOSE: 179 MG/DL (ref 70–110)
POCT GLUCOSE: 225 MG/DL (ref 70–110)
POTASSIUM SERPL-SCNC: 3.7 MMOL/L
PROT SERPL-MCNC: 5.2 G/DL
RBC # BLD AUTO: 2.77 M/UL
SODIUM SERPL-SCNC: 138 MMOL/L
WBC # BLD AUTO: 10.1 K/UL

## 2018-07-06 PROCEDURE — 63600175 PHARM REV CODE 636 W HCPCS: Performed by: INTERNAL MEDICINE

## 2018-07-06 PROCEDURE — 12000002 HC ACUTE/MED SURGE SEMI-PRIVATE ROOM

## 2018-07-06 PROCEDURE — 83735 ASSAY OF MAGNESIUM: CPT

## 2018-07-06 PROCEDURE — 97116 GAIT TRAINING THERAPY: CPT

## 2018-07-06 PROCEDURE — 94761 N-INVAS EAR/PLS OXIMETRY MLT: CPT

## 2018-07-06 PROCEDURE — A4217 STERILE WATER/SALINE, 500 ML: HCPCS | Performed by: HOSPITALIST

## 2018-07-06 PROCEDURE — 85025 COMPLETE CBC W/AUTO DIFF WBC: CPT

## 2018-07-06 PROCEDURE — 63700000 PHARM REV CODE 250 ALT 637 W/O HCPCS: Performed by: INTERNAL MEDICINE

## 2018-07-06 PROCEDURE — 25000003 PHARM REV CODE 250: Performed by: HOSPITALIST

## 2018-07-06 PROCEDURE — 97803 MED NUTRITION INDIV SUBSEQ: CPT

## 2018-07-06 PROCEDURE — 94799 UNLISTED PULMONARY SVC/PX: CPT

## 2018-07-06 PROCEDURE — 25000003 PHARM REV CODE 250: Performed by: INTERNAL MEDICINE

## 2018-07-06 PROCEDURE — 63600175 PHARM REV CODE 636 W HCPCS: Performed by: SURGERY

## 2018-07-06 PROCEDURE — 84100 ASSAY OF PHOSPHORUS: CPT

## 2018-07-06 PROCEDURE — 92526 ORAL FUNCTION THERAPY: CPT

## 2018-07-06 PROCEDURE — 97110 THERAPEUTIC EXERCISES: CPT

## 2018-07-06 PROCEDURE — 80053 COMPREHEN METABOLIC PANEL: CPT

## 2018-07-06 PROCEDURE — C9113 INJ PANTOPRAZOLE SODIUM, VIA: HCPCS | Performed by: SURGERY

## 2018-07-06 PROCEDURE — 63600175 PHARM REV CODE 636 W HCPCS: Performed by: HOSPITALIST

## 2018-07-06 PROCEDURE — 97530 THERAPEUTIC ACTIVITIES: CPT

## 2018-07-06 RX ADMIN — BRINZOLAMIDE 1 DROP: 10 SUSPENSION/ DROPS OPHTHALMIC at 01:07

## 2018-07-06 RX ADMIN — ENOXAPARIN SODIUM 40 MG: 100 INJECTION SUBCUTANEOUS at 06:07

## 2018-07-06 RX ADMIN — CALCIUM GLUCONATE: 94 INJECTION, SOLUTION INTRAVENOUS at 10:07

## 2018-07-06 RX ADMIN — DORZOLAMIDE HYDROCHLORIDE AND TIMOLOL MALEATE 1 DROP: 20; 5 SOLUTION/ DROPS OPHTHALMIC at 10:07

## 2018-07-06 RX ADMIN — INSULIN DETEMIR 6 UNITS: 100 INJECTION, SOLUTION SUBCUTANEOUS at 10:07

## 2018-07-06 RX ADMIN — PIPERACILLIN SODIUM AND TAZOBACTAM SODIUM 4.5 G: 4; .5 INJECTION, POWDER, LYOPHILIZED, FOR SOLUTION INTRAVENOUS at 03:07

## 2018-07-06 RX ADMIN — SODIUM BICARBONATE 650 MG TABLET 650 MG: at 06:07

## 2018-07-06 RX ADMIN — SODIUM BICARBONATE 650 MG TABLET 650 MG: at 12:07

## 2018-07-06 RX ADMIN — BRINZOLAMIDE 1 DROP: 10 SUSPENSION/ DROPS OPHTHALMIC at 10:07

## 2018-07-06 RX ADMIN — LATANOPROST 1 DROP: 50 SOLUTION OPHTHALMIC at 10:07

## 2018-07-06 RX ADMIN — FLUCONAZOLE IN SODIUM CHLORIDE 200 MG: 2 INJECTION, SOLUTION INTRAVENOUS at 07:07

## 2018-07-06 RX ADMIN — FENOFIBRATE 160 MG: 160 TABLET ORAL at 12:07

## 2018-07-06 RX ADMIN — PANTOPRAZOLE SODIUM 40 MG: 40 INJECTION, POWDER, LYOPHILIZED, FOR SOLUTION INTRAVENOUS at 06:07

## 2018-07-06 RX ADMIN — SODIUM BICARBONATE 650 MG TABLET 650 MG: at 10:07

## 2018-07-06 RX ADMIN — PIPERACILLIN SODIUM AND TAZOBACTAM SODIUM 4.5 G: 4; .5 INJECTION, POWDER, LYOPHILIZED, FOR SOLUTION INTRAVENOUS at 06:07

## 2018-07-06 RX ADMIN — PIPERACILLIN SODIUM AND TAZOBACTAM SODIUM 4.5 G: 4; .5 INJECTION, POWDER, LYOPHILIZED, FOR SOLUTION INTRAVENOUS at 10:07

## 2018-07-06 RX ADMIN — INSULIN ASPART 1 UNITS: 100 INJECTION, SOLUTION INTRAVENOUS; SUBCUTANEOUS at 10:07

## 2018-07-06 RX ADMIN — LEVOTHYROXINE SODIUM 25 MCG: 25 TABLET ORAL at 06:07

## 2018-07-06 RX ADMIN — FLUTICASONE PROPIONATE 50 MCG: 50 SPRAY, METERED NASAL at 01:07

## 2018-07-06 RX ADMIN — VITAMIN D, TAB 1000IU (100/BT) 1000 UNITS: 25 TAB at 12:07

## 2018-07-06 RX ADMIN — SIMVASTATIN 10 MG: 10 TABLET, FILM COATED ORAL at 10:07

## 2018-07-06 RX ADMIN — DORZOLAMIDE HYDROCHLORIDE AND TIMOLOL MALEATE 1 DROP: 20; 5 SOLUTION/ DROPS OPHTHALMIC at 12:07

## 2018-07-06 NOTE — PHYSICIAN QUERY
PT Name: Sis Teixeira  MR #: [id number]  Physician Query Form - CKD Clarification     CDS/: Jeanine Galindo RN            Contact information:[email or web address]  This form is a permanent document in the medical record.     Query Date: July 6, 2018    By submitting this query, we are merely seeking further clarification of documentation. Please utilize your independent clinical judgment when addressing the question(s) below.    The Medical record contains the following:     Indicators   Supporting Clinical Findings   Location in Medical Record   x CKD or Chronic Kidney (Renal) Failure / Disease Chronic Renal Disease   Anesthesia note 6/28   x BUN/Creatinine                          GFR BUN = 23  Cr =  1.6   GFR = 36  BUN = 23  Cr =  1.4   GFR = 42  BUN = 14  Cr =  1.2   GFR = 50  BUN = 48  Cr =  1.6   GFR = 36  BUN = 50  Cr =  1.1   GFR = 56  BUN = 44  Cr =  0.9   GFR = >60 Labs 6/21  Labs 6/23  Labs 6/27  Labs 6/29  Labs 7/1  Labs 7/6    Dehydration      Nausea / Vomiting      Dialysis / CRRT      Medication      Treatment      Other Chronic Conditions     x Other CA of small intestine including duodenum, Aspiration PNA , Severe malnutrition, JAYSHREE, DM2     Metabolic acidosis, Staph/Ecoli of retroperitoneal abscess Hospital Medicine PN 7/4        Infectious disease PN 6/29     Provider, please further specify the stage of CKD.      [  ] Chronic Kidney Disease (CKD) (please specify stage* below)       [employer] Definitions  Stage Description  eGFR (mL/min)   [ x ]     II Mildly reduced kidney function 60-89   [  ]     III Moderately reduced kidney function 30-59     [  ] Other (please specify): ________________________  [  ] Clinically Undetermined    Please document in your progress notes daily for the duration of treatment until resolved and include in your discharge summary.

## 2018-07-06 NOTE — PLAN OF CARE
I sent a 3 day packet, current meds, PT and Speech notes and LTAC consult to ERENDIRA via [facility]. Mirta Garibay LMSW     · 7/6/2018 11:41:25 AM Under Review: Onsite Review  [email or web address]  · 7/6/2018 11:07:50 AM New: New LTAC consult. Thanks  Mirta Garibay       07/06/18 1104   Discharge Assessment   Assessment Type Discharge Planning Reassessment

## 2018-07-06 NOTE — PROGRESS NOTES
[facility]  Adult Nutrition  Consult Note    SUMMARY     Recommendations    1) Custom TPN: 4% AA, 9% dextrose with 20% lipids (250 mls over 12 hrs)  First 24 hrs rate of 25 mls/hr to provide 50% of EEN.    To prevent refeeding syndrome recommend:  adjust Peptamen 1.5 Prebio to 10 mls/hr to meet no more than 75% EEN x 24 hrs,   then advance to 20 mls/hr to provide no more than 100% EEN after 24 hrs in an effort to prevent refeeding syndrome.   Advance toward EEN per pt tolerance.    TPN + Enteral @ 10 mls/hr provides 71% EEN.  TPN + Enteral @ 20  Mls/hr provides 94% EEN.    Monitor lytes for possible refeeding syndrome.      2) When medically appropriate transition to enteral feeds, Peptamen 1.5 Prebio with slow progression to 45 mls/hr.      3) Follow up 7/9/18      Goals: 1) Pt will consume/receive at least 50% EEN by RD f/u. 2) Pt will transition to enteral feeds within 72 hrs 3) pt will tolerate nutrition support    Nutrition Goal Status: 1) met  2) continues 3) new    Communication of RD Recs: reviewed with RN (POC, reviewed with MD, second sign )    Reason for Assessment    Reason for Assessment:  RD follow up  1. Abdominal wall abscess    2. Abdominal pain    3. Right upper quadrant abdominal mass      Past Medical History:   Diagnosis Date    Congenital small head     Diabetes mellitus     dIET CONTROL     General Information Comments: Admitted with abdominal pain.  s/p surgery to drain large abscess.  Family reports poor intake x > 1 month and has not eaten well since admit.  Per MD H&P, pt has lost 50 lbs x 6 months. Pt's mom reports wt of 181 x 3 months ago.     Pt had procedure this afternoon, where a possible cecal mass vs less likely diverticular bleeding was found per MD note.Consult for PICC line sent.   6/28/18: Pt NPO and solely on TPN. Blood sugars are running high. Calorie needs exceeded.   6/29/18: Pt NPO. S/P hemicolectomy rt, cholecystectomy.  Blood sugars continue to  "run high, Altered electrolytes.  Possible refeeding 2/2 excess calories?  MD directed to reduce dextrose to 5% and initiate trickle feeds. Per Infectious Disease MD note pt also has JAYSHREE.   7/3/18 RN reported pt having frequent diarrhea. (x 6 today) that is the same color of the TF.  Adjusted TF rec to more hydrolyzed formula at a lower rate.  Discussed with RN and MD.  Pt has now progressed to nectar thick liquids, but is not receptive to them now.  Spoke with family, encouraging trial po.  18 Pt had emesis this am. TF stopped.  Discussed TPN options with MD.   18: Enteral feeds initiated. Not tolerated at goal, running at 30 mls plus TPN.  Overfeeding.  Spoke with MD for adjustment of nutrition support.   Nutrition Risk Screen    Nutrition Risk Screen: other (see comments)    Nutrition/Diet History    Patient Reported Diet/Restrictions/Preferences: general  Food Preferences: No spicy foods.  Uses [place] Instant Breakfast at home to increase calories.   Do you have any cultural, spiritual, Rastafari conflicts, given your current situation?: none  Food Allergies: NKFA  Factors Affecting Nutritional Intake: abdominal pain, decreased appetite    Anthropometrics    Temp: 98.5 °F (36.9 °C)  Height Method: Stated  Height: 4' 11"  Height (inches): 59 in  Weight Method: Bed Scale  Weight: 69.9 kg (154 lb)  Weight (lb): 154 lb  Ideal Body Weight (IBW), Female: 95 lb  % Ideal Body Weight, Female (lb): 142.48 lb  BMI (Calculated): 27.4  BMI Grade: 25 - 29.9 - overweight  Weight Loss: unintentional  Usual Body Weight (UBW), k.45 kg (per MD H&P note of wt loss of 50 lbs x 6 months)  % Usual Body Weight: 72.86  % Weight Change From Usual Weight: -27.29 %       Lab/Procedures/Meds    Pertinent Labs Reviewed: reviewed  Lab Results   Component Value Date    ALBUMIN 1.3 (L) 2018     Lab Results   Component Value Date    .3 (H) 2018     POCT Glucose   Date Value Ref Range Status   2018 142 (H) " 70 - 110 mg/dL Final   07/05/2018 192 (H) 70 - 110 mg/dL Final   07/05/2018 155 (H) 70 - 110 mg/dL Final   07/04/2018 145 (H) 70 - 110 mg/dL Final   07/04/2018 181 (H) 70 - 110 mg/dL Final   07/04/2018 157 (H) 70 - 110 mg/dL Final   07/04/2018 198 (H) 70 - 110 mg/dL Final   07/03/2018 241 (H) 70 - 110 mg/dL Final   07/03/2018 257 (H) 70 - 110 mg/dL Final   .    Pertinent Medications Reviewed: reviewed  Scheduled Meds:   brinzolamide  1 drop Both Eyes BID    dorzolamide-timolol 2-0.5%  1 drop Both Eyes BID    enoxaparin  40 mg Subcutaneous Daily    fenofibrate  160 mg Oral Daily    fluconazole (DIFLUCAN) IVPB  200 mg Intravenous Q24H    fluticasone  1 spray Each Nare Daily    insulin detemir U-100  6 Units Subcutaneous QHS    latanoprost  1 drop Both Eyes QHS    levothyroxine  25 mcg Oral Before breakfast    pantoprazole  40 mg Intravenous Before breakfast    piperacillin-tazobactam 4.5 g in dextrose 5 % 100 mL IVPB (ready to mix system)  4.5 g Intravenous Q8H    promethazine (PHENERGAN) IVPB  12.5 mg Intravenous Once    simvastatin  10 mg Oral QHS    sodium bicarbonate  650 mg Oral QID    vitamin D  1,000 Units Oral Daily     Continuous Infusions:   TPN ADULT CENTRAL LINE CUSTOM 25 mL/hr at 07/05/18 2300    TPN ADULT CENTRAL LINE CUSTOM       Physical Findings/Assessment    Overall Physical Appearance: nourished (NFPE completed.  No fat or muscle loss noted. )  Skin:  (Wolfgang score 17)    Estimated/Assessed Needs    Weight Used For Calorie Calculations: 61.4 kg (135 lb 5.8 oz)  Energy Need Method: [facility]: 1104.62 x 1.3-1.0=7707-6579  Weight Used For Protein Calculations: 61.4 kg (135 lb 5.8 oz)   1.0-1.2 gm/kg/day: 61-74  (until JAYSHREE resolves, then increase to 1.2-1.5 gm/kg/day)  Fluid Need Method: RDA Method (or per MD rec)  CHO Requirement: 45-50% EEN or GIR <5    Nutrition Prescription Ordered    Current Diet Order: consistent carbohydrate    Evaluation of Received Nutrient/Fluid  Intake    Custom TPN 4% AA, 9% dextrose @ 50mls/hr with 20% lipids (250 mls)  Provides 780 calories, 24 gms protein, 600 mls fluid, GIR 0.61    Enteral feeding: Peptamen 1.5 Prebio @ 30 mls/hr provides   Provides 1080 calories,49 gms protein, 132 gms CHO, 554 mls free water.     Total TPN + Enteral: 1860 calories, 73 gms protein, 1154 mls fluid.      Energy Calories Required: exceeding needs  Protein Required: meets needs  Fluid Required: meeting needs  CHO Required: meets needs    Intake/Output Summary (Last 24 hours) at 07/06/18 1138  Last data filed at 07/06/18 0640   Gross per 24 hour   Intake          1274.35 ml   Output              650 ml   Net           624.35 ml     % Intake of Estimated Energy Needs: >100%  % Meal Intake: NPO    Nutrition Risk    Level of Risk/Frequency of Follow-up:  (2 x wkly)     Assessment and Plan    Severe malnutrition    Malnutrition in the context of acute illness    Related to (etiology):  Decreased intake 2/2 abdominal pain    Signs and Symptoms (as evidenced by):  Energy Intake: <50% of estimated energy requirement for >5 days  Body Fat Depletion: none noted  Muscle Mass Depletion: none noted  Weight Loss: 27.29% x 6 months (per MD H&P note)  Fluid Accumulation: 2+ edema    Interventions/Recommendations (treatment strategy):  1) Continue parenteral nutrition at current rate with 20% lipids (250 mls) and adjust Peptamen 1.5 Prebio to meet no more than 75% EEN x 24 hrs, then advance to no more than 100% EEN after 24 hrs in an effort to prevent refeeding syndrome.     Nutrition Diagnosis Status:  Continues                 Monitor and Evaluation    Food and Nutrient Intake: energy intake  Food and Nutrient Adminstration: diet order  Anthropometric Measurements: weight, weight change  Biochemical Data, Medical Tests and Procedures: glucose/endocrine profile, inflammatory profile  Nutrition-Focused Physical Findings: overall appearance, skin     Discharge Plan    To be  determined    RD Follow-up?: Yes

## 2018-07-06 NOTE — PLAN OF CARE
07/06/18 1224   Patient Assessment/Suction   Level of Consciousness (AVPU) alert   PRE-TX-O2-ETCO2   O2 Device (Oxygen Therapy) room air   SpO2 100 %   Pulse Oximetry Type Intermittent   $ Pulse Oximetry - Multiple Charge Pulse Oximetry - Multiple   Incentive Spirometer   $ Incentive Spirometer Charges done with encouragement   Administration (Incentive Spirometer) done with encouragement   Number of Repetitions (Incentive Spirometer) 18   Level (mL) (Incentive Spirometer) 250   Patient Tolerance fair  (does not follow commands well to slow down and breathe deepe)

## 2018-07-06 NOTE — PROGRESS NOTES
"Progress Note  [facility]    Admit Date: 6/20/2018    SUBJECTIVE:     Follow-up For:  Carcinoma of small intestine, including duodenum      Interval history (See H&P for complete P,F,SHx) : The the patient seen and examined.  The overnight, continues to have cough productive of thick, green mucus.  Also complains of some mild abdominal distention and pain. J-tube site is leaking as well as JESUS drain site.  Part of J-tube seems to have had malfunction.  Discussed plan of care with General surgery as well as dietitian.  Plan of care discussed the patient at the bedside nurse as well as her mom and dad in detail.     Review of Systems: Systems were reviewed and otherwise negative unless indicated below.  Pain scale: 0/10  Gen- Weakness/ Fatigue  Resp: Cough/ SOB      OBJECTIVE:     Vital Signs Range (Last 24H):  Temp:  [96.6 °F (35.9 °C)-98.7 °F (37.1 °C)]   Pulse:  [79-87]   Resp:  [16-18]   BP: (127-145)/(51-65)   SpO2:  [94 %-97 %]     I & O (Last 24H):    Intake/Output Summary (Last 24 hours) at 07/05/18 2149  Last data filed at 07/05/18 1843   Gross per 24 hour   Intake          1447.68 ml   Output                0 ml   Net          1447.68 ml       Estimated body mass index is 31.1 kg/m² as calculated from the following:    Height as of this encounter: 4' 11" (1.499 m).    Weight as of this encounter: 69.9 kg (154 lb).    Physical Exam:  General exam-microcephalic  female who appears alert and oriented but nonverbal.  Responds  nonverbally to commands and questions.  Cardiovascular-regular rate rhythm, no murmurs  Respiratory-Wet, bilateral rhonchi worse in the lower lung fields with mildly increased work of breathing noted.  Abdomen-ostomy bag noted over JESUS drain site which is leaking clear fluid.  Laparotomy site appears to be stable with no erythema or drainage. J-tube also appears to be stable with no erythema or drainage.  Extremities-2+ pretibial pitting edema noted " bilaterally.    Laboratory/Diagnostic Data:  Reviewed and noted in plan where applicable- Please see chart for full lab data.    Medications:  Medication list was reviewed and changes noted under Assessment/Plan.    ASSESSMENT/PLAN:     Active Problems:    Active Hospital Problems    Diagnosis  POA    *Carcinoma of small intestine, including duodenum [C17.8] patient post op from cancer resection and J-tube placement.  Surgery for wound management and monitor patient closely.  Patient in persistent catabolic state secondary to active cancer and wound healing is likely going to be poor.  Yes    Anasarca [R60.1] patient with pulmonary edema likely secondary to volume overload from anasarca. CXR improved..  Will give another 20 mg IV Lasix to help facilitate diuresis and monitor renal function closely.  Yes    Aspiration pneumonia [J69.0] white count appears to be improving, he and patient remains symptomatically improved.  Chest x-ray also appears improved.  Will continue current antibiotics and monitor closely.  No    Severe malnutrition [E43] patient made NPO after aspiration event occurred 2 days ago.  Difficult to assume why aspiration event occurred if the patient is on transpyloric feeding-  more likely that it was patient's own secretions.  Continue NPO for now, but will resume J-tube feeds once general surgery able to adjust the J-tube site malfunction.  Continue TPN for now.  Monitor BUN closely.  Albumin remains markedly low.  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  May predispose patient to aspiration.  Not Applicable    Diabetes mellitus 2 [E11.9]   Patient's FSGs are controlled on current hypoglycemics.   Last A1c reviewed-   Lab Results   Component Value Date    HGBA1C 4.4 06/20/2018     Most recent fingerstick glucose reviewed-     Recent Labs  Lab 07/04/18  2303 07/05/18  1120 07/05/18  1707   POCTGLUCOSE 145* 155* 192*     Current correctional scale  Low  Maintain anti-hyperglycemic  dose as follows-   Antihyperglycemics     Start     Stop Route Frequency Ordered    06/30/18 0215  insulin aspart U-100 pen 0-5 Units      -- SubQ Every 6 hours PRN 06/30/18 0101    06/29/18 1612  insulin detemir U-100 pen 6 Units      -- SubQ Nightly 06/29/18 1613          Yes     dIET CONTROL            Hypokalemia-   Patient has hypokalemia which is currently controlled. Last electrolytes reviewed-   Recent Labs  Lab 07/04/18  0447 07/05/18  0518   K 3.9 3.8   . Will replace potassium and monitor electrolytes closely.         JAYSHREE (acute kidney injury) [N17.9]- Patient with JAYSHREE likely d/t IVVD  Which is currently controlled. Labs reviewed- BMP with Estimated Creatinine Clearance: 76.1 mL/min (based on SCr of 0.9 mg/dL). according to latest data. Monitor UOP and serial BMP and adjust therapy as needed. Renally dose meds for GFR listed above.    Yes      Resolved Hospital Problems    Diagnosis Date Resolved POA   No resolved problems to display.         Disposition- unsure    VTE Risk Mitigation         Ordered     enoxaparin injection 40 mg  Daily      06/29/18 1613     IP VTE HIGH RISK PATIENT  Once      06/28/18 1834     Reason for No Pharmacological VTE Prophylaxis  Once      06/28/18 1834

## 2018-07-06 NOTE — PT/OT/SLP PROGRESS
"Physical Therapy Treatment    Patient Name:  Sis Teixeira   MRN:  [id number]    Recommendations:     Discharge Recommendations:   (TBD)   Discharge Equipment Recommendations: none       Assessment:     Sis Teixeira is a 58 y.o. female admitted with a medical diagnosis of Carcinoma of small intestine, including duodenum.  She presents with the following impairments/functional limitations:  weakness, impaired endurance, impaired self care skills, impaired functional mobilty, gait instability, impaired balance, impaired cognition, decreased safety awareness, pain, decreased ROM . Pt continues to improve with functional mobility, gait and activity tolerance.    Rehab Prognosis:  good; patient would benefit from acute skilled PT services to address these deficits and reach maximum level of function.      Recent Surgery: Procedure(s) (LRB):  HEMICOLECTOMY, RIGHT (N/A)  CHOLECYSTECTOMY (N/A)  INSERTION, JEJUNOSTOMY TUBE (N/A) 8 Days Post-Op    Plan:     During this hospitalization, patient to be seen daily to address the above listed problems via gait training, therapeutic activities, therapeutic exercises  · Plan of Care Expires:  07/14/18   Plan of Care Reviewed with: patient, father, mother    Subjective     Communicated with nurse Brown prior to session.  Patient found supine upon PT entry to room, agreeable to treatment.      Chief Complaint: abdominal pain and stating "I'm tired."  Patient comments/goals: pt agreeable to PT   Pain/Comfort:  · Pain Rating 1:  (did not rate, expressed abdominal pain)  · Location - Orientation 1: generalized  · Location 1: abdomen  · Pain Addressed 1: Reposition, Distraction, Cessation of Activity    Patients cultural, spiritual, Zoroastrianism conflicts given the current situation: none    Objective:     Patient found with: PEG Tube, pressure relief boots, PICC line, telemetry     General Precautions: Standard, fall   Orthopedic Precautions:N/A   Braces: N/A     Functional " Mobility:  · Bed Mobility:     · Scooting: minimum assistance and to EOB  · Supine to Sit: moderate assistance    · Transfers:     · Sit to Stand:  minimum assistance and of 2 for safety and to provide HHA with no AD  · Bed to Chair: minimum assistance with  no AD  using  Stand Pivot    · Gait: 35' with min A using no AD; HHA provided and mother providing assistance with IV pole management          Therapeutic Activities and Exercises:   pt assisted to bedside chair following gait.   pt required assistance for repositioning/scooting back in chair to prevent slipping    Seated BLE therex: AP, laq, marching, hip abd/add x 10-12 reps each    Patient left up in chair with all lines intact, call button in reach, nurse Stephanie notified and mother and father present..    GOALS:    Physical Therapy Goals        Problem: Physical Therapy Goal    Goal Priority Disciplines Outcome Goal Variances Interventions   Physical Therapy Goal     PT/OT, PT Ongoing (interventions implemented as appropriate)     Description:  Goals to be met by: 18     Patient will increase functional independence with mobility by performin. Supine to sit with Modified [place]  2. Sit to stand transfer with [place]  3. Gait  x 100 feet with Supervision.                       Time Tracking:     PT Received On: 18  PT Start Time: 0955     PT Stop Time: 1020  PT Total Time (min): 25 min     Billable Minutes: Gait Training 8, Therapeutic Activity 8 and Therapeutic Exercise 9    Treatment Type: Treatment  PT/PTA: PTA     PTA Visit Number: 5     Dianelys Reyes PTA  2018

## 2018-07-06 NOTE — PT/OT/SLP PROGRESS
"Speech Language Pathology Treatment    Patient Name:  Sis Teixeira   MRN:  [id number]  Admitting Diagnosis: Carcinoma of small intestine, including duodenum    Recommendations:                 General Recommendations:  Modified barium swallow study  Diet recommendations:   , Liquid Diet Level: Clears, [place] Thick   Aspiration Precautions: Assistance with meals and Assistance with thickening liquids and Continue alternate means of nutrition as pt with poor PO intake  General Precautions: Standard, fall, aspiration  Communication strategies:  provide increased time to answer    Subjective     "Show her my shirt."    Pain/Comfort:  · Pain Rating 1:  (unable to rate)  · Location 1: chest (epigastric? Globus sensation? Pt unable to elaborate)  · Pain Addressed 1: Distraction    Objective:   Pt up in chair. Family at bedside. Pt seen for ongoing swallow evaluation Upon entrance into room, pt presented with mild pooling of oral secretions. +cough on initial ice chip. Pt subsequently tolerated thin liquids via tsp x2, very small cup sip and straw sip x1, nectar thick via tsp, small cup sip and straw with no overt s/s penetration/aspiration. However, pt with c/o globus sensation vs odynophagia;  unable to elaborate, however, pain resolved. Pt required coaxing to accept PO trials. Mother reports frequent coughing with juice.     Has the patient been evaluated by SLP for swallowing?   Yes  Keep patient NPO? No   Current Respiratory Status: room air        Assessment:     Sis Teixeira is a 58 y.o. female with an SLP diagnosis of suspected Dysphagia. Pt continues with limited acceptance of PO. Inconsistent s/s possible pharyngeal dysphagia with thin liquids. Tolerated NECTAR thick liquids without difficulty. REC MBSS for further evaluation of swallow function once pt demonstrates improved acceptance of PO. REC continue clear liquids, per MD, NECTAR thick.      Goals:    SLP Goals        Problem: SLP Goal    Goal Priority " Disciplines Outcome   SLP Goal     SLP    Description:  1. Tolerate thin liquids with no overt s/s penetration/aspiration  2. Ongoing assessment for upgraded textures once advanced from clears per MD.    3. Pt will participate in MBSS for further evaluation of swallow function.                    Plan:     · Patient to be seen:  5 x/week   · Plan of Care expires:  07/13/18  · Plan of Care reviewed with:  patient, father, mother, friend   · SLP Follow-Up:  Yes       Discharge recommendations:      Barriers to Discharge:  None    Time Tracking:     SLP Treatment Date:   07/06/18  Speech Start Time:  1105  Speech Stop Time:  1130     Speech Total Time (min):  25 min    Billable Minutes: Treatment Swallowing Dysfunction 25 and Total Time 25    Karmen Goldstein CCC-SLP  07/06/2018

## 2018-07-06 NOTE — PLAN OF CARE
Problem: Nutrition, Parenteral (Adult)  Intervention: Monitor/Manage Parenteral Nutrition Support  Recommendations    1) Custom TPN: 4% AA, 9% dextrose with 20% lipids (250 mls over 12 hrs)  First 24 hrs rate of 25 mls/hr to provide 50% of EEN.    To prevent refeeding syndrome recommend:  adjust Peptamen 1.5 Prebio to 10 mls/hr to meet no more than 75% EEN x 24 hrs,   then advance to 20 mls/hr to provide no more than 100% EEN after 24 hrs in an effort to prevent refeeding syndrome.   Advance toward EEN per pt tolerance.    TPN + Enteral @ 10 mls/hr provides 71% EEN.  TPN + Enteral @ 20  Mls/hr provides 94% EEN.    Monitor lytes for possible refeeding syndrome.      2) When medically appropriate transition to enteral feeds, Peptamen 1.5 Prebio with slow progression to 45 mls/hr.      3) Follow up 7/9/18      Goals: 1) Pt will consume/receive at least 50% EEN by RD f/u. 2) Pt will transition to enteral feeds within 72 hrs 3) pt will tolerate nutrition support    Nutrition Goal Status: 1) met  2) continues 3) new    Communication of AKOSUA Recs: reviewed with RN (POC, reviewed with MD, second sign )

## 2018-07-06 NOTE — PHYSICIAN QUERY
PT Name: Sis Teixeira  MR #: [id number]     Physician Query Form - Documentation Clarification      CDS/: Jeanine Galindo RN               Contact information:[email or web address]    This form is a permanent document in the medical record.     Query Date: July 6, 2018    By submitting this query, we are merely seeking further clarification of documentation. Please utilize your independent clinical judgment when addressing the question(s) below.    The Medical record reflects the following:    Supporting Clinical Findings Location in Medical Record   Oral Phase:   · WFL  · Anterior loss  · Pocketing   Left very mild, cleared after request  Pharyngeal Phase:   · coughing/choking  · wet vocal quality after swallow    Procedure(s) (LRB):  HEMICOLECTOMY, RIGHT   CHOLECYSTECTOMY   INSERTION, JEJUNOSTOMY TUBE     Dysphagia    Aspiration pneumonia  new onset, likely secondary to vomiting overnight. Severe malnutrition SLP  Beside swallow 7/3                OP note 6/28          Gastroenterology consult 6/20    Hospital Medicine PN 7/4                Doctor, Please specify diagnosis or diagnoses associated with above clinical findings.    Doctor, please specify the type of dysphagia associated with the above clinical findings:     Provider Use Only      [ x  ] Oropharyngeal phase dysphagia POA  [   ] Oropharyngeal phase dysphagia not POA    [ x  ] Pharyngeal phase dysphagia POA  [   ] Pharyngeal phase dysphagia not POA    [   ] Other Dysphagia _______________ POA  [   ] Other Dysphagia _______________ not POA      [   ] Other __________________________                                                                                                             [  ] Clinically undetermined

## 2018-07-06 NOTE — PHYSICIAN QUERY
"PT Name: Sis Teixeira  MR #: [id number]    Physician Query Form - Hematology Clarification      CDS/: Jeanine Galindo RN               Contact information:[email or web address]    This form is a permanent document in the medical record.      Query Date: July 6, 2018    By submitting this query, we are merely seeking further clarification of documentation. Please utilize your independent clinical judgment when addressing the question(s) below.    The Medical record contains the following:   Indicators  Supporting Clinical Findings Location in Medical Record   x "Anemia" documented Anemia    Anemia - Iron deficiency anemia Gastroenterology PN 6/25    Hospital Medicine PN 6/25   x H & H = H & H = 8.9/26.1  H & H = 7.2/21.6  H & H = 8.7/25.9  H & H = 10.8/31.2  H & H = 8.6/25.9  H & H = 8.8/25.9 Labs 6/21  Labs 6/24  Labs 6/28  Labs 6/29  Labs 7/3  Labs 7/6    BP =                     HR=     x "GI bleeding" documented  rectal bleeding. several episodes of rectal bleeding last night characterized as intermittent mild some clot present associated with cramping anemia and elevated CEA Gastroenterology PN 6/25    Acute bleeding (Non GI site)     x Transfusion(s) Transfused with 2 units PRBC's 6/28 transfusion record    Treatment:     x Other:  Procedure(s) (LRB):  HEMICOLECTOMY, RIGHT (N/A)  CHOLECYSTECTOMY (N/A)  INSERTION, JEJUNOSTOMY TUBE (N/A)  Estimated Blood Loss: 800 mL OP note 6/28     Provider, please specify diagnosis or diagnoses associated with above clinical findings.    [  ] Acute blood loss anemia expected post-operatively  [ x ] Acute blood loss anemia  [  ] Aplastic anemia  [ x ] Iron deficiency anemia    [  xx] Anemia of chronic disease ( Specify chronic disease)      [  ] CKD      [ x ] Neoplastic disease      [  ] Due to Chemotherapy     [  ] Other Hematological Diagnosis (please specify): _________________________________    [  ] Clinically Undetermined       Please document in your " progress notes daily for the duration of treatment, until resolved, and include in your discharge summary.

## 2018-07-06 NOTE — PLAN OF CARE
Problem: Patient Care Overview  Goal: Plan of Care Review  Outcome: Ongoing (interventions implemented as appropriate)  Patient alert and oriented to self. VSS.  TPN infusing to R upper arm PICC.  Tube feedings advanced to 30mL per hour.  When advanced to 45mL to hour patient reported cramps.  Slowed to 30mL/hr.  Residuals checked q4.  No residual noted.  Incontinence care provided x10 this shift. Ostomy pouch to R side draining copious amounts of serosanguineous fluid.  New area of incontinence breakdown noted to R buttocks.   Barrier paste and mepilex applied.  Pt and family verbalized understanding of POC. Q2hr rounding done during shift to promote patient safety. Patient free from falls and injury during shift.  Bed in lowest position, brakes locked, and call light within reach.  Will continue to monitor.

## 2018-07-06 NOTE — PLAN OF CARE
Problem: Patient Care Overview  Goal: Plan of Care Review  Outcome: Ongoing (interventions implemented as appropriate)  Plan of care reviewed with patient and mother, verbalized understanding. Incontinence care performed. Foam dressing reapplied to sacrum. J tube dressing changed. Ambulated in room with PT, sat up in chair. Repositioned every 2 hours, pillows in use. TPN maintained. Tube feedings provided 30cc hour, No residual. Remained free from fall and injury. Bed in lowest position, call light within reach and family to remain at bedside.

## 2018-07-06 NOTE — PROGRESS NOTES
Per Lele with [facility]- he just saw the pt and spoke to the pts mom. She is going to tour the facility and wanted a date of discharge. He is going to work the pt up and get her input into their system and he will keep me updated. Mirta Garibay LMSW     2:42- Per Peter with [facility] they are able to accept the pt. Per Dr. Garza the pt will discharge Monday to [facility], I updated Peter at [phone]. Mirta Garibay LMSW

## 2018-07-06 NOTE — PLAN OF CARE
Problem: SLP Goal  Goal: SLP Goal  1. Tolerate thin liquids with no overt s/s penetration/aspiration  2. Ongoing assessment for upgraded textures once advanced from clears per MD.    3. Pt will participate in MBSS for further evaluation of swallow function.    Pt continues with limited acceptance of PO. Inconsistent s/s possible pharyngeal dysphagia. Tolerated NECTAR thick liquids without difficulty. REC MBSS for further evaluation of swallow function once pt demonstrated improved acceptance of PO. REC continue clear liquids, per MD, NECTAR thick.     Karmen Goldstein MS, CCC/SLP 7/6/2018

## 2018-07-06 NOTE — PLAN OF CARE
Problem: Physical Therapy Goal  Goal: Physical Therapy Goal  Goals to be met by: 18     Patient will increase functional independence with mobility by performin. Supine to sit with Modified [place]  2. Sit to stand transfer with [place]  3. Gait  x 100 feet with Supervision.      Outcome: Ongoing (interventions implemented as appropriate)  PT for functional mobility training, gait training and therex

## 2018-07-07 LAB
ALBUMIN SERPL BCP-MCNC: 1.3 G/DL
ALP SERPL-CCNC: 76 U/L
ALT SERPL W/O P-5'-P-CCNC: 21 U/L
ANION GAP SERPL CALC-SCNC: 7 MMOL/L
AST SERPL-CCNC: 59 U/L
BASOPHILS # BLD AUTO: 0 K/UL
BASOPHILS NFR BLD: 0.5 %
BILIRUB SERPL-MCNC: 0.9 MG/DL
BUN SERPL-MCNC: 38 MG/DL
CALCIUM SERPL-MCNC: 8 MG/DL
CHLORIDE SERPL-SCNC: 111 MMOL/L
CO2 SERPL-SCNC: 21 MMOL/L
CREAT SERPL-MCNC: 0.8 MG/DL
DIFFERENTIAL METHOD: ABNORMAL
EOSINOPHIL # BLD AUTO: 0.3 K/UL
EOSINOPHIL NFR BLD: 3.4 %
ERYTHROCYTE [DISTWIDTH] IN BLOOD BY AUTOMATED COUNT: 20.3 %
EST. GFR  (AFRICAN AMERICAN): >60 ML/MIN/1.73 M^2
EST. GFR  (NON AFRICAN AMERICAN): >60 ML/MIN/1.73 M^2
GLUCOSE SERPL-MCNC: 86 MG/DL
HCT VFR BLD AUTO: 23.5 %
HGB BLD-MCNC: 7.9 G/DL
LYMPHOCYTES # BLD AUTO: 2.2 K/UL
LYMPHOCYTES NFR BLD: 26.4 %
MAGNESIUM SERPL-MCNC: 2.3 MG/DL
MCH RBC QN AUTO: 32 PG
MCHC RBC AUTO-ENTMCNC: 33.7 G/DL
MCV RBC AUTO: 95 FL
MONOCYTES # BLD AUTO: 0.7 K/UL
MONOCYTES NFR BLD: 8.8 %
NEUTROPHILS # BLD AUTO: 5 K/UL
NEUTROPHILS NFR BLD: 60.9 %
PHOSPHATE SERPL-MCNC: 2.9 MG/DL
PLATELET # BLD AUTO: 195 K/UL
PMV BLD AUTO: 8.1 FL
POCT GLUCOSE: 117 MG/DL (ref 70–110)
POCT GLUCOSE: 126 MG/DL (ref 70–110)
POCT GLUCOSE: 99 MG/DL (ref 70–110)
POTASSIUM SERPL-SCNC: 4 MMOL/L
PROT SERPL-MCNC: 4.9 G/DL
RBC # BLD AUTO: 2.48 M/UL
SODIUM SERPL-SCNC: 139 MMOL/L
WBC # BLD AUTO: 8.3 K/UL

## 2018-07-07 PROCEDURE — 84100 ASSAY OF PHOSPHORUS: CPT

## 2018-07-07 PROCEDURE — 97116 GAIT TRAINING THERAPY: CPT

## 2018-07-07 PROCEDURE — 36415 COLL VENOUS BLD VENIPUNCTURE: CPT

## 2018-07-07 PROCEDURE — 25000003 PHARM REV CODE 250: Performed by: INTERNAL MEDICINE

## 2018-07-07 PROCEDURE — 25000003 PHARM REV CODE 250: Performed by: HOSPITALIST

## 2018-07-07 PROCEDURE — 12000002 HC ACUTE/MED SURGE SEMI-PRIVATE ROOM

## 2018-07-07 PROCEDURE — 94761 N-INVAS EAR/PLS OXIMETRY MLT: CPT

## 2018-07-07 PROCEDURE — 63600175 PHARM REV CODE 636 W HCPCS: Performed by: HOSPITALIST

## 2018-07-07 PROCEDURE — G8997 SWALLOW GOAL STATUS: HCPCS | Mod: CI

## 2018-07-07 PROCEDURE — 85025 COMPLETE CBC W/AUTO DIFF WBC: CPT

## 2018-07-07 PROCEDURE — 63600175 PHARM REV CODE 636 W HCPCS: Performed by: SURGERY

## 2018-07-07 PROCEDURE — 63600175 PHARM REV CODE 636 W HCPCS: Performed by: INTERNAL MEDICINE

## 2018-07-07 PROCEDURE — A4217 STERILE WATER/SALINE, 500 ML: HCPCS | Performed by: HOSPITALIST

## 2018-07-07 PROCEDURE — 83735 ASSAY OF MAGNESIUM: CPT

## 2018-07-07 PROCEDURE — 92611 MOTION FLUOROSCOPY/SWALLOW: CPT

## 2018-07-07 PROCEDURE — 80053 COMPREHEN METABOLIC PANEL: CPT

## 2018-07-07 PROCEDURE — G8996 SWALLOW CURRENT STATUS: HCPCS | Mod: CM

## 2018-07-07 PROCEDURE — C9113 INJ PANTOPRAZOLE SODIUM, VIA: HCPCS | Performed by: SURGERY

## 2018-07-07 PROCEDURE — 94799 UNLISTED PULMONARY SVC/PX: CPT

## 2018-07-07 PROCEDURE — 63700000 PHARM REV CODE 250 ALT 637 W/O HCPCS: Performed by: INTERNAL MEDICINE

## 2018-07-07 RX ADMIN — LATANOPROST 1 DROP: 50 SOLUTION OPHTHALMIC at 08:07

## 2018-07-07 RX ADMIN — DORZOLAMIDE HYDROCHLORIDE AND TIMOLOL MALEATE 1 DROP: 20; 5 SOLUTION/ DROPS OPHTHALMIC at 09:07

## 2018-07-07 RX ADMIN — SODIUM BICARBONATE 650 MG TABLET 650 MG: at 08:07

## 2018-07-07 RX ADMIN — LEVOTHYROXINE SODIUM 25 MCG: 25 TABLET ORAL at 06:07

## 2018-07-07 RX ADMIN — BRINZOLAMIDE 1 DROP: 10 SUSPENSION/ DROPS OPHTHALMIC at 09:07

## 2018-07-07 RX ADMIN — SODIUM BICARBONATE 650 MG TABLET 650 MG: at 01:07

## 2018-07-07 RX ADMIN — SIMVASTATIN 10 MG: 10 TABLET, FILM COATED ORAL at 08:07

## 2018-07-07 RX ADMIN — SODIUM BICARBONATE 650 MG TABLET 650 MG: at 09:07

## 2018-07-07 RX ADMIN — BRINZOLAMIDE 1 DROP: 10 SUSPENSION/ DROPS OPHTHALMIC at 08:07

## 2018-07-07 RX ADMIN — ENOXAPARIN SODIUM 40 MG: 100 INJECTION SUBCUTANEOUS at 04:07

## 2018-07-07 RX ADMIN — PIPERACILLIN SODIUM AND TAZOBACTAM SODIUM 4.5 G: 4; .5 INJECTION, POWDER, LYOPHILIZED, FOR SOLUTION INTRAVENOUS at 01:07

## 2018-07-07 RX ADMIN — SODIUM BICARBONATE 650 MG TABLET 650 MG: at 04:07

## 2018-07-07 RX ADMIN — PIPERACILLIN SODIUM AND TAZOBACTAM SODIUM 4.5 G: 4; .5 INJECTION, POWDER, LYOPHILIZED, FOR SOLUTION INTRAVENOUS at 06:07

## 2018-07-07 RX ADMIN — DORZOLAMIDE HYDROCHLORIDE AND TIMOLOL MALEATE 1 DROP: 20; 5 SOLUTION/ DROPS OPHTHALMIC at 08:07

## 2018-07-07 RX ADMIN — PIPERACILLIN SODIUM AND TAZOBACTAM SODIUM 4.5 G: 4; .5 INJECTION, POWDER, LYOPHILIZED, FOR SOLUTION INTRAVENOUS at 10:07

## 2018-07-07 RX ADMIN — INSULIN DETEMIR 6 UNITS: 100 INJECTION, SOLUTION SUBCUTANEOUS at 08:07

## 2018-07-07 RX ADMIN — VITAMIN D, TAB 1000IU (100/BT) 1000 UNITS: 25 TAB at 09:07

## 2018-07-07 RX ADMIN — CALCIUM GLUCONATE: 94 INJECTION, SOLUTION INTRAVENOUS at 08:07

## 2018-07-07 RX ADMIN — FLUCONAZOLE IN SODIUM CHLORIDE 200 MG: 2 INJECTION, SOLUTION INTRAVENOUS at 06:07

## 2018-07-07 RX ADMIN — PANTOPRAZOLE SODIUM 40 MG: 40 INJECTION, POWDER, LYOPHILIZED, FOR SOLUTION INTRAVENOUS at 06:07

## 2018-07-07 RX ADMIN — FENOFIBRATE 160 MG: 160 TABLET ORAL at 09:07

## 2018-07-07 NOTE — SUBJECTIVE & OBJECTIVE
Interval History:  Patient seen and examined.  Reports some diarrhea overnight.  Tolerating J tube feeds without difficulty.  Denies any pain or other adverse symptoms.  Plan of care discussed with the patient and family at the bedside in detail.  Patient did not pass swallow study.    Review of Systems   Unable to perform ROS: Patient nonverbal     Objective:     Vital Signs (Most Recent):  Temp: 97.4 °F (36.3 °C) (07/07/18 1151)  Pulse: 77 (07/07/18 1151)  Resp: 18 (07/07/18 1151)  BP: 135/62 (07/07/18 1151)  SpO2: 100 % (07/07/18 1157) Vital Signs (24h Range):  Temp:  [96.8 °F (36 °C)-98.1 °F (36.7 °C)] 97.4 °F (36.3 °C)  Pulse:  [77-87] 77  Resp:  [16-18] 18  SpO2:  [97 %-100 %] 100 %  BP: (120-135)/(55-63) 135/62     Weight: 69.9 kg (154 lb)  Body mass index is 31.1 kg/m².    Intake/Output Summary (Last 24 hours) at 07/07/18 1520  Last data filed at 07/06/18 1947   Gross per 24 hour   Intake           993.33 ml   Output              175 ml   Net           818.33 ml      Physical Exam   General exam-microcephalic  female who appears alert and oriented but nonverbal.  Responds  nonverbally to commands and questions.  Cardiovascular-regular rate rhythm, no murmurs  Respiratory-mild bilateral lower lobe rhonchi.  Normal work of breathing.  Abdomen-ostomy bag noted over JESUS drain site which is leaking clear fluid.  Laparotomy site appears to be stable with no erythema or drainage. J-tube also appears to be stable with no erythema or drainage.  Extremities-2+ pretibial pitting edema noted bilaterally.    Significant Labs: All pertinent labs within the past 24 hours have been reviewed.    Significant Imaging: I have reviewed all pertinent imaging results/findings within the past 24 hours.

## 2018-07-07 NOTE — PLAN OF CARE
Problem: Physical Therapy Goal  Goal: Physical Therapy Goal  Goals to be met by: 18     Patient will increase functional independence with mobility by performin. Supine to sit with Modified [place]  2. Sit to stand transfer with [place]  3. Gait  x 100 feet with Supervision.      Outcome: Ongoing (interventions implemented as appropriate)  Cont'  With established goals to be met by 18:     1. Supine to sit with Modified [place]   2. Sit to stand transfer with I  3. Gait x 100 ft. With Supervision

## 2018-07-07 NOTE — PT/OT/SLP PROGRESS
Physical Therapy  Treatment    Sis Teixeira   MRN: [id number]   Admitting Diagnosis: Carcinoma of small intestine, including duodenum    PT Received On: 07/07/18  PT Start Time: 1035     PT Stop Time: 1050    PT Total Time (min): 15 min       Billable Minutes:  15 min. Transfer and gait training     Treatment Type: Treatment  PT/PTA: PT     PTA Visit Number: 0       General Precautions: Standard, fall, NPO  Orthopedic Precautions: N/A   Braces: N/A    Do you have any cultural, spiritual, Yarsani conflicts, given your current situation?: none reported     Subjective:  Communicated with Nikki RN prior to session.  PT. Agreed to do PT , speech arrived to put her in a chair for swallow study. We transferred her sit<->stand , and walked to the transfer chair , 5 ft.     Pain/Comfort  Pain Rating 1: 5/10  Location - Orientation 1: generalized  Location 1: chest  Pain Addressed 1: Distraction, Reposition  Pain Rating Post-Intervention 1: 5/10    Objective:   Patient found with: PEG Tube, pressure relief boots, PICC line, telemetry    Functional Mobility:  Bed Mobility:   MOD A with supine <->sit     Transfers:  MIN A sit<->stand     Gait:   5 ft. With MIN A     Stairs:  NA     Balance:   Static Sit: FAIR+: Able to take MINIMAL challenges from all directions  Dynamic Sit: FAIR+: Maintains balance through MINIMAL excursions of active trunk motion  Static Stand: FAIR+: Takes MINIMAL challenges from all directions  Dynamic stand: FAIR: Needs CONTACT GUARD during gait     Therapeutic Activities and Exercises:  Transfer to chair      AM-PAC 6 CLICK MOBILITY  How much help from another person does this patient currently need?   1 = Unable, Total/Dependent Assistance  2 = A lot, Maximum/Moderate Assistance  3 = A little, Minimum/Contact Guard/Supervision  4 = None, Modified [place]/Independent    Turning over in bed (including adjusting bedclothes, sheets and blankets)?: 2  Sitting down on and standing up from a chair with  arms (e.g., wheelchair, bedside commode, etc.): 3  Moving from lying on back to sitting on the side of the bed?: 2  Moving to and from a bed to a chair (including a wheelchair)?: 3  Need to walk in hospital room?: 3  Climbing 3-5 steps with a railing?: 1  Basic Mobility Total Score: 14    AM-PAC Raw Score CMS G-Code Modifier Level of Impairment Assistance   6 % Total / Unable   7 - 9 CM 80 - 100% Maximal Assist   10 - 14 CL 60 - 80% Moderate Assist   15 - 19 CK 40 - 60% Moderate Assist   20 - 22 CJ 20 - 40% Minimal Assist   23 CI 1-20% SBA / CGA   24 CH 0% Independent/ Mod I     Patient left up in chair with all lines intact, call button in reach and family, speech therapist  present.    Assessment:  Sis Teixeira is a 58 y.o. female with a medical diagnosis of Carcinoma of small intestine, including duodenum and presents with weakness, deconditioning.    Rehab identified problem list/impairments: Rehab identified problem list/impairments: weakness, impaired endurance, impaired sensation, impaired self care skills, impaired functional mobilty, gait instability, impaired balance, impaired cognition, decreased coordination, decreased lower extremity function, decreased safety awareness    Rehab potential is good.    Activity tolerance: Good    Discharge recommendations: Discharge Facility/Level Of Care Needs: LTACH (long term acute care hospital)     Barriers to discharge:      Equipment recommendations: Equipment Needed After Discharge: none     GOALS:    Physical Therapy Goals        Problem: Physical Therapy Goal    Goal Priority Disciplines Outcome Goal Variances Interventions   Physical Therapy Goal     PT/OT, PT Ongoing (interventions implemented as appropriate)     Description:  Goals to be met by: 18     Patient will increase functional independence with mobility by performin. Supine to sit with Modified [place]  2. Sit to stand transfer with [place]  3. Gait  x 100 feet with  Supervision.                       PLAN:    Patient to be seen daily  to address the above listed problems via gait training, therapeutic activities, therapeutic exercises  Plan of Care expires: 07/14/18  Plan of Care reviewed with: patient, family         Rangel Bolton, PT  07/07/2018

## 2018-07-07 NOTE — ASSESSMENT & PLAN NOTE
patient with pulmonary edema likely secondary to volume overload from anasarca. CXR improved. S/p 20 mg IV Lasix on 7/4 and 7/5 to help facilitate diuresis and monitor renal function closely. Hold off lasix today.

## 2018-07-07 NOTE — ASSESSMENT & PLAN NOTE
white count appears to be improving, he and patient remains symptomatically improved.  Chest x-ray also appears improved.  Will continue current antibiotics and monitor closely

## 2018-07-07 NOTE — PROGRESS NOTES
[facility] Hospital Medicine  Progress Note    Patient Name: Sis Teixeira  MRN: [id number]  Patient Class: IP- Inpatient   Admission Date: 6/20/2018  Length of Stay: 15 days  Attending Physician: Bekah Fernandez MD  Primary Care Provider: Mann Garduno MD        Subjective:     Principal Problem:Carcinoma of small intestine, including duodenum    Interval History: Patient stable. Discussed with surgery- restarted J tube feeds at 30 + TPN. Discussed with family about LTAC,     Review of Systems   Unable to perform ROS: Patient nonverbal     Objective:     Vital Signs (Most Recent):  Temp: 97.8 °F (36.6 °C) (07/06/18 2017)  Pulse: 87 (07/06/18 2017)  Resp: 16 (07/06/18 2017)  BP: (!) 128/59 (07/06/18 2017)  SpO2: 98 % (07/06/18 2017) Vital Signs (24h Range):  Temp:  [97.6 °F (36.4 °C)-98.5 °F (36.9 °C)] 97.8 °F (36.6 °C)  Pulse:  [84-87] 87  Resp:  [16-20] 16  SpO2:  [98 %-100 %] 98 %  BP: (128-134)/(55-63) 128/59     Weight: 69.9 kg (154 lb)  Body mass index is 31.1 kg/m².    Intake/Output Summary (Last 24 hours) at 07/06/18 2300  Last data filed at 07/06/18 1947   Gross per 24 hour   Intake             1385 ml   Output              825 ml   Net              560 ml      Physical Exam   General exam-microcephalic  female who appears alert and oriented but nonverbal.  Responds  nonverbally to commands and questions.  Cardiovascular-regular rate rhythm, no murmurs  Respiratory-Wet, bilateral rhonchi worse in the lower lung fields with mildly increased work of breathing noted.  Abdomen-ostomy bag noted over JESUS drain site which is leaking clear fluid.  Laparotomy site appears to be stable with no erythema or drainage. J-tube also appears to be stable with no erythema or drainage.  Extremities-2+ pretibial pitting edema noted bilaterally.    Significant Labs: All pertinent labs within the past 24 hours have been reviewed.    Significant Imaging: I have reviewed all pertinent imaging  results/findings within the past 24 hours.    Assessment/Plan:      * Carcinoma of small intestine, including duodenum    Patient post op from cancer resection and J-tube placement.  Surgery for wound management and monitor patient closely.  Patient in persistent catabolic state secondary to active cancer and wound healing is likely going to be poor.          Anasarca    patient with pulmonary edema likely secondary to volume overload from anasarca. CXR improved. S/p 20 mg IV Lasix on 7/4 and 7/5 to help facilitate diuresis and monitor renal function closely. Hold off lasix today.          Surgical wound breakdown, sequela    Severe, related to poor wound healing and catabolic state d/t cancer. Wound care consulted. Prepping for LTAC on Monday,          Aspiration pneumonia    white count appears to be improving, he and patient remains symptomatically improved.  Chest x-ray also appears improved.  Will continue current antibiotics and monitor closely          Severe malnutrition    patient made NPO after aspiration event occurred 2 days ago.  Difficult to asses why aspiration event occurred if the patient is on transpyloric feeding-  more likely that it was patient's own secretions.  Continue NPO for now, but will restart J-tube feeds. Continue TPN for now.  Monitor BUN closely.  Albumin remains markedly low.          Diabetes mellitus 2    Patient's FSGs are controlled on current hypoglycemics.   Last A1c reviewed-   Lab Results   Component Value Date    HGBA1C 4.4 06/20/2018     Most recent fingerstick glucose reviewed-   Recent Labs  Lab 07/05/18  2304 07/06/18  1725 07/06/18  2244   POCTGLUCOSE 142* 179* 225*     Current correctional scale  Low  Maintain anti-hyperglycemic dose as follows-   Antihyperglycemics     Start     Stop Route Frequency Ordered    06/30/18 0215  insulin aspart U-100 pen 0-5 Units      -- SubQ Every 6 hours PRN 06/30/18 0101    06/29/18 1612  insulin detemir U-100 pen 6 Units      -- SubQ  Nightly 06/29/18 1613                  Congenital small head    Chronic issue.  May predispose patient to aspiration            VTE Risk Mitigation         Ordered     enoxaparin injection 40 mg  Daily      06/29/18 1613     IP VTE HIGH RISK PATIENT  Once      06/28/18 1834     Reason for No Pharmacological VTE Prophylaxis  Once      06/28/18 1834              Brian Garza MD  Department of Hospital Medicine   [facility]

## 2018-07-07 NOTE — ASSESSMENT & PLAN NOTE
Severe, related to poor wound healing and catabolic state d/t cancer. Wound care consulted. Prepping for LTAC on Monday,

## 2018-07-07 NOTE — PROGRESS NOTES
Modified Barium Swallowing Study    Past Medical History:   Diagnosis Date    Congenital small head     Diabetes mellitus     dIET CONTROL     Past Surgical History:   Procedure Laterality Date    CHOLECYSTECTOMY N/A 6/28/2018    Procedure: CHOLECYSTECTOMY;  Surgeon: Kumar Torres MD;  Location: [facility] OR;  Service: Colon and Rectal;  Laterality: N/A;    INCISION AND DRAINAGE OF ABSCESS Right 6/21/2018    Procedure: INCISION AND DRAINAGE, ABSCESS;  Surgeon: Kumar Torres MD;  Location: [facility];  Service: General;  Laterality: Right;    PLACEMENT OF JEJUNOSTOMY TUBE N/A 6/28/2018    Procedure: INSERTION, JEJUNOSTOMY TUBE;  Surgeon: Kumar Torres MD;  Location: [facility] OR;  Service: Colon and Rectal;  Laterality: N/A;    RIGHT HEMICOLECTOMY N/A 6/28/2018    Procedure: HEMICOLECTOMY, RIGHT;  Surgeon: Kumar Torres MD;  Location: [facility];  Service: Colon and Rectal;  Laterality: N/A;     Pt seen for MBSS after s/s pharyngeal dysphagia on BSS.  Pt found to have mild oral prep dysphagia, but penetration & SILENT aspiration which could not be eliminated with cued throat clearing, cued cough, multiple swallows or reclined positioning.  Due to general debility, recommend NPO except ice chips, use J-tube for nutrition, hydration & meds.  Excellent mouth care 4x per day.  Aspiration precautions.  Dysphagia tx ([place], SSGS, effortful swallow, hyoid lift & laryngeal lift exercises.  Repeat MBSS after 8 weeks dysphagia tx.        G Codes Swallowing Current CM     Goal CI       07/07/18 1150   Speech Time Calculation   Speech Start Time 1043   Speech Stop Time 1119   Speech Total (min) 36 min   General Information   SLP Treatment Date 07/07/18   Referring Physician Greg   General Observations alert, cooperative   Pertinent History of Current Problem cognitive deficits, debility, coughing on po intake on BSS   Chest X-ray results lung infiltrates have decreased.  There remains mild left perihilar and basilar infiltrate and very  mild hypoventilatory change or infiltrate right lung base.   Current Respiratory Status room air   General Precautions fall;NPO   Current Diet   Current Diet Textures NPO except meds   Current Liquid Consistencies NPO   Current Non-Oral Diet J tube   Subjective   Patient states okay       MBS Eval: Thin Liquid Trial   Mode of Presentation, Thin Liquid spoon;fed by clinician   Volume of Thin Liquid Presented tsp   Oral Prep/Phase, Thin Liquid WFL   Pharyngeal Phase, Thin Liquid impaired;decreased base of tongue retraction;decreased pharyngeal wall contraction;scattered pharyngeal residue;vallecular stasis;pyriform sinuses stasis;reduced hyolaryngeal excursion  (decr epigl inversion; mild residue)   Rosenbeck's 8-Point Penetration-Aspiration Scale, Thin Liquids (8) Material enters the airway, passes below the vocal folds, and no effort is made to eject.   Penetration/Aspiration, Thin Liquid penetration bon residue during subsequent swallow   Diagnostic Statement severe pharygneal dysphagia with SILENT aspiration   Additional Comments reclined position but pt righted head despite cueing       MBS Eval: Nectar Thick Liquid Trial   Mode of Presentation, Nectar spoon;cup;fed by clinician;straw;self fed   Volume of Nectar Presented tsp, cup sip, small & larger straw sips   Oral Prep/Phase, Nectar delayed anterior/posterior movement;slow oral transit time   Pharyngeal Phase, Nectar impaired;decreased base of tongue retraction;decreased pharyngeal wall contraction;pharyngeal webbing;reduced hyolaryngeal excursion;scattered pharnygeal residue;vallecular stasis;pyriform sinuses stasis  (decr epigl inversion, severe residue)   Rosenbeck's 8-Point Penetration-Aspiration Scale, Nectar Thick Liquids (1) Material does not enter airway.;(5) Material enters the airway, contacts the vocal fold, and is not ejected from the airway.   Penetration/Aspiration, Nectar at risk for aspiration on residue   Diagnostic Statement severe  pharyngeal dysphagia with SILENT aspiration   Additional Comments recline, cues throat clear & multiple swallows not successful in reducing residue or penetrationeliminating        MBS Eval: Pureed Trial   Mode of Presentation, Puree spoon   Volume of Puree Presented tsp   Oral Prep/Phase, Puree delayed anterior/posterior movement   Pharyngeal Phase, Puree impaired;decreased base of tongue retraction;decreased pharyngeal wall contraction  (decr epiglottic inversion, severe earnestine, mod pyr residue)   Rosenbeck's 8-Point Penetration-Aspiration Scale, Pureed (1) Material does not enter airway.   Penetration/Aspiration, Pureed at risk for aspiration on residue mixed with saliva   Diagnostic Statement mild oral transit, moderate pharygneal dysphagia       MBS Eval: Soft Solid Trial   Mode of Presentation, Semisolid spoon   Volume of Semisolid Food Presented tsp peach in NTL barium   Oral Prep/Phase, Semisolid (prolonged chewing)   Oral Residue, Semisolid (small peach residue after oral swallow)   Pharyngeal Phase, Semisolid impaired;decreased base of tongue retraction;decreased pharyngeal wall contraction;reduced hyolaryngeal excursion;vallecular stasis;pyriform sinuses stasis  (decr epigl inversion, mild residue)   Rosenbeck's 8-Point Penetration-Aspiration Scale, Semisolid (1) Material does not enter airway.   Diagnostic Statement mild oral prep, mild pharygneal dysphagia       MBS Eval: Solid Food Texture Trial   Mode of Presentation, Solid spoon   Volume of Solid Food Presented 1/2 cookie with barium paste   Oral Prep/Phase, Solid prolonged chewing;slow oral transit time   Pharyngeal Phase, Solid impaired;decreased pharyngeal wall contraction;pyriform sinuses stasis;reduced hyolaryngeal excursion;scattered pharnygeal residue;vallecular stasis  (decr epigl inversion, mod residue)   Rosenbeck's 8-Point Penetration-Aspiration Scale, Solid (1) Material does not enter airway.   Penetration/Aspiration at risk for aspiration  on resdiue mixed with saliva   Diagnostic Statement mild oral, moderate pharyngeal dysphagia   Recommendations   NPO Yes   Solid Diet Level NPO   Liquid Diet Level NPO   Additional Recommendations allow ice chips after excellent mouth care   Plan   Plan Dysphagia Therapy   SLP Follow-up   SLP Follow-up? Yes   Treatment/Billable Minutes   Motion Fluoro Swallow, Cine/Vid 36   Total Time 36

## 2018-07-07 NOTE — PROGRESS NOTES
[facility] Hospital Medicine  Progress Note    Patient Name: Sis Teixeira  MRN: [id number]  Patient Class: IP- Inpatient   Admission Date: 6/20/2018  Length of Stay: 16 days  Attending Physician: Bekah Fernandez MD  Primary Care Provider: Mann Garduno MD        Subjective:     Principal Problem:Carcinoma of small intestine, including duodenum    HPI:  No notes on file    Hospital Course:  No notes on file    Interval History:  Patient seen and examined.  Reports some diarrhea overnight.  Tolerating J tube feeds without difficulty.  Denies any pain or other adverse symptoms.  Plan of care discussed with the patient and family at the bedside in detail.  Patient did not pass swallow study.    Review of Systems   Unable to perform ROS: Patient nonverbal     Objective:     Vital Signs (Most Recent):  Temp: 97.4 °F (36.3 °C) (07/07/18 1151)  Pulse: 77 (07/07/18 1151)  Resp: 18 (07/07/18 1151)  BP: 135/62 (07/07/18 1151)  SpO2: 100 % (07/07/18 1157) Vital Signs (24h Range):  Temp:  [96.8 °F (36 °C)-98.1 °F (36.7 °C)] 97.4 °F (36.3 °C)  Pulse:  [77-87] 77  Resp:  [16-18] 18  SpO2:  [97 %-100 %] 100 %  BP: (120-135)/(55-63) 135/62     Weight: 69.9 kg (154 lb)  Body mass index is 31.1 kg/m².    Intake/Output Summary (Last 24 hours) at 07/07/18 1520  Last data filed at 07/06/18 1947   Gross per 24 hour   Intake           993.33 ml   Output              175 ml   Net           818.33 ml      Physical Exam   General exam-microcephalic  female who appears alert and oriented but nonverbal.  Responds  nonverbally to commands and questions.  Cardiovascular-regular rate rhythm, no murmurs  Respiratory-mild bilateral lower lobe rhonchi.  Normal work of breathing.  Abdomen-ostomy bag noted over JSEUS drain site which is leaking clear fluid.  Laparotomy site appears to be stable with no erythema or drainage. J-tube also appears to be stable with no erythema or drainage.  Extremities-2+ pretibial pitting  edema noted bilaterally.    Significant Labs: All pertinent labs within the past 24 hours have been reviewed.    Significant Imaging: I have reviewed all pertinent imaging results/findings within the past 24 hours.    Assessment/Plan:      * Carcinoma of small intestine, including duodenum    Patient post op from cancer resection and J-tube placement.  Surgery for wound management and monitor patient closely.  Patient in persistent catabolic state secondary to active cancer and wound healing is likely going to be poor.  Patient likely to go to LTAC on Monday for TPN, antibiotics, and wound care.          Anasarca    patient with pulmonary edema likely secondary to volume overload from anasarca. CXR improved. S/p 20 mg IV Lasix on 7/4 and 7/5 to help facilitate diuresis and monitor renal function closely. Hold off lasix today.          Surgical wound breakdown, sequela    Severe, related to poor wound healing and catabolic state d/t cancer. Wound care consulted. Prepping for LTAC on Monday,          Aspiration pneumonia    white count appears to be improving, he and patient remains symptomatically improved.  Chest x-ray also appears improved.  Will continue current antibiotics and monitor closely.  Patient to be made NPO secondary to failing swallow study          Severe malnutrition    Patient made NPO after aspiration event occurred 7/4.  Likely related to duodenal obstruction and patient inability to handle her own gastric secretions.  Continue NPO for now secondary to failing swallow study, but will continue J-tube feeds. Continue TPN for now.  Monitor BUN closely.  Albumin remains markedly low.          Diabetes mellitus 2    Patient's FSGs are controlled on current hypoglycemics.   Last A1c reviewed-   Lab Results   Component Value Date    HGBA1C 4.4 06/20/2018     Most recent fingerstick glucose reviewed-     Recent Labs  Lab 07/06/18  1725 07/06/18  2244 07/07/18  1120   POCTGLUCOSE 179* 225* 99     Current  correctional scale  Low  Maintain anti-hyperglycemic dose as follows-   Antihyperglycemics     Start     Stop Route Frequency Ordered    06/30/18 0215  insulin aspart U-100 pen 0-5 Units      -- SubQ Every 6 hours PRN 06/30/18 0101    06/29/18 1612  insulin detemir U-100 pen 6 Units      -- SubQ Nightly 06/29/18 1613                  Congenital small head    Chronic issue.  May predispose patient to aspiration            VTE Risk Mitigation         Ordered     enoxaparin injection 40 mg  Daily      06/29/18 1613     IP VTE HIGH RISK PATIENT  Once      06/28/18 1834     Reason for No Pharmacological VTE Prophylaxis  Once      06/28/18 1834              Brian Garza MD  Department of Hospital Medicine   [facility]

## 2018-07-07 NOTE — PROGRESS NOTES
Data reviewed  Wound care at bedside fashioning device for the right flank incision.  Did not pass swallow eval  Smiling, waving and walking, spending time out of bed  Had substantial diarrhea per mother today. ?antibiotic or TF related  Will check Cdiff.

## 2018-07-07 NOTE — PLAN OF CARE
Problem: Patient Care Overview  Goal: Plan of Care Review  Outcome: Ongoing (interventions implemented as appropriate)  Plan of care reviewed with patient and patients family. Pt family verbalized understanding. Pt awake and alert. IVF infusing per orders. Tube feeding infusing 10 mL per hour, 0 residual noted. TPN infusing per orders. PICC remains in place flushing well. Ostomy bag to right incision changed by WOC nurse, draining serosanguinous fluid. Midline incision SEDRICK clean dry and intact. Pt voiding per brief. BM x 1. Pt turned q 2 hours. SR up x 2. Bed in lowest position. Call light in reach.

## 2018-07-07 NOTE — ASSESSMENT & PLAN NOTE
Patient post op from cancer resection and J-tube placement.  Surgery for wound management and monitor patient closely.  Patient in persistent catabolic state secondary to active cancer and wound healing is likely going to be poor.

## 2018-07-07 NOTE — PROGRESS NOTES
Progress Note  Infectious Disease    Follow-up For:  Intra-abdominal abscess    SUBJECTIVE:   ROS:  No fever. Awake and cooperative, withdrawn, Events reviewed. Vomited x 1 and possibly/probably aspirated. No respiratory distress or fever, but WBC higher. CXR and CT reviewed. REceived lasix, voiding x 3 so far. Denies nausea at this time.   7/5: mother and father at bedside report she walked farther today, did exercise, laughed with the therapists, had a BM, no SOB, no vomiting.   Interval notes reviewed, wound care photos reviewed.   7/6: much more alert and interactive today, walked to the door, tolerating her tube feeds. Mother tells me she is going to have a MBSS tomorrow. Accepted to LTAC, ?tranfer Monday.    Antibiotics     Start     Stop Route Frequency Ordered    06/20/18 1700  piperacillin-tazobactam 4.5 g in dextrose 5 % 100 mL IVPB (ready to mix system)      -- IV Every 8 hours (non-standard times) 06/20/18 1548      diflucan    Pertinent Medications noted:    EXAM & DIAGNOSTICS REVIEWED:   Vitals:     Temp:  [96 °F (35.6 °C)-98.5 °F (36.9 °C)]   Temp: 97.8 °F (36.6 °C) (07/06/18 2017)  Pulse: 87 (07/06/18 2017)  Resp: 16 (07/06/18 2017)  BP: (!) 128/59 (07/06/18 2017)  SpO2: 98 % (07/06/18 2017)    Intake/Output Summary (Last 24 hours) at 07/06/18 2017  Last data filed at 07/06/18 0640   Gross per 24 hour   Intake           391.67 ml   Output              650 ml   Net          -258.33 ml       General:  In NAD. Looks comfortable but pale, more animated and interactive,  Eyes:  Anicteric, PERRL,  ENT:  Mouth w/ pink MMM, no lesions/exudate,     Neck:  Trachea midline, supple, no adenopathy appreciated  Lungs:  Clear, wet cough  Heart:  RRR, no gallop/murmur noted  Abd:  soft,  BS active, J tube feedings are in progress. Right flank ostomy appliance over prior abscess drainage site, with thin, blood tinged fluid.  :  Mehta out  Musc:  Joints without effusion, erythema, synovitis, poor muscle bulk, moves  all extremities  Skin:  Generally warm, dry, normal for color. No rashes  Wound: Midline incision looks great.   Neuro: Awake, cooperative,interacting  little , more talkative Moves all ext on command  Extrem: No pitting edema, erythema, phlebitis, cellulitis, synovitis  VAD:  picc right arm    Lines/Tubes/Drains:    General Labs reviewed:    Recent Labs  Lab 07/06/18  0409   WBC 10.10   RBC 2.77*   HGB 8.8*   HCT 25.9*      MCV 93   MCH 31.9*   MCHC 34.2       Recent Labs  Lab 07/06/18  0409   CALCIUM 8.3*   PROT 5.2*      K 3.7   CO2 21*      BUN 44*   CREATININE 0.9   ALKPHOS 75   ALT 27   AST 79*   BILITOT 1.0       Micro: nothing new    Imaging Reviewed: CXR with bilateral upper lobe interstitial changes  CT abd: dilated stomach and duodenum with air fluid levels. No abscesses.  cxr 7/5 and KUB both improved    ASSESSMENT & PLAN      Patient Active Problem List   Diagnosis    Congenital small head    Diabetes mellitus 2    JAYSHREE (acute kidney injury)    Carcinoma of small intestine, including duodenum    Severe malnutrition    Anasarca    Aspiration pneumonia    Surgical wound breakdown, sequela      Imp; large RUQ abscess from contained perforation of right colon(Ecoli, strep and fusobacterium = all colon organisms), with adenocarcinoma,advanced stage,  with 50# weight loss, hypoalbuminemia and elevated CEA and LGI bleeding, s/p colon resection 6/28              : microcephaly, childlike, withdrawn, parents are decision makers              : malnutrition, severe, due to prolonged illness, albumin 1.3 despite TF and TPN              : anorexia due to malignancy, likely  Aspiration pneumonia +/- pulmonary interstitial edema from 3rd spacing, 7/4, improving  Ileus of stomach, duodenum=gastric outlet obstruction 7/4, improving    Recommendation:   Continue zosyn, diflucan, TPN, tube feeds  Awaiting transfer to LTAC. Should be able to stop zosyn within a week

## 2018-07-07 NOTE — NURSING
Tube feeding changed to 10 ml per hr. For 24 house. Will increase rate to 20 tonight. No C/O pain or nausea. Mid line incision SEDRICK with staples. No resdess, discharge noted. Colostomy bag placed over old JESUS drain site. Draining serosanguineous thin fluid. TPN Infusing as ordered. Will continue to monitor.

## 2018-07-07 NOTE — ASSESSMENT & PLAN NOTE
Patient post op from cancer resection and J-tube placement.  Surgery for wound management and monitor patient closely.  Patient in persistent catabolic state secondary to active cancer and wound healing is likely going to be poor.  Patient likely to go to LTAC on Monday for TPN, antibiotics, and wound care.

## 2018-07-07 NOTE — ASSESSMENT & PLAN NOTE
white count appears to be improving, he and patient remains symptomatically improved.  Chest x-ray also appears improved.  Will continue current antibiotics and monitor closely.  Patient to be made NPO secondary to failing swallow study

## 2018-07-07 NOTE — SUBJECTIVE & OBJECTIVE
Interval History: Patient stable. Discussed with surgery- restarted J tube feeds at 30 + TPN. Discussed with family about LTAC,     Review of Systems   Unable to perform ROS: Patient nonverbal     Objective:     Vital Signs (Most Recent):  Temp: 97.8 °F (36.6 °C) (07/06/18 2017)  Pulse: 87 (07/06/18 2017)  Resp: 16 (07/06/18 2017)  BP: (!) 128/59 (07/06/18 2017)  SpO2: 98 % (07/06/18 2017) Vital Signs (24h Range):  Temp:  [97.6 °F (36.4 °C)-98.5 °F (36.9 °C)] 97.8 °F (36.6 °C)  Pulse:  [84-87] 87  Resp:  [16-20] 16  SpO2:  [98 %-100 %] 98 %  BP: (128-134)/(55-63) 128/59     Weight: 69.9 kg (154 lb)  Body mass index is 31.1 kg/m².    Intake/Output Summary (Last 24 hours) at 07/06/18 2300  Last data filed at 07/06/18 1947   Gross per 24 hour   Intake             1385 ml   Output              825 ml   Net              560 ml      Physical Exam   General exam-microcephalic  female who appears alert and oriented but nonverbal.  Responds  nonverbally to commands and questions.  Cardiovascular-regular rate rhythm, no murmurs  Respiratory-Wet, bilateral rhonchi worse in the lower lung fields with mildly increased work of breathing noted.  Abdomen-ostomy bag noted over JESUS drain site which is leaking clear fluid.  Laparotomy site appears to be stable with no erythema or drainage. J-tube also appears to be stable with no erythema or drainage.  Extremities-2+ pretibial pitting edema noted bilaterally.    Significant Labs: All pertinent labs within the past 24 hours have been reviewed.    Significant Imaging: I have reviewed all pertinent imaging results/findings within the past 24 hours.

## 2018-07-07 NOTE — ASSESSMENT & PLAN NOTE
Patient made NPO after aspiration event occurred 7/4.  Likely related to duodenal obstruction and patient inability to handle her own gastric secretions.  Continue NPO for now secondary to failing swallow study, but will continue J-tube feeds. Continue TPN for now.  Monitor BUN closely.  Albumin remains markedly low.

## 2018-07-07 NOTE — ASSESSMENT & PLAN NOTE
patient made NPO after aspiration event occurred 2 days ago.  Difficult to asses why aspiration event occurred if the patient is on transpyloric feeding-  more likely that it was patient's own secretions.  Continue NPO for now, but will restart J-tube feeds. Continue TPN for now.  Monitor BUN closely.  Albumin remains markedly low.

## 2018-07-07 NOTE — PROGRESS NOTES
"Informed fistula pouch over J-P drain site was leaking. Dressing saturated with serosanguinous drainage, with multiple layers of ABD pads. Assisted by RNNikki, and Daysi WETZEL.    Removed pouch, cleaned skin with wound cleanser, dried. Applied Skin Barrier Protectant. Cut an opening the length of the incision on the fistula pouch. Placed 1/2 inch wide steri-strips over the incision to help approximate opening. Made a "dam" around the entire incision with a moldable ring, to help guide the drainage into the pouch. Applied pouch, securing edges along one side with Stoma Paste. Placed Stoma Paste in the deep crease below the fistula pouch to help seal the area when the pouch was placed over it. Applied pouch, obtaining a good seal.    Patient was placed on side with a wedge under her and pillows used to secure in place.    Sacral area - Two areas of break down noted on sacrum which were photographed and noted on admission. Both areas have advanced to Stage 3 pressure injury. The pressure injury on the right buttock measured 1 cm X 1.2 cm X 0.1 cm, with a red moist base, open wound edges. The pressure injury on the sacral spine measured, 1.8 cm X 0.9 cm X 0.1 cm, with a pick base and one small area with redness.  Removed the dressing which was saturated with drainage from the right flank incision site. Cleaned with wound cleanser. Applied a thick layer of Triad Paste just to the surface of these wounds. Triad Paste is a hydrophilic barrier paste, which helps keep the wound moist for a healing environment, along with the dimethicone. Remove only the paste that is soiled when doing se-dorothy care. Reapply when needed to keep a thick layer on the wounds.    Recommend: Sacral Stage 3 pressure injuries - During se-dorothy care apply a thick layer of Triad Paste.remove only the paste that is soiled. Re-apply as needed to keep the wound covered.    PLEASE REPLACE FISTULA POUCH IF DRAINING. DO NOT PATCH! There are extra pouches in " the patient's room in the pink bin, if needed.    Asya Manley RN, CWOCN    Right Flank incision      Sacrum and right buttock.

## 2018-07-07 NOTE — ASSESSMENT & PLAN NOTE
Patient's FSGs are controlled on current hypoglycemics.   Last A1c reviewed-   Lab Results   Component Value Date    HGBA1C 4.4 06/20/2018     Most recent fingerstick glucose reviewed-   Recent Labs  Lab 07/05/18  2304 07/06/18  1725 07/06/18  2244   POCTGLUCOSE 142* 179* 225*     Current correctional scale  Low  Maintain anti-hyperglycemic dose as follows-   Antihyperglycemics     Start     Stop Route Frequency Ordered    06/30/18 0215  insulin aspart U-100 pen 0-5 Units      -- SubQ Every 6 hours PRN 06/30/18 0101    06/29/18 1612  insulin detemir U-100 pen 6 Units      -- SubQ Nightly 06/29/18 1613

## 2018-07-08 LAB
ALBUMIN SERPL BCP-MCNC: 1.4 G/DL
ALP SERPL-CCNC: 83 U/L
ALT SERPL W/O P-5'-P-CCNC: 23 U/L
ANION GAP SERPL CALC-SCNC: 6 MMOL/L
AST SERPL-CCNC: 57 U/L
BASOPHILS # BLD AUTO: 0.1 K/UL
BASOPHILS NFR BLD: 1 %
BILIRUB SERPL-MCNC: 0.9 MG/DL
BUN SERPL-MCNC: 30 MG/DL
CALCIUM SERPL-MCNC: 8.3 MG/DL
CHLORIDE SERPL-SCNC: 113 MMOL/L
CO2 SERPL-SCNC: 20 MMOL/L
CREAT SERPL-MCNC: 0.8 MG/DL
DIFFERENTIAL METHOD: ABNORMAL
EOSINOPHIL # BLD AUTO: 0.3 K/UL
EOSINOPHIL NFR BLD: 4.4 %
ERYTHROCYTE [DISTWIDTH] IN BLOOD BY AUTOMATED COUNT: 20.3 %
EST. GFR  (AFRICAN AMERICAN): >60 ML/MIN/1.73 M^2
EST. GFR  (NON AFRICAN AMERICAN): >60 ML/MIN/1.73 M^2
GLUCOSE SERPL-MCNC: 91 MG/DL
HCT VFR BLD AUTO: 24.9 %
HGB BLD-MCNC: 8.5 G/DL
LYMPHOCYTES # BLD AUTO: 2.3 K/UL
LYMPHOCYTES NFR BLD: 29.6 %
MAGNESIUM SERPL-MCNC: 2.3 MG/DL
MCH RBC QN AUTO: 32.3 PG
MCHC RBC AUTO-ENTMCNC: 34.1 G/DL
MCV RBC AUTO: 95 FL
MONOCYTES # BLD AUTO: 0.5 K/UL
MONOCYTES NFR BLD: 6.6 %
NEUTROPHILS # BLD AUTO: 4.6 K/UL
NEUTROPHILS NFR BLD: 58.4 %
PHOSPHATE SERPL-MCNC: 3.3 MG/DL
PLATELET # BLD AUTO: 196 K/UL
PMV BLD AUTO: 8.2 FL
POCT GLUCOSE: 122 MG/DL (ref 70–110)
POCT GLUCOSE: 126 MG/DL (ref 70–110)
POTASSIUM SERPL-SCNC: 4 MMOL/L
PROT SERPL-MCNC: 5.2 G/DL
RBC # BLD AUTO: 2.63 M/UL
SODIUM SERPL-SCNC: 139 MMOL/L
WBC # BLD AUTO: 7.9 K/UL

## 2018-07-08 PROCEDURE — C9113 INJ PANTOPRAZOLE SODIUM, VIA: HCPCS | Performed by: SURGERY

## 2018-07-08 PROCEDURE — 25000003 PHARM REV CODE 250: Performed by: INTERNAL MEDICINE

## 2018-07-08 PROCEDURE — 94799 UNLISTED PULMONARY SVC/PX: CPT

## 2018-07-08 PROCEDURE — 25000003 PHARM REV CODE 250: Performed by: HOSPITALIST

## 2018-07-08 PROCEDURE — 12000002 HC ACUTE/MED SURGE SEMI-PRIVATE ROOM

## 2018-07-08 PROCEDURE — A4217 STERILE WATER/SALINE, 500 ML: HCPCS | Performed by: HOSPITALIST

## 2018-07-08 PROCEDURE — 85025 COMPLETE CBC W/AUTO DIFF WBC: CPT

## 2018-07-08 PROCEDURE — 94761 N-INVAS EAR/PLS OXIMETRY MLT: CPT

## 2018-07-08 PROCEDURE — 63600175 PHARM REV CODE 636 W HCPCS: Performed by: INTERNAL MEDICINE

## 2018-07-08 PROCEDURE — 80053 COMPREHEN METABOLIC PANEL: CPT

## 2018-07-08 PROCEDURE — 63700000 PHARM REV CODE 250 ALT 637 W/O HCPCS: Performed by: INTERNAL MEDICINE

## 2018-07-08 PROCEDURE — 63600175 PHARM REV CODE 636 W HCPCS: Performed by: HOSPITALIST

## 2018-07-08 PROCEDURE — 83735 ASSAY OF MAGNESIUM: CPT

## 2018-07-08 PROCEDURE — 36415 COLL VENOUS BLD VENIPUNCTURE: CPT

## 2018-07-08 PROCEDURE — 87324 CLOSTRIDIUM AG IA: CPT

## 2018-07-08 PROCEDURE — 63600175 PHARM REV CODE 636 W HCPCS: Performed by: SURGERY

## 2018-07-08 PROCEDURE — 84100 ASSAY OF PHOSPHORUS: CPT

## 2018-07-08 PROCEDURE — 97110 THERAPEUTIC EXERCISES: CPT

## 2018-07-08 RX ORDER — FLUCONAZOLE 2 MG/ML
200 INJECTION, SOLUTION INTRAVENOUS DAILY
Qty: 1500 ML | Refills: 0 | Status: SHIPPED | OUTPATIENT
Start: 2018-07-08 | End: 2018-07-23

## 2018-07-08 RX ORDER — ACETAMINOPHEN 500 MG
1000 TABLET ORAL EVERY 6 HOURS PRN
Refills: 0 | COMMUNITY
Start: 2018-07-08

## 2018-07-08 RX ORDER — FUROSEMIDE 10 MG/ML
20 INJECTION INTRAMUSCULAR; INTRAVENOUS ONCE
Status: COMPLETED | OUTPATIENT
Start: 2018-07-08 | End: 2018-07-08

## 2018-07-08 RX ORDER — DIPHENOXYLATE HYDROCHLORIDE AND ATROPINE SULFATE 2.5; .025 MG/1; MG/1
1 TABLET ORAL 4 TIMES DAILY PRN
Qty: 30 TABLET | Refills: 0 | Status: SHIPPED | OUTPATIENT
Start: 2018-07-08 | End: 2018-07-18

## 2018-07-08 RX ORDER — FUROSEMIDE 10 MG/ML
20 INJECTION INTRAMUSCULAR; INTRAVENOUS DAILY
Status: DISCONTINUED | OUTPATIENT
Start: 2018-07-09 | End: 2018-07-09 | Stop reason: HOSPADM

## 2018-07-08 RX ADMIN — SODIUM BICARBONATE 650 MG TABLET 650 MG: at 09:07

## 2018-07-08 RX ADMIN — VITAMIN D, TAB 1000IU (100/BT) 1000 UNITS: 25 TAB at 09:07

## 2018-07-08 RX ADMIN — ENOXAPARIN SODIUM 40 MG: 100 INJECTION SUBCUTANEOUS at 05:07

## 2018-07-08 RX ADMIN — PIPERACILLIN SODIUM AND TAZOBACTAM SODIUM 4.5 G: 4; .5 INJECTION, POWDER, LYOPHILIZED, FOR SOLUTION INTRAVENOUS at 10:07

## 2018-07-08 RX ADMIN — ACETAMINOPHEN 1000 MG: 500 TABLET, FILM COATED ORAL at 12:07

## 2018-07-08 RX ADMIN — DORZOLAMIDE HYDROCHLORIDE AND TIMOLOL MALEATE 1 DROP: 20; 5 SOLUTION/ DROPS OPHTHALMIC at 09:07

## 2018-07-08 RX ADMIN — SODIUM BICARBONATE 650 MG TABLET 650 MG: at 12:07

## 2018-07-08 RX ADMIN — PIPERACILLIN SODIUM AND TAZOBACTAM SODIUM 4.5 G: 4; .5 INJECTION, POWDER, LYOPHILIZED, FOR SOLUTION INTRAVENOUS at 03:07

## 2018-07-08 RX ADMIN — BRINZOLAMIDE 1 DROP: 10 SUSPENSION/ DROPS OPHTHALMIC at 08:07

## 2018-07-08 RX ADMIN — FENOFIBRATE 160 MG: 160 TABLET ORAL at 09:07

## 2018-07-08 RX ADMIN — BRINZOLAMIDE 1 DROP: 10 SUSPENSION/ DROPS OPHTHALMIC at 09:07

## 2018-07-08 RX ADMIN — FUROSEMIDE 20 MG: 10 INJECTION, SOLUTION INTRAVENOUS at 12:07

## 2018-07-08 RX ADMIN — SODIUM BICARBONATE 650 MG TABLET 650 MG: at 05:07

## 2018-07-08 RX ADMIN — LATANOPROST 1 DROP: 50 SOLUTION OPHTHALMIC at 08:07

## 2018-07-08 RX ADMIN — PIPERACILLIN SODIUM AND TAZOBACTAM SODIUM 4.5 G: 4; .5 INJECTION, POWDER, LYOPHILIZED, FOR SOLUTION INTRAVENOUS at 06:07

## 2018-07-08 RX ADMIN — DORZOLAMIDE HYDROCHLORIDE AND TIMOLOL MALEATE 1 DROP: 20; 5 SOLUTION/ DROPS OPHTHALMIC at 08:07

## 2018-07-08 RX ADMIN — CALCIUM GLUCONATE: 94 INJECTION, SOLUTION INTRAVENOUS at 08:07

## 2018-07-08 RX ADMIN — FLUCONAZOLE IN SODIUM CHLORIDE 200 MG: 2 INJECTION, SOLUTION INTRAVENOUS at 05:07

## 2018-07-08 RX ADMIN — INSULIN DETEMIR 6 UNITS: 100 INJECTION, SOLUTION SUBCUTANEOUS at 08:07

## 2018-07-08 RX ADMIN — FLUTICASONE PROPIONATE 50 MCG: 50 SPRAY, METERED NASAL at 09:07

## 2018-07-08 RX ADMIN — PANTOPRAZOLE SODIUM 40 MG: 40 INJECTION, POWDER, LYOPHILIZED, FOR SOLUTION INTRAVENOUS at 06:07

## 2018-07-08 RX ADMIN — SODIUM BICARBONATE 650 MG TABLET 650 MG: at 08:07

## 2018-07-08 RX ADMIN — SIMVASTATIN 10 MG: 10 TABLET, FILM COATED ORAL at 08:07

## 2018-07-08 RX ADMIN — LEVOTHYROXINE SODIUM 25 MCG: 25 TABLET ORAL at 06:07

## 2018-07-08 NOTE — SUBJECTIVE & OBJECTIVE
Interval History:  Patient seen and examined.  No acute events overnight.  Patient continues to have copious leakage from JESUS site.  Tolerating tube feeds at 20 cc an hour.  Plan of care reviewed with the patient and the family in detail.    Review of Systems   Unable to perform ROS: Patient nonverbal     Objective:     Vital Signs (Most Recent):  Temp: 97.8 °F (36.6 °C) (07/08/18 1126)  Pulse: 77 (07/08/18 1126)  Resp: 18 (07/08/18 1126)  BP: (!) 140/60 (07/08/18 1126)  SpO2: 99 % (07/08/18 1126) Vital Signs (24h Range):  Temp:  [97.1 °F (36.2 °C)-98.1 °F (36.7 °C)] 97.8 °F (36.6 °C)  Pulse:  [74-81] 77  Resp:  [16-20] 18  SpO2:  [98 %-100 %] 99 %  BP: (106-140)/(53-63) 140/60     Weight: 69.9 kg (154 lb)  Body mass index is 31.1 kg/m².    Intake/Output Summary (Last 24 hours) at 07/08/18 1518  Last data filed at 07/08/18 0500   Gross per 24 hour   Intake          1272.91 ml   Output              100 ml   Net          1172.91 ml      Physical Exam  General exam-microcephalic  female who appears alert and oriented but nonverbal.  Responds  nonverbally to commands and questions.  Cardiovascular-regular rate rhythm, no murmurs  Respiratory-mild bilateral lower lobe rhonchi.  Normal work of breathing.  Abdomen-ostomy bag noted over JESUS drain site which is leaking clear fluid.  Laparotomy site appears to be stable with no erythema or drainage. J-tube also appears to be stable with no erythema or drainage.  Extremities-2+ pretibial pitting edema noted bilaterally.    Significant Labs: All pertinent labs within the past 24 hours have been reviewed.    Significant Imaging: I have reviewed all pertinent imaging results/findings within the past 24 hours.

## 2018-07-08 NOTE — ASSESSMENT & PLAN NOTE
patient with pulmonary edema likely secondary to volume overload from anasarca. CXR improved. S/p 20 mg IV Lasix on 7/4 and 7/5 to help facilitate diuresis and monitor renal function closely.  Will restart Lasix again today.

## 2018-07-08 NOTE — PROGRESS NOTES
[facility] Hospital Medicine  Progress Note    Patient Name: Sis Teixeira  MRN: [id number]  Patient Class: IP- Inpatient   Admission Date: 6/20/2018  Length of Stay: 17 days  Attending Physician: Bekah Fernandez MD  Primary Care Provider: Mann Garduno MD        Subjective:     Principal Problem:Carcinoma of small intestine, including duodenum      Interval History:  Patient seen and examined.  No acute events overnight.  Patient continues to have copious leakage from JESUS site.  Tolerating tube feeds at 20 cc an hour.  Plan of care reviewed with the patient and the family in detail.    Review of Systems   Unable to perform ROS: Patient nonverbal     Objective:     Vital Signs (Most Recent):  Temp: 97.8 °F (36.6 °C) (07/08/18 1126)  Pulse: 77 (07/08/18 1126)  Resp: 18 (07/08/18 1126)  BP: (!) 140/60 (07/08/18 1126)  SpO2: 99 % (07/08/18 1126) Vital Signs (24h Range):  Temp:  [97.1 °F (36.2 °C)-98.1 °F (36.7 °C)] 97.8 °F (36.6 °C)  Pulse:  [74-81] 77  Resp:  [16-20] 18  SpO2:  [98 %-100 %] 99 %  BP: (106-140)/(53-63) 140/60     Weight: 69.9 kg (154 lb)  Body mass index is 31.1 kg/m².    Intake/Output Summary (Last 24 hours) at 07/08/18 1518  Last data filed at 07/08/18 0500   Gross per 24 hour   Intake          1272.91 ml   Output              100 ml   Net          1172.91 ml      Physical Exam  General exam-microcephalic  female who appears alert and oriented but nonverbal.  Responds  nonverbally to commands and questions.  Cardiovascular-regular rate rhythm, no murmurs  Respiratory-mild bilateral lower lobe rhonchi.  Normal work of breathing.  Abdomen-ostomy bag noted over JESUS drain site which is leaking clear fluid.  Laparotomy site appears to be stable with no erythema or drainage. J-tube also appears to be stable with no erythema or drainage.  Extremities-2+ pretibial pitting edema noted bilaterally.    Significant Labs: All pertinent labs within the past 24 hours have been  reviewed.    Significant Imaging: I have reviewed all pertinent imaging results/findings within the past 24 hours.    Assessment/Plan:      * Carcinoma of small intestine, including duodenum    Patient post op from cancer resection and J-tube placement.  Surgery for wound management and monitor patient closely.  Patient in persistent catabolic state secondary to active cancer and wound healing is likely going to be poor.  Patient likely to go to LTAC on Monday for TPN, antibiotics, and wound care.          Anasarca    patient with pulmonary edema likely secondary to volume overload from anasarca. CXR improved. S/p 20 mg IV Lasix on 7/4 and 7/5 to help facilitate diuresis and monitor renal function closely.  Will restart Lasix again today.          Surgical wound breakdown, sequela    Severe, related to poor wound healing and catabolic state d/t cancer. Wound care consulted. Prepping for LTAC on Monday,          Aspiration pneumonia    white count appears to be improving, he and patient remains symptomatically improved.  Chest x-ray also appears improved.  Will continue current antibiotics and monitor closely.  Patient to be made NPO secondary to failing swallow study          Severe malnutrition    Patient made NPO after aspiration event occurred 7/4.  Likely related to duodenal obstruction and patient inability to handle her own gastric secretions.  Continue NPO for now secondary to failing swallow study, but will continue J-tube feeds. Continue TPN for now.  Monitor BUN closely.  Albumin remains markedly low.          Diabetes mellitus 2    Patient's FSGs are controlled on current hypoglycemics.   Last A1c reviewed-   Lab Results   Component Value Date    HGBA1C 4.4 06/20/2018     Most recent fingerstick glucose reviewed-     Recent Labs  Lab 07/07/18  1659 07/07/18  2040 07/08/18  1115   POCTGLUCOSE 117* 126* 122*     Current correctional scale  Low  Maintain anti-hyperglycemic dose as follows-    Antihyperglycemics     Start     Stop Route Frequency Ordered    06/30/18 0215  insulin aspart U-100 pen 0-5 Units      -- SubQ Every 6 hours PRN 06/30/18 0101    06/29/18 1612  insulin detemir U-100 pen 6 Units      -- SubQ Nightly 06/29/18 1613                  Congenital small head    Chronic issue.  May predispose patient to aspiration            VTE Risk Mitigation         Ordered     enoxaparin injection 40 mg  Daily      06/29/18 1613     IP VTE HIGH RISK PATIENT  Once      06/28/18 1834     Reason for No Pharmacological VTE Prophylaxis  Once      06/28/18 1834              Brian Garza MD  Department of Hospital Medicine   [facility]

## 2018-07-08 NOTE — PLAN OF CARE
07/07/18 1950   Patient Assessment/Suction   Level of Consciousness (AVPU) alert   PRE-TX-O2-ETCO2   O2 Device (Oxygen Therapy) room air   SpO2 98 %   Pulse Oximetry Type Intermittent   Pulse 79   Resp 20   Incentive Spirometer   $ Incentive Spirometer Charges done with encouragement   Administration (Incentive Spirometer) done with encouragement   Number of Repetitions (Incentive Spirometer) 10   Level (mL) (Incentive Spirometer) 200   Patient Tolerance fair

## 2018-07-08 NOTE — DISCHARGE INSTRUCTIONS
[facility] Transfer Orders        Admit to: Post-Acute Medical    Diagnoses:   Active Hospital Problems    Diagnosis  POA    *Carcinoma of small intestine, including duodenum [C17.8]  Yes     Priority: 1 - High    Anasarca [R60.1]  Yes     Priority: 2     Surgical wound breakdown, sequela [T81.31XS]  Not Applicable    Aspiration pneumonia [J69.0]  No    Severe malnutrition [E43]  Yes    Congenital small head [Q02]  Not Applicable    Diabetes mellitus 2 [E11.9]  Yes     dIET CONTROL        Resolved Hospital Problems    Diagnosis Date Resolved POA    JAYSHREE (acute kidney injury) [N17.9] 07/06/2018 Yes     Allergies:   Review of patient's allergies indicates:   Allergen Reactions    Sulfa (sulfonamide antibiotics) Rash    Tetracyclines Rash       Code Status: Full    Vitals: Routine       Diet:   Custom TPN: 4% AA, 9% dextrose with 20% lipids (250 mls over 12 hrs)  J-tube Peptamen 1.5 Prebio @ 20 mls/hr continuous- advance to calorie needs met and wean off TPN      Activity: Activity as tolerated    Nursing Precautions: Aspiration , Fall and Pressure ulcer prevention    Bed/Surface: Pressure Redistribution    Consults: PT to evaluate and treat- 5 times a week, OT to evaluate and treat- 5 times a week, Wound Care and Nutrition to evaluate and recommend diet    Oxygen: room air    Dialysis: Patient is not on dialysis.     Labs:  Daily BMP, CBC, weekly pre-albumin/triglycerides   Pending Diagnostic Studies:     Procedure Component Value Units Date/Time    Fl Modified Barium Swallow Speech [[id number]] Resulted:  07/07/18 1048    Order Status:  Sent Lab Status:  In process Updated:  07/07/18 1404          Miscellaneous Care:   PEG Care:  Clean site every 24 hours  Routine Skin for Bedridden Patients:  Apply moisture barrier cream to all  Wound Care: yes:  Surgical Wound:  Location:  Abdomen-Consult ET nurse        Apply the following to wound:   Collagenase (Santyl) daily, cover with  telfa, wrap with with kerlix dressing   Ostomy care:  Change ostomy bag daily and when full.  Monitor for increasing drainage.  Diabetes Care: Diabetes: Check blood sugar. Fingerstick blood sugar AC and HS  Sliding Scale/Hypoglycemia Protocol: For FSG<80, give 1 amp D50 or 1 glucose tablet. For FSG , do nothing. For -200, give 1 unit of novolog in addition to pre-meal insulin. For -250, give 2 units of novolog in addition to pre-meal insulin. For -300, give 3 units of novolog in addition to pre-meal insulin. For 301-350, give 4 units of novolog in addition to pre-meal insulin. For 351-400, give 5 units of novolog in addition to pre-meal insulin. For FSG >400, give 5 units of novolog in addition to pre-meal insulin and please call MD    IV Access: PICC     Medications: Discontinue all previous medication orders, if any. See new list below.  Current Discharge Medication List      START taking these medications    Details   acetaminophen (TYLENOL) 500 MG tablet Take 2 tablets (1,000 mg total) by mouth every 6 (six) hours as needed.  Refills: 0      diphenoxylate-atropine 2.5-0.025 mg (LOMOTIL) 2.5-0.025 mg per tablet Take 1 tablet by mouth 4 (four) times daily as needed for Diarrhea.  Qty: 30 tablet, Refills: 0      fluconazole (DIFLUCAN) 200 mg/100 mL IVPB Inject 100 mLs (200 mg total) into the vein once daily. for 15 days  Qty: 1500 mL, Refills: 0      insulin detemir U-100 (LEVEMIR FLEXTOUCH) 100 unit/mL (3 mL) SubQ InPn pen Inject 6 Units into the skin every evening.  Qty: 3 mL, Refills: 0      piperacillin sodium/tazobactam (PIPERACILLIN-TAZOBACTAM 4.5G/100ML D5W IVPB, READY TO MIX,) Inject 100 mLs (4.5 g total) into the vein every 8 (eight) hours. for 7 days  Qty: 2100 mL, Refills: 0      sterile water SolP 342.86 mL with parenteral amino acid 10% No.2 10 % SolP 60 g, dextrose 70% SolP 179.998 g Inject 50 mL/hr into the vein continuous.  Qty: 1000 mL, Refills: 0         CONTINUE these  medications which have NOT CHANGED    Details   brinzolamide (AZOPT) 1 % ophthalmic suspension 1 drop 3 (three) times daily.      calcium carbonate (OS-WALLY) 600 mg calcium (1,500 mg) Tab Take 600 mg by mouth once.      cholecalciferol, vitamin D3, (VITAMIN D3) 1,000 unit capsule Take 1,000 Units by mouth once daily.      docusate sodium (COLACE) 100 MG capsule Take 100 mg by mouth 2 (two) times daily.      dorzolamide-timolol, PF, (COSOPT PF) 2-0.5 % Dpet ophthalmic solution 1 drop 2 (two) times daily.      fenofibrate (LOFIBRA) 160 MG Tab Take 160 mg by mouth once daily.      fluticasone (FLONASE) 50 mcg/actuation nasal spray 1 spray by Each Nare route once daily.      furosemide (LASIX) 20 MG tablet Take 20 mg by mouth 2 (two) times daily.      latanoprost 0.005 % ophthalmic solution 1 drop every evening.      levothyroxine (SYNTHROID) 25 MCG tablet Take 25 mcg by mouth once daily.      ranitidine (ZANTAC) 150 MG capsule Take 150 mg by mouth 2 (two) times daily.      simvastatin (ZOCOR) 10 MG tablet Take 10 mg by mouth every evening.      sodium bicarbonate 650 MG tablet Take 650 mg by mouth 4 (four) times daily.           Follow up:   Follow-up Information     ERENDIRA Levine, Susan. \Hospital Has a New Name and Outlook.\"".

## 2018-07-08 NOTE — PT/OT/SLP PROGRESS
Physical Therapy Treatment    Patient Name:  Sis Teixeira   MRN:  [id number]    Recommendations:     Discharge Recommendations:  LTACH (long term acute care hospital)   Discharge Equipment Recommendations: none   Barriers to discharge: None    Assessment:     Sis Teixeira is a 58 y.o. female admitted with a medical diagnosis of Carcinoma of small intestine, including duodenum.  She presents with the following impairments/functional limitations:  weakness, impaired endurance, impaired self care skills, impaired functional mobilty, gait instability, impaired balance, impaired cognition, decreased lower extremity function, decreased safety awareness .    Rehab Prognosis:  poor; patient would benefit from acute skilled PT services to address these deficits and reach maximum level of function.      Recent Surgery: Procedure(s) (LRB):  HEMICOLECTOMY, RIGHT (N/A)  CHOLECYSTECTOMY (N/A)  INSERTION, JEJUNOSTOMY TUBE (N/A) 10 Days Post-Op    Plan:     During this hospitalization, patient to be seen daily to address the above listed problems via gait training, therapeutic activities, therapeutic exercises  · Plan of Care Expires:  07/14/18   Plan of Care Reviewed with: patient    Subjective     Communicated with lance Oates prior to session.  Patient found in supine position in bed upon PT entry to room, agreeable to treatment.      Chief Complaint: none  Patient comments/goals: not able to verbalize  Pain/Comfort:  · Pain Rating 1: 0/10  · Pain Rating Post-Intervention 1: 0/10    Patients cultural, spiritual, Voodoo conflicts given the current situation: none reported     Objective:     Patient found with: PEG Tube, pressure relief boots, PICC line, telemetry, nethrostomy tube    General Precautions: Standard, fall, NPO   Orthopedic Precautions:N/A   Braces: N/A     Functional Mobility:  · Bed Mobility:     · Rolling Left:  moderate assistance  · Bridging: minimum assistance and moderate assistance  · Supine to  Sit: moderate assistance  · Sit to Supine: minimum assistance  · Balance: fair in sitting can lean to R/L and right herself to neutral. Can sit on EOB without LOB for 3 min.      AM-PAC 6 CLICK MOBILITY          Therapeutic Activities and Exercises:   mobility exercises: bridging in supine 2 x 10 and bridging in supine 2 x 10. Sitting exercises as follows to strengthen BLE for safe and functional mobility: 1 x 10 ea., LAQ, marching, AP.  Supine exercises 1 x 10 ea:  HS, QS, GS, bridges, hip ab/add.    Patient left supine with all lines intact, call button in reach, bed alarm on and family members present..    GOALS:    Physical Therapy Goals        Problem: Physical Therapy Goal    Goal Priority Disciplines Outcome Goal Variances Interventions   Physical Therapy Goal     PT/OT, PT Ongoing (interventions implemented as appropriate)     Description:  Goals to be met by: 18     Patient will increase functional independence with mobility by performin. Supine to sit with Modified [place]  2. Sit to stand transfer with [place]  3. Gait  x 100 feet with Supervision.                       Time Tracking:     PT Received On: 18  PT Start Time: 1046     PT Stop Time: 1114  PT Total Time (min): 28 min     Billable Minutes: Therapeutic Exercise 28       PT/PTA: PTA     PTA Visit Number: 1     Kiana Prince, BRIAN  2018

## 2018-07-08 NOTE — ASSESSMENT & PLAN NOTE
Patient's FSGs are controlled on current hypoglycemics.   Last A1c reviewed-   Lab Results   Component Value Date    HGBA1C 4.4 06/20/2018     Most recent fingerstick glucose reviewed-     Recent Labs  Lab 07/07/18  1659 07/07/18  2040 07/08/18  1115   POCTGLUCOSE 117* 126* 122*     Current correctional scale  Low  Maintain anti-hyperglycemic dose as follows-   Antihyperglycemics     Start     Stop Route Frequency Ordered    06/30/18 0215  insulin aspart U-100 pen 0-5 Units      -- SubQ Every 6 hours PRN 06/30/18 0101    06/29/18 1612  insulin detemir U-100 pen 6 Units      -- SubQ Nightly 06/29/18 1613

## 2018-07-08 NOTE — PLAN OF CARE
Problem: Physical Therapy Goal  Goal: Physical Therapy Goal  Goals to be met by: 18     Patient will increase functional independence with mobility by performin. Supine to sit with Modified [place]  2. Sit to stand transfer with [place]  3. Gait  x 100 feet with Supervision.      Outcome: Ongoing (interventions implemented as appropriate)  Patient participated in therapeutic exercise and activities to improve functional mobility, improve ADL's and promote safe ambulation to decrease fall risk.

## 2018-07-08 NOTE — PLAN OF CARE
Problem: Patient Care Overview  Goal: Plan of Care Review  Outcome: Ongoing (interventions implemented as appropriate)  Pt on room air with 100% sats and IS, pt achieves 200 on IS

## 2018-07-08 NOTE — PLAN OF CARE
Problem: Patient Care Overview  Goal: Plan of Care Review  Outcome: Ongoing (interventions implemented as appropriate)  Pt remains free from injury. Turned q2 with pillow support. TPN @ ml/hr. IV abx infused. R PICC. Tube feeding @ 20 ml/hr, pt tolerating. NPO. Blood glucose monitored. Participated with PT. brandyn bag @  incision. Room air. To be d/c'd tomorrow to LTAC. lovenox given. Family @ bedside. stg 3 @ sacral, dsg c/d/i. Bed locked and low. Room near unit station. Call light in reach. Nurse hourly rounding to ensure pt safety.

## 2018-07-08 NOTE — PLAN OF CARE
Problem: Patient Care Overview  Goal: Plan of Care Review  Outcome: Ongoing (interventions implemented as appropriate)  POC discussed with patient, verbalized understanding. Patient repositioned every couple hours with brief changed due to incontinence of urine multiple times and large loose stool this am, no stool collected. VS stable. Receiving TPN @ 25cc/h per right arm PICC line with NS at kvo rate for antibiotics. Tolerating TF at 20cc/h as ordered. Dressing around JT site changed multiple times due to drainage around site, serosanguinous. Drainage bag to old JESUS site drained 200cc serosanguinous drainage. Monitoring accuchecks, no coverage needed. Family at bedside. Denies pain.

## 2018-07-08 NOTE — HOSPITAL COURSE
Patient is a 58-year-old  female with a past medical history significant for microcephaly presents the hospital with abdominal pain. Patient was found CT scan to have a large abscess number with surgical drainage of her abscess.  She was found to have malignant tumor located within her right colon and secondary abscess requiring significant surgery for debulking and removal of large mass.  Subsequent to her surgery, the patient improved symptomatically.  She was placed on broad-spectrum antibiotics and Infectious Disease was consulted.  Patient had J-tube placed for feeds and was initially on TPN with J-tube feeds.  However, patient had aspiration event on 07/03 and she was made NPO at that point in time.  J-tube feeds were temporarily held, as there was a mechanical malfunction with the J-tube.  Patient remained on TPN throughout her hospital stay.  J-tube feeds restarted at 10 cc an hour advanced to 20 prior to discharge. Patient had modified barium swallow study done on 07/06 which revealed evidence of severe dysphagia and silent aspiration.  Therefore patient was can continued strict NPO and was fed only through J-tube.  Patient had copious drainage through her old JESUS drain site likely consistent with anasarca secondary to hypoalbuminemia.  Patient had elevated BUN likely related to overfeeding, but this had improved markedly as protein content of feeds was adjusted.  Given the patient's complex surgical history, necessity for TPN and and post pyloric feeding, as well as her general deconditioned status, recommendation was made that the patient go to LTAC for but will likely be a complex course for recovery.  She will need follow-up with Oncology, General surgery, and primary care.  Patient was accepted by post acute medical LTAC.

## 2018-07-09 VITALS
BODY MASS INDEX: 31.04 KG/M2 | HEIGHT: 59 IN | TEMPERATURE: 98 F | DIASTOLIC BLOOD PRESSURE: 57 MMHG | RESPIRATION RATE: 18 BRPM | HEART RATE: 80 BPM | WEIGHT: 154 LBS | SYSTOLIC BLOOD PRESSURE: 123 MMHG | OXYGEN SATURATION: 100 %

## 2018-07-09 PROBLEM — R19.00 ABDOMINAL MASS: Status: ACTIVE | Noted: 2018-07-09

## 2018-07-09 PROBLEM — L02.211 ABDOMINAL WALL ABSCESS: Status: ACTIVE | Noted: 2018-07-09

## 2018-07-09 LAB
ALBUMIN SERPL BCP-MCNC: 1.3 G/DL
ALP SERPL-CCNC: 81 U/L
ALT SERPL W/O P-5'-P-CCNC: 18 U/L
ANION GAP SERPL CALC-SCNC: 6 MMOL/L
AST SERPL-CCNC: 47 U/L
BASOPHILS # BLD AUTO: 0.1 K/UL
BASOPHILS NFR BLD: 0.7 %
BILIRUB SERPL-MCNC: 0.7 MG/DL
BUN SERPL-MCNC: 25 MG/DL
C DIFF GDH STL QL: NEGATIVE
C DIFF TOX A+B STL QL IA: NEGATIVE
CALCIUM SERPL-MCNC: 8.2 MG/DL
CHLORIDE SERPL-SCNC: 112 MMOL/L
CO2 SERPL-SCNC: 20 MMOL/L
CREAT SERPL-MCNC: 0.9 MG/DL
DIFFERENTIAL METHOD: ABNORMAL
EOSINOPHIL # BLD AUTO: 0.3 K/UL
EOSINOPHIL NFR BLD: 4 %
ERYTHROCYTE [DISTWIDTH] IN BLOOD BY AUTOMATED COUNT: 20.7 %
EST. GFR  (AFRICAN AMERICAN): >60 ML/MIN/1.73 M^2
EST. GFR  (NON AFRICAN AMERICAN): >60 ML/MIN/1.73 M^2
GLUCOSE SERPL-MCNC: 190 MG/DL
HCT VFR BLD AUTO: 23.4 %
HGB BLD-MCNC: 7.9 G/DL
LYMPHOCYTES # BLD AUTO: 2.2 K/UL
LYMPHOCYTES NFR BLD: 25.7 %
MAGNESIUM SERPL-MCNC: 2.3 MG/DL
MCH RBC QN AUTO: 32.3 PG
MCHC RBC AUTO-ENTMCNC: 33.6 G/DL
MCV RBC AUTO: 96 FL
MONOCYTES # BLD AUTO: 0.4 K/UL
MONOCYTES NFR BLD: 5.2 %
NEUTROPHILS # BLD AUTO: 5.5 K/UL
NEUTROPHILS NFR BLD: 64.4 %
PHOSPHATE SERPL-MCNC: 3.9 MG/DL
PLATELET # BLD AUTO: 189 K/UL
PMV BLD AUTO: 8.6 FL
POCT GLUCOSE: 127 MG/DL (ref 70–110)
POCT GLUCOSE: 167 MG/DL (ref 70–110)
POTASSIUM SERPL-SCNC: 5.1 MMOL/L
PROT SERPL-MCNC: 4.9 G/DL
RBC # BLD AUTO: 2.43 M/UL
SODIUM SERPL-SCNC: 138 MMOL/L
WBC # BLD AUTO: 8.5 K/UL

## 2018-07-09 PROCEDURE — 36415 COLL VENOUS BLD VENIPUNCTURE: CPT

## 2018-07-09 PROCEDURE — 84100 ASSAY OF PHOSPHORUS: CPT

## 2018-07-09 PROCEDURE — 63600175 PHARM REV CODE 636 W HCPCS: Performed by: INTERNAL MEDICINE

## 2018-07-09 PROCEDURE — 97110 THERAPEUTIC EXERCISES: CPT

## 2018-07-09 PROCEDURE — 63600175 PHARM REV CODE 636 W HCPCS: Performed by: SURGERY

## 2018-07-09 PROCEDURE — 25000003 PHARM REV CODE 250: Performed by: INTERNAL MEDICINE

## 2018-07-09 PROCEDURE — C9113 INJ PANTOPRAZOLE SODIUM, VIA: HCPCS | Performed by: SURGERY

## 2018-07-09 PROCEDURE — 83735 ASSAY OF MAGNESIUM: CPT

## 2018-07-09 PROCEDURE — 99239 HOSP IP/OBS DSCHRG MGMT >30: CPT | Mod: ,,, | Performed by: INTERNAL MEDICINE

## 2018-07-09 PROCEDURE — 80053 COMPREHEN METABOLIC PANEL: CPT

## 2018-07-09 PROCEDURE — 63600175 PHARM REV CODE 636 W HCPCS: Performed by: HOSPITALIST

## 2018-07-09 PROCEDURE — 97530 THERAPEUTIC ACTIVITIES: CPT

## 2018-07-09 PROCEDURE — 85025 COMPLETE CBC W/AUTO DIFF WBC: CPT

## 2018-07-09 PROCEDURE — 97116 GAIT TRAINING THERAPY: CPT

## 2018-07-09 RX ADMIN — DORZOLAMIDE HYDROCHLORIDE AND TIMOLOL MALEATE 1 DROP: 20; 5 SOLUTION/ DROPS OPHTHALMIC at 08:07

## 2018-07-09 RX ADMIN — FUROSEMIDE 20 MG: 10 INJECTION, SOLUTION INTRAVENOUS at 08:07

## 2018-07-09 RX ADMIN — BRINZOLAMIDE 1 DROP: 10 SUSPENSION/ DROPS OPHTHALMIC at 08:07

## 2018-07-09 RX ADMIN — LEVOTHYROXINE SODIUM 25 MCG: 25 TABLET ORAL at 05:07

## 2018-07-09 RX ADMIN — FLUTICASONE PROPIONATE 50 MCG: 50 SPRAY, METERED NASAL at 08:07

## 2018-07-09 RX ADMIN — PIPERACILLIN SODIUM AND TAZOBACTAM SODIUM 4.5 G: 4; .5 INJECTION, POWDER, LYOPHILIZED, FOR SOLUTION INTRAVENOUS at 05:07

## 2018-07-09 RX ADMIN — PANTOPRAZOLE SODIUM 40 MG: 40 INJECTION, POWDER, LYOPHILIZED, FOR SOLUTION INTRAVENOUS at 05:07

## 2018-07-09 NOTE — PLAN OF CARE
Faxed AVS/discharge orders to Eleanor at [facility] (fax#[phone]); he will call me back with a room assignment and set up [facility] for transport when ready....Arturo Blackburn       07/09/18 120   Discharge Reassessment   Assessment Type Discharge Planning Reassessment

## 2018-07-09 NOTE — PLAN OF CARE
Per Lele at [facility], pt's nurse can call report to the charge nurse at #[phone], the pt is going to Rm 107-B. [facility] is called [facility].  I notified pt's nurseNima of the above.......RASHARD Blackburn       07/09/18 3235   Discharge Reassessment   Assessment Type Discharge Planning Reassessment

## 2018-07-09 NOTE — PT/OT/SLP PROGRESS
Physical Therapy Treatment    Patient Name:  Sis Teixeira   MRN:  [id number]    Recommendations:     Discharge Recommendations:  LTACH (long term acute care [facility])       Assessment:     Sis Teixeira is a 58 y.o. female admitted with a medical diagnosis of Carcinoma of small intestine, including duodenum.  She presents with the following impairments/functional limitations:  weakness, impaired endurance, impaired self care skills, impaired functional mobilty, gait instability, impaired balance, impaired cognition, decreased lower extremity function, decreased safety awareness, pain .    Rehab Prognosis:  good; patient would benefit from acute skilled PT services to address these deficits and reach maximum level of function.      Recent Surgery: Procedure(s) (LRB):  HEMICOLECTOMY, RIGHT (N/A)  CHOLECYSTECTOMY (N/A)  INSERTION, JEJUNOSTOMY TUBE (N/A) 11 Days Post-Op    Plan:     During this hospitalization, patient to be seen daily to address the above listed problems via gait training, therapeutic activities, therapeutic exercises  · Plan of Care Expires:  07/14/18   Plan of Care Reviewed with: patient    Subjective     Communicated with nurse Garcia prior to session.  Patient found supine upon PT entry to room, agreeable to treatment.      Chief Complaint: abdominal pain  Patient comments/goals: pt eager to get up.   Pain/Comfort:  · Pain Rating 1:  (did not ratel; pointing to abdomen)  · Location - Orientation 1: generalized  · Location 1: abdomen  · Pain Addressed 1: Reposition, Distraction, Cessation of Activity    Patients cultural, spiritual, Bahai conflicts given the current situation: none reported     Objective:     Patient found with: PEG Tube, PICC line, telemetry, JESUS drain     General Precautions: Standard, fall, NPO   Orthopedic Precautions:N/A   Braces: N/A     Functional Mobility:  · Bed Mobility:     · Scooting: moderate assistance  · Supine to Sit: moderate assistance    · Transfers:      · Sit to Stand:  minimum assistance with hand-held assist  · Bed to Chair: minimum assistance with  hand-held assist  using  Stand Pivot    · Gait: 25' with min A; HHA        Therapeutic Activities and Exercises:   increased time and assistance required for bed mobility 2' abdominal pain   Pt assisted to bedside chair post gait     Seated BLE therex: AP, laq, marching, hip abd/add x 10 reps each    Patient left up in chair with all lines intact, call button in reach, nurse notified and parents present..    GOALS:    Physical Therapy Goals     Not on file          Multidisciplinary Problems (Resolved)        Problem: Physical Therapy Goal    Goal Priority Disciplines Outcome Goal Variances Interventions   Physical Therapy Goal   (Resolved)     PT/OT, PT Outcome(s) achieved     Description:  Goals to be met by: 18     Patient will increase functional independence with mobility by performin. Supine to sit with Modified [place]  2. Sit to stand transfer with [place]  3. Gait  x 100 feet with Supervision.                       Time Tracking:     PT Received On: 18  PT Start Time: 1110     PT Stop Time: 1137  PT Total Time (min): 27 min     Billable Minutes: Gait Training 8, Therapeutic Activity 8 and Therapeutic Exercise 8    Treatment Type: Treatment  PT/PTA: PTA     PTA Visit Number: 2     Dianelys Reyes, PTA  2018

## 2018-07-09 NOTE — DISCHARGE SUMMARY
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 female admitted to Hospitalist Service from Dr. Garduno's office with complaints of abdominal pain since December, 2017. Patient reportedly has past medical history significant for Congenital microcephaly and diet controlled DM. Part of the history obtained from patient's parents and Dr. Garduno. Patient had been complaining of RUQ and right flank pain for few months now. She was being worked up for cholelithiasis and possible cholecystectomy planned by Dr. Joel. Patient was noted to have UTI and leukocytosis and completed PO Cipro course. Still having leukocytosis. Patient has a low appetite and has lost almost 50 lbs in 6 months. No fever or chills. Patient denied chest pain, shortness of breath, headache, vision changes, focal neuro-deficits, cough or fever. Patient was admitted to Hospitalist medicine service. Patient was evaluated by Dr. Torres and Dr. Bautista. Patient under went abdominal retroperitoneal abscess drainage on the right. Dr. Zavala from GI followed. Patient later underwent right hemicolectomy + Cholecystectomy and J-tube placement. Patient is treated for sacral decubiti by the wound care nurse. Patient is failing swallow evaluation.  Patient is getting trickle J-tube feeding. Patient is receiving TPN for malnutrition. Patient is having physical deconditioning. Upon discharge, patient to resume follow-up with her doctors and oncologist for further management and care. Patient was discharged to LTAC in stable condition with following discharge plan of care. Total time with the patient was 30 minutes and greater than 50% was spent in counseling and coordination of care. The assessment and plan have been discussed at length. Physicians' notes reviewed. Labs and procedure reviewed.     Consults: Dr. Bautista, Dr. Torres, Dr. Zavala    Significant Diagnostic Studies:   CT abdomen (06-19-18): Large subphrenic mass on the right abutting could be involving the liver extending into the right lateral abdominal wall, heterogeneous and complex in appearance suggesting complex fluid collection and containing small foci of air.  Differential includes large abscess, or necrotic mass.  It is indistinguishable from, abutting adjacent colon with colon wall thickening and findings thought most suggestive of abscess secondary to contained right colon perforation from colon cancer or less likely right-sided diverticulitis.  This corresponds to findings on recent ultrasound of 6/9/2018.     US abdomen:   1. Cholelithiasis.  2. 7.5 x 5.4 x 7.3 cm complicated cystic structure interposed between the right hepatic lobe and right kidney, possibly a loop of bowel (such as the hepatic flexure) with internal debris.  Consider additional evaluation with contrast enhanced CT of the abdomen.     CT abdomen with contrast: Interval drainage of the large right flank abscess.  It is now predominant air-filled with small amount of residual fluid with air-fluid level.  This abuts and is indistinguishable from the right colon, distal descending duodenum, caudal tip of the liver and the wall of the adjacent colon appears thick and irregular in findings thought most suggestive of sequela  from localized perforation from colon cancer.  Diverticular disease include in the differential. Small amount of ascites increased compared the prior exam.  Interval development of small left pleural effusion additional findings as detailed above including 2.2 cm calcification right side of the pelvis possibly dermoid or exophytic uterine fibroid.  Diverticulosis.  Cholelithiasis.     Gastrograffin enema: Multiple attempts were made to fill the colon.  Despite the balloon inflated the rectum the patient had spasm in the sigmoid colon, expelled lung the contrast on multiple occasions.  The colon is very redundant and was able to be filled to the hepatic flexure but not more proximally and in particular the area of the suspected colon mass and abscess was not able to be opacified.  Visualized segment of colon is patent with diverticulosis.     KUB: Mildly dilated small bowel left upper quadrant of the abdomen appearing decreased compared to the CT scan of 7/4/2018.     CXR: Decrease of the lung infiltrate compared to the prior exam.     CT abdomen and pelvis:  Post operative change with heterogeneous density in the gallbladder fossa likely on a postoperative basis although recommend correlation clinically.  Drain right lateral abdominal wall with just trace fluid pararenal space/caudal to the right lobe of the liver with no drainable abscess.  Stranding in the subcutaneous soft tissues suggesting cellulitis  Interval dilatation of the duodenum and the proximal jejunum more so the proximal jejunum which is severely dilated and findings concerning for proximal small bowel obstruction. Bibasilar infiltrates right more so than left consistent with pneumonia.     MBSS: Aspiration occurred with thin liquids.    Microbiology Results (last 7 days)     Procedure Component Value Units Date/Time    Clostridium difficile EIA [[id number]] Collected:  07/08/18 1840    Order Status:  Completed Specimen:  Stool from Stool Updated:   07/09/18 0134     C. diff Antigen Negative     C difficile Toxins A+B, EIA Negative     Comment: Testing not recommended for children <24 months old.           Special Treatments/Procedures: None  Disposition: LTAC    Medications:  Reconciled Home Medications: Current Discharge Medication List      START taking these medications    Details   acetaminophen (TYLENOL) 500 MG tablet Take 2 tablets (1,000 mg total) by mouth every 6 (six) hours as needed.  Refills: 0      diphenoxylate-atropine 2.5-0.025 mg (LOMOTIL) 2.5-0.025 mg per tablet Take 1 tablet by mouth 4 (four) times daily as needed for Diarrhea.  Qty: 30 tablet, Refills: 0      fluconazole (DIFLUCAN) 200 mg/100 mL IVPB Inject 100 mLs (200 mg total) into the vein once daily. for 15 days  Qty: 1500 mL, Refills: 0      insulin detemir U-100 (LEVEMIR FLEXTOUCH) 100 unit/mL (3 mL) SubQ InPn pen Inject 6 Units into the skin every evening.  Qty: 3 mL, Refills: 0      piperacillin sodium/tazobactam (PIPERACILLIN-TAZOBACTAM 4.5G/100ML D5W IVPB, READY TO MIX,) Inject 100 mLs (4.5 g total) into the vein every 8 (eight) hours. for 7 days  Qty: 2100 mL, Refills: 0      sterile water SolP 342.86 mL with parenteral amino acid 10% No.2 10 % SolP 60 g, dextrose 70% SolP 179.998 g Inject 50 mL/hr into the vein continuous.  Qty: 1000 mL, Refills: 0         CONTINUE these medications which have NOT CHANGED    Details   brinzolamide (AZOPT) 1 % ophthalmic suspension 1 drop 3 (three) times daily.      calcium carbonate (OS-WALLY) 600 mg calcium (1,500 mg) Tab Take 600 mg by mouth once.      cholecalciferol, vitamin D3, (VITAMIN D3) 1,000 unit capsule Take 1,000 Units by mouth once daily.      docusate sodium (COLACE) 100 MG capsule Take 100 mg by mouth 2 (two) times daily.      dorzolamide-timolol, PF, (COSOPT PF) 2-0.5 % Dpet ophthalmic solution 1 drop 2 (two) times daily.      fenofibrate (LOFIBRA) 160 MG Tab Take 160 mg by mouth once daily.      fluticasone (FLONASE) 50  74 year old male with PMHx HTN, CAD, PCI of LCx 2015, DM mcg/actuation nasal spray 1 spray by Each Nare route once daily.      furosemide (LASIX) 20 MG tablet Take 20 mg by mouth 2 (two) times daily.      latanoprost 0.005 % ophthalmic solution 1 drop every evening.      levothyroxine (SYNTHROID) 25 MCG tablet Take 25 mcg by mouth once daily.      ranitidine (ZANTAC) 150 MG capsule Take 150 mg by mouth 2 (two) times daily.      simvastatin (ZOCOR) 10 MG tablet Take 10 mg by mouth every evening.      sodium bicarbonate 650 MG tablet Take 650 mg by mouth 4 (four) times daily.             Discharge Procedure Orders  Diet NPO     Notify your health care provider if you experience any of the following:  temperature >100.4     Notify your health care provider if you experience any of the following:  difficulty breathing or increased cough     Notify your health care provider if you experience any of the following:  persistent nausea and vomiting or diarrhea     Notify your health care provider if you experience any of the following:  severe uncontrolled pain     Notify your health care provider if you experience any of the following:  redness, tenderness, or signs of infection (pain, swelling, redness, odor or green/yellow discharge around incision site)     Change dressing (specify)   Order Comments: Dressing change: 1 times per day using gauze/wound care bag, etc..     Activity as tolerated       Follow-up Information     [facility].           Kumar Torres MD In 2 weeks.    Specialties:  General Surgery, Surgery  Contact information:  [address]  [address]  [place] [address] [phone]             Mann Garduno MD In 2 weeks.    Specialty:  Internal Medicine  Contact information:  [address]  [place] [address]  [phone]             Rose Steward MD In 2 weeks.    Specialty:  Hematology and Oncology  Contact information:  [address]  [place] [address]  [phone]                              This is a 74 year old male with PM Hx HTN, CAD, PCI of LCx 2015, DM, HTN HLD with c/o SOB on exertion with an abnormal PET scan.

## 2018-07-09 NOTE — PLAN OF CARE
07/08/18 1945   Patient Assessment/Suction   Level of Consciousness (AVPU) alert   PRE-TX-O2-ETCO2   O2 Device (Oxygen Therapy) room air   SpO2 99 %   Pulse Oximetry Type Intermittent   Pulse 75   Resp 18   Incentive Spirometer   $ Incentive Spirometer Charges done with encouragement   Administration (Incentive Spirometer) done with encouragement   Number of Repetitions (Incentive Spirometer) 10   Level (mL) (Incentive Spirometer) 200   Patient Tolerance fair

## 2018-07-09 NOTE — PHYSICIAN QUERY
PT Name: Sis Teixeira  MR #: [id number]     Physician Query Form - Documentation Clarification      CDS/: Jeanine Galindo RN               Contact information:[email or web address]    This form is a permanent document in the medical record.     Query Date: July 9, 2018    By submitting this query, we are merely seeking further clarification of documentation. Please utilize your independent clinical judgment when addressing the question(s) below.    The Medical record reflects the following:    Supporting Clinical Findings Location in Medical Record   Two areas of break down noted on sacrum which were photographed and noted on admission. Both areas have advanced to Stage 3 pressure injury. The pressure injury on the right buttock measured 1 cm X 1.2 cm X 0.1 cm, with a red moist base, open wound edges. The pressure injury on the sacral spine measured, 1.8 cm X 0.9 cm X 0.1 cm, with a pick base and one small area with redness.    Dressing to sacrum CDI      Stages 1 / 2 PI- apply foam dressing now approved per Hospital Medicine     Skin color, texture, turgor normal. No rashes or lesions. Wound care PN 7/7            Nursing plan of care note 7/5    MD order 6/27      H&P                                                                                      Doctor, Please specify diagnosis or diagnoses associated with above clinical findings.    Provider Use Only    [  x ] Sacral pressure ulcers POA     [   ] Sacral pressure ulcers not POA        [   ] Other ____________________                                                                                                             [  ] Clinically undetermined

## 2018-07-10 NOTE — PLAN OF CARE
07/10/18 0744   Final Note   Assessment Type Final Discharge Note   Discharge Disposition LTAC

## 2018-08-09 ENCOUNTER — OFFICE VISIT (OUTPATIENT)
Dept: SURGERY | Facility: CLINIC | Age: 58
End: 2018-08-09
Payer: MEDICARE

## 2018-08-09 VITALS
BODY MASS INDEX: 26.45 KG/M2 | HEART RATE: 78 BPM | TEMPERATURE: 98 F | DIASTOLIC BLOOD PRESSURE: 63 MMHG | WEIGHT: 130.94 LBS | SYSTOLIC BLOOD PRESSURE: 141 MMHG

## 2018-08-09 DIAGNOSIS — Z98.890 POST-OPERATIVE STATE: ICD-10-CM

## 2018-08-09 DIAGNOSIS — C80.1 ADENOCARCINOMA: Primary | ICD-10-CM

## 2018-08-09 PROCEDURE — 99999 PR PBB SHADOW E&M-EST. PATIENT-LVL III: CPT | Mod: PBBFAC,,, | Performed by: SURGERY

## 2018-08-09 PROCEDURE — 99024 POSTOP FOLLOW-UP VISIT: CPT | Mod: POP,,, | Performed by: SURGERY

## 2018-08-09 PROCEDURE — 99213 OFFICE O/P EST LOW 20 MIN: CPT | Mod: PBBFAC,PO | Performed by: SURGERY

## 2018-08-09 RX ORDER — CHOLESTYRAMINE LIGHT 4 G/5.7G
POWDER, FOR SUSPENSION ORAL
COMMUNITY
Start: 2018-08-03

## 2018-08-09 NOTE — PROGRESS NOTES
S/P right colectomy for perforated cancer 6 weeks ago.    Doing well.  On tube feedings because of swallowing difficulties.  No problems with bowels.  Incisions clean.  Refer for Heme-Onc evaluation.

## 2018-08-22 ENCOUNTER — INITIAL CONSULT (OUTPATIENT)
Dept: HEMATOLOGY/ONCOLOGY | Facility: CLINIC | Age: 58
End: 2018-08-22
Payer: MEDICARE

## 2018-08-22 VITALS
DIASTOLIC BLOOD PRESSURE: 58 MMHG | HEART RATE: 71 BPM | RESPIRATION RATE: 16 BRPM | TEMPERATURE: 98 F | SYSTOLIC BLOOD PRESSURE: 121 MMHG | HEIGHT: 59 IN | BODY MASS INDEX: 25.55 KG/M2 | WEIGHT: 126.75 LBS

## 2018-08-22 DIAGNOSIS — T81.31XS SURGICAL WOUND BREAKDOWN, SEQUELA: ICD-10-CM

## 2018-08-22 DIAGNOSIS — Q02: ICD-10-CM

## 2018-08-22 DIAGNOSIS — C18.2 MALIGNANT NEOPLASM OF ASCENDING COLON: Primary | ICD-10-CM

## 2018-08-22 DIAGNOSIS — D49.0 COLORECTAL NEOPLASM: ICD-10-CM

## 2018-08-22 PROCEDURE — 99213 OFFICE O/P EST LOW 20 MIN: CPT | Mod: PBBFAC,PO | Performed by: INTERNAL MEDICINE

## 2018-08-22 PROCEDURE — 99999 PR PBB SHADOW E&M-EST. PATIENT-LVL III: CPT | Mod: PBBFAC,,, | Performed by: INTERNAL MEDICINE

## 2018-08-22 PROCEDURE — 99214 OFFICE O/P EST MOD 30 MIN: CPT | Mod: S$PBB,,, | Performed by: INTERNAL MEDICINE

## 2018-08-22 RX ORDER — HEPARIN 100 UNIT/ML
500 SYRINGE INTRAVENOUS
Status: CANCELLED | OUTPATIENT
Start: 2018-08-22

## 2018-08-22 RX ORDER — DIPHENHYDRAMINE HYDROCHLORIDE 50 MG/ML
50 INJECTION INTRAMUSCULAR; INTRAVENOUS ONCE AS NEEDED
Status: CANCELLED | OUTPATIENT
Start: 2018-08-22 | End: 2018-08-22

## 2018-08-22 RX ORDER — FLUOROURACIL 50 MG/ML
400 INJECTION, SOLUTION INTRAVENOUS
Status: CANCELLED | OUTPATIENT
Start: 2018-08-22

## 2018-08-22 RX ORDER — ONDANSETRON HYDROCHLORIDE 8 MG/1
8 TABLET, FILM COATED ORAL
COMMUNITY

## 2018-08-22 RX ORDER — HEPARIN 100 UNIT/ML
500 SYRINGE INTRAVENOUS
Status: CANCELLED | OUTPATIENT
Start: 2018-08-24

## 2018-08-22 RX ORDER — SODIUM CHLORIDE 0.9 % (FLUSH) 0.9 %
10 SYRINGE (ML) INJECTION
Status: CANCELLED | OUTPATIENT
Start: 2018-08-22

## 2018-08-22 RX ORDER — PROCHLORPERAZINE MALEATE 10 MG
10 TABLET ORAL 3 TIMES DAILY
COMMUNITY

## 2018-08-22 RX ORDER — ONDANSETRON 8 MG/1
8 TABLET, ORALLY DISINTEGRATING ORAL EVERY 12 HOURS PRN
Qty: 30 TABLET | Refills: 1 | Status: SHIPPED | OUTPATIENT
Start: 2018-08-22 | End: 2019-08-22

## 2018-08-22 RX ORDER — EPINEPHRINE 0.3 MG/.3ML
0.3 INJECTION SUBCUTANEOUS ONCE AS NEEDED
Status: CANCELLED | OUTPATIENT
Start: 2018-08-22 | End: 2018-08-22

## 2018-08-22 RX ORDER — SODIUM CHLORIDE 0.9 % (FLUSH) 0.9 %
10 SYRINGE (ML) INJECTION
Status: CANCELLED | OUTPATIENT
Start: 2018-08-24

## 2018-08-22 RX ORDER — PROCHLORPERAZINE MALEATE 10 MG
10 TABLET ORAL EVERY 6 HOURS PRN
Qty: 30 TABLET | Refills: 1 | Status: SHIPPED | OUTPATIENT
Start: 2018-08-22 | End: 2019-08-22

## 2018-08-22 NOTE — PROGRESS NOTES
This is a 58-year-old patient being sent for a new diagnosis of colorectal   carcinoma.  Significant to her history, the patient has been complaining of   abdominal pain since December 2017.  She has a congenital microcephaly.  She   lives with her parents, both mother and father are in the room today at our   discussion.  The patient was following with Dr. Garduno her primary care.  She   was worked up for cholelithiasis.  The patient was found to have leukocytosis   and had lost her appetite and lost almost 50 pounds of weight in six months.    She was admitted to the hospital from Dr. Garduno's office when above was noted.    She was found to have an abdominal retroperitoneal abscess, this was drained   by Dr. Pulido from GI.  At that time, the patient was found to have the abscess   as well as a large mass, which is subphrenic abutting and involving the liver   extending into the right lateral abdominal wall.  There was also adjacent colon   with colon wall thickening and this was done in June 2018.  The patient had this   abscess drained.  This is so involving all the adjacent organs including right   colon, descending duodenum, caudal tip of the liver and hence underwent a   hemicolectomy at that time.  There was also concern of interval dilatation of   the duodenum and proximal jejunum concerning for small bowel obstruction.  This   was resolved.  The pathology of the resected specimen came back as positive for   colorectal carcinoma.  The initial abdominal wall biopsy reported malignant   cells found in the abdominal wall, but further testing of the biopsy specimen   was negative.  The patient had MSI stable.  The right colon revealed invasive   moderately differentiated adenocarcinoma 6.5 cm in greatest dimension   penetrating the muscular wall extending into pericolonic adipose tissue into the   external peritoneal surfaces.  Pericolonic abscess formation was identified.    The margins were free of tumor.   Fourteen regional lymph nodes were negative.    The gallbladder showed no disease.  The abdominal wall abscess was biopsied   showing hemorrhagic and acutely inflamed fibroadipose tissue with moderately   differentiated adenocarcinoma.  The tumor invaded visceral peritoneum.    Lymphovascular or perineural invasion was not identified.  T4a N0 pathology.    The patient is here with her family members for further discussion.  She   certainly has diminished capabilities, although they are not terribly   diminished.  She has lived with her parents all her life and parents have been   her primary decision maker.    The patient has had a very difficult recovery from her surgery.  Currently, she   is on PEG tube feeds.  She has a sacral decubitus from the time that she has   been in the hospital and bedridden with failure to thrive and the abscess was   treated with antibiotics for a prolonged period as well.  Currently, she has   wound care attending to her wound and that seems to be healing with rather   nicely.  The patient's surgical date was 06/28/2018.  The date today is   08/21/2018.    PHYSICAL EXAMINATION:  Microcephaly, some amount of diminished capabilities in   processing and discussion; however, she is alert, oriented.  PEG tube in place.    Sacral decubitus is within her buttock cheek and is well healed.  Abscess   drainage site is well healed.  Incision from surgery is well healed.  LUNGS:  Clear to auscultation.  HEENT:  PERRLA, 3 mm, no pallor, icterus, cyanosis or clubbing.  NECK:  Supple without JVD.  Trachea is central.  ABDOMEN:  Soft.  No rebound, guarding, rigidity.  NEUROLOGICALLY:  She seems appropriate for her overall neurological status.    Labs are only available to me from 07/09/2018 draw when the hemoglobin of 7.9   during surgery.  She has had transfusion.  White count is normal.  Platelets   were normal.  Comprehensive metabolic panel showed BUN of 25, creatinine normal.    This was all  during her acute illness with an albumin of 1.3.  Nothing since   then has been done in the last six weeks.    IMPRESSION:  T4N0 colorectal carcinoma with an abscess formation that extended   into the abdominal wall with visceral peritoneum being involved.  She is a clear   candidate for adjuvant chemotherapy.  FOLFOX regimen recommended.  Long   discussion with the patient and family after this visit that has lasted about 25   minutes spent.  There will be further time spent in treatment determination,   order placement on [place], organization of drugs.  2.  We will proceed with obtaining records from wound care for the level of   healing.  The patient is currently on the mend with PEG tube feeds.  She needs   to improve her oral intake.  We will continue to work on this with high-calorie   diet, which is full of protein and this has been discussed at length as well.    She will need a port.  This has been discussed.  The patient will need   antiemetics.  A start date hopefully should be the next three weeks or so after   allowing more time for recovery.  I will see her in time with CBC, iron studies   for severe anemia in the clinic along with a PET scan for staging purposes.  The   parents and patient seemed to voice understanding.  There is also another   relative, a cousin, who seems to be understanding this discussion.          /omega [id number] aleshia(s)          SSS/HN  dd: 08/22/2018 15:49:03 (CDT)  td: 08/23/2018 03:26:43 (CDT)  Doc ID   #[id number]  Job ID #[id number]    CC:

## 2018-08-22 NOTE — LETTER
August 22, 2018      Kumar Torres MD  [address]  [address]  [place] [address]           [facility] Hematology Oncology  [address], [address]  [place] [address]  Phone: [phone]          Patient: Sis Teixeira   MR Number: [id number]   YOB: 1960   Date of Visit: 8/22/2018       Dear Dr. Kumar Torres:    Thank you for referring Sis Teixeira to me for evaluation. Attached you will find relevant portions of my assessment and plan of care.    If you have questions, please do not hesitate to call me. I look forward to following Sis Teixeira along with you.    Sincerely,    Grisel Prince MD    Enclosure  CC:  No Recipients    If you would like to receive this communication electronically, please contact [email or web address] or [phone] to request more information on [employer] access.    For providers and/or their staff who would like to refer a patient to [facility], please contact us through our one-stop-shop provider referral line, [facility], at [phone].    If you feel you have received this communication in error or would no longer like to receive these types of communications, please e-mail [email or web address]

## 2018-08-24 DIAGNOSIS — C18.9 MALIGNANT NEOPLASM OF COLON, UNSPECIFIED PART OF COLON: Primary | ICD-10-CM

## 2018-08-27 ENCOUNTER — TELEPHONE (OUTPATIENT)
Dept: HEMATOLOGY/ONCOLOGY | Facility: CLINIC | Age: 58
End: 2018-08-27

## 2018-08-27 NOTE — TELEPHONE ENCOUNTER
Called to schedule patient for chemo school and patients mother states that she does not want to schedule at this time because she is having second thoughts regarding chemotherapy.  She states that she wanted to speak with Dr. Prince before scheduling any chemo or port placement.  Notified RASHARD Clifford for Dr. Prince's office of patients request.

## 2018-08-27 NOTE — TELEPHONE ENCOUNTER
Mother given Pet ct results and notified that chemo is Adjuvant and that it is still needed for treatment. Mother stated she did not want to do chemo. I notified her that she should get a second opinion from another Dr before she decides on not getting chemo. I also made sure it was clear that this chemo is part of the treatment for her cancer.

## 2018-09-07 DIAGNOSIS — Z93.1 GASTROSTOMY STATUS: ICD-10-CM

## 2018-09-07 DIAGNOSIS — R13.10 DYSPHAGIA: ICD-10-CM

## 2018-09-07 DIAGNOSIS — C17.8 MALIGNANT NEOPLASM OF OVERLAPPING SITES OF SMALL INTESTINE: Primary | ICD-10-CM

## 2018-09-12 ENCOUNTER — HOSPITAL ENCOUNTER (OUTPATIENT)
Dept: RADIOLOGY | Facility: HOSPITAL | Age: 58
Discharge: HOME | End: 2018-09-12
Attending: INTERNAL MEDICINE
Payer: MEDICARE

## 2018-09-12 DIAGNOSIS — R13.13 PHARYNGEAL DYSPHAGIA: Primary | ICD-10-CM

## 2018-09-12 DIAGNOSIS — Z93.1 GASTROSTOMY STATUS: ICD-10-CM

## 2018-09-12 DIAGNOSIS — C17.8 MALIGNANT NEOPLASM OF OVERLAPPING SITES OF SMALL INTESTINE: ICD-10-CM

## 2018-09-12 PROCEDURE — 74230 X-RAY XM SWLNG FUNCJ C+: CPT | Mod: TC

## 2018-09-12 PROCEDURE — G8998 SWALLOW D/C STATUS: HCPCS | Mod: CI

## 2018-09-12 PROCEDURE — 74230 X-RAY XM SWLNG FUNCJ C+: CPT | Mod: 26,,, | Performed by: RADIOLOGY

## 2018-09-12 PROCEDURE — G8996 SWALLOW CURRENT STATUS: HCPCS | Mod: CI

## 2018-09-12 PROCEDURE — G8997 SWALLOW GOAL STATUS: HCPCS | Mod: CH

## 2018-09-12 PROCEDURE — 92526 ORAL FUNCTION THERAPY: CPT

## 2018-09-12 PROCEDURE — 92611 MOTION FLUOROSCOPY/SWALLOW: CPT

## 2018-09-12 NOTE — PROGRESS NOTES
Modified Barium Swallowing Study    Past Medical History:   Diagnosis Date    Congenital small head     Diabetes mellitus     dIET CONTROL    Diabetes mellitus, type 2      Past Surgical History:   Procedure Laterality Date    CHOLECYSTECTOMY N/A 6/28/2018    Procedure: CHOLECYSTECTOMY;  Surgeon: Kumar Torres MD;  [facility]: [facility];  Service: Colon and Rectal;  Laterality: N/A;    CHOLECYSTECTOMY N/A 6/28/2018    [facility]    COLON SURGERY      HEMICOLECTOMY, RIGHT N/A 6/28/2018    [facility]    INCISION AND DRAINAGE OF ABSCESS Right 6/21/2018    Procedure: INCISION AND DRAINAGE, ABSCESS;  Surgeon: Kumar Torres MD;  Location: [facility] OR;  Service: General;  Laterality: Right;    INCISION AND DRAINAGE, ABSCESS Right 6/21/2018    [facility]    INSERTION, JEJUNOSTOMY TUBE N/A 6/28/2018    [facility]    PLACEMENT OF JEJUNOSTOMY TUBE N/A 6/28/2018    Procedure: INSERTION, JEJUNOSTOMY TUBE;  Surgeon: Kumar Torres MD;  Location: [facility] OR;  Service: Colon and Rectal;  Laterality: N/A;    RIGHT HEMICOLECTOMY N/A 6/28/2018    Procedure: HEMICOLECTOMY, RIGHT;  Surgeon: Kumar Torres MD;  Location: [facility];  Service: Colon and Rectal;  Laterality: N/A;     Pt seen for f/u MBSS.  Prior study done 7/7/18 found SILENT aspiration on liquids & on residue from other bolus types; PEG tube was placed & dysphagia tx initiated.  Pt & mother have actively pursued oral diet & this re-eval.  Per Mom, pt has been drinking liquids without problems, eating for about 2 weeks, & ate a taco yesterday.  Mother reported no pneumonia since d/c form this hospital.   Today, pt was found to have WNL oral stages, & improved pharyngeal stage of swallow.  Delayed & decreased epiglottic inversion allowed penetration on liquids, & with no sensation response this material will eventually fall into the airway.  All bolus types  left pharyngeal residue, which will mix with saliva & be eventually aspirated also.  Cued throat clear ejected penetrated material, & multiple swallows/liquid wash reduced residue.     Recommend regular textures & nectar thick liquids.  Allow Bl[place]card Water Protocol.  Dysphagia tx to train supraglottic swallow (2 swallows with 2 throat clears per bite/sip to eject & reduce residue), & education re [place] Water Protocol & written Swallowing Guidelines.  Pt & mother were given written instructions & detailed information re findings & recommendations.   ST was contacted per their wishes.   G Codes Swallowing Current CI     Goal CH     D/C CI   09/12/18 1000   Speech Time Calculation   Speech Start Time 1000   Speech Stop Time 1030   Speech Total (min) 30 min   General Information   SLP Treatment Date 09/12/18   Referring Physician Laureen   General Observations alert, cooperative   Pertinent History of Current Problem MBSS 7/7/18 recom NPO 2/2 SILENT aspiration; PEG tube inserted; dyspahgia tx via    Current Respiratory Status room air   Current Diet   Current Diet Textures Regular   Current Liquid Consistencies Thin   Current Non-Oral Diet PEG tube  (used irregularly per [facility])   Subjective   Patient states I want the tube out   Family states She wants the tube out   Oral Musculature Evaluation   Oral Musculature WFL   Dentition present and adequate   Oral Labial Strength and Mobility WFL   Lingual Strength and Mobility WFL   Velar Elevation WFL   Buccal Strength and Mobility WFL   Voice Prior to PO Intake clear       MBS Eval: Thin Liquid Trial   Mode of Presentation, Thin Liquid spoon;fed by clinician;cup;self fed   Oral Prep/Phase, Thin Liquid WNL   Pharyngeal Phase, Thin Liquid impaired;cervical protrusion (comment);decreased base of tongue retraction;decreased pharyngeal wall contraction;delayed epiglottic inversion;delayed pharyngeal swallow;premature spillage;reduced hyolaryngeal excursion;scattered  pharyngeal residue;vallecular stasis;pyriform sinuses stasis  (C4-5 osteophyte, decr epigl inversion, mild residue)   Rosenbeck's 8-Point Penetration-Aspiration Scale, Thin Liquids (5) Material enters the airway, contacts the vocal fold, and is not ejected from the airway.   Penetration/Aspiration, Thin Liquid no sensation response   Successful Strategies Trialed During Procedure, Thin Liquid (cuwed throat clear ejected material)   Diagnostic Statement severe pharyngeal dysphagia 2/2 no sensation response to material on vocal folds       MBS Eval: Nectar Thick Liquid Trial   Mode of Presentation, Nectar spoon;fed by clinician;cup;straw;self fed   Volume of Nectar Presented tsp, cup sip, straw sip   Oral Prep/Phase, Nectar WNL   Pharyngeal Phase, Nectar impaired;cervical protrusion (comment);decreased base of tongue retraction;decreased pharyngeal wall contraction;delayed epiglottic inversion;premature spillage;pyriform sinuses stasis;reduced hyolaryngeal excursion;scattered pharnygeal residue;vallecular stasis  (C4-5 osteophyte, decr epgl inversion, mild residue)   Rosenbeck's 8-Point Penetration-Aspiration Scale, Nectar Thick Liquids (3) Material enters the airway, remains above the vocal folds, and is not ejected from the airway.   Penetration/Aspiration, Nectar penertation during the swallow without sensation response  (high risk for aspiration on residue mixed with saliva)   Esophageal Phase, Nectar (nothing which affected pharyngeal swallow)   Successful Strategies Trialed During Procedure, Nectar effortful swallow;multiple swallows  (cuerd throat clear)   Diagnostic Statement moderate pharyngeal dysphagia   Additional Comments Barium pill passed lips to stomach without problem with nectar thick liquid sips       MBS Eval: Pureed Trial   Mode of Presentation, Puree spoon;fed by clinician   Volume of Puree Presented tsp   Oral Prep/Phase, Puree WNL   Pharyngeal Phase, Puree impaired;cervical protrusion  (comment);decreased base of tongue retraction;decreased pharyngeal wall contraction;delayed epiglottic inversion;premature spillage;reduced hyolaryngeal excursion;scattered pharyngeal residue;vallecular stasis;pyriform sinuses stasis  (C4-5 osteophyte, decr epigl inversion, mild residue)   Rosenbeck's 8-Point Penetration-Aspiration Scale, Pureed (1) Material does not enter airway.   Successful Strategies Trialed During Procedure, Puree multiple swallows;alternate bite/sips;effortful swallow  (liquid wash reduced residue better than multiple/effortful s)   Diagnostic Statement mild pharyngela dysphagia   Additional Comments risk for aspiration on residue mixed with saliva       MBS Eval: Soft Solid Trial   Mode of Presentation, Semisolid spoon   Volume of Semisolid Food Presented tsp peaches in nectar barium,   Oral Prep/Phase, Semisolid WFL   Pharyngeal Phase, Semisolid impaired;cervical protrusion (comment);decreased base of tongue retraction;decreased pharyngeal wall contraction;delayed epiglottic inversion;premature spillage;reduced hyolaryngeal excursion;scattered pharnygeal residue;pyriform sinuses stasis;vallecular stasis  (C4-5 osteophytes, decr epigl inversion, mild residue)   Rosenbeck's 8-Point Penetration-Aspiration Scale, Semisolid (1) Material does not enter airway.   Successful Strategies Trialed During Procedure, Semisolid multiple swallows       MBS Eval: Solid Food Texture Trial   Mode of Presentation, Solid spoon   Volume of Solid Food Presented 1/2 cookie with barium paste   Oral Prep/Phase, Solid WFL   Pharyngeal Phase, Solid impaired;cervical protrusion (comment);decreased pharyngeal wall contraction;delayed epiglottic inversion;pyriform sinuses stasis;reduced hyolaryngeal excursion;vallecular stasis  (C4-5 osteophytes, delayed epigl inversion, mild residue)   Rosenbeck's 8-Point Penetration-Aspiration Scale, Solid (1) Material does not enter airway.   Penetration/Aspiration risk for aspiration  on residue mixed with saliva   Successful Strategies Trialed During Procedure, Solid alternate bites/sips;multiple swallows   Diagnostic Statement mild pharyngela dysphagia   Recommendations   NPO No   Solid Diet Level Regular   Liquid Diet Level Nectar Thick   Additional Recommendations pills whole with puree or nectar thick liquid   Plan   Plan Dysphagia Therapy   SLP Follow-up   SLP Follow-up? Yes   Treatment/Billable Minutes   Treatment Swallowing Dysfunction 13   Motion Fluoro Swallow, Cine/Vid 17   Total Time 30

## 2018-09-28 ENCOUNTER — OFFICE VISIT (OUTPATIENT)
Dept: SURGERY | Facility: CLINIC | Age: 58
End: 2018-09-28
Payer: MEDICARE

## 2018-09-28 DIAGNOSIS — Z93.4 GASTROJEJUNOSTOMY TUBE STATUS: Primary | ICD-10-CM

## 2018-09-28 PROCEDURE — 99214 OFFICE O/P EST MOD 30 MIN: CPT | Mod: S$PBB,,, | Performed by: SURGERY

## 2018-09-28 PROCEDURE — 99999 PR PBB SHADOW E&M-EST. PATIENT-LVL I: CPT | Mod: PBBFAC,,, | Performed by: SURGERY

## 2018-09-28 PROCEDURE — 99211 OFF/OP EST MAY X REQ PHY/QHP: CPT | Mod: PBBFAC,PO | Performed by: SURGERY

## 2018-09-28 NOTE — PROGRESS NOTES
Patent 3 months s/p right hemicolectomy and feeding jejunostomy for colon cancer.  No longer using j-tube and would like it removed.    On exam, healed J-tube site with no drainage or purulence.    Under aseptic technique, retaining suture cut and tube removed with continuous traction.  Entire tube including both wings removed intact.    DSD applied     Return prn.

## 2018-10-06 ENCOUNTER — LAB VISIT (OUTPATIENT)
Dept: LAB | Facility: HOSPITAL | Age: 58
End: 2018-10-06
Attending: INTERNAL MEDICINE
Payer: MEDICARE

## 2018-10-06 DIAGNOSIS — I12.9 PARENCHYMAL RENAL HYPERTENSION: ICD-10-CM

## 2018-10-06 DIAGNOSIS — E87.20 ACIDOSIS: ICD-10-CM

## 2018-10-06 DIAGNOSIS — E11.9 DIABETES MELLITUS WITHOUT COMPLICATION: ICD-10-CM

## 2018-10-06 DIAGNOSIS — N18.30 CHRONIC KIDNEY DISEASE, STAGE III (MODERATE): ICD-10-CM

## 2018-10-06 DIAGNOSIS — I10 ESSENTIAL HYPERTENSION, MALIGNANT: Primary | ICD-10-CM

## 2018-10-06 DIAGNOSIS — R60.9 EDEMA: ICD-10-CM

## 2018-10-06 LAB
ALBUMIN SERPL BCP-MCNC: 3.2 G/DL
ALP SERPL-CCNC: 63 U/L
ALT SERPL W/O P-5'-P-CCNC: 14 U/L
ANION GAP SERPL CALC-SCNC: 10 MMOL/L
AST SERPL-CCNC: 36 U/L
BILIRUB SERPL-MCNC: 0.4 MG/DL
BUN SERPL-MCNC: 28 MG/DL
CALCIUM SERPL-MCNC: 10 MG/DL
CHLORIDE SERPL-SCNC: 109 MMOL/L
CO2 SERPL-SCNC: 25 MMOL/L
CREAT SERPL-MCNC: 1.3 MG/DL
EST. GFR  (AFRICAN AMERICAN): 53 ML/MIN/1.73 M^2
EST. GFR  (NON AFRICAN AMERICAN): 46 ML/MIN/1.73 M^2
FERRITIN SERPL-MCNC: 89 NG/ML
GLUCOSE SERPL-MCNC: 87 MG/DL
IRON SERPL-MCNC: 52 UG/DL
POTASSIUM SERPL-SCNC: 3.8 MMOL/L
PROT SERPL-MCNC: 8.2 G/DL
SATURATED IRON: 10 %
SODIUM SERPL-SCNC: 144 MMOL/L
TOTAL IRON BINDING CAPACITY: 537 UG/DL
TRANSFERRIN SERPL-MCNC: 363 MG/DL

## 2018-10-06 PROCEDURE — 80053 COMPREHEN METABOLIC PANEL: CPT

## 2018-10-06 PROCEDURE — 87086 URINE CULTURE/COLONY COUNT: CPT

## 2018-10-06 PROCEDURE — 83540 ASSAY OF IRON: CPT

## 2018-10-06 PROCEDURE — 87088 URINE BACTERIA CULTURE: CPT

## 2018-10-06 PROCEDURE — 82728 ASSAY OF FERRITIN: CPT

## 2018-10-06 PROCEDURE — 87077 CULTURE AEROBIC IDENTIFY: CPT

## 2018-10-06 PROCEDURE — 87186 SC STD MICRODIL/AGAR DIL: CPT

## 2018-10-06 PROCEDURE — 36415 COLL VENOUS BLD VENIPUNCTURE: CPT

## 2018-10-08 LAB — BACTERIA UR CULT: NORMAL

## 2018-11-10 ENCOUNTER — LAB VISIT (OUTPATIENT)
Dept: LAB | Facility: HOSPITAL | Age: 58
End: 2018-11-10
Attending: INTERNAL MEDICINE
Payer: MEDICARE

## 2018-11-10 DIAGNOSIS — E78.2 MIXED HYPERLIPIDEMIA: Primary | ICD-10-CM

## 2018-11-10 DIAGNOSIS — N18.30 CKD (CHRONIC KIDNEY DISEASE), STAGE III: ICD-10-CM

## 2018-11-10 DIAGNOSIS — E03.9 HYPOTHYROIDISM: ICD-10-CM

## 2018-11-10 LAB
ALBUMIN SERPL BCP-MCNC: 3.4 G/DL
ALP SERPL-CCNC: 50 U/L
ALT SERPL W/O P-5'-P-CCNC: 12 U/L
ANION GAP SERPL CALC-SCNC: 9 MMOL/L
AST SERPL-CCNC: 38 U/L
BASOPHILS # BLD AUTO: 0 K/UL
BASOPHILS NFR BLD: 0.8 %
BILIRUB SERPL-MCNC: 0.8 MG/DL
BILIRUB UR QL STRIP: NEGATIVE
BUN SERPL-MCNC: 36 MG/DL
CALCIUM SERPL-MCNC: 9.6 MG/DL
CHLORIDE SERPL-SCNC: 110 MMOL/L
CLARITY UR: CLEAR
CO2 SERPL-SCNC: 25 MMOL/L
COLOR UR: YELLOW
CREAT SERPL-MCNC: 1.4 MG/DL
DIFFERENTIAL METHOD: ABNORMAL
EOSINOPHIL # BLD AUTO: 0.1 K/UL
EOSINOPHIL NFR BLD: 2.6 %
ERYTHROCYTE [DISTWIDTH] IN BLOOD BY AUTOMATED COUNT: 19.9 %
EST. GFR  (AFRICAN AMERICAN): 48 ML/MIN/1.73 M^2
EST. GFR  (NON AFRICAN AMERICAN): 42 ML/MIN/1.73 M^2
GLUCOSE SERPL-MCNC: 83 MG/DL
GLUCOSE UR QL STRIP: NEGATIVE
HCT VFR BLD AUTO: 34.3 %
HGB BLD-MCNC: 11.3 G/DL
HGB UR QL STRIP: NEGATIVE
KETONES UR QL STRIP: NEGATIVE
LEUKOCYTE ESTERASE UR QL STRIP: NEGATIVE
LYMPHOCYTES # BLD AUTO: 2.5 K/UL
LYMPHOCYTES NFR BLD: 49.2 %
MCH RBC QN AUTO: 28.4 PG
MCHC RBC AUTO-ENTMCNC: 32.8 G/DL
MCV RBC AUTO: 86 FL
MONOCYTES # BLD AUTO: 0.4 K/UL
MONOCYTES NFR BLD: 7.1 %
NEUTROPHILS # BLD AUTO: 2.1 K/UL
NEUTROPHILS NFR BLD: 40.3 %
NITRITE UR QL STRIP: NEGATIVE
PH UR STRIP: 5 [PH] (ref 5–8)
PLATELET # BLD AUTO: 217 K/UL
PMV BLD AUTO: 10.6 FL
POTASSIUM SERPL-SCNC: 4 MMOL/L
PROT SERPL-MCNC: 7.7 G/DL
PROT UR QL STRIP: NEGATIVE
RBC # BLD AUTO: 3.97 M/UL
SODIUM SERPL-SCNC: 144 MMOL/L
SP GR UR STRIP: 1.02 (ref 1–1.03)
TSH SERPL DL<=0.005 MIU/L-ACNC: 2.38 UIU/ML
URN SPEC COLLECT METH UR: NORMAL
UROBILINOGEN UR STRIP-ACNC: NEGATIVE EU/DL
WBC # BLD AUTO: 5.1 K/UL

## 2018-11-10 PROCEDURE — 80061 LIPID PANEL: CPT

## 2018-11-10 PROCEDURE — 81003 URINALYSIS AUTO W/O SCOPE: CPT

## 2018-11-10 PROCEDURE — 85025 COMPLETE CBC W/AUTO DIFF WBC: CPT

## 2018-11-10 PROCEDURE — 36415 COLL VENOUS BLD VENIPUNCTURE: CPT

## 2018-11-10 PROCEDURE — 84443 ASSAY THYROID STIM HORMONE: CPT

## 2018-11-10 PROCEDURE — 80053 COMPREHEN METABOLIC PANEL: CPT

## 2018-11-13 LAB
CHOLEST SERPL-MCNC: 87 MG/DL
CHOLEST/HDLC SERPL: 4.1 {RATIO}
HDLC SERPL-MCNC: 21 MG/DL
HDLC SERPL: 24.1 %
LDLC SERPL CALC-MCNC: 45.6 MG/DL
NONHDLC SERPL-MCNC: 66 MG/DL
TRIGL SERPL-MCNC: 102 MG/DL

## 2018-11-27 DIAGNOSIS — R22.9 LOCALIZED SWELLING, MASS AND LUMP, UNSPECIFIED: Primary | ICD-10-CM

## 2018-11-28 ENCOUNTER — HOSPITAL ENCOUNTER (OUTPATIENT)
Dept: RADIOLOGY | Facility: HOSPITAL | Age: 58
Discharge: HOME | End: 2018-11-28
Attending: INTERNAL MEDICINE
Payer: MEDICARE

## 2018-11-28 DIAGNOSIS — R22.9 LOCALIZED SWELLING, MASS AND LUMP, UNSPECIFIED: ICD-10-CM

## 2018-11-28 PROCEDURE — 25500020 PHARM REV CODE 255

## 2018-11-28 PROCEDURE — 74160 CT ABDOMEN W/CONTRAST: CPT | Mod: 26,,, | Performed by: RADIOLOGY

## 2018-11-28 PROCEDURE — 74160 CT ABDOMEN W/CONTRAST: CPT | Mod: TC

## 2018-11-28 RX ORDER — SODIUM CHLORIDE 9 MG/ML
INJECTION, SOLUTION INTRAVENOUS
Status: DISPENSED
Start: 2018-11-28 | End: 2018-11-28

## 2018-11-28 RX ADMIN — IOHEXOL 75 ML: 350 INJECTION, SOLUTION INTRAVENOUS at 09:11

## 2019-04-08 ENCOUNTER — LAB VISIT (OUTPATIENT)
Dept: LAB | Facility: HOSPITAL | Age: 59
End: 2019-04-08
Attending: INTERNAL MEDICINE
Payer: MEDICARE

## 2019-04-08 DIAGNOSIS — N39.0 URINARY TRACT INFECTION, SITE NOT SPECIFIED: Primary | ICD-10-CM

## 2019-04-08 DIAGNOSIS — I10 ESSENTIAL HYPERTENSION, MALIGNANT: ICD-10-CM

## 2019-04-08 LAB
ALBUMIN SERPL BCP-MCNC: 3.4 G/DL (ref 3.5–5.2)
ALP SERPL-CCNC: 50 U/L (ref 55–135)
ALT SERPL W/O P-5'-P-CCNC: 9 U/L (ref 10–44)
ANION GAP SERPL CALC-SCNC: 5 MMOL/L (ref 8–16)
AST SERPL-CCNC: 28 U/L (ref 10–40)
BASOPHILS # BLD AUTO: 0 K/UL (ref 0–0.2)
BASOPHILS NFR BLD: 0.7 % (ref 0–1.9)
BILIRUB SERPL-MCNC: 0.6 MG/DL (ref 0.1–1)
BUN SERPL-MCNC: 31 MG/DL (ref 6–20)
CALCIUM SERPL-MCNC: 9.8 MG/DL (ref 8.7–10.5)
CHLORIDE SERPL-SCNC: 112 MMOL/L (ref 95–110)
CO2 SERPL-SCNC: 26 MMOL/L (ref 23–29)
CREAT SERPL-MCNC: 1.6 MG/DL (ref 0.5–1.4)
CREAT UR-MCNC: 97.9 MG/DL (ref 15–325)
DIFFERENTIAL METHOD: ABNORMAL
EOSINOPHIL # BLD AUTO: 0.2 K/UL (ref 0–0.5)
EOSINOPHIL NFR BLD: 3.9 % (ref 0–8)
ERYTHROCYTE [DISTWIDTH] IN BLOOD BY AUTOMATED COUNT: 15.9 % (ref 11.5–14.5)
EST. GFR  (AFRICAN AMERICAN): 41 ML/MIN/1.73 M^2
EST. GFR  (NON AFRICAN AMERICAN): 36 ML/MIN/1.73 M^2
GLUCOSE SERPL-MCNC: 86 MG/DL (ref 70–110)
HCT VFR BLD AUTO: 34 % (ref 37–48.5)
HGB BLD-MCNC: 11.3 G/DL (ref 12–16)
LYMPHOCYTES # BLD AUTO: 2.1 K/UL (ref 1–4.8)
LYMPHOCYTES NFR BLD: 41.8 % (ref 18–48)
MCH RBC QN AUTO: 29.6 PG (ref 27–31)
MCHC RBC AUTO-ENTMCNC: 33.2 G/DL (ref 32–36)
MCV RBC AUTO: 89 FL (ref 82–98)
MONOCYTES # BLD AUTO: 0.3 K/UL (ref 0.3–1)
MONOCYTES NFR BLD: 6 % (ref 4–15)
NEUTROPHILS # BLD AUTO: 2.4 K/UL (ref 1.8–7.7)
NEUTROPHILS NFR BLD: 47.6 % (ref 38–73)
PLATELET # BLD AUTO: 196 K/UL (ref 150–350)
PMV BLD AUTO: 9.6 FL (ref 9.2–12.9)
POTASSIUM SERPL-SCNC: 4 MMOL/L (ref 3.5–5.1)
PROT SERPL-MCNC: 7.3 G/DL (ref 6–8.4)
PROT UR-MCNC: 26 MG/DL (ref 0–15)
PROT/CREAT UR: 0.27 MG/G{CREAT} (ref 0–0.2)
RBC # BLD AUTO: 3.82 M/UL (ref 4–5.4)
SODIUM SERPL-SCNC: 143 MMOL/L (ref 136–145)
WBC # BLD AUTO: 5.1 K/UL (ref 3.9–12.7)

## 2019-04-08 PROCEDURE — 84156 ASSAY OF PROTEIN URINE: CPT

## 2019-04-08 PROCEDURE — 36415 COLL VENOUS BLD VENIPUNCTURE: CPT

## 2019-04-08 PROCEDURE — 85025 COMPLETE CBC W/AUTO DIFF WBC: CPT

## 2019-04-08 PROCEDURE — 80053 COMPREHEN METABOLIC PANEL: CPT

## 2019-05-09 ENCOUNTER — LAB VISIT (OUTPATIENT)
Dept: LAB | Facility: HOSPITAL | Age: 59
End: 2019-05-09
Attending: INTERNAL MEDICINE
Payer: MEDICARE

## 2019-05-09 DIAGNOSIS — N18.30 CHRONIC KIDNEY DISEASE, STAGE III (MODERATE): ICD-10-CM

## 2019-05-09 DIAGNOSIS — E78.2 MIXED HYPERLIPIDEMIA: Primary | ICD-10-CM

## 2019-05-09 DIAGNOSIS — R79.9 ABNORMAL FINDING OF BLOOD CHEMISTRY: ICD-10-CM

## 2019-05-09 LAB
ALBUMIN SERPL BCP-MCNC: 3.2 G/DL (ref 3.5–5.2)
ALBUMIN/CREAT UR: 107.1 UG/MG (ref 0–30)
ALP SERPL-CCNC: 51 U/L (ref 55–135)
ALT SERPL W/O P-5'-P-CCNC: 11 U/L (ref 10–44)
ANION GAP SERPL CALC-SCNC: 7 MMOL/L (ref 8–16)
AST SERPL-CCNC: 34 U/L (ref 10–40)
BACTERIA #/AREA URNS HPF: ABNORMAL /HPF
BASOPHILS # BLD AUTO: 0 K/UL (ref 0–0.2)
BASOPHILS NFR BLD: 0.6 % (ref 0–1.9)
BILIRUB SERPL-MCNC: 0.5 MG/DL (ref 0.1–1)
BILIRUB UR QL STRIP: NEGATIVE
BUN SERPL-MCNC: 28 MG/DL (ref 6–20)
CALCIUM SERPL-MCNC: 9.4 MG/DL (ref 8.7–10.5)
CHLORIDE SERPL-SCNC: 110 MMOL/L (ref 95–110)
CHOLEST SERPL-MCNC: 104 MG/DL (ref 120–199)
CHOLEST/HDLC SERPL: 7.4 {RATIO} (ref 2–5)
CLARITY UR: CLEAR
CO2 SERPL-SCNC: 24 MMOL/L (ref 23–29)
COLOR UR: YELLOW
CREAT SERPL-MCNC: 1.5 MG/DL (ref 0.5–1.4)
CREAT UR-MCNC: 70 MG/DL (ref 15–325)
DIFFERENTIAL METHOD: ABNORMAL
EOSINOPHIL # BLD AUTO: 0.2 K/UL (ref 0–0.5)
EOSINOPHIL NFR BLD: 3.4 % (ref 0–8)
ERYTHROCYTE [DISTWIDTH] IN BLOOD BY AUTOMATED COUNT: 16 % (ref 11.5–14.5)
EST. GFR  (AFRICAN AMERICAN): 44 ML/MIN/1.73 M^2
EST. GFR  (NON AFRICAN AMERICAN): 38 ML/MIN/1.73 M^2
ESTIMATED AVG GLUCOSE: 82 MG/DL (ref 68–131)
GLUCOSE SERPL-MCNC: 83 MG/DL (ref 70–110)
GLUCOSE UR QL STRIP: NEGATIVE
HBA1C MFR BLD HPLC: 4.5 % (ref 4–5.6)
HCT VFR BLD AUTO: 31.8 % (ref 37–48.5)
HDLC SERPL-MCNC: 14 MG/DL (ref 40–75)
HDLC SERPL: 13.5 % (ref 20–50)
HGB BLD-MCNC: 10.3 G/DL (ref 12–16)
HGB UR QL STRIP: NEGATIVE
KETONES UR QL STRIP: NEGATIVE
LDLC SERPL CALC-MCNC: 56 MG/DL (ref 63–159)
LEUKOCYTE ESTERASE UR QL STRIP: ABNORMAL
LYMPHOCYTES # BLD AUTO: 2.4 K/UL (ref 1–4.8)
LYMPHOCYTES NFR BLD: 40 % (ref 18–48)
MCH RBC QN AUTO: 29.1 PG (ref 27–31)
MCHC RBC AUTO-ENTMCNC: 32.3 G/DL (ref 32–36)
MCV RBC AUTO: 90 FL (ref 82–98)
MICROALBUMIN UR DL<=1MG/L-MCNC: 75 UG/ML
MICROSCOPIC COMMENT: ABNORMAL
MONOCYTES # BLD AUTO: 0.3 K/UL (ref 0.3–1)
MONOCYTES NFR BLD: 4.7 % (ref 4–15)
NEUTROPHILS # BLD AUTO: 3.1 K/UL (ref 1.8–7.7)
NEUTROPHILS NFR BLD: 51.3 % (ref 38–73)
NITRITE UR QL STRIP: NEGATIVE
NONHDLC SERPL-MCNC: 90 MG/DL
PH UR STRIP: 6 [PH] (ref 5–8)
PLATELET # BLD AUTO: 233 K/UL (ref 150–350)
PMV BLD AUTO: 9.8 FL (ref 9.2–12.9)
POTASSIUM SERPL-SCNC: 3.9 MMOL/L (ref 3.5–5.1)
PROT SERPL-MCNC: 7.1 G/DL (ref 6–8.4)
PROT UR QL STRIP: NEGATIVE
RBC # BLD AUTO: 3.53 M/UL (ref 4–5.4)
SODIUM SERPL-SCNC: 141 MMOL/L (ref 136–145)
SP GR UR STRIP: 1.02 (ref 1–1.03)
SQUAMOUS #/AREA URNS HPF: 3 /HPF
TRIGL SERPL-MCNC: 170 MG/DL (ref 30–150)
URN SPEC COLLECT METH UR: ABNORMAL
UROBILINOGEN UR STRIP-ACNC: NEGATIVE EU/DL
WBC # BLD AUTO: 6 K/UL (ref 3.9–12.7)
WBC #/AREA URNS HPF: 10 /HPF (ref 0–5)

## 2019-05-09 PROCEDURE — 36415 COLL VENOUS BLD VENIPUNCTURE: CPT

## 2019-05-09 PROCEDURE — 83036 HEMOGLOBIN GLYCOSYLATED A1C: CPT

## 2019-05-09 PROCEDURE — 82043 UR ALBUMIN QUANTITATIVE: CPT

## 2019-05-09 PROCEDURE — 85025 COMPLETE CBC W/AUTO DIFF WBC: CPT

## 2019-05-09 PROCEDURE — 80053 COMPREHEN METABOLIC PANEL: CPT

## 2019-05-09 PROCEDURE — 81000 URINALYSIS NONAUTO W/SCOPE: CPT

## 2019-05-09 PROCEDURE — 80061 LIPID PANEL: CPT

## 2019-06-19 ENCOUNTER — OFFICE VISIT (OUTPATIENT)
Dept: SURGERY | Facility: CLINIC | Age: 59
End: 2019-06-19
Payer: MEDICARE

## 2019-06-19 VITALS
BODY MASS INDEX: 27.61 KG/M2 | DIASTOLIC BLOOD PRESSURE: 77 MMHG | HEART RATE: 61 BPM | SYSTOLIC BLOOD PRESSURE: 201 MMHG | WEIGHT: 136.69 LBS

## 2019-06-19 DIAGNOSIS — K43.9 VENTRAL HERNIA WITHOUT OBSTRUCTION OR GANGRENE: Primary | ICD-10-CM

## 2019-06-19 PROCEDURE — 99999 PR PBB SHADOW E&M-EST. PATIENT-LVL IV: CPT | Mod: PBBFAC,,, | Performed by: SURGERY

## 2019-06-19 PROCEDURE — 99214 OFFICE O/P EST MOD 30 MIN: CPT | Mod: S$PBB,,, | Performed by: SURGERY

## 2019-06-19 PROCEDURE — 99999 PR PBB SHADOW E&M-EST. PATIENT-LVL IV: ICD-10-PCS | Mod: PBBFAC,,, | Performed by: SURGERY

## 2019-06-19 PROCEDURE — 99214 OFFICE O/P EST MOD 30 MIN: CPT | Mod: PBBFAC,PO | Performed by: SURGERY

## 2019-06-19 PROCEDURE — 99214 PR OFFICE/OUTPT VISIT, EST, LEVL IV, 30-39 MIN: ICD-10-PCS | Mod: S$PBB,,, | Performed by: SURGERY

## 2019-06-19 RX ORDER — AZELASTINE 1 MG/ML
SPRAY, METERED NASAL
COMMUNITY
Start: 2019-04-07

## 2019-06-19 RX ORDER — LIDOCAINE HYDROCHLORIDE 10 MG/ML
1 INJECTION, SOLUTION EPIDURAL; INFILTRATION; INTRACAUDAL; PERINEURAL ONCE
Status: CANCELLED | OUTPATIENT
Start: 2019-07-05

## 2019-06-19 RX ORDER — DORZOLAMIDE HYDROCHLORIDE AND TIMOLOL MALEATE 20; 5 MG/ML; MG/ML
SOLUTION/ DROPS OPHTHALMIC
COMMUNITY
Start: 2019-05-20

## 2019-06-19 RX ORDER — ROSUVASTATIN CALCIUM 10 MG/1
10 TABLET, COATED ORAL DAILY
COMMUNITY

## 2019-06-19 RX ORDER — SODIUM CHLORIDE 9 MG/ML
INJECTION, SOLUTION INTRAVENOUS CONTINUOUS
Status: CANCELLED | OUTPATIENT
Start: 2019-07-05

## 2019-06-19 NOTE — PATIENT INSTRUCTIONS
..PRE-OP INSTRUCTIONS    Your procedure has been scheduled at:    [facility]pre-admit nurse  [phone]      DAY Friday     DATE 07/05/19       Please call the pre-op nurse to schedule your pre-op testing and registration  Someone from the hospital will call you the evening before surgery to let you know what time you need to be at the hospital for surgery.                                               1:  DO NOT EAT OR DRINK ANYTHING AFTER MIDNIGHT THE NIGHT BEFORE SURGERY.     2:  You will need to stop any blood thinners 1 week prior to surgery.  This includes Aspirin, fish oil, Pradaxa, Coumadin, Plavix, Pletal.  Please contact the prescribing doctor to be sure it is ok to stop these medicines.    3:  Pre-admit nurse will go over your medicines and let you know which ones not to take the morning of surgery    4:  Plan to have someone drive you home after you are released from the hospital.  You WILL NOT be able to drive yourself.    5:  If you have any questions or need to change your surgery date, please call Colleen at [phone]    AFTER SURGERY:    You can shower 48 hours after surgery, REMOVE WET BANDAGES AND BANDAIDS, leave the steri- strips on.  If you have not had a bowel movement within 3 days after surgery you may take a laxative of your choice.  Do not lift anything over 5-10 pounds.    You need to have a follow up appointment 7-14 days after surgery, call the office to schedule or if you have questions or concerns.    For [facility] patients, you will receive a [facility] message with a link to schedule your post op appointment.

## 2019-06-20 ENCOUNTER — HOSPITAL ENCOUNTER (OUTPATIENT)
Dept: PREADMISSION TESTING | Facility: HOSPITAL | Age: 59
Discharge: HOME | End: 2019-06-20
Attending: SURGERY
Payer: MEDICARE

## 2019-06-20 ENCOUNTER — HOSPITAL ENCOUNTER (OUTPATIENT)
Dept: RADIOLOGY | Facility: HOSPITAL | Age: 59
Discharge: HOME | End: 2019-06-20
Attending: SURGERY
Payer: MEDICARE

## 2019-06-20 VITALS — BODY MASS INDEX: 23.46 KG/M2 | HEIGHT: 64 IN

## 2019-06-20 DIAGNOSIS — K43.9 VENTRAL HERNIA WITHOUT OBSTRUCTION OR GANGRENE: ICD-10-CM

## 2019-06-20 DIAGNOSIS — Z01.818 PRE-OP EXAM: ICD-10-CM

## 2019-06-20 PROCEDURE — 74176 CT ABD & PELVIS W/O CONTRAST: CPT | Mod: 26,,, | Performed by: RADIOLOGY

## 2019-06-20 PROCEDURE — 99900104 DSU ONLY-NO CHARGE-EA ADD'L HR (STAT)

## 2019-06-20 PROCEDURE — 93010 ELECTROCARDIOGRAM REPORT: CPT | Mod: ,,, | Performed by: INTERNAL MEDICINE

## 2019-06-20 PROCEDURE — 93010 EKG 12-LEAD: ICD-10-PCS | Mod: ,,, | Performed by: INTERNAL MEDICINE

## 2019-06-20 PROCEDURE — 74176 CT ABD & PELVIS W/O CONTRAST: CPT | Mod: TC

## 2019-06-20 PROCEDURE — 74176 CT ABDOMEN PELVIS WITHOUT CONTRAST: ICD-10-PCS | Mod: 26,,, | Performed by: RADIOLOGY

## 2019-06-20 PROCEDURE — 93005 ELECTROCARDIOGRAM TRACING: CPT

## 2019-06-20 PROCEDURE — 25500020 PHARM REV CODE 255: Performed by: SURGERY

## 2019-06-20 PROCEDURE — 99900103 DSU ONLY-NO CHARGE-INITIAL HR (STAT)

## 2019-06-20 RX ADMIN — IOHEXOL 30 ML: 350 INJECTION, SOLUTION INTRAVENOUS at 10:06

## 2019-06-20 NOTE — DISCHARGE INSTRUCTIONS
To confirm, Your doctor has instructed you that surgery is scheduled for:  7/5/19 Timur  [phone]    Please report to [facility] the morning of surgery. You must check-in and receive a wristband before going to your procedure.    Pre-Op will call the afternoon prior to surgery between 1:00 and 6:00 PM with the final arrival time.  Phone number: [phone]    PLEASE NOTE:  The surgery schedule has many variables which may affect the time of your surgery case.  Family members should be available if your surgery time changes.  Plan to be here the day of your procedure between 4-6 hours.    MEDICATIONS:  TAKE ONLY THESE MEDICATIONS WITH A SMALL SIP OF WATER THE MORNING OF YOUR PROCEDURE:   FLONASE, RANITIDINE, SYNTHROID    DO NOT TAKE THESE MEDICATIONS 5-7 DAYS PRIOR to your procedure or per your surgeon's request: ASPIRIN, ALEVE, ADVIL, IBUPROFEN, FISH OIL VITAMIN E, HERBALS  (May take Tylenol)    ONLY if you are prescribed any types of blood thinners such as:  Aspirin, Coumadin, Plavix, Pradaxa, Xarelto, Aggrenox, Effient, Eliquis, Savasya, Brilinta, or any other, ask your surgeon whether you should stop taking them and how long before surgery you should stop.  You may also need to verify with the prescribing physician if it is ok to stop your medication.      INSTRUCTIONS IMPORTANT!!  · Do not eat or drink anything between midnight and the time of your procedure- this includes gum, mints, and candy.  · Do not smoke or drink alcoholic beverages 24 hours prior to your procedure.  · Shower the night before AND the morning of your procedure with a Chlorhexidine wash such as Hibiclens or Dial antibacterial soap from the neck down.  Do not get it on your face or in your eyes.  You may use your own shampoo and face wash. This helps your skin to be as bacteria free as possible.    · If you wear contact lenses, dentures, hearing aids or glasses, bring a container to put them in  during surgery and give to a family member for safe keeping.  Please leave all jewelry, piercing's and valuables at home.   · DO NOT remove hair from the surgery site.  Do not shave the incision site unless you are given specific instructions to do so.    · ONLY if you have been diagnosed with sleep apnea please bring your C-PAP machine.  · ONLY if you wear home oxygen please bring your portable oxygen tank the day of your procedure.  · ONLY if you have a history of OPEN HEART SURGERY you will need a clearance from your Cardiologist per Anesthesia.      · ONLY for patients requiring bowel prep, written instructions will be given by your doctor's office.  · ONLY if you have a neuro stimulator, please bring the controller with you the morning of surgery  · ONLY if a type and screen test is needed before surgery, please return:  · If your doctor has scheduled you for an overnight stay, bring a small overnight bag with any personal items you need.  · Make arrangements in advance for transportation home by a responsible adult.  It is not safe to drive a vehicle during the 24 hours after anesthesia.      · Visiting hours are 10:00AM to 8:30PM.  For the safety of all patients, visitors under the age of 12 are not allowed above the first floor of the hospital.    · All [facility] facilities and properties are tobacco free.  Smoking is NOT allowed.       If you have any questions about these instructions, call Pre-Op Admit  Nursing at [phone] or the Pre-Op Day Surgery Unit at [phone].

## 2019-06-27 ENCOUNTER — TELEPHONE (OUTPATIENT)
Dept: HEMATOLOGY/ONCOLOGY | Facility: CLINIC | Age: 59
End: 2019-06-27

## 2019-06-27 ENCOUNTER — TELEPHONE (OUTPATIENT)
Dept: SURGERY | Facility: CLINIC | Age: 59
End: 2019-06-27

## 2019-06-27 NOTE — TELEPHONE ENCOUNTER
Spoke with father and notified him that Dr Whitman office has been notified that patient has already been seen by Dr Prince for her lung cancer and that The parents did not want patient to receive chemotherapy.  Father notified that he will have to call Dr whitman office to see if surgery is still on for hernia repair.

## 2019-06-27 NOTE — H&P (VIEW-ONLY)
Subjective:       Patient ID: Sis Teixeira is a 59 y.o. female.    Chief Complaint: Establish Care (possible hernia )      HPI 59-year-old female with a right flank mass.  She was operated on by Dr. liu from the past.  He did a colectomy for a large hepatic flexure colon cancer.  Prior to that he had drained an abscess at this site. The tumor had eroded through the colon and created the abscess.  Now she appears to have a hernia at this site. It is reducible.  It is tender.  She is not having any obstructive symptoms.    Past Medical History:   Diagnosis Date    Congenital small head     Diabetes mellitus     dIET CONTROL    Diabetes mellitus, type 2      Past Surgical History:   Procedure Laterality Date    CHOLECYSTECTOMY N/A 6/28/2018    [facility] OR    COLON SURGERY      HEMICOLECTOMY, RIGHT N/A 6/28/2018    [facility] OR    INCISION AND DRAINAGE, ABSCESS Right 6/21/2018    [facility]    INSERTION, JEJUNOSTOMY TUBE N/A 6/28/2018    [facility]formed by Kumar [facility] at [facility] OR         Current Outpatient Medications:     azelastine (ASTELIN) 137 mcg (0.1 %) nasal spray, , Disp: , Rfl:     brinzolamide (AZOPT) 1 % ophthalmic suspension, 1 drop 3 (three) times daily., Disp: , Rfl:     cholecalciferol, vitamin D3, (VITAMIN D3) 1,000 unit capsule, Take 1,000 Units by mouth once daily., Disp: , Rfl:     dorzolamide-timolol 2-0.5% (COSOPT) 22.3-6.8 mg/mL ophthalmic solution, , Disp: , Rfl:     fenofibrate (LOFIBRA) 160 MG Tab, Take 160 mg by mouth once daily., Disp: , Rfl:     furosemide (LASIX) 20 MG tablet, Take 20 mg by mouth 2 (two) times daily., Disp: , Rfl:     latanoprost 0.005 % ophthalmic solution, 1 drop every evening., Disp: , Rfl:     levothyroxine (SYNTHROID) 25 MCG tablet, Take 25 mcg by mouth once daily., Disp: , Rfl:     PAZEO 0.7 % Drop, , Disp: , Rfl:     ranitidine (ZANTAC) 150 MG capsule, Take 150  mg by mouth 2 (two) times daily., Disp: , Rfl:     rosuvastatin (CRESTOR) 10 MG tablet, Take 10 mg by mouth once daily., Disp: , Rfl:     simvastatin (ZOCOR) 10 MG tablet, Take 10 mg by mouth every evening., Disp: , Rfl:     sodium bicarbonate 650 MG tablet, Take 650 mg by mouth 4 (four) times daily., Disp: , Rfl:     sterile water SolP 342.86 mL with parenteral amino acid 10% No.2 10 % SolP 60 g, dextrose 70% SolP 179.998 g, Inject 50 mL/hr into the vein continuous., Disp: 1000 mL, Rfl: 0    acetaminophen (TYLENOL) 500 MG tablet, Take 2 tablets (1,000 mg total) by mouth every 6 (six) hours as needed., Disp: , Rfl: 0    calcium carbonate (OS-WALLY) 600 mg calcium (1,500 mg) Tab, Take 600 mg by mouth once., Disp: , Rfl:     CHOLESTYRAMINE LIGHT 4 gram packet, , Disp: , Rfl:     docusate sodium (COLACE) 100 MG capsule, Take 100 mg by mouth 2 (two) times daily., Disp: , Rfl:     dorzolamide-timolol, PF, (COSOPT PF) 2-0.5 % Dpet ophthalmic solution, 1 drop 2 (two) times daily., Disp: , Rfl:     fluticasone (FLONASE) 50 mcg/actuation nasal spray, 1 spray by Each Nare route once daily., Disp: , Rfl:     ondansetron (ZOFRAN) 8 MG tablet, Take 8 mg by mouth every 8 (eight) hours., Disp: , Rfl:     ondansetron (ZOFRAN-ODT) 8 MG TbDL, Take 1 tablet (8 mg total) by mouth every 12 (twelve) hours as needed., Disp: 30 tablet, Rfl: 1    prochlorperazine (COMPAZINE) 10 MG tablet, Take 10 mg by mouth 3 (three) times daily., Disp: , Rfl:     prochlorperazine (COMPAZINE) 10 MG tablet, Take 1 tablet (10 mg total) by mouth every 6 (six) hours as needed., Disp: 30 tablet, Rfl: 1    Review of patient's allergies indicates:   Allergen Reactions    Sulfa (sulfonamide antibiotics) Rash    Tetracyclines Rash       History reviewed. No pertinent family history.  Social History     Socioeconomic History    Marital status: Single     Spouse name: Not on file    Number of children: Not on file    Years of education: Not on file     Highest education level: Not on file   Occupational History    Not on file   Social Needs    Financial resource strain: Not on file    Food insecurity:     Worry: Not on file     Inability: Not on file    Transportation needs:     Medical: Not on file     Non-medical: Not on file   Tobacco Use    Smoking status: Never Smoker    Smokeless tobacco: Never Used   Substance and Sexual Activity    Alcohol use: No    Drug use: No    Sexual activity: Not on file   Lifestyle    Physical activity:     Days per week: Not on file     Minutes per session: Not on file    Stress: Not on file   Relationships    Social connections:     Talks on phone: Not on file     Gets together: Not on file     Attends Sikh service: Not on file     Active member of club or organization: Not on file     Attends meetings of clubs or organizations: Not on file     Relationship status: Not on file   Other Topics Concern    Not on file   Social History Narrative    Not on file       Review of Systems   Unable to perform ROS: Other     Objective:     Physical Exam   Constitutional: She is oriented to person, place, and time. She appears well-developed and well-nourished. No distress.   HENT:   Head: Normocephalic and atraumatic.   Eyes: Pupils are equal, round, and reactive to light. Conjunctivae and EOM are normal.   Neck: Normal range of motion.   Cardiovascular: Normal rate and regular rhythm.   No murmur heard.  Pulmonary/Chest: Effort normal and breath sounds normal. She has no wheezes. She has no rales.   Abdominal: Soft. Bowel sounds are normal. She exhibits no distension and no mass. There is tenderness. A hernia (There is a right flank hernia.) is present.   Musculoskeletal: Normal range of motion. She exhibits no edema.   Neurological: She is alert and oriented to person, place, and time. No cranial nerve deficit.   Skin: Skin is warm and dry. No rash noted. No erythema.     Assessment:     Encounter Diagnosis   Name  Primary?    Ventral hernia without obstruction or gangrene Yes       Plan:      1.  Will plan ventral hernia repair.  2.  Will get CT to evaluate for nature of the hernia versus abscess and potential recurrence of the malignancy.  3. Risks and benefits of the planned procedure were discussed at length with the patient.  Risks and benefits of not proceeding with the procedure were discussed as well. All questions were answered. The patient expressed clear understanding and would like to proceed with the procedure as discussed.

## 2019-06-27 NOTE — TELEPHONE ENCOUNTER
"Father called back to schedule appointment to see Dr Prince because he said that Dr Whitman will not do her hernia surgery until he sees Dr Prince. Father stated "we still don't want her having that chemotherapy". Appointment made.   "

## 2019-06-27 NOTE — TELEPHONE ENCOUNTER
Pt mother is extremely confused,advised mother that pt needs to go to see dr moreno oncology prior to surgery scheudled.

## 2019-06-27 NOTE — TELEPHONE ENCOUNTER
----- Message from Glenna Estrada sent at 6/27/2019  1:21 PM CDT -----  Contact: Mother  Patient's mom came in today asking to speak with Dr. Whitman's nurse about upcoming operation. She had several questions/concerns. She said she's tried to call numerous times, but can't get past the phone room. She would like a call back as soon as possible. Thank you.

## 2019-06-27 NOTE — TELEPHONE ENCOUNTER
Father called in wanting to schedule and appointment with Dr Prince per Dr Whitman. Patient to have a hernia surgery. Father notified that patient has seen Dr Prince in the past but did not want chemotherapy for the cancer in her lungs. Father stated that he does not want patient to have chemotherapy but wants her to be treated. I explained to him the treatment is chemotherapy. He stated she just needs the surgery. Message sent to Dr Whitman office for clarification on surgery and what is needed from Dr Prince.

## 2019-06-27 NOTE — TELEPHONE ENCOUNTER
----- Message from Gricel Franco LPN sent at 6/27/2019  2:39 PM CDT -----  Contact: pt's father  NP referral  ----- Message -----  From: Kiesha Wilde  Sent: 6/27/2019  11:15 AM  To: Suresh CONTRERAS Staff    Type: Needs Medical Advice    Who Called:  Pt's father  Best Call Back Number: [phone]  Additional Information: pt was referred to set up f/u appt with Dr Prince during preop found a spot they want to have checked out per patient's father. They will  Be going out of town 6-28 thru 7-1, please call to set up appt refer to notes for Dr dong

## 2019-06-27 NOTE — PROGRESS NOTES
Subjective:       Patient ID: Sis Teixeira is a 59 y.o. female.    Chief Complaint: Establish Care (possible hernia )      HPI 59-year-old female with a right flank mass.  She was operated on by Dr. liu from the past.  He did a colectomy for a large hepatic flexure colon cancer.  Prior to that he had drained an abscess at this site. The tumor had eroded through the colon and created the abscess.  Now she appears to have a hernia at this site. It is reducible.  It is tender.  She is not having any obstructive symptoms.    Past Medical History:   Diagnosis Date    Congenital small head     Diabetes mellitus     dIET CONTROL    Diabetes mellitus, type 2      Past Surgical History:   Procedure Laterality Date    CHOLECYSTECTOMY N/A 6/28/2018    [facility]formed by [facility]    COLON SURGERY      HEMICOLECTOMY, RIGHT N/A 6/28/2018    [facility]    INCISION AND DRAINAGE, ABSCESS Right 6/21/2018    [facility]    INSERTION, JEJUNOSTOMY TUBE N/A 6/28/2018    [facility]formed by [facility] OR         Current Outpatient Medications:     azelastine (ASTELIN) 137 mcg (0.1 %) nasal spray, , Disp: , Rfl:     brinzolamide (AZOPT) 1 % ophthalmic suspension, 1 drop 3 (three) times daily., Disp: , Rfl:     cholecalciferol, vitamin D3, (VITAMIN D3) 1,000 unit capsule, Take 1,000 Units by mouth once daily., Disp: , Rfl:     dorzolamide-timolol 2-0.5% (COSOPT) 22.3-6.8 mg/mL ophthalmic solution, , Disp: , Rfl:     fenofibrate (LOFIBRA) 160 MG Tab, Take 160 mg by mouth once daily., Disp: , Rfl:     furosemide (LASIX) 20 MG tablet, Take 20 mg by mouth 2 (two) times daily., Disp: , Rfl:     latanoprost 0.005 % ophthalmic solution, 1 drop every evening., Disp: , Rfl:     levothyroxine (SYNTHROID) 25 MCG tablet, Take 25 mcg by mouth once daily., Disp: , Rfl:     PAZEO 0.7 % Drop, , Disp: , Rfl:     ranitidine (ZANTAC) 150 MG capsule, Take 150  mg by mouth 2 (two) times daily., Disp: , Rfl:     rosuvastatin (CRESTOR) 10 MG tablet, Take 10 mg by mouth once daily., Disp: , Rfl:     simvastatin (ZOCOR) 10 MG tablet, Take 10 mg by mouth every evening., Disp: , Rfl:     sodium bicarbonate 650 MG tablet, Take 650 mg by mouth 4 (four) times daily., Disp: , Rfl:     sterile water SolP 342.86 mL with parenteral amino acid 10% No.2 10 % SolP 60 g, dextrose 70% SolP 179.998 g, Inject 50 mL/hr into the vein continuous., Disp: 1000 mL, Rfl: 0    acetaminophen (TYLENOL) 500 MG tablet, Take 2 tablets (1,000 mg total) by mouth every 6 (six) hours as needed., Disp: , Rfl: 0    calcium carbonate (OS-WALLY) 600 mg calcium (1,500 mg) Tab, Take 600 mg by mouth once., Disp: , Rfl:     CHOLESTYRAMINE LIGHT 4 gram packet, , Disp: , Rfl:     docusate sodium (COLACE) 100 MG capsule, Take 100 mg by mouth 2 (two) times daily., Disp: , Rfl:     dorzolamide-timolol, PF, (COSOPT PF) 2-0.5 % Dpet ophthalmic solution, 1 drop 2 (two) times daily., Disp: , Rfl:     fluticasone (FLONASE) 50 mcg/actuation nasal spray, 1 spray by Each Nare route once daily., Disp: , Rfl:     ondansetron (ZOFRAN) 8 MG tablet, Take 8 mg by mouth every 8 (eight) hours., Disp: , Rfl:     ondansetron (ZOFRAN-ODT) 8 MG TbDL, Take 1 tablet (8 mg total) by mouth every 12 (twelve) hours as needed., Disp: 30 tablet, Rfl: 1    prochlorperazine (COMPAZINE) 10 MG tablet, Take 10 mg by mouth 3 (three) times daily., Disp: , Rfl:     prochlorperazine (COMPAZINE) 10 MG tablet, Take 1 tablet (10 mg total) by mouth every 6 (six) hours as needed., Disp: 30 tablet, Rfl: 1    Review of patient's allergies indicates:   Allergen Reactions    Sulfa (sulfonamide antibiotics) Rash    Tetracyclines Rash       History reviewed. No pertinent family history.  Social History     Socioeconomic History    Marital status: Single     Spouse name: Not on file    Number of children: Not on file    Years of education: Not on file     Highest education level: Not on file   Occupational History    Not on file   Social Needs    Financial resource strain: Not on file    Food insecurity:     Worry: Not on file     Inability: Not on file    Transportation needs:     Medical: Not on file     Non-medical: Not on file   Tobacco Use    Smoking status: Never Smoker    Smokeless tobacco: Never Used   Substance and Sexual Activity    Alcohol use: No    Drug use: No    Sexual activity: Not on file   Lifestyle    Physical activity:     Days per week: Not on file     Minutes per session: Not on file    Stress: Not on file   Relationships    Social connections:     Talks on phone: Not on file     Gets together: Not on file     Attends Hindu service: Not on file     Active member of club or organization: Not on file     Attends meetings of clubs or organizations: Not on file     Relationship status: Not on file   Other Topics Concern    Not on file   Social History Narrative    Not on file       Review of Systems   Unable to perform ROS: Other     Objective:     Physical Exam   Constitutional: She is oriented to person, place, and time. She appears well-developed and well-nourished. No distress.   HENT:   Head: Normocephalic and atraumatic.   Eyes: Pupils are equal, round, and reactive to light. Conjunctivae and EOM are normal.   Neck: Normal range of motion.   Cardiovascular: Normal rate and regular rhythm.   No murmur heard.  Pulmonary/Chest: Effort normal and breath sounds normal. She has no wheezes. She has no rales.   Abdominal: Soft. Bowel sounds are normal. She exhibits no distension and no mass. There is tenderness. A hernia (There is a right flank hernia.) is present.   Musculoskeletal: Normal range of motion. She exhibits no edema.   Neurological: She is alert and oriented to person, place, and time. No cranial nerve deficit.   Skin: Skin is warm and dry. No rash noted. No erythema.     Assessment:     Encounter Diagnosis   Name  Primary?    Ventral hernia without obstruction or gangrene Yes       Plan:      1.  Will plan ventral hernia repair.  2.  Will get CT to evaluate for nature of the hernia versus abscess and potential recurrence of the malignancy.  3. Risks and benefits of the planned procedure were discussed at length with the patient.  Risks and benefits of not proceeding with the procedure were discussed as well. All questions were answered. The patient expressed clear understanding and would like to proceed with the procedure as discussed.

## 2019-07-03 ENCOUNTER — OFFICE VISIT (OUTPATIENT)
Dept: HEMATOLOGY/ONCOLOGY | Facility: CLINIC | Age: 59
End: 2019-07-03
Payer: MEDICARE

## 2019-07-03 VITALS
TEMPERATURE: 98 F | HEIGHT: 64 IN | DIASTOLIC BLOOD PRESSURE: 79 MMHG | OXYGEN SATURATION: 100 % | RESPIRATION RATE: 20 BRPM | HEART RATE: 68 BPM | WEIGHT: 137.38 LBS | BODY MASS INDEX: 23.45 KG/M2 | SYSTOLIC BLOOD PRESSURE: 145 MMHG

## 2019-07-03 DIAGNOSIS — E08.00 DIABETES MELLITUS DUE TO UNDERLYING CONDITION WITH HYPEROSMOLARITY WITHOUT COMA, WITHOUT LONG-TERM CURRENT USE OF INSULIN: ICD-10-CM

## 2019-07-03 DIAGNOSIS — Q02: ICD-10-CM

## 2019-07-03 DIAGNOSIS — C18.7 MALIGNANT NEOPLASM OF SIGMOID COLON: ICD-10-CM

## 2019-07-03 DIAGNOSIS — C18.9 MALIGNANT NEOPLASM OF COLON, UNSPECIFIED PART OF COLON: Primary | ICD-10-CM

## 2019-07-03 DIAGNOSIS — R19.00 ABDOMINAL MASS, UNSPECIFIED ABDOMINAL LOCATION: ICD-10-CM

## 2019-07-03 DIAGNOSIS — C17.8: ICD-10-CM

## 2019-07-03 PROCEDURE — 99215 OFFICE O/P EST HI 40 MIN: CPT | Mod: S$PBB,,, | Performed by: INTERNAL MEDICINE

## 2019-07-03 PROCEDURE — 99215 PR OFFICE/OUTPT VISIT, EST, LEVL V, 40-54 MIN: ICD-10-PCS | Mod: S$PBB,,, | Performed by: INTERNAL MEDICINE

## 2019-07-03 PROCEDURE — 99999 PR PBB SHADOW E&M-EST. PATIENT-LVL V: ICD-10-PCS | Mod: PBBFAC,,, | Performed by: INTERNAL MEDICINE

## 2019-07-03 PROCEDURE — 99999 PR PBB SHADOW E&M-EST. PATIENT-LVL V: CPT | Mod: PBBFAC,,, | Performed by: INTERNAL MEDICINE

## 2019-07-03 PROCEDURE — 99215 OFFICE O/P EST HI 40 MIN: CPT | Mod: PBBFAC,PO | Performed by: INTERNAL MEDICINE

## 2019-07-03 NOTE — PROGRESS NOTES
Subjective:       Patient ID: Sis Teixeira is a 59 y.o. female.    Chief Complaint:  Lost to follow-up since August of 2018    Oncologic History:  Had visceral peritoneal involvement of colon cancer adjuvant chemotherapy was recommended after the patient had adequately recovered postoperatively patient has disabilities and parents decided that they would not go ahead with adjuvant therapy but did not call to let us now now she has a lump on the right flank which is considered to be a ventral hernia per scan patient needed clearance from oncology services prior to embarking on surgical resection of this and correction by Dr. Whitman.     Oncologic History     Oncologic Treatment     PathologyFINAL PATHOLOGIC DIAGNOSIS  1. Abdominal wall biopsy:  Acutely and chronically inflamed fibroadipose tissue.  Areas of necrosis are identified.  Rare multinucleated giant cells also seen.  Foreign material, suggestive of fecal material is also present.  Area suspicious for malignancy seen on frozen section is not identified in the permanent sections.  2. Right colon:  Invasive moderately differentiated adenocarcinoma, 6.5 cm in greatest dimension, penetrating muscular wall  extending into pericolonic adipose tissue and to the external peritoneal surface.  Pericolonic abscess formation identified.  Intestinal and mesenteric margins free of tumor.  Random sections of ileum and colon with no significant histopathologic abnormalities.  14 regional lymph nodes negative for metastatic carcinoma.    BRAF wild type, KRS mutated   MSI stable  HPI:     Social History     Socioeconomic History    Marital status: Single     Spouse name: Not on file    Number of children: Not on file    Years of education: Not on file    Highest education level: Not on file   Occupational History    Not on file   Social Needs    Financial resource strain: Not on file    Food insecurity:     Worry: Not on file     Inability: Not on file     Transportation needs:     Medical: Not on file     Non-medical: Not on file   Tobacco Use    Smoking status: Never Smoker    Smokeless tobacco: Never Used   Substance and Sexual Activity    Alcohol use: No    Drug use: No    Sexual activity: Not on file   Lifestyle    Physical activity:     Days per week: Not on file     Minutes per session: Not on file    Stress: Not on file   Relationships    Social connections:     Talks on phone: Not on file     Gets together: Not on file     Attends Mandaeism service: Not on file     Active member of club or organization: Not on file     Attends meetings of clubs or organizations: Not on file     Relationship status: Not on file   Other Topics Concern    Not on file   Social History Narrative    Not on file     No family history on file.  Past Surgical History:   Procedure Laterality Date    CHOLECYSTECTOMY N/A 6/28/2018    [facility]    COLON SURGERY      HEMICOLECTOMY, RIGHT N/A 6/28/2018    [facility]    INCISION AND DRAINAGE, ABSCESS Right 6/21/2018    [facility]    INSERTION, JEJUNOSTOMY TUBE N/A 6/28/2018    [facility]     Past Medical History:   Diagnosis Date    Congenital small head     Diabetes mellitus     dIET CONTROL    Diabetes mellitus, type 2        Current Outpatient Medications:     acetaminophen (TYLENOL) 500 MG tablet, Take 2 tablets (1,000 mg total) by mouth every 6 (six) hours as needed., Disp: , Rfl: 0    azelastine (ASTELIN) 137 mcg (0.1 %) nasal spray, , Disp: , Rfl:     brinzolamide (AZOPT) 1 % ophthalmic suspension, 1 drop 3 (three) times daily., Disp: , Rfl:     calcium carbonate (OS-WALLY) 600 mg calcium (1,500 mg) Tab, Take 600 mg by mouth once., Disp: , Rfl:     cholecalciferol, vitamin D3, (VITAMIN D3) 1,000 unit capsule, Take 1,000 Units by mouth once daily., Disp: , Rfl:     docusate sodium (COLACE) 100  MG capsule, Take 100 mg by mouth 2 (two) times daily., Disp: , Rfl:     dorzolamide-timolol 2-0.5% (COSOPT) 22.3-6.8 mg/mL ophthalmic solution, , Disp: , Rfl:     dorzolamide-timolol, PF, (COSOPT PF) 2-0.5 % Dpet ophthalmic solution, 1 drop 2 (two) times daily., Disp: , Rfl:     fenofibrate (LOFIBRA) 160 MG Tab, Take 160 mg by mouth once daily., Disp: , Rfl:     fluticasone (FLONASE) 50 mcg/actuation nasal spray, 1 spray by Each Nare route once daily., Disp: , Rfl:     furosemide (LASIX) 20 MG tablet, Take 20 mg by mouth 2 (two) times daily., Disp: , Rfl:     latanoprost 0.005 % ophthalmic solution, 1 drop every evening., Disp: , Rfl:     levothyroxine (SYNTHROID) 25 MCG tablet, Take 25 mcg by mouth once daily., Disp: , Rfl:     PAZEO 0.7 % Drop, , Disp: , Rfl:     prochlorperazine (COMPAZINE) 10 MG tablet, Take 10 mg by mouth 3 (three) times daily., Disp: , Rfl:     prochlorperazine (COMPAZINE) 10 MG tablet, Take 1 tablet (10 mg total) by mouth every 6 (six) hours as needed., Disp: 30 tablet, Rfl: 1    ranitidine (ZANTAC) 150 MG capsule, Take 150 mg by mouth 2 (two) times daily., Disp: , Rfl:     rosuvastatin (CRESTOR) 10 MG tablet, Take 10 mg by mouth once daily., Disp: , Rfl:     simvastatin (ZOCOR) 10 MG tablet, Take 10 mg by mouth every evening., Disp: , Rfl:     sodium bicarbonate 650 MG tablet, Take 650 mg by mouth 4 (four) times daily., Disp: , Rfl:     sterile water SolP 342.86 mL with parenteral amino acid 10% No.2 10 % SolP 60 g, dextrose 70% SolP 179.998 g, Inject 50 mL/hr into the vein continuous., Disp: 1000 mL, Rfl: 0    CHOLESTYRAMINE LIGHT 4 gram packet, , Disp: , Rfl:     ondansetron (ZOFRAN) 8 MG tablet, Take 8 mg by mouth every 8 (eight) hours., Disp: , Rfl:     ondansetron (ZOFRAN-ODT) 8 MG TbDL, Take 1 tablet (8 mg total) by mouth every 12 (twelve) hours as needed., Disp: 30 tablet, Rfl: 1  Review of patient's allergies indicates:   Allergen Reactions    Sulfa (sulfonamide  antibiotics) Rash    Tetracyclines Rash         REVIEW OF SYSTEMS:     Primary historians are patient's parents.   specially the mother  Mother decided not to proceed with adjuvant chemotherapy about a year ago  but our office was not notified hence patient was lost to follow-up now there is a ventral hernia reported on scan and this is causing pain and discomfort to patient and the hope is that this can be resected tomorrow.  With clearance from Oncology  Patient congenitally has deformities physically and a little bit of mental growth issues however she seems very capable of understanding discussion very dependent on her parents for decision making    PHYSICAL EXAM:     Vitals:    07/03/19 1338   BP: (!) 145/79   Pulse: 68   Resp: 20   Temp: 98.1 °F (36.7 °C)       GENERAL:  Small head small hand facial deformity.  Awake, alert and oriented to time, person and place.  No anxiety, or agitation.      HEENT: Normal conjunctivae and eyelids. WNL.  PERRLA 3 to 4 mm. No icterus, no pallor, no congestion, and no discharge noted.     NECK:  Supple. Trachea is central.  No crepitus.  No JVD or masses.    RESPIRATORY:  No intercostal retractions.  No dullness to percussion.  Chest is clear to auscultation.  No rales, rhonchi or wheezes.  No crepitus.  Good air entry bilaterally.    CARDIOVASCULAR:  S1 and S2 are normally heard without murmurs or gallops.  All peripheral pulses are present.    ABDOMEN:  Normal abdomen.  No hepatosplenomegaly.  No free fluid.  Bowel sounds are present.  No hernia noted. No masses.  No rebound or tenderness.  No guarding or rigidity.  Umbilicus is midline.  Scar of surgery have well-healed.  Right flank reveals firm mass that is not reduced    LYMPHATICS:  No axillary, cervical, supraclavicular, submental, or inguinal lymphadenopathy.    SKIN/MUSCULOSKELETAL:  There is no evidence of excoriation marks or ecchmosis.  No rashes.  No cyanosis.  No clubbing.  No joint or skeletal deformities  noted.  Normal range of motion.    NEUROLOGIC:  Higher functions are appropriate.  No cranial nerve deficits.  Normal minh.  Normal strength.  Motor and sensory functions are normal.  Deep tendon reflexes are normal.    GENITAL/RECTAL:  Exams are deferred.      Laboratory:     CBC:  Lab Results   Component Value Date    WBC 6.00 05/09/2019    RBC 3.53 (L) 05/09/2019    HGB 10.3 (L) 05/09/2019    HCT 31.8 (L) 05/09/2019    MCV 90 05/09/2019    MCH 29.1 05/09/2019    MCHC 32.3 05/09/2019    RDW 16.0 (H) 05/09/2019     05/09/2019    MPV 9.8 05/09/2019    GRAN 3.1 05/09/2019    GRAN 51.3 05/09/2019    LYMPH 2.4 05/09/2019    LYMPH 40.0 05/09/2019    MONO 0.3 05/09/2019    MONO 4.7 05/09/2019    EOS 0.2 05/09/2019    BASO 0.00 05/09/2019    EOSINOPHIL 3.4 05/09/2019    BASOPHIL 0.6 05/09/2019       BMP: BMP  Lab Results   Component Value Date     06/20/2019    K 4.0 06/20/2019     (H) 06/20/2019    CO2 22 (L) 06/20/2019    BUN 31 (H) 06/20/2019    CREATININE 1.5 (H) 06/20/2019    CALCIUM 9.5 06/20/2019    ANIONGAP 8 06/20/2019    ESTGFRAFRICA 44 (A) 06/20/2019    EGFRNONAA 38 (A) 06/20/2019       LFT:   Lab Results   Component Value Date    ALT 10 06/20/2019    AST 32 06/20/2019    ALKPHOS 46 (L) 06/20/2019    BILITOT 0.5 06/20/2019         Assessment/Plan:       1. Malignant neoplasm of colon, unspecified part of colon    2. Malignant neoplasm of sigmoid colon         No adjuvant therapy given in a patient who had visceral peritoneal involvement and abdominal wall reported to be involved on frozen section but not confirmed by pathology parents had refused adjuvant therapy even recommend a year later patient has this mass on right flank that is perceived on scan to be a ventral hernia with bowel loops however  it appears rather firm  Obtain PET  CT also shows lung nodule suspicious for metastatic area 11 mm  Risk of metastatic disease is high in this patient  Proceed with surgical resection to obtain  further tissue as well as giving patient comfort even if palliative.  Family will bring her back for further discussion after resection is completed tomorrow with path report I am concerned this flank  mass may be malignant  Proceed with continued diabetes support  Nutritionally patient has improved remarkably her feeding tube has been removed  Will also obtain tumor markerCEA   Advance Care Planning     Living Will  During this visit, I engaged the patient's parents in the advance care planning process.  They and I reviewed the role for advance directives and their purpose in directing future healthcare as the patient's unable to speak for herself.  At this point in time, the patient does not have full decision-making capacity.  We discussed different extreme health states that she could experience, and reviewed what kind of medical care she would want in those situations.  The patient's parents communicated that if she were comatose and had little chance of a meaningful recovery, they  Would not  want machines/life-susing treatments used.parents have a living will on her behalf to reflect these preferences.  The parents  Are already designated a healthcare power of  to make decisions on her behalf.

## 2019-07-04 ENCOUNTER — ANESTHESIA EVENT (OUTPATIENT)
Dept: SURGERY | Facility: HOSPITAL | Age: 59
End: 2019-07-04
Payer: MEDICARE

## 2019-07-05 ENCOUNTER — TELEPHONE (OUTPATIENT)
Dept: SURGERY | Facility: CLINIC | Age: 59
End: 2019-07-05

## 2019-07-05 ENCOUNTER — HOSPITAL ENCOUNTER (OUTPATIENT)
Facility: HOSPITAL | Age: 59
Discharge: HOME | End: 2019-07-05
Attending: SURGERY | Admitting: SURGERY
Payer: MEDICARE

## 2019-07-05 ENCOUNTER — ANESTHESIA (OUTPATIENT)
Dept: SURGERY | Facility: HOSPITAL | Age: 59
End: 2019-07-05
Payer: MEDICARE

## 2019-07-05 DIAGNOSIS — Z01.818 PRE-OP EXAM: ICD-10-CM

## 2019-07-05 DIAGNOSIS — K43.9 VENTRAL HERNIA WITHOUT OBSTRUCTION OR GANGRENE: Primary | ICD-10-CM

## 2019-07-05 PROCEDURE — C1781 MESH (IMPLANTABLE): HCPCS | Performed by: SURGERY

## 2019-07-05 PROCEDURE — D9220A PRA ANESTHESIA: ICD-10-PCS | Mod: ANES,,, | Performed by: ANESTHESIOLOGY

## 2019-07-05 PROCEDURE — 99900104 DSU ONLY-NO CHARGE-EA ADD'L HR (STAT): Performed by: SURGERY

## 2019-07-05 PROCEDURE — 25000003 PHARM REV CODE 250: Performed by: SURGERY

## 2019-07-05 PROCEDURE — 88302 TISSUE SPECIMEN TO PATHOLOGY - SURGERY: ICD-10-PCS | Mod: 26,,, | Performed by: PATHOLOGY

## 2019-07-05 PROCEDURE — S0077 INJECTION, CLINDAMYCIN PHOSP: HCPCS | Performed by: SURGERY

## 2019-07-05 PROCEDURE — D9220A PRA ANESTHESIA: Mod: ANES,,, | Performed by: ANESTHESIOLOGY

## 2019-07-05 PROCEDURE — 88342 IMHCHEM/IMCYTCHM 1ST ANTB: CPT | Mod: 26,,, | Performed by: PATHOLOGY

## 2019-07-05 PROCEDURE — 49560 PR REPAIR INCISIONAL HERNIA,REDUCIBLE: ICD-10-PCS | Mod: ,,, | Performed by: SURGERY

## 2019-07-05 PROCEDURE — 71000033 HC RECOVERY, INTIAL HOUR: Performed by: SURGERY

## 2019-07-05 PROCEDURE — 36000706: Performed by: SURGERY

## 2019-07-05 PROCEDURE — 49560 PR REPAIR INCISIONAL HERNIA,REDUCIBLE: CPT | Mod: ,,, | Performed by: SURGERY

## 2019-07-05 PROCEDURE — 88302 TISSUE EXAM BY PATHOLOGIST: CPT | Mod: 26,,, | Performed by: PATHOLOGY

## 2019-07-05 PROCEDURE — 36000707: Performed by: SURGERY

## 2019-07-05 PROCEDURE — 49568 PR IMPLANT MESH HERNIA REPAIR/DEBRIDEMENT CLOSURE: CPT | Mod: ,,, | Performed by: SURGERY

## 2019-07-05 PROCEDURE — 25000003 PHARM REV CODE 250: Performed by: ANESTHESIOLOGY

## 2019-07-05 PROCEDURE — 88342 IMHCHEM/IMCYTCHM 1ST ANTB: CPT | Performed by: PATHOLOGY

## 2019-07-05 PROCEDURE — D9220A PRA ANESTHESIA: ICD-10-PCS | Mod: CRNA,,, | Performed by: NURSE ANESTHETIST, CERTIFIED REGISTERED

## 2019-07-05 PROCEDURE — 71000015 HC POSTOP RECOV 1ST HR: Performed by: SURGERY

## 2019-07-05 PROCEDURE — 99900103 DSU ONLY-NO CHARGE-INITIAL HR (STAT): Performed by: SURGERY

## 2019-07-05 PROCEDURE — 63600175 PHARM REV CODE 636 W HCPCS: Performed by: NURSE ANESTHETIST, CERTIFIED REGISTERED

## 2019-07-05 PROCEDURE — 37000008 HC ANESTHESIA 1ST 15 MINUTES: Performed by: SURGERY

## 2019-07-05 PROCEDURE — 25000003 PHARM REV CODE 250: Performed by: NURSE ANESTHETIST, CERTIFIED REGISTERED

## 2019-07-05 PROCEDURE — D9220A PRA ANESTHESIA: Mod: CRNA,,, | Performed by: NURSE ANESTHETIST, CERTIFIED REGISTERED

## 2019-07-05 PROCEDURE — C1729 CATH, DRAINAGE: HCPCS | Performed by: SURGERY

## 2019-07-05 PROCEDURE — 88342 TISSUE SPECIMEN TO PATHOLOGY - SURGERY: ICD-10-PCS | Mod: 26,,, | Performed by: PATHOLOGY

## 2019-07-05 PROCEDURE — 49568 PR IMPLANT MESH HERNIA REPAIR/DEBRIDEMENT CLOSURE: ICD-10-PCS | Mod: ,,, | Performed by: SURGERY

## 2019-07-05 PROCEDURE — 37000009 HC ANESTHESIA EA ADD 15 MINS: Performed by: SURGERY

## 2019-07-05 DEVICE — PATCH HERNIA MESH VENTRALEX: Type: IMPLANTABLE DEVICE | Site: ABDOMEN | Status: FUNCTIONAL

## 2019-07-05 RX ORDER — FENTANYL CITRATE 50 UG/ML
25 INJECTION, SOLUTION INTRAMUSCULAR; INTRAVENOUS EVERY 5 MIN PRN
Status: DISCONTINUED | OUTPATIENT
Start: 2019-07-05 | End: 2019-07-05 | Stop reason: HOSPADM

## 2019-07-05 RX ORDER — ROCURONIUM BROMIDE 10 MG/ML
INJECTION, SOLUTION INTRAVENOUS
Status: DISCONTINUED | OUTPATIENT
Start: 2019-07-05 | End: 2019-07-05

## 2019-07-05 RX ORDER — OXYCODONE HYDROCHLORIDE 5 MG/1
5 TABLET ORAL
Status: DISCONTINUED | OUTPATIENT
Start: 2019-07-05 | End: 2019-07-05 | Stop reason: HOSPADM

## 2019-07-05 RX ORDER — PROPOFOL 10 MG/ML
VIAL (ML) INTRAVENOUS
Status: DISCONTINUED | OUTPATIENT
Start: 2019-07-05 | End: 2019-07-05

## 2019-07-05 RX ORDER — CLINDAMYCIN PHOSPHATE 900 MG/50ML
900 INJECTION, SOLUTION INTRAVENOUS
Status: COMPLETED | OUTPATIENT
Start: 2019-07-05 | End: 2019-07-05

## 2019-07-05 RX ORDER — LIDOCAINE HYDROCHLORIDE 10 MG/ML
1 INJECTION, SOLUTION EPIDURAL; INFILTRATION; INTRACAUDAL; PERINEURAL ONCE
Status: COMPLETED | OUTPATIENT
Start: 2019-07-05 | End: 2019-07-05

## 2019-07-05 RX ORDER — LIDOCAINE HYDROCHLORIDE 10 MG/ML
1 INJECTION, SOLUTION EPIDURAL; INFILTRATION; INTRACAUDAL; PERINEURAL ONCE
Status: DISCONTINUED | OUTPATIENT
Start: 2019-07-05 | End: 2019-07-05 | Stop reason: HOSPADM

## 2019-07-05 RX ORDER — DEXAMETHASONE SODIUM PHOSPHATE 4 MG/ML
INJECTION, SOLUTION INTRA-ARTICULAR; INTRALESIONAL; INTRAMUSCULAR; INTRAVENOUS; SOFT TISSUE
Status: DISCONTINUED | OUTPATIENT
Start: 2019-07-05 | End: 2019-07-05

## 2019-07-05 RX ORDER — BUPIVACAINE HYDROCHLORIDE AND EPINEPHRINE 2.5; 5 MG/ML; UG/ML
INJECTION, SOLUTION EPIDURAL; INFILTRATION; INTRACAUDAL; PERINEURAL
Status: DISCONTINUED | OUTPATIENT
Start: 2019-07-05 | End: 2019-07-05 | Stop reason: HOSPADM

## 2019-07-05 RX ORDER — MIDAZOLAM HYDROCHLORIDE 1 MG/ML
INJECTION, SOLUTION INTRAMUSCULAR; INTRAVENOUS
Status: DISCONTINUED | OUTPATIENT
Start: 2019-07-05 | End: 2019-07-05

## 2019-07-05 RX ORDER — PHENYLEPHRINE HYDROCHLORIDE 10 MG/ML
INJECTION INTRAVENOUS
Status: DISCONTINUED | OUTPATIENT
Start: 2019-07-05 | End: 2019-07-05

## 2019-07-05 RX ORDER — DIPHENHYDRAMINE HYDROCHLORIDE 50 MG/ML
25 INJECTION INTRAMUSCULAR; INTRAVENOUS EVERY 6 HOURS PRN
Status: DISCONTINUED | OUTPATIENT
Start: 2019-07-05 | End: 2019-07-05 | Stop reason: HOSPADM

## 2019-07-05 RX ORDER — SODIUM CHLORIDE 9 MG/ML
INJECTION, SOLUTION INTRAVENOUS CONTINUOUS
Status: DISCONTINUED | OUTPATIENT
Start: 2019-07-05 | End: 2019-07-05 | Stop reason: HOSPADM

## 2019-07-05 RX ORDER — ACETAMINOPHEN 10 MG/ML
INJECTION, SOLUTION INTRAVENOUS
Status: DISCONTINUED | OUTPATIENT
Start: 2019-07-05 | End: 2019-07-05

## 2019-07-05 RX ORDER — NEOSTIGMINE METHYLSULFATE 1 MG/ML
INJECTION, SOLUTION INTRAVENOUS
Status: DISCONTINUED | OUTPATIENT
Start: 2019-07-05 | End: 2019-07-05

## 2019-07-05 RX ORDER — ONDANSETRON 2 MG/ML
4 INJECTION INTRAMUSCULAR; INTRAVENOUS ONCE
Status: DISCONTINUED | OUTPATIENT
Start: 2019-07-05 | End: 2019-07-05 | Stop reason: HOSPADM

## 2019-07-05 RX ORDER — ONDANSETRON 2 MG/ML
INJECTION INTRAMUSCULAR; INTRAVENOUS
Status: DISCONTINUED | OUTPATIENT
Start: 2019-07-05 | End: 2019-07-05

## 2019-07-05 RX ORDER — HYDROCODONE BITARTRATE AND ACETAMINOPHEN 5; 325 MG/1; MG/1
1 TABLET ORAL EVERY 6 HOURS PRN
Qty: 20 TABLET | Refills: 0 | Status: SHIPPED | OUTPATIENT
Start: 2019-07-05

## 2019-07-05 RX ORDER — SODIUM CHLORIDE 0.9 % (FLUSH) 0.9 %
3 SYRINGE (ML) INJECTION
Status: DISCONTINUED | OUTPATIENT
Start: 2019-07-05 | End: 2019-07-05 | Stop reason: HOSPADM

## 2019-07-05 RX ORDER — MEPERIDINE HYDROCHLORIDE 50 MG/ML
12.5 INJECTION INTRAMUSCULAR; INTRAVENOUS; SUBCUTANEOUS ONCE AS NEEDED
Status: DISCONTINUED | OUTPATIENT
Start: 2019-07-05 | End: 2019-07-05 | Stop reason: HOSPADM

## 2019-07-05 RX ORDER — LIDOCAINE HCL/PF 100 MG/5ML
SYRINGE (ML) INTRAVENOUS
Status: DISCONTINUED | OUTPATIENT
Start: 2019-07-05 | End: 2019-07-05

## 2019-07-05 RX ORDER — SODIUM CHLORIDE, SODIUM LACTATE, POTASSIUM CHLORIDE, CALCIUM CHLORIDE 600; 310; 30; 20 MG/100ML; MG/100ML; MG/100ML; MG/100ML
INJECTION, SOLUTION INTRAVENOUS CONTINUOUS
Status: DISCONTINUED | OUTPATIENT
Start: 2019-07-05 | End: 2019-07-05 | Stop reason: HOSPADM

## 2019-07-05 RX ORDER — SODIUM CHLORIDE, SODIUM LACTATE, POTASSIUM CHLORIDE, CALCIUM CHLORIDE 600; 310; 30; 20 MG/100ML; MG/100ML; MG/100ML; MG/100ML
75 INJECTION, SOLUTION INTRAVENOUS CONTINUOUS
Status: DISCONTINUED | OUTPATIENT
Start: 2019-07-05 | End: 2019-07-05 | Stop reason: HOSPADM

## 2019-07-05 RX ORDER — ONDANSETRON 2 MG/ML
4 INJECTION INTRAMUSCULAR; INTRAVENOUS DAILY PRN
Status: DISCONTINUED | OUTPATIENT
Start: 2019-07-05 | End: 2019-07-05 | Stop reason: HOSPADM

## 2019-07-05 RX ORDER — HYDROMORPHONE HYDROCHLORIDE 2 MG/ML
0.2 INJECTION, SOLUTION INTRAMUSCULAR; INTRAVENOUS; SUBCUTANEOUS EVERY 5 MIN PRN
Status: DISCONTINUED | OUTPATIENT
Start: 2019-07-05 | End: 2019-07-05 | Stop reason: HOSPADM

## 2019-07-05 RX ORDER — FENTANYL CITRATE 50 UG/ML
INJECTION, SOLUTION INTRAMUSCULAR; INTRAVENOUS
Status: DISCONTINUED | OUTPATIENT
Start: 2019-07-05 | End: 2019-07-05

## 2019-07-05 RX ORDER — GLYCOPYRROLATE 0.2 MG/ML
INJECTION INTRAMUSCULAR; INTRAVENOUS
Status: DISCONTINUED | OUTPATIENT
Start: 2019-07-05 | End: 2019-07-05

## 2019-07-05 RX ORDER — SUCCINYLCHOLINE CHLORIDE 20 MG/ML
INJECTION INTRAMUSCULAR; INTRAVENOUS
Status: DISCONTINUED | OUTPATIENT
Start: 2019-07-05 | End: 2019-07-05

## 2019-07-05 RX ADMIN — SUCCINYLCHOLINE CHLORIDE 180 MG: 20 INJECTION, SOLUTION INTRAMUSCULAR; INTRAVENOUS at 01:07

## 2019-07-05 RX ADMIN — MIDAZOLAM 1 MG: 1 INJECTION INTRAMUSCULAR; INTRAVENOUS at 01:07

## 2019-07-05 RX ADMIN — ROCURONIUM BROMIDE 10 MG: 10 INJECTION, SOLUTION INTRAVENOUS at 01:07

## 2019-07-05 RX ADMIN — ONDANSETRON 4 MG: 2 INJECTION, SOLUTION INTRAMUSCULAR; INTRAVENOUS at 01:07

## 2019-07-05 RX ADMIN — SODIUM CHLORIDE, SODIUM LACTATE, POTASSIUM CHLORIDE, AND CALCIUM CHLORIDE: .6; .31; .03; .02 INJECTION, SOLUTION INTRAVENOUS at 02:07

## 2019-07-05 RX ADMIN — SODIUM CHLORIDE, SODIUM LACTATE, POTASSIUM CHLORIDE, AND CALCIUM CHLORIDE: .6; .31; .03; .02 INJECTION, SOLUTION INTRAVENOUS at 11:07

## 2019-07-05 RX ADMIN — FENTANYL CITRATE 50 MCG: 50 INJECTION, SOLUTION INTRAMUSCULAR; INTRAVENOUS at 01:07

## 2019-07-05 RX ADMIN — GLYCOPYRROLATE 0.4 MG: 0.2 INJECTION, SOLUTION INTRAMUSCULAR; INTRAVENOUS at 02:07

## 2019-07-05 RX ADMIN — ROCURONIUM BROMIDE 30 MG: 10 INJECTION, SOLUTION INTRAVENOUS at 01:07

## 2019-07-05 RX ADMIN — NEOSTIGMINE METHYLSULFATE 5 MG: 1 INJECTION INTRAVENOUS at 02:07

## 2019-07-05 RX ADMIN — DEXAMETHASONE SODIUM PHOSPHATE 8 MG: 4 INJECTION, SOLUTION INTRAMUSCULAR; INTRAVENOUS at 01:07

## 2019-07-05 RX ADMIN — LIDOCAINE HYDROCHLORIDE 10 MG: 10 INJECTION, SOLUTION EPIDURAL; INFILTRATION; INTRACAUDAL; PERINEURAL at 10:07

## 2019-07-05 RX ADMIN — FENTANYL CITRATE 50 MCG: 50 INJECTION, SOLUTION INTRAMUSCULAR; INTRAVENOUS at 12:07

## 2019-07-05 RX ADMIN — PHENYLEPHRINE HYDROCHLORIDE 100 MCG: 10 INJECTION INTRAVENOUS at 01:07

## 2019-07-05 RX ADMIN — CLINDAMYCIN PHOSPHATE 900 MG: 18 INJECTION, SOLUTION INTRAVENOUS at 12:07

## 2019-07-05 RX ADMIN — MIDAZOLAM 1 MG: 1 INJECTION INTRAMUSCULAR; INTRAVENOUS at 12:07

## 2019-07-05 RX ADMIN — GLYCOPYRROLATE 0.2 MG: 0.2 INJECTION, SOLUTION INTRAMUSCULAR; INTRAVENOUS at 01:07

## 2019-07-05 RX ADMIN — ACETAMINOPHEN 1000 MG: 10 INJECTION, SOLUTION INTRAVENOUS at 01:07

## 2019-07-05 RX ADMIN — PROPOFOL 130 MG: 10 INJECTION, EMULSION INTRAVENOUS at 01:07

## 2019-07-05 RX ADMIN — LIDOCAINE HYDROCHLORIDE 75 MG: 20 INJECTION, SOLUTION INTRAVENOUS at 01:07

## 2019-07-05 NOTE — PLAN OF CARE
Discharge instructions discussed, JESUS drain instructions completed and demonstration completed, pt mother was able to talk me through the instructions of the JESUS drain  Pt is calm  Up to the bathroom to urinate, no difficulty,  Denies nausea,      All questions answered,  Her mom was able to teach back the instructions and is aare of getting appt for Monday to have the drain removed by Dr hWitman.

## 2019-07-05 NOTE — ANESTHESIA PREPROCEDURE EVALUATION
07/05/2019  Sis Teixeira is a 59 y.o., female.    Anesthesia Evaluation    I have reviewed the Patient Summary Reports.    I have reviewed the Nursing Notes.      Review of Systems  Anesthesia Hx:  No problems with previous Anesthesia    Endocrine:   Diabetes, well controlled, type 2        Physical Exam  General:  Well nourished    Airway/Jaw/Neck:  Airway Findings: Mallampati: II                Anesthesia Plan  Type of Anesthesia, risks & benefits discussed:  Anesthesia Type:  general  Patient's Preference:   Intra-op Monitoring Plan:   Intra-op Monitoring Plan Comments:   Post Op Pain Control Plan:   Post Op Pain Control Plan Comments:   Induction:   IV  Beta Blocker:  Patient is not currently on a Beta-Blocker (No further documentation required).       Informed Consent: Patient understands risks and agrees with Anesthesia plan.  Questions answered. Anesthesia consent signed with patient.  ASA Score: 2     Day of Surgery Review of History & Physical:    H&P update referred to the surgeon.         Ready For Surgery From Anesthesia Perspective.

## 2019-07-05 NOTE — ANESTHESIA POSTPROCEDURE EVALUATION
Anesthesia Post Evaluation    Patient: Sis Teixeira    Procedure(s) Performed: Procedure(s) (LRB):  REPAIR, HERNIA, VENTRAL, RECURRENT (Right)    Final Anesthesia Type: general  Patient location during evaluation: PACU  Patient participation: Yes- Able to Participate  Level of consciousness: awake and alert and oriented  Post-procedure vital signs: reviewed and stable  Pain management: adequate  Airway patency: patent  PONV status at discharge: No PONV  Anesthetic complications: no      Cardiovascular status: blood pressure returned to baseline  Respiratory status: unassisted, spontaneous ventilation and room air  Hydration status: euvolemic  Follow-up not needed.          Vitals Value Taken Time   /57 7/5/2019  2:32 PM   Temp 36.4 °C (97.5 °F) 7/5/2019 10:39 AM   Pulse 76 7/5/2019  2:32 PM   Resp 19 7/5/2019  2:32 PM   SpO2 100 % 7/5/2019  2:32 PM   Vitals shown include unvalidated device data.      No case tracking events are documented in the log.      Pain/Storm Score: No data recorded

## 2019-07-05 NOTE — TRANSFER OF CARE
"Anesthesia Transfer of Care Note    Patient: Sis Teixeira    Procedure(s) Performed: Procedure(s) (LRB):  REPAIR, HERNIA, VENTRAL, RECURRENT (Right)    Patient location: PACU    Anesthesia Type: general    Transport from OR: Transported from OR on 2-3 L/min O2 by NC with adequate spontaneous ventilation    Post pain: adequate analgesia    Post assessment: no apparent anesthetic complications and tolerated procedure well    Post vital signs: stable    Level of consciousness: awake and sedated    Nausea/Vomiting: no nausea/vomiting    Complications: none    Transfer of care protocol was followed      Last vitals:   Visit Vitals  BP (!) 175/68 (BP Location: Right arm, Patient Position: Sitting)   Pulse 75   Temp 36.4 °C (97.5 °F) (Skin)   Resp 18   Ht 5' 4" (1.626 m)   Wt 61.7 kg (136 lb)   LMP  (LMP Unknown)   SpO2 100%   Breastfeeding? No   BMI 23.34 kg/m²     "

## 2019-07-05 NOTE — OP NOTE
Patient: Sis Teixeira     Date of Procedure: 7/5/2019    Procedure:  Repair of right flank incisional hernia.  Mesh implantation.    Surgeon: Edmundo Baker MD    Assistant: June Caballero    Pre-op Diagnosis: Ventral hernia without obstruction or gangrene [K43.9]     Post-op Diagnosis: Ventral hernia without obstruction or gangrene [K43.9]    Findings:  Right flank incisional hernia.    Specimen:  Hernia sac    EBL:  25 cc    Complications: None      Procedure in detail:      After appropriate consent was obtained, consent forms signed and questions answered, the operative site was marked and the patient was taken to the operating room.  The patient was positioned and general anesthesia was induced. Pre-operative antibiotic was administered. Time out procedure was then performed with the members of the surgical team. The operative field was then prepped and draped in the usual sterile fashion.      Skin incision was made overlying the defect. This was on the right flank.  Dissection performed down into the hernia sac sac was then excised.  This exposed a defect approximately 3 cm in diameter.  This extended clearly into the abdominal cavity.  There was no evidence of recurrence malignancy.  A medium Ventralex patch was then inserted through the defect and secured to the edges with interrupted 0 Ethibond suture.  Once that was completed soft tissue and fascia was closed over the mesh with interrupted 0 Ethibond suture.  0.25% Marcaine was injected for local anesthetic.  JESUS drain was placed brought out through an anterior stab incision and secured to the skin with 2 O nylon suture. Soft tissues were closed over the drain with running 3 O Vicryl suture. Skin was closed with 4 Monocryl subcuticular stitch.  Steri-Strips and dressings were applied.  Patient was awakened extubated and transferred to the recovery room stable condition. There were no immediate complications.

## 2019-07-05 NOTE — DISCHARGE INSTRUCTIONS
"Discharge Instructions: After Your Surgery/Procedure  Youve just had surgery. During surgery you were given medicine called anesthesia to keep you relaxed and free of pain. After surgery you may have some pain or nausea. This is common. Here are some tips for feeling better and getting well after surgery.     Stay on schedule with your medication.   Going home  Your doctor or nurse will show you how to take care of yourself when you go home. He or she will also answer your questions. Have an adult family member or friend drive you home.      For your safety we recommend these precaution for the first 24 hours after your procedure:  · Do not drive or use heavy equipment.  · Do not make important decisions or sign legal papers.  · Do not drink alcohol.  · Have someone stay with you, if needed. He or she can watch for problems and help keep you safe.  · Your concentration, balance, coordination, and judgement may be impaired for many hours after anesthesia.  Use caution when ambulating or standing up.     · You may feel weak and "washed out" after anesthesia and surgery.      Subtle residual effects of general anesthesia or sedation with regional / local anesthesia can last more than 24 hours.  Rest for the remainder of the day or longer if your Doctor/Surgeon has advised you to do so.  Although you may feel normal within the first 24 hours, your reflexes and mental ability may be impaired without you realizing it.  You may feel dizzy, lightheaded or sleepy for 24 hours or longer.      Be sure to go to all follow-up visits with your doctor. And rest after your surgery for as long as your doctor tells you to.  Coping with pain  If you have pain after surgery, pain medicine will help you feel better. Take it as told, before pain becomes severe. Also, ask your doctor or pharmacist about other ways to control pain. This might be with heat, ice, or relaxation. And follow any other instructions your surgeon or nurse gives " you.  Tips for taking pain medicine  To get the best relief possible, remember these points:  · Pain medicines can upset your stomach. Taking them with a little food may help.  · Most pain relievers taken by mouth need at least 20 to 30 minutes to start to work.  · Taking medicine on a schedule can help you remember to take it. Try to time your medicine so that you can take it before starting an activity. This might be before you get dressed, go for a walk, or sit down for dinner.  · Constipation is a common side effect of pain medicines. Call your doctor before taking any medicines such as laxatives or stool softeners to help ease constipation. Also ask if you should skip any foods. Drinking lots of fluids and eating foods such as fruits and vegetables that are high in fiber can also help. Remember, do not take laxatives unless your surgeon has prescribed them.  · Drinking alcohol and taking pain medicine can cause dizziness and slow your breathing. It can even be deadly. Do not drink alcohol while taking pain medicine.  · Pain medicine can make you react more slowly to things. Do not drive or run machinery while taking pain medicine.  Your health care provider may tell you to take acetaminophen to help ease your pain. Ask him or her how much you are supposed to take each day. Acetaminophen or other pain relievers may interact with your prescription medicines or other over-the-counter (OTC) drugs. Some prescription medicines have acetaminophen and other ingredients. Using both prescription and OTC acetaminophen for pain can cause you to overdose. Read the labels on your OTC medicines with care. This will help you to clearly know the list of ingredients, how much to take, and any warnings. It may also help you not take too much acetaminophen. If you have questions or do not understand the information, ask your pharmacist or health care provider to explain it to you before you take the OTC medicine.  Managing  nausea  Some people have an upset stomach after surgery. This is often because of anesthesia, pain, or pain medicine, or the stress of surgery. These tips will help you handle nausea and eat healthy foods as you get better. If you were on a special food plan before surgery, ask your doctor if you should follow it while you get better. These tips may help:  · Do not push yourself to eat. Your body will tell you when to eat and how much.  · Start off with clear liquids and soup. They are easier to digest.  · Next try semi-solid foods, such as mashed potatoes, applesauce, and gelatin, as you feel ready.  · Slowly move to solid foods. Dont eat fatty, rich, or spicy foods at first.  · Do not force yourself to have 3 large meals a day. Instead eat smaller amounts more often.  · Take pain medicines with a small amount of solid food, such as crackers or toast, to avoid nausea.     Call your surgeon if  · You still have pain an hour after taking medicine. The medicine may not be strong enough.  · You feel too sleepy, dizzy, or groggy. The medicine may be too strong.  · You have side effects like nausea, vomiting, or skin changes, such as rash, itching, or hives.       If you have obstructive sleep apnea  You were given anesthesia medicine during surgery to keep you comfortable and free of pain. After surgery, you may have more apnea spells because of this medicine and other medicines you were given. The spells may last longer than usual.   At home:  · Keep using the continuous positive airway pressure (CPAP) device when you sleep. Unless your health care provider tells you not to, use it when you sleep, day or night. CPAP is a common device used to treat obstructive sleep apnea.  · Talk with your provider before taking any pain medicine, muscle relaxants, or sedatives. Your provider will tell you about the possible dangers of taking these medicines.  © 0646-3577 [employer]. [address] [place] [address]  [place] [address]. All rights reserved. This information is not intended as a substitute for professional medical care. Always follow your healthcare professional's instructions.  Post op instructions for prevention of DVT  What is deep vein thrombosis?  Deep vein thrombosis (DVT) is the medical term for blood clots in the deep veins of the leg.  These blood clots can be dangerous.  A DVT can block a blood vessel and keep blood from getting where it needs to go.  Another problem is that the clot can travel to other parts of the body such as the lungs.  A clot that travels to the lungs is called a pulmonary embolus (PE) and can cause serious problems with breathing which can lead to death.  Am I at risk for DVT/PE?  If you are not very active, you are at risk of DVT.  Anyone confined to bed, sitting for long periods of time, recovering from surgery, etc. increases the risk of DVT.  Other risk factors are cancer diagnosis, certain medications, estrogen replacement in any form,older age, obesity, pregnancy, smoking, history of clotting disorders, and dehydration.  How will I know if I have a DVT?   Swelling in the lower leg   Pain   Warmth, redness, hardness or bulging of the vein  If you have any of these symptoms, call your doctors office right away.  Some people will not have any symptoms until the clot moves to the lungs.  What are the symptoms of a PE?   Panting, shortness of breath, or trouble breathing   Sharp, knife-like chest pain when you breathe   Coughing or coughing up blood   Rapid heartbeat  If you have any of these symptoms or get worse quickly, call 911 for emergency treatment.  How can I prevent a DVT?   Avoid long periods of inactivity and dont cross your legs--get up and walk around every hour or so.   Stay active--walking after surgery is highly encouraged.  This means you should get out of the house and walk in the neighborhood.  Going up and down stairs will not impair healing (unless advised  against such activity by your doctor).     Drink plenty of noncaffeinated, nonalcoholic fluids each day to prevent dehydration.   Wear special support stockings, if they have been advised by your doctor.   If you travel, stop at least once an hour and walk around.   Avoid smoking (assistance with stopping is available through your healthcare provider)  Always notify your doctor if you are not able to follow the post operative instructions that are given to you at the time of discharge.  It may be necessary to prescribe one of the medications available to prevent DVT.

## 2019-07-05 NOTE — DISCHARGE SUMMARY
Discharge Summary  General Surgery      Admit Date: 7/5/2019    Discharge Date :7/5/2019    Attending Physician: Edmundo Whitman     Discharge Physician: Edmundo Whitman    Discharged Condition: good    Discharge Diagnosis: Ventral hernia without obstruction or gangrene [K43.9]    Treatments/Procedures: Procedure(s) (LRB):  REPAIR, HERNIA, VENTRAL, RECURRENT (Right)    Hospital Course: Uneventful; Discharged home from Recovery    Significant Diagnostic Studies: none    Disposition: Home or Self Care    Diet: Regular    Follow up: Office 10-14 days    Activity: No heavy lifting till seen in office.    Patient Instructions:   Current Discharge Medication List      START taking these medications    Details   HYDROcodone-acetaminophen (NORCO) 5-325 mg per tablet Take 1 tablet by mouth every 6 (six) hours as needed for Pain.  Qty: 20 tablet, Refills: 0         CONTINUE these medications which have NOT CHANGED    Details   acetaminophen (TYLENOL) 500 MG tablet Take 2 tablets (1,000 mg total) by mouth every 6 (six) hours as needed.  Refills: 0      azelastine (ASTELIN) 137 mcg (0.1 %) nasal spray       brinzolamide (AZOPT) 1 % ophthalmic suspension 1 drop 3 (three) times daily.      cholecalciferol, vitamin D3, (VITAMIN D3) 1,000 unit capsule Take 1,000 Units by mouth once daily.      docusate sodium (COLACE) 100 MG capsule Take 100 mg by mouth 2 (two) times daily.      dorzolamide-timolol 2-0.5% (COSOPT) 22.3-6.8 mg/mL ophthalmic solution       dorzolamide-timolol, PF, (COSOPT PF) 2-0.5 % Dpet ophthalmic solution 1 drop 2 (two) times daily.      fenofibrate (LOFIBRA) 160 MG Tab Take 160 mg by mouth once daily.      fluticasone (FLONASE) 50 mcg/actuation nasal spray 1 spray by Each Nare route once daily.      furosemide (LASIX) 20 MG tablet Take 20 mg by mouth 2 (two) times daily.      latanoprost 0.005 % ophthalmic solution 1 drop every evening.      levothyroxine (SYNTHROID) 25 MCG tablet Take 25 mcg by mouth once daily.       ondansetron (ZOFRAN) 8 MG tablet Take 8 mg by mouth every 8 (eight) hours.      ondansetron (ZOFRAN-ODT) 8 MG TbDL Take 1 tablet (8 mg total) by mouth every 12 (twelve) hours as needed.  Qty: 30 tablet, Refills: 1    Associated Diagnoses: Congenital small head; Surgical wound breakdown, sequela      PAZEO 0.7 % Drop       !! prochlorperazine (COMPAZINE) 10 MG tablet Take 10 mg by mouth 3 (three) times daily.      !! prochlorperazine (COMPAZINE) 10 MG tablet Take 1 tablet (10 mg total) by mouth every 6 (six) hours as needed.  Qty: 30 tablet, Refills: 1    Associated Diagnoses: Congenital small head; Surgical wound breakdown, sequela      ranitidine (ZANTAC) 150 MG capsule Take 150 mg by mouth 2 (two) times daily.      rosuvastatin (CRESTOR) 10 MG tablet Take 10 mg by mouth once daily.      simvastatin (ZOCOR) 10 MG tablet Take 10 mg by mouth every evening.      sodium bicarbonate 650 MG tablet Take 650 mg by mouth 4 (four) times daily.      calcium carbonate (OS-WALLY) 600 mg calcium (1,500 mg) Tab Take 600 mg by mouth once.      CHOLESTYRAMINE LIGHT 4 gram packet       sterile water SolP 342.86 mL with parenteral amino acid 10% No.2 10 % SolP 60 g, dextrose 70% SolP 179.998 g Inject 50 mL/hr into the vein continuous.  Qty: 1000 mL, Refills: 0       !! - Potential duplicate medications found. Please discuss with provider.          Discharge Procedure Orders   Diet general

## 2019-07-05 NOTE — TELEPHONE ENCOUNTER
----- Message from Rosemary Ramirez sent at 7/5/2019  3:33 PM CDT -----    Pt  Is calling to  Book and  Jenny  For  This  Monday 07/08/2019 // post  Op // [phone]

## 2019-07-08 VITALS
BODY MASS INDEX: 23.22 KG/M2 | SYSTOLIC BLOOD PRESSURE: 187 MMHG | TEMPERATURE: 98 F | WEIGHT: 136 LBS | RESPIRATION RATE: 16 BRPM | OXYGEN SATURATION: 100 % | HEIGHT: 64 IN | DIASTOLIC BLOOD PRESSURE: 80 MMHG | HEART RATE: 70 BPM

## 2019-07-10 ENCOUNTER — OFFICE VISIT (OUTPATIENT)
Dept: SURGERY | Facility: CLINIC | Age: 59
End: 2019-07-10
Payer: MEDICARE

## 2019-07-10 VITALS — BODY MASS INDEX: 23.07 KG/M2 | TEMPERATURE: 98 F | WEIGHT: 135.13 LBS | HEIGHT: 64 IN

## 2019-07-10 DIAGNOSIS — Z98.890 POST-OPERATIVE STATE: Primary | ICD-10-CM

## 2019-07-10 PROCEDURE — 99024 POSTOP FOLLOW-UP VISIT: CPT | Mod: POP,,, | Performed by: SURGERY

## 2019-07-10 PROCEDURE — 99999 PR PBB SHADOW E&M-EST. PATIENT-LVL III: ICD-10-PCS | Mod: PBBFAC,,, | Performed by: SURGERY

## 2019-07-10 PROCEDURE — 99024 PR POST-OP FOLLOW-UP VISIT: ICD-10-PCS | Mod: POP,,, | Performed by: SURGERY

## 2019-07-10 PROCEDURE — 99999 PR PBB SHADOW E&M-EST. PATIENT-LVL III: CPT | Mod: PBBFAC,,, | Performed by: SURGERY

## 2019-07-10 PROCEDURE — 99213 OFFICE O/P EST LOW 20 MIN: CPT | Mod: PBBFAC,PO | Performed by: SURGERY

## 2019-07-11 NOTE — PROGRESS NOTES
POST-OP NOTE    PROCEDURE:  Status post repair of right flank hernia with mesh implantation.    COMPLAINTS: None.    EXAM: Incision well approximated. No drainage. No infection.  JESUS drain is in place with minimal drainage.      IMPRESSION:  Doing well.  Removed JESUS.    PLAN: FU as needed.

## 2019-07-12 ENCOUNTER — TELEPHONE (OUTPATIENT)
Dept: SURGERY | Facility: CLINIC | Age: 59
End: 2019-07-12

## 2019-07-12 NOTE — TELEPHONE ENCOUNTER
Patient had no needs. Did not mean to call office. ----- Message from Kulwant Agosto sent at 7/12/2019  8:58 AM CDT -----  Contact: Dr. Yoo  Type: Needs Medical Advice    Who Called:  Dr. Yoo    Best Call Back Number: [phone]  Additional Information: Pt has metastatic colon cancer. Please call to advise

## 2019-07-24 ENCOUNTER — LAB VISIT (OUTPATIENT)
Dept: LAB | Facility: HOSPITAL | Age: 59
End: 2019-07-24
Attending: INTERNAL MEDICINE
Payer: MEDICARE

## 2019-07-24 ENCOUNTER — TELEPHONE (OUTPATIENT)
Dept: HEMATOLOGY/ONCOLOGY | Facility: CLINIC | Age: 59
End: 2019-07-24

## 2019-07-24 DIAGNOSIS — C18.2 MALIGNANT NEOPLASM OF ASCENDING COLON: ICD-10-CM

## 2019-07-24 DIAGNOSIS — D49.0 COLORECTAL NEOPLASM: ICD-10-CM

## 2019-07-24 LAB
BASOPHILS # BLD AUTO: 0.02 K/UL (ref 0–0.2)
BASOPHILS NFR BLD: 0.2 % (ref 0–1.9)
CEA SERPL-MCNC: 1488.7 NG/ML (ref 0–5)
DIFFERENTIAL METHOD: ABNORMAL
EOSINOPHIL # BLD AUTO: 0.4 K/UL (ref 0–0.5)
EOSINOPHIL NFR BLD: 4.1 % (ref 0–8)
ERYTHROCYTE [DISTWIDTH] IN BLOOD BY AUTOMATED COUNT: 14.9 % (ref 11.5–14.5)
FERRITIN SERPL-MCNC: 20 NG/ML (ref 20–300)
HCT VFR BLD AUTO: 23.5 % (ref 37–48.5)
HGB BLD-MCNC: 6.9 G/DL (ref 12–16)
IMM GRANULOCYTES # BLD AUTO: 0.03 K/UL (ref 0–0.04)
IRON SERPL-MCNC: 25 UG/DL (ref 30–160)
LYMPHOCYTES # BLD AUTO: 2.6 K/UL (ref 1–4.8)
LYMPHOCYTES NFR BLD: 30.3 % (ref 18–48)
MCH RBC QN AUTO: 25.9 PG (ref 27–31)
MCHC RBC AUTO-ENTMCNC: 29.4 G/DL (ref 32–36)
MCV RBC AUTO: 88 FL (ref 82–98)
MONOCYTES # BLD AUTO: 0.6 K/UL (ref 0.3–1)
MONOCYTES NFR BLD: 7.5 % (ref 4–15)
NEUTROPHILS # BLD AUTO: 4.9 K/UL (ref 1.8–7.7)
NEUTROPHILS NFR BLD: 57.5 % (ref 38–73)
NRBC BLD-RTO: 0 /100 WBC
PLATELET # BLD AUTO: 354 K/UL (ref 150–350)
PMV BLD AUTO: 11.2 FL (ref 9.2–12.9)
RBC # BLD AUTO: 2.66 M/UL (ref 4–5.4)
SATURATED IRON: 4 % (ref 20–50)
TOTAL IRON BINDING CAPACITY: 632 UG/DL (ref 250–450)
TRANSFERRIN SERPL-MCNC: 427 MG/DL (ref 200–375)
WBC # BLD AUTO: 8.49 K/UL (ref 3.9–12.7)

## 2019-07-24 PROCEDURE — 82728 ASSAY OF FERRITIN: CPT

## 2019-07-24 PROCEDURE — 82378 CARCINOEMBRYONIC ANTIGEN: CPT

## 2019-07-24 PROCEDURE — 36415 COLL VENOUS BLD VENIPUNCTURE: CPT

## 2019-07-24 PROCEDURE — 85025 COMPLETE CBC W/AUTO DIFF WBC: CPT

## 2019-07-24 PROCEDURE — 83540 ASSAY OF IRON: CPT

## 2019-07-24 NOTE — TELEPHONE ENCOUNTER
Spoke with mother and she doesn't think patient will need any blood. She thinks that it is from her past surgery that her count is low and she will like to wait until appt on Friday to discuss with Dr Prince

## 2019-07-24 NOTE — TELEPHONE ENCOUNTER
----- Message from Grisel Prince MD sent at 7/24/2019 12:32 PM CDT -----  She is anemic wehat dies family want ? transfusin?

## 2019-07-25 LAB — STFR SERPL-MCNC: 8.3 MG/L (ref 1.8–4.6)

## 2019-07-26 ENCOUNTER — OFFICE VISIT (OUTPATIENT)
Dept: HEMATOLOGY/ONCOLOGY | Facility: CLINIC | Age: 59
End: 2019-07-26
Payer: MEDICARE

## 2019-07-26 ENCOUNTER — TELEPHONE (OUTPATIENT)
Dept: HEMATOLOGY/ONCOLOGY | Facility: CLINIC | Age: 59
End: 2019-07-26

## 2019-07-26 ENCOUNTER — LAB VISIT (OUTPATIENT)
Dept: LAB | Facility: HOSPITAL | Age: 59
End: 2019-07-26
Attending: INTERNAL MEDICINE
Payer: MEDICARE

## 2019-07-26 VITALS
BODY MASS INDEX: 22.81 KG/M2 | RESPIRATION RATE: 20 BRPM | DIASTOLIC BLOOD PRESSURE: 63 MMHG | WEIGHT: 133.63 LBS | TEMPERATURE: 99 F | OXYGEN SATURATION: 100 % | SYSTOLIC BLOOD PRESSURE: 136 MMHG | HEART RATE: 68 BPM | HEIGHT: 64 IN

## 2019-07-26 DIAGNOSIS — Q02: ICD-10-CM

## 2019-07-26 DIAGNOSIS — D49.0 COLORECTAL NEOPLASM: ICD-10-CM

## 2019-07-26 DIAGNOSIS — C18.7 MALIGNANT NEOPLASM OF SIGMOID COLON: ICD-10-CM

## 2019-07-26 DIAGNOSIS — C18.9 MALIGNANT NEOPLASM OF COLON, UNSPECIFIED PART OF COLON: ICD-10-CM

## 2019-07-26 DIAGNOSIS — C18.7 MALIGNANT NEOPLASM OF SIGMOID COLON: Primary | ICD-10-CM

## 2019-07-26 DIAGNOSIS — R19.00 ABDOMINAL MASS, UNSPECIFIED ABDOMINAL LOCATION: ICD-10-CM

## 2019-07-26 DIAGNOSIS — C18.9 MALIGNANT NEOPLASM OF COLON, UNSPECIFIED PART OF COLON: Primary | ICD-10-CM

## 2019-07-26 LAB
ABO + RH BLD: NORMAL
ALBUMIN SERPL BCP-MCNC: 3.1 G/DL (ref 3.5–5.2)
ALP SERPL-CCNC: 48 U/L (ref 55–135)
ALT SERPL W/O P-5'-P-CCNC: 9 U/L (ref 10–44)
ANION GAP SERPL CALC-SCNC: 9 MMOL/L (ref 8–16)
AST SERPL-CCNC: 30 U/L (ref 10–40)
BASOPHILS # BLD AUTO: 0.05 K/UL (ref 0–0.2)
BASOPHILS NFR BLD: 0.5 % (ref 0–1.9)
BILIRUB SERPL-MCNC: 0.7 MG/DL (ref 0.1–1)
BLD GP AB SCN CELLS X3 SERPL QL: NORMAL
BUN SERPL-MCNC: 36 MG/DL (ref 6–20)
CALCIUM SERPL-MCNC: 9.8 MG/DL (ref 8.7–10.5)
CHLORIDE SERPL-SCNC: 107 MMOL/L (ref 95–110)
CO2 SERPL-SCNC: 26 MMOL/L (ref 23–29)
CREAT SERPL-MCNC: 1.9 MG/DL (ref 0.5–1.4)
DIFFERENTIAL METHOD: ABNORMAL
EOSINOPHIL # BLD AUTO: 0.3 K/UL (ref 0–0.5)
EOSINOPHIL NFR BLD: 2.8 % (ref 0–8)
ERYTHROCYTE [DISTWIDTH] IN BLOOD BY AUTOMATED COUNT: 15.2 % (ref 11.5–14.5)
EST. GFR  (AFRICAN AMERICAN): 33 ML/MIN/1.73 M^2
EST. GFR  (NON AFRICAN AMERICAN): 29 ML/MIN/1.73 M^2
GLUCOSE SERPL-MCNC: 148 MG/DL (ref 70–110)
HCT VFR BLD AUTO: 25.5 % (ref 37–48.5)
HGB BLD-MCNC: 7.2 G/DL (ref 12–16)
IMM GRANULOCYTES # BLD AUTO: 0.03 K/UL (ref 0–0.04)
LYMPHOCYTES # BLD AUTO: 3 K/UL (ref 1–4.8)
LYMPHOCYTES NFR BLD: 29.7 % (ref 18–48)
MCH RBC QN AUTO: 24.8 PG (ref 27–31)
MCHC RBC AUTO-ENTMCNC: 28.2 G/DL (ref 32–36)
MCV RBC AUTO: 88 FL (ref 82–98)
MONOCYTES # BLD AUTO: 0.9 K/UL (ref 0.3–1)
MONOCYTES NFR BLD: 8.7 % (ref 4–15)
NEUTROPHILS # BLD AUTO: 5.8 K/UL (ref 1.8–7.7)
NEUTROPHILS NFR BLD: 58 % (ref 38–73)
NRBC BLD-RTO: 0 /100 WBC
PLATELET # BLD AUTO: 422 K/UL (ref 150–350)
PMV BLD AUTO: 10.3 FL (ref 9.2–12.9)
POTASSIUM SERPL-SCNC: 4.1 MMOL/L (ref 3.5–5.1)
PROT SERPL-MCNC: 7.3 G/DL (ref 6–8.4)
RBC # BLD AUTO: 2.9 M/UL (ref 4–5.4)
SODIUM SERPL-SCNC: 142 MMOL/L (ref 136–145)
WBC # BLD AUTO: 10.02 K/UL (ref 3.9–12.7)

## 2019-07-26 PROCEDURE — 99215 OFFICE O/P EST HI 40 MIN: CPT | Mod: S$PBB,,, | Performed by: INTERNAL MEDICINE

## 2019-07-26 PROCEDURE — 99999 PR PBB SHADOW E&M-EST. PATIENT-LVL III: ICD-10-PCS | Mod: PBBFAC,,, | Performed by: INTERNAL MEDICINE

## 2019-07-26 PROCEDURE — 99215 PR OFFICE/OUTPT VISIT, EST, LEVL V, 40-54 MIN: ICD-10-PCS | Mod: S$PBB,,, | Performed by: INTERNAL MEDICINE

## 2019-07-26 PROCEDURE — 85025 COMPLETE CBC W/AUTO DIFF WBC: CPT

## 2019-07-26 PROCEDURE — 80053 COMPREHEN METABOLIC PANEL: CPT

## 2019-07-26 PROCEDURE — 86850 RBC ANTIBODY SCREEN: CPT

## 2019-07-26 PROCEDURE — 86920 COMPATIBILITY TEST SPIN: CPT

## 2019-07-26 PROCEDURE — 99999 PR PBB SHADOW E&M-EST. PATIENT-LVL III: CPT | Mod: PBBFAC,,, | Performed by: INTERNAL MEDICINE

## 2019-07-26 PROCEDURE — 36415 COLL VENOUS BLD VENIPUNCTURE: CPT

## 2019-07-26 PROCEDURE — 99213 OFFICE O/P EST LOW 20 MIN: CPT | Mod: PBBFAC,PO,25 | Performed by: INTERNAL MEDICINE

## 2019-07-26 RX ORDER — DIPHENHYDRAMINE HCL 25 MG
25 CAPSULE ORAL
Status: CANCELLED | OUTPATIENT
Start: 2019-07-26

## 2019-07-26 RX ORDER — ACETAMINOPHEN 325 MG/1
650 TABLET ORAL
Status: CANCELLED | OUTPATIENT
Start: 2019-07-26

## 2019-07-26 RX ORDER — HYDROCODONE BITARTRATE AND ACETAMINOPHEN 500; 5 MG/1; MG/1
TABLET ORAL ONCE
Status: CANCELLED | OUTPATIENT
Start: 2019-07-26 | End: 2019-07-26

## 2019-07-26 NOTE — TELEPHONE ENCOUNTER
Spoke to Mrs Gonzalez and informed her that the patient has a transfusion scheduled for Monday 7/29/19 at 9am, GI appt on 7/31/19 at 1:45pm with Dr Pulido.. PET scan rescheduled from 7/29/19 to 8/6/19 at 3pm. Chemo class, chemo and port to be set up by Rosi Lopez. Message sent   independent

## 2019-07-26 NOTE — PLAN OF CARE
START ON PATHWAY REGIMEN - Colorectal    [id number]        Oxaliplatin (Eloxatin(R))       Leucovorin       5-Fluorouracil       5-Fluorouracil       Bevacizumab (Avastin(R))           Additional Orders: **Note: order sheet contains two q14 day cycles**    **Always confirm dose/schedule in your pharmacy ordering system**    Patient Characteristics:  Metastatic Colorectal, First Line, Nonsurgical Candidate, KRAS Mutation   Positive/Unknown, BRAF Wild-Type/Unknown, PS = 0,1; Bevacizumab Eligible  Current evidence of distant metastases<= Yes  AJCC T Category: TX  AJCC N Category: NX  AJCC M Category: M1  AJCC 8 Stage Grouping: IVC  BRAF Mutation Status: Quantity Not Sufficient  KRAS/NRAS Mutation Status: Quantity Not Sufficient  Line of therapy: First Line  Performance Status: PS = 0, 1  Bevacizumab Eligibility: Eligible  Intent of Therapy:  Non-Curative / Palliative Intent, Discussed with Patient

## 2019-07-26 NOTE — PROGRESS NOTES
Subjective:       Patient ID: Sis Teixeira is a 59 y.o. female.    Chief Complaint:  Lost to follow-up since August of 2018    Oncologic History:  Had visceral peritoneal involvement of colon cancer adjuvant chemotherapy was recommended after the patient had adequately recovered postoperatively patient has disabilities and parents decided that they would not go ahead with adjuvant therapy but did not call to let us now now she has a lump on the right flank which has been removed came back positive for metastatic colon cancer     Oncologic History     Oncologic Treatment     Pathology  July 5, 2019  FINAL PATHOLOGIC DIAGNOSIS  Hernia sac, hernia repair:  Hernia sac, positive for focus of metastatic adenocarcinoma, consistent with patient's prior history of colonic  adenocarcinoma.  See comment.  Comment:    June 28, 2018  FINAL PATHOLOGIC DIAGNOSIS    2. Right colon:  Invasive moderately differentiated adenocarcinoma, 6.5 cm in greatest dimension, penetrating muscular wall  extending into pericolonic adipose tissue and to the external peritoneal surface.  Pericolonic abscess formation identified.  Intestinal and mesenteric margins free of tumor.  Random sections of ileum and colon with no significant histopathologic abnormalities.  14 regional lymph nodes negative for metastatic carcinoma.    BRAF wild type, KRS mutated   MSI stable  HPI:     Social History     Socioeconomic History    Marital status: Single     Spouse name: Not on file    Number of children: Not on file    Years of education: Not on file    Highest education level: Not on file   Occupational History    Not on file   Social Needs    Financial resource strain: Not on file    Food insecurity:     Worry: Not on file     Inability: Not on file    Transportation needs:     Medical: Not on file     Non-medical: Not on file   Tobacco Use    Smoking status: Never Smoker    Smokeless tobacco: Never Used   Substance and Sexual Activity     Alcohol use: No    Drug use: No    Sexual activity: Not on file   Lifestyle    Physical activity:     Days per week: Not on file     Minutes per session: Not on file    Stress: Not on file   Relationships    Social connections:     Talks on phone: Not on file     Gets together: Not on file     Attends [employer] service: Not on file     Active member of club or organization: Not on file     Attends meetings of clubs or organizations: Not on file     Relationship status: Not on file   Other Topics Concern    Not on file   Social History Narrative    Not on file     No family history on file.  Past Surgical History:   Procedure Laterality Date    CHOLECYSTECTOMY N/A 6/28/2018    [facility] at [facility]    COLON SURGERY      HEMICOLECTOMY, RIGHT N/A 6/28/2018    [facility] at [facility]    HERNIA REPAIR  07/05/2019    ventral    INCISION AND DRAINAGE, ABSCESS Right 6/21/2018    [facility] at [facility]    INSERTION, JEJUNOSTOMY TUBE N/A 6/28/2018    [facility] at [facility]    REPAIR, HERNIA, VENTRAL, RECURRENT Right 7/5/2019    Performed by Edmundo Whitman MD at [facility] OR     Past Medical History:   Diagnosis Date    Cancer     colon    Congenital small head     Diabetes mellitus     dIET CONTROL    Diabetes mellitus, type 2     Thyroid disease        Current Outpatient Medications:     acetaminophen (TYLENOL) 500 MG tablet, Take 2 tablets (1,000 mg total) by mouth every 6 (six) hours as needed., Disp: , Rfl: 0    azelastine (ASTELIN) 137 mcg (0.1 %) nasal spray, , Disp: , Rfl:     brinzolamide (AZOPT) 1 % ophthalmic suspension, 1 drop 3 (three) times daily., Disp: , Rfl:     calcium carbonate (OS-WALLY) 600 mg calcium (1,500 mg) Tab, Take 600 mg by mouth once., Disp: , Rfl:     cholecalciferol, vitamin D3, (VITAMIN D3) 1,000 unit capsule, Take 1,000 Units by mouth once daily., Disp: , Rfl:     CHOLESTYRAMINE LIGHT 4 gram packet,  , Disp: , Rfl:     docusate sodium (COLACE) 100 MG capsule, Take 100 mg by mouth 2 (two) times daily., Disp: , Rfl:     dorzolamide-timolol 2-0.5% (COSOPT) 22.3-6.8 mg/mL ophthalmic solution, , Disp: , Rfl:     dorzolamide-timolol, PF, (COSOPT PF) 2-0.5 % Dpet ophthalmic solution, 1 drop 2 (two) times daily., Disp: , Rfl:     fenofibrate (LOFIBRA) 160 MG Tab, Take 160 mg by mouth once daily., Disp: , Rfl:     fluticasone (FLONASE) 50 mcg/actuation nasal spray, 1 spray by Each Nare route once daily., Disp: , Rfl:     furosemide (LASIX) 20 MG tablet, Take 20 mg by mouth 2 (two) times daily., Disp: , Rfl:     HYDROcodone-acetaminophen (NORCO) 5-325 mg per tablet, Take 1 tablet by mouth every 6 (six) hours as needed for Pain., Disp: 20 tablet, Rfl: 0    latanoprost 0.005 % ophthalmic solution, 1 drop every evening., Disp: , Rfl:     levothyroxine (SYNTHROID) 25 MCG tablet, Take 25 mcg by mouth once daily., Disp: , Rfl:     ondansetron (ZOFRAN) 8 MG tablet, Take 8 mg by mouth every 8 (eight) hours., Disp: , Rfl:     ondansetron (ZOFRAN-ODT) 8 MG TbDL, Take 1 tablet (8 mg total) by mouth every 12 (twelve) hours as needed., Disp: 30 tablet, Rfl: 1    PAZEO 0.7 % Drop, , Disp: , Rfl:     prochlorperazine (COMPAZINE) 10 MG tablet, Take 10 mg by mouth 3 (three) times daily., Disp: , Rfl:     prochlorperazine (COMPAZINE) 10 MG tablet, Take 1 tablet (10 mg total) by mouth every 6 (six) hours as needed., Disp: 30 tablet, Rfl: 1    ranitidine (ZANTAC) 150 MG capsule, Take 150 mg by mouth 2 (two) times daily., Disp: , Rfl:     rosuvastatin (CRESTOR) 10 MG tablet, Take 10 mg by mouth once daily., Disp: , Rfl:     simvastatin (ZOCOR) 10 MG tablet, Take 10 mg by mouth every evening., Disp: , Rfl:     sodium bicarbonate 650 MG tablet, Take 650 mg by mouth 4 (four) times daily., Disp: , Rfl:     sterile water SolP 342.86 mL with parenteral amino acid 10% No.2 10 % SolP 60 g, dextrose 70% SolP 179.998 g, Inject 50  mL/hr into the vein continuous., Disp: 1000 mL, Rfl: 0  Review of patient's allergies indicates:   Allergen Reactions    Sulfa (sulfonamide antibiotics) Rash    Tetracyclines Rash         REVIEW OF SYSTEMS:     Primary historians are patient's parents.   specially the mother  Mother decided not to proceed with adjuvant chemotherapy about a year ago  but our office was not notified hence patient was lost to follow-up now there is a ventral hernia reported on scan and this is causing pain and discomfort to patient and the hope is that this can be resected tomorrow.  With clearance from Oncology  Patient congenitally has deformities physically and a little bit of mental growth issues however she seems very capable of understanding discussion very dependent on her parents for decision making  Mother reports black tarry stools and patient is lying about a lot  PHYSICAL EXAM:     Vitals:    07/26/19 0944   BP: 136/63   Pulse: 68   Resp: 20   Temp: 98.7 °F (37.1 °C)       GENERAL:  Small head small hand facial deformity.  Awake, alert and oriented to time, person and place.  No anxiety, or agitation.      HEENT: Normal conjunctivae and eyelids. WNL.  PERRLA 3 to 4 mm. No icterus, no pallor, no congestion, and no discharge noted.     NECK:  Supple. Trachea is central.  No crepitus.  No JVD or masses.    RESPIRATORY:  No intercostal retractions.  No dullness to percussion.  Chest is clear to auscultation.  No rales, rhonchi or wheezes.  No crepitus.  Good air entry bilaterally.    CARDIOVASCULAR:  S1 and S2 are normally heard without murmurs or gallops.  All peripheral pulses are present.    ABDOMEN:  Normal abdomen.  No hepatosplenomegaly.  No free fluid.  Bowel sounds are present.  No hernia noted. No masses.  No rebound or tenderness.  No guarding or rigidity.  Umbilicus is midline.  Scar of surgery have well-healed.  Right flank reveals firm mass that is not reduced    LYMPHATICS:  No axillary, cervical,  supraclavicular, submental, or inguinal lymphadenopathy.    SKIN/MUSCULOSKELETAL:  There is no evidence of excoriation marks or ecchmosis.  No rashes.  No cyanosis.  No clubbing.  No joint or skeletal deformities noted.  Normal range of motion.    NEUROLOGIC:  Higher functions are appropriate.  No cranial nerve deficits.  Normal minh.  Normal strength.  Motor and sensory functions are normal.  Deep tendon reflexes are normal.    GENITAL/RECTAL:  Exams are deferred.      Laboratory:     CBC:  Lab Results   Component Value Date    WBC 10.02 07/26/2019    RBC 2.90 (L) 07/26/2019    HGB 7.2 (L) 07/26/2019    HCT 25.5 (L) 07/26/2019    MCV 88 07/26/2019    MCH 24.8 (L) 07/26/2019    MCHC 28.2 (L) 07/26/2019    RDW 15.2 (H) 07/26/2019     (H) 07/26/2019    MPV 10.3 07/26/2019    GRAN 5.8 07/26/2019    GRAN 58.0 07/26/2019    LYMPH 3.0 07/26/2019    LYMPH 29.7 07/26/2019    MONO 0.9 07/26/2019    MONO 8.7 07/26/2019    EOS 0.3 07/26/2019    BASO 0.05 07/26/2019    EOSINOPHIL 2.8 07/26/2019    BASOPHIL 0.5 07/26/2019       BMP: BMP  Lab Results   Component Value Date     06/20/2019    K 4.0 06/20/2019     (H) 06/20/2019    CO2 22 (L) 06/20/2019    BUN 31 (H) 06/20/2019    CREATININE 1.5 (H) 06/20/2019    CALCIUM 9.5 06/20/2019    ANIONGAP 8 06/20/2019    ESTGFRAFRICA 44 (A) 06/20/2019    EGFRNONAA 38 (A) 06/20/2019       LFT:   Lab Results   Component Value Date    ALT 10 06/20/2019    AST 32 06/20/2019    ALKPHOS 46 (L) 06/20/2019    BILITOT 0.5 06/20/2019     CEA elevated to 1400    Assessment/Plan:       1. Malignant neoplasm of colon, unspecified part of colon      now metastatic with hernial sac and abdominal wall showing disease for pathology  PET scan pending  This has been a long and tedious appointment with patient's family.  They seem to be seen in so much denial that is uncanny  Pain did not come in and get blood transfusion and hemoglobin 6.2 because they did not think it was too bad.  Last  time patient left the clinic chemo orders were placed but mother  states she thought everything was fine and did not show up for chemo  This visit patient mother father and a friend are in the room and I have reiterated that the patient has metastatic we can prolong her life and have favorable way by starting chemotherapy mother has been agreeable I have asked him to let me know if they decide not to go through  With it patient will have a port placed  Chemo class  Started date  For the 1st 4 cycles I will avoid Avastin due to recent hernia repair.  2 units PRBC  [facility]     Advance Care Planning     Living Will  During this visit, I engaged the patient's parents in the advance care planning process.  They and I reviewed the role for advance directives and their purpose in directing future healthcare as the patient's unable to speak for herself.  At this point in time, the patient does not have full decision-making capacity.  We discussed different extreme health states that she could experience, and reviewed what kind of medical care she would want in those situations.  The patient's parents communicated that if she were comatose and had little chance of a meaningful recovery, they  Would not  want machines/life-susing treatments used.parents have a living will on her behalf to reflect these preferences.  The parents  Are already designated a healthcare power of  to make decisions on her behalf.

## 2019-07-29 ENCOUNTER — INFUSION (OUTPATIENT)
Dept: INFUSION THERAPY | Facility: HOSPITAL | Age: 59
End: 2019-07-29
Attending: ANESTHESIOLOGY
Payer: MEDICARE

## 2019-07-29 VITALS
TEMPERATURE: 97 F | DIASTOLIC BLOOD PRESSURE: 72 MMHG | OXYGEN SATURATION: 100 % | HEART RATE: 69 BPM | RESPIRATION RATE: 16 BRPM | SYSTOLIC BLOOD PRESSURE: 189 MMHG

## 2019-07-29 DIAGNOSIS — C18.9 MALIGNANT NEOPLASM OF COLON, UNSPECIFIED PART OF COLON: ICD-10-CM

## 2019-07-29 LAB
BLD PROD TYP BPU: NORMAL
BLD PROD TYP BPU: NORMAL
BLOOD UNIT EXPIRATION DATE: NORMAL
BLOOD UNIT EXPIRATION DATE: NORMAL
BLOOD UNIT TYPE CODE: 6200
BLOOD UNIT TYPE CODE: 6200
BLOOD UNIT TYPE: NORMAL
BLOOD UNIT TYPE: NORMAL
CODING SYSTEM: NORMAL
CODING SYSTEM: NORMAL
DISPENSE STATUS: NORMAL
DISPENSE STATUS: NORMAL
NUM UNITS TRANS PACKED RBC: NORMAL
NUM UNITS TRANS PACKED RBC: NORMAL

## 2019-07-29 PROCEDURE — 25000003 PHARM REV CODE 250: Performed by: INTERNAL MEDICINE

## 2019-07-29 PROCEDURE — P9016 RBC LEUKOCYTES REDUCED: HCPCS

## 2019-07-29 PROCEDURE — 36000 PLACE NEEDLE IN VEIN: CPT

## 2019-07-29 PROCEDURE — 36430 TRANSFUSION BLD/BLD COMPNT: CPT

## 2019-07-29 RX ORDER — HYDROCODONE BITARTRATE AND ACETAMINOPHEN 500; 5 MG/1; MG/1
TABLET ORAL ONCE
Status: DISCONTINUED | OUTPATIENT
Start: 2019-07-29 | End: 2019-07-29 | Stop reason: HOSPADM

## 2019-07-29 RX ORDER — DIPHENHYDRAMINE HCL 25 MG
25 CAPSULE ORAL
Status: COMPLETED | OUTPATIENT
Start: 2019-07-29 | End: 2019-07-29

## 2019-07-29 RX ORDER — ACETAMINOPHEN 325 MG/1
650 TABLET ORAL
Status: COMPLETED | OUTPATIENT
Start: 2019-07-29 | End: 2019-07-29

## 2019-07-29 RX ADMIN — DIPHENHYDRAMINE HYDROCHLORIDE 25 MG: 25 CAPSULE ORAL at 10:07

## 2019-07-29 RX ADMIN — ACETAMINOPHEN 650 MG: 325 TABLET, FILM COATED ORAL at 10:07

## 2019-07-29 NOTE — NURSING
Pt to unit. IV placed. 2 units PRBC infused. NAD noted. VSS.   IV removed and pt escorted out by family.

## 2019-08-05 ENCOUNTER — CLINICAL SUPPORT (OUTPATIENT)
Dept: HEMATOLOGY/ONCOLOGY | Facility: CLINIC | Age: 59
End: 2019-08-05
Payer: MEDICARE

## 2019-08-05 NOTE — PROGRESS NOTES
I met with patient's mother, long-time family friend, and neighbor to complete new patient orientation; patient was not present.  Due to patient's cognitive impairments, I did not complete the distress screening verbatim but I did review transportation, financial, eating, etc.  Patient's family/friends will assist with transportation, monitor for fevers and pain, etc.  I provided patient's mother with a $50 gift card to [employer].  I provided patient with a tour of the infusion center and the [facility].  I also provided the ladies with my contact information in the event they need assistance in the future.

## 2019-08-05 NOTE — PROGRESS NOTES
Sis Teixeira  [id number]    [facility]    TITLE: PLAN OF CARE FOR THE CHEMOTHERAPY PATIENT / TEACHING PROTOCOL    PURPOSE: To involve the patient / significant other in the plan of care and to provide teaching to the significant other & patient receiving chemotherapy.    LEVEL: Independent.    CONTENT: The Plan of Care for the chemotherapy patient is individualized and appropriate to the patients needs, strengths, limitations, & goals.  Education includes information regarding chemotherapy side effects, the treatment itself, and self-care  Activities.    GOAL / OUTCOME STANDARDS    PHYSIOLOGIC: The client will remain free or experience minimal side effects or toxicities throughout the chemotherapy treatment period.     PSYCHOLOGIC: The client/significant others will demonstrate positive coping mechanisms in relation to chemotherapy and its side effects.      COGINITIVE: The client/significant others will verbalize understanding of self-care measure to avoid/minimize side effects of the chemotherapy regime.    EVALUATION / COMMENT KEY:    V = Audiovisual/Video  S = Successfully meets outcome  N = Needs further instruction  NA = Not applicable to the patient  P = Previous knowledge  U = Unable to comprehend  * = See progress notes          PLAN OF CARE  INFORMATION TO BE DELIVERED / NURSING INTERVENTIONS DATE EVALUATION   1. Assessment of client/caregiver,         knowledge of cancer diagnosis,         and chemotherapy as a treatment. 1a. Evaluate patient/caregiver learning ability    b. Plan teaching sessions with patient/caregiver according to needs and present anxiety level/ability to learn.    c. Provide Chemotherapy Education Packet,        Mouth Care Protocol,         Specific Patient Education Sheets. 08/05/2019 S   2. Individual chemotherapy treatment         plan. 2a. Review of Chemotherapy Education handout from [email or web address]            08/05/2019   S   3. Knowledge Deficit  & Self-Management of general side effects common to all chemotherapy:  a. Nausea/Vomiting  b.   Diarrhea  c. Mouth Care  d. Dental care  e. Constipation  f. Hair Loss  g. Potential for infection  h. Fatigue   3a. Reinforce that the majority of side effects from chemotherapy are reversible and are  controlled both in the hospital and at home        (blood counts recover, hair grows back).   b.  Refer to the following for reinforcement of         information post-treatment:  1. Mouth Care Protocol.  2. Bowel Protocol for constipation or diarrhea.  3.  Drug Specific Chemotherapy Information Sheets for each medication patient receiving.    08/05/2019     S     PLAN OF CARE  INFORMATION TO BE DELIVERED / NURSING INTERVENTIONS DATE EVALUATION   h. Potential for bleeding         i. Potential anemia/fatigue         j. Potential sunburn         k. Birth control measures  l. Safety measures post treatment 4.  Chemotherapy Home Care Instruction  and Safety Information Sheet.  A. patient/caregivers to thoroughly cook shellfish (shrimp, crab, etc) to decrease the chance of infection.    B.  Use sunscreen and protective clothing while in the sun.   08/05/2019      4. Knowledge deficit & Self Management of Drug Specific  Side Effects.    a. BLADDER EFFECTS        (Hemorrhagic Cystitis)                  Preventable with adequate hydration; occurs 2-3 days or more post treatment.   1.  Instruct patient to:  a.   Void at least every 2 hours; increase intake.  b.   DO NOT hold urine; go when urge is felt.  c.    Empty bladder at bedtime and on         awakening.  d.   Observe for color changes (red to tea           colored), amount and frequency changes.  e.   Notify oncologist of any abnormalities           in urine or voiding or if you cannot               drink adequate fluids.   08/05/2019   S   b.   CHANGES IN URINE        COLOR:      1.   Instruct patient:  a.   Most evident in first 2-3 voidings after            administration.  b. Lasts less than 24 hours.  c. If urine is discolored 2 or more days post- treatment, notify oncologist.      08/05/2019 S   c.    KIDNEY EFFECTS           (Nephrotoxicity)   1.  Instruct patient to:  a.   Drink 8-16 glasses of fluid/day the day   pre-treatment and 3-4 days post-treatment to maintain hydration; the best way to minimize kidney problems.  b.   Notify oncologist immediately if unable to drink fluids or if changes are noted in urinary elimination.     08/05/2019   S   a. PULMONARY TOXICITY    1. Instruct patient to report symptoms such as shortness of breath, chest pain, shallow breathing, or chest wall discomfort to physician.  2. Reinforce preventative measures used by the health care team.  a. Baseline and periodic PFT and chest x-ray.   08/05/2019   S     PLAN OF CARE INFORMATION TO BE DELIVERED / NURSING INTERVENTIONS DATE EVALUATION   b. NERVE & MUSCLE EFFECTS (neurotoxocity; neuropathy, possible visual/hearing changes)        3. Instruct patient to:    a. Report numbness or tingling of the hands/feet, loss of fine motor movement (buttoning shirt, tying shoelaces), or gait changes to your oncologist.  b. If numbness/tingling are present:  1. protect feet with shoes at all times.  2. Use gloves for washing dishes/gardening & potholders in kitchen.       08/05/2019   S   c. CARDIOTOXICITY  Decreased effectiveness of             cardiac function. Effective are                  cumulative and irreversible.                                    CARDIAC ARRYTHMIAS              4   Instruct:  a. Heart function may be tested before treatment and perdiocally during treatment.  b. Notify oncologist of irregular pulse, palpitations, shortness of breath, or swelling in lower extremities/feet.          Taxol and Taxotere can cause arrhythmias on infusion that resolve once infusion discontinued. Instruct nurse if any irregularity felt.    08/05/2019   S   d. EXTRAVASTION  Occurs when vesicants  leak outside of vein and cause damage to the skin and underlying tissues.   1. Reinforce preventive measures used to avoid complications.  a. Fresh IV site or central line monitored continuously with vesicant IVP.  b. Continuous infusion via central line site and blood return monitored periodically around the clock.  2. Instruct to:  a. Notify nurse of any discomfort, burning, stinging, etc. at IV site during chemotherapy administration.  b. Notify oncologist of any redness, pain, or swelling at IV site after discharge from hospital.   08/05/2019   S   e. HYPERSENSITIVITY can happen with any medication.   1. Instruct patient:  a. Nurse is with them during the initial part of treatment and will be close by to monitor.  b. Pre-medication ordered by the oncologist must be taken on time. If doses are missed, treatment will need to be re-scheduled.  c. Skin redness, itching, or hives appearing after discharge should be reported to oncologist. 08/05/2019   S       PLAN OF CARE INFORMATION TO BE DELIVERED / NURSING INTERVENTIONS DATE EVALUATION   f. FLU-LIKE SYNDROME      1. Instruct patient symptoms are hard to prevent and may include fever, shaking chills, muscle and body aches.  a. Taking prescribed medications from physician if needed.  b. Adequate fluids are important.    2. Reinforce the need to call if temperature is         elevated to 100.4 or more  08/05/2019   S   g. HAND-FOOT SYNDROME  causes painful, symmetric swelling and redness of palms and soles                  5. Instruct patient to report any numbness or tingling in the hands or feet.  6. Explain prevention techniques, such as     a. Use heavy moisturizers to lessen skin dryness and itching, but to avoid if skin is cracked or broken  b. Bathe in tepid water, use non-perfumed soap, and wash gently. Baths with oatmeal or diluted baking soda may be soothing.  c. Avoid tight fitting shoes and repetitive actions, such as rubbing hands or applying pressure to  hands/feet.  7. Review measures to take should syndrome occur:  a. Cold compresses and elevation for          edema  b. Pain medications and other measures as ordered by oncologist.   4.   Syndrome resolves few weeks after therapy. 08/05/2019   S   5. DISCHARGE PLANNING /        EDUCATION 1.    Explain importance of compliance with follow- up  tests (CBC, CMP).  2.    Verify patient/caregiver know:  a.    Oncologists office phone number.  b.    Dates of follow-up appointments.  c.    Prescriptions given for nausea  3.   Review side effects to monitor and notify          oncologist about.  4.   Reinforce the need for patient and caregivers to:  a.    Review information given.  b.    Call oncologists office with questions          or symptoms  5.   Provide Cancer [facility] [place] Brochure make referrals if needed for financial or .   08/05/2019   S     PROGRESS NOTES:     I met with the patient's Mother, neighbor and family friend today for chemotherapy education. she will be starting treatment with Folfox and Avastin . We discussed the mechanism of action, potential side effects of this treatment as well as ways she can manage them at home. Some of these side effects include but or not limited to fever, nausea, vomiting, decreased appetite, fatigue, weakness, cytopenias, myalgia/arthralgia, constipation, diarrhea, bleeding, headache, shortness of breath, nail changes, taste change, hair thinning/loss, mood disturbances, or edema. We also discussed dietary modifications she should make although this will be discussed in more detail with the dietician. she was provided with a copy of all of the information we discussed today as well as our contact information. she will be provided a schedule on his first day of treatment. MD office will obtain labs on a weekly basis and the patient will follow-up with the physician for toxicity monitoring throughout treatment. All questions were answered and an informed  consent was obtained. she was reminded to certainly contact us sooner if needed.  Attached to the patients folder and discussed with the patient the 24 hour/ 7 days a week after hours telephone number for the physician.  Patient notified to call anytime 24/7 because their is a physician on call for any problems that may arise.  Patient also notified to report to [facility] if they can not get in touch with a physician after hours.  Discussed the five wishes booklet with the patient and their family.

## 2019-08-06 ENCOUNTER — HOSPITAL ENCOUNTER (OUTPATIENT)
Dept: RADIOLOGY | Facility: HOSPITAL | Age: 59
Discharge: HOME | End: 2019-08-06
Attending: INTERNAL MEDICINE
Payer: MEDICARE

## 2019-08-06 VITALS — WEIGHT: 136 LBS | BODY MASS INDEX: 27.42 KG/M2 | HEIGHT: 59 IN

## 2019-08-06 DIAGNOSIS — C18.7 MALIGNANT NEOPLASM OF SIGMOID COLON: ICD-10-CM

## 2019-08-06 DIAGNOSIS — C18.9 MALIGNANT NEOPLASM OF COLON, UNSPECIFIED PART OF COLON: ICD-10-CM

## 2019-08-06 LAB — GLUCOSE SERPL-MCNC: 103 MG/DL (ref 70–110)

## 2019-08-06 PROCEDURE — A9552 F18 FDG: HCPCS

## 2019-08-06 PROCEDURE — 78815 PET IMAGE W/CT SKULL-THIGH: CPT | Mod: TC

## 2019-08-07 ENCOUNTER — OFFICE VISIT (OUTPATIENT)
Dept: SURGERY | Facility: CLINIC | Age: 59
End: 2019-08-07
Payer: MEDICARE

## 2019-08-07 VITALS
TEMPERATURE: 98 F | SYSTOLIC BLOOD PRESSURE: 165 MMHG | HEIGHT: 59 IN | WEIGHT: 135.56 LBS | HEART RATE: 71 BPM | DIASTOLIC BLOOD PRESSURE: 66 MMHG | BODY MASS INDEX: 27.33 KG/M2

## 2019-08-07 DIAGNOSIS — Z85.9 HISTORY OF CANCER: Primary | ICD-10-CM

## 2019-08-07 DIAGNOSIS — C18.9 COLON CANCER: ICD-10-CM

## 2019-08-07 PROCEDURE — 99024 POSTOP FOLLOW-UP VISIT: CPT | Mod: POP,,, | Performed by: SURGERY

## 2019-08-07 PROCEDURE — 99215 OFFICE O/P EST HI 40 MIN: CPT | Mod: PBBFAC,PO | Performed by: SURGERY

## 2019-08-07 PROCEDURE — 99999 PR PBB SHADOW E&M-EST. PATIENT-LVL V: ICD-10-PCS | Mod: PBBFAC,,, | Performed by: SURGERY

## 2019-08-07 PROCEDURE — 99024 PR POST-OP FOLLOW-UP VISIT: ICD-10-PCS | Mod: POP,,, | Performed by: SURGERY

## 2019-08-07 PROCEDURE — 99999 PR PBB SHADOW E&M-EST. PATIENT-LVL V: CPT | Mod: PBBFAC,,, | Performed by: SURGERY

## 2019-08-07 RX ORDER — LIDOCAINE HYDROCHLORIDE 10 MG/ML
1 INJECTION, SOLUTION EPIDURAL; INFILTRATION; INTRACAUDAL; PERINEURAL ONCE
Status: CANCELLED | OUTPATIENT
Start: 2019-08-15

## 2019-08-07 RX ORDER — SODIUM CHLORIDE 9 MG/ML
INJECTION, SOLUTION INTRAVENOUS CONTINUOUS
Status: CANCELLED | OUTPATIENT
Start: 2019-08-15

## 2019-08-07 RX ORDER — FENOFIBRATE 160 MG/1
1 TABLET ORAL DAILY
COMMUNITY

## 2019-08-07 RX ORDER — PANTOPRAZOLE SODIUM 40 MG/1
1 TABLET, DELAYED RELEASE ORAL DAILY
COMMUNITY
Start: 2019-07-31

## 2019-08-07 NOTE — PATIENT INSTRUCTIONS
..PRE-OP INSTRUCTIONS    Your procedure has been scheduled at:    [facility]pre-admit nurse  [phone]      DAY Thursday     DATE 08/15/2019       Please call the pre-op nurse to schedule your pre-op testing and registration  Someone from the hospital will call you the evening before surgery to let you know what time you need to be at the hospital for surgery.                                               1:  DO NOT EAT OR DRINK ANYTHING AFTER MIDNIGHT THE NIGHT BEFORE SURGERY.     2:  You will need to stop any blood thinners 1 week prior to surgery.  This includes Aspirin, fish oil, Pradaxa, Coumadin, Plavix, Pletal.  Please contact the prescribing doctor to be sure it is ok to stop these medicines.    3:  Pre-admit nurse will go over your medicines and let you know which ones not to take the morning of surgery    4:  Plan to have someone drive you home after you are released from the hospital.  You WILL NOT be able to drive yourself.    5:  If you have any questions or need to change your surgery date, please call Colleen at [phone]    AFTER SURGERY:    You can shower 48 hours after surgery, REMOVE WET BANDAGES AND BANDAIDS, leave the steri- strips on.  If you have not had a bowel movement within 3 days after surgery you may take a laxative of your choice.  Do not lift anything over 5-10 pounds.    You need to have a follow up appointment 7-14 days after surgery, call the office to schedule or if you have questions or concerns.    For [facility] patients, you will receive a [facility] message with a link to schedule your post op appointment.

## 2019-08-09 ENCOUNTER — TELEPHONE (OUTPATIENT)
Dept: HEMATOLOGY/ONCOLOGY | Facility: CLINIC | Age: 59
End: 2019-08-09

## 2019-08-09 DIAGNOSIS — C18.7 MALIGNANT NEOPLASM OF SIGMOID COLON: Primary | ICD-10-CM

## 2019-08-09 NOTE — H&P (VIEW-ONLY)
Subjective:       Patient ID: Sis Teixeira is a 59 y.o. female.    Chief Complaint: Consult (Port placement)      HPI 59-year-old female recently underwent right flank incisional hernia repair.  The hernia sac was excised and pathology revealed malignancy consistent with the patient's previous colon cancer.  She is referred back to me by Oncology for Port-A-Cath placement to receive chemotherapy.  She initially refused chemotherapy at the time of the original surgery for removal for colon cancer by Dr. Torres    Past Medical History:   Diagnosis Date    Cancer     colon    Congenital small head     Diabetes mellitus     dIET CONTROL    Diabetes mellitus, type 2     Thyroid disease      Past Surgical History:   Procedure Laterality Date    CHOLECYSTECTOMY N/A 6/28/2018    Performed [facility]    COLON SURGERY      HEMICOLECTOMY, RIGHT N/A 6/28/2018    [facility]    HERNIA REPAIR  07/05/2019    ventral    INCISION AND DRAINAGE, ABSCESS Right 6/21/2018    Performed [facility]    INSERTION, JEJUNOSTOMY TUBE N/A 6/28/2018    [facility]    REPAIR, HERNIA, VENTRAL, RECURRENT Right 7/5/2019    Performed by Edmundo Whitman [facility] at [facility] OR         Current Outpatient Medications:     brinzolamide (AZOPT) 1 % ophthalmic suspension, 1 drop 3 (three) times daily., Disp: , Rfl:     calcium carbonate (OS-WALLY) 600 mg calcium (1,500 mg) Tab, Take 600 mg by mouth once., Disp: , Rfl:     cholecalciferol, vitamin D3, (VITAMIN D3) 1,000 unit capsule, Take 1,000 Units by mouth once daily., Disp: , Rfl:     CHOLESTYRAMINE LIGHT 4 gram packet, , Disp: , Rfl:     dorzolamide-timolol 2-0.5% (COSOPT) 22.3-6.8 mg/mL ophthalmic solution, , Disp: , Rfl:     dorzolamide-timolol, PF, (COSOPT PF) 2-0.5 % Dpet ophthalmic solution, 1 drop 2 (two) times daily., Disp: , Rfl:     fenofibrate (LOFIBRA) 160 MG Tab, Take 160 mg by  mouth once daily., Disp: , Rfl:     fenofibrate 160 MG Tab, Take 1 tablet by mouth once daily., Disp: , Rfl:     latanoprost 0.005 % ophthalmic solution, 1 drop every evening., Disp: , Rfl:     levothyroxine (SYNTHROID) 25 MCG tablet, Take 25 mcg by mouth once daily., Disp: , Rfl:     pantoprazole (PROTONIX) 40 MG tablet, Take 1 tablet by mouth once daily., Disp: , Rfl:     PAZEO 0.7 % Drop, , Disp: , Rfl:     rosuvastatin (CRESTOR) 10 MG tablet, Take 10 mg by mouth once daily., Disp: , Rfl:     simvastatin (ZOCOR) 10 MG tablet, Take 10 mg by mouth every evening., Disp: , Rfl:     acetaminophen (TYLENOL) 500 MG tablet, Take 2 tablets (1,000 mg total) by mouth every 6 (six) hours as needed., Disp: , Rfl: 0    azelastine (ASTELIN) 137 mcg (0.1 %) nasal spray, , Disp: , Rfl:     docusate sodium (COLACE) 100 MG capsule, Take 100 mg by mouth 2 (two) times daily., Disp: , Rfl:     fluticasone (FLONASE) 50 mcg/actuation nasal spray, 1 spray by Each Nare route once daily., Disp: , Rfl:     furosemide (LASIX) 20 MG tablet, Take 20 mg by mouth 2 (two) times daily., Disp: , Rfl:     HYDROcodone-acetaminophen (NORCO) 5-325 mg per tablet, Take 1 tablet by mouth every 6 (six) hours as needed for Pain., Disp: 20 tablet, Rfl: 0    ondansetron (ZOFRAN) 8 MG tablet, Take 8 mg by mouth every 8 (eight) hours., Disp: , Rfl:     ondansetron (ZOFRAN-ODT) 8 MG TbDL, Take 1 tablet (8 mg total) by mouth every 12 (twelve) hours as needed., Disp: 30 tablet, Rfl: 1    prochlorperazine (COMPAZINE) 10 MG tablet, Take 10 mg by mouth 3 (three) times daily., Disp: , Rfl:     prochlorperazine (COMPAZINE) 10 MG tablet, Take 1 tablet (10 mg total) by mouth every 6 (six) hours as needed., Disp: 30 tablet, Rfl: 1    ranitidine (ZANTAC) 150 MG capsule, Take 150 mg by mouth 2 (two) times daily., Disp: , Rfl:     sodium bicarbonate 650 MG tablet, Take 650 mg by mouth 4 (four) times daily., Disp: , Rfl:     sterile water SolP 342.86 mL  with parenteral amino acid 10% No.2 10 % SolP 60 g, dextrose 70% SolP 179.998 g, Inject 50 mL/hr into the vein continuous., Disp: 1000 mL, Rfl: 0    Review of patient's allergies indicates:   Allergen Reactions    Sulfa (sulfonamide antibiotics) Rash    Tetracyclines Rash       History reviewed. No pertinent family history.  Social History     Socioeconomic History    Marital status: Single     Spouse name: Not on file    Number of children: Not on file    Years of education: Not on file    Highest education level: Not on file   Occupational History    Not on file   Social Needs    Financial resource strain: Not on file    Food insecurity:     Worry: Not on file     Inability: Not on file    Transportation needs:     Medical: Not on file     Non-medical: Not on file   Tobacco Use    Smoking status: Never Smoker    Smokeless tobacco: Never Used   Substance and Sexual Activity    Alcohol use: No    Drug use: No    Sexual activity: Not on file   Lifestyle    Physical activity:     Days per week: Not on file     Minutes per session: Not on file    Stress: Not on file   Relationships    Social connections:     Talks on phone: Not on file     Gets together: Not on file     Attends [employer] service: Not on file     Active member of club or organization: Not on file     Attends meetings of clubs or organizations: Not on file     Relationship status: Not on file   Other Topics Concern    Not on file   Social History Narrative    Not on file       Review of Systems   Unable to perform ROS: Dementia     Objective:     Physical Exam   Constitutional: No distress.   Eyes: Pupils are equal, round, and reactive to light. Conjunctivae and EOM are normal.   Neck: Normal range of motion.   Cardiovascular: Normal rate and regular rhythm.   No murmur heard.  Pulmonary/Chest: Effort normal and breath sounds normal. She has no wheezes. She has no rales.   Abdominal: Soft. Bowel sounds are normal. She exhibits no  distension and no mass. There is no tenderness. No hernia.   Incision healing well without any evidence of infection.   Musculoskeletal: Normal range of motion. She exhibits no edema.   Skin: Skin is warm and dry. No rash noted. No erythema.     Assessment:     Encounter Diagnoses   Name Primary?    History of cancer Yes    Colon cancer        Plan:      1.  Plan Port-A-Cath placement.  2. Risks and benefits of the planned procedure were discussed at length with the patient.  Risks and benefits of not proceeding with the procedure were discussed as well. All questions were answered. The patient expressed clear understanding and would like to proceed with the procedure as discussed.

## 2019-08-09 NOTE — PROGRESS NOTES
Subjective:       Patient ID: Sis Teixeira is a 59 y.o. female.    Chief Complaint: Consult (Port placement)      HPI 59-year-old female recently underwent right flank incisional hernia repair.  The hernia sac was excised and pathology revealed malignancy consistent with the patient's previous colon cancer.  She is referred back to me by Oncology for Port-A-Cath placement to receive chemotherapy.  She initially refused chemotherapy at the time of the original surgery for removal for colon cancer by Dr. Torres    Past Medical History:   Diagnosis Date    Cancer     colon    Congenital small head     Diabetes mellitus     dIET CONTROL    Diabetes mellitus, type 2     Thyroid disease      Past Surgical History:   Procedure Laterality Date    CHOLECYSTECTOMY N/A 6/28/2018    [facility]formed [facility]    COLON SURGERY      HEMICOLECTOMY, RIGHT N/A 6/28/2018    [facility]    HERNIA REPAIR  07/05/2019    ventral    INCISION AND DRAINAGE, ABSCESS Right 6/21/2018    [facility]    INSERTION, JEJUNOSTOMY TUBE N/A 6/28/2018    [facility]    REPAIR, HERNIA, VENTRAL, RECURRENT Right 7/5/2019    Performed by Ed[facility]undo Whitman [facility] at [facility] OR         Current Outpatient Medications:     brinzolamide (AZOPT) 1 % ophthalmic suspension, 1 drop 3 (three) times daily., Disp: , Rfl:     calcium carbonate (OS-AWLLY) 600 mg calcium (1,500 mg) Tab, Take 600 mg by mouth once., Disp: , Rfl:     cholecalciferol, vitamin D3, (VITAMIN D3) 1,000 unit capsule, Take 1,000 Units by mouth once daily., Disp: , Rfl:     CHOLESTYRAMINE LIGHT 4 gram packet, , Disp: , Rfl:     dorzolamide-timolol 2-0.5% (COSOPT) 22.3-6.8 mg/mL ophthalmic solution, , Disp: , Rfl:     dorzolamide-timolol, PF, (COSOPT PF) 2-0.5 % Dpet ophthalmic solution, 1 drop 2 (two) times daily., Disp: , Rfl:     fenofibrate (LOFIBRA) 160 MG Tab, Take 160 mg by  mouth once daily., Disp: , Rfl:     fenofibrate 160 MG Tab, Take 1 tablet by mouth once daily., Disp: , Rfl:     latanoprost 0.005 % ophthalmic solution, 1 drop every evening., Disp: , Rfl:     levothyroxine (SYNTHROID) 25 MCG tablet, Take 25 mcg by mouth once daily., Disp: , Rfl:     pantoprazole (PROTONIX) 40 MG tablet, Take 1 tablet by mouth once daily., Disp: , Rfl:     PAZEO 0.7 % Drop, , Disp: , Rfl:     rosuvastatin (CRESTOR) 10 MG tablet, Take 10 mg by mouth once daily., Disp: , Rfl:     simvastatin (ZOCOR) 10 MG tablet, Take 10 mg by mouth every evening., Disp: , Rfl:     acetaminophen (TYLENOL) 500 MG tablet, Take 2 tablets (1,000 mg total) by mouth every 6 (six) hours as needed., Disp: , Rfl: 0    azelastine (ASTELIN) 137 mcg (0.1 %) nasal spray, , Disp: , Rfl:     docusate sodium (COLACE) 100 MG capsule, Take 100 mg by mouth 2 (two) times daily., Disp: , Rfl:     fluticasone (FLONASE) 50 mcg/actuation nasal spray, 1 spray by Each Nare route once daily., Disp: , Rfl:     furosemide (LASIX) 20 MG tablet, Take 20 mg by mouth 2 (two) times daily., Disp: , Rfl:     HYDROcodone-acetaminophen (NORCO) 5-325 mg per tablet, Take 1 tablet by mouth every 6 (six) hours as needed for Pain., Disp: 20 tablet, Rfl: 0    ondansetron (ZOFRAN) 8 MG tablet, Take 8 mg by mouth every 8 (eight) hours., Disp: , Rfl:     ondansetron (ZOFRAN-ODT) 8 MG TbDL, Take 1 tablet (8 mg total) by mouth every 12 (twelve) hours as needed., Disp: 30 tablet, Rfl: 1    prochlorperazine (COMPAZINE) 10 MG tablet, Take 10 mg by mouth 3 (three) times daily., Disp: , Rfl:     prochlorperazine (COMPAZINE) 10 MG tablet, Take 1 tablet (10 mg total) by mouth every 6 (six) hours as needed., Disp: 30 tablet, Rfl: 1    ranitidine (ZANTAC) 150 MG capsule, Take 150 mg by mouth 2 (two) times daily., Disp: , Rfl:     sodium bicarbonate 650 MG tablet, Take 650 mg by mouth 4 (four) times daily., Disp: , Rfl:     sterile water SolP 342.86 mL  with parenteral amino acid 10% No.2 10 % SolP 60 g, dextrose 70% SolP 179.998 g, Inject 50 mL/hr into the vein continuous., Disp: 1000 mL, Rfl: 0    Review of patient's allergies indicates:   Allergen Reactions    Sulfa (sulfonamide antibiotics) Rash    Tetracyclines Rash       History reviewed. No pertinent family history.  Social History     Socioeconomic History    Marital status: Single     Spouse name: Not on file    Number of children: Not on file    Years of education: Not on file    Highest education level: Not on file   Occupational History    Not on file   Social Needs    Financial resource strain: Not on file    Food insecurity:     Worry: Not on file     Inability: Not on file    Transportation needs:     Medical: Not on file     Non-medical: Not on file   Tobacco Use    Smoking status: Never Smoker    Smokeless tobacco: Never Used   Substance and Sexual Activity    Alcohol use: No    Drug use: No    Sexual activity: Not on file   Lifestyle    Physical activity:     Days per week: Not on file     Minutes per session: Not on file    Stress: Not on file   Relationships    Social connections:     Talks on phone: Not on file     Gets together: Not on file     Attends [employer] service: Not on file     Active member of club or organization: Not on file     Attends meetings of clubs or organizations: Not on file     Relationship status: Not on file   Other Topics Concern    Not on file   Social History Narrative    Not on file       Review of Systems   Unable to perform ROS: Dementia     Objective:     Physical Exam   Constitutional: No distress.   Eyes: Pupils are equal, round, and reactive to light. Conjunctivae and EOM are normal.   Neck: Normal range of motion.   Cardiovascular: Normal rate and regular rhythm.   No murmur heard.  Pulmonary/Chest: Effort normal and breath sounds normal. She has no wheezes. She has no rales.   Abdominal: Soft. Bowel sounds are normal. She exhibits no  distension and no mass. There is no tenderness. No hernia.   Incision healing well without any evidence of infection.   Musculoskeletal: Normal range of motion. She exhibits no edema.   Skin: Skin is warm and dry. No rash noted. No erythema.     Assessment:     Encounter Diagnoses   Name Primary?    History of cancer Yes    Colon cancer        Plan:      1.  Plan Port-A-Cath placement.  2. Risks and benefits of the planned procedure were discussed at length with the patient.  Risks and benefits of not proceeding with the procedure were discussed as well. All questions were answered. The patient expressed clear understanding and would like to proceed with the procedure as discussed.

## 2019-08-09 NOTE — TELEPHONE ENCOUNTER
Spoke to pt father to inform him of all scheduled appointments prior to receiving her 1st cycle of chemo. Pt father confirmed apt date and times and verbalized understanding.

## 2019-08-14 ENCOUNTER — HOSPITAL ENCOUNTER (OUTPATIENT)
Dept: PREADMISSION TESTING | Facility: HOSPITAL | Age: 59
Discharge: HOME | End: 2019-08-14
Payer: MEDICARE

## 2019-08-14 ENCOUNTER — ANESTHESIA EVENT (OUTPATIENT)
Dept: SURGERY | Facility: HOSPITAL | Age: 59
End: 2019-08-14
Payer: MEDICARE

## 2019-08-14 NOTE — PRE-PROCEDURE INSTRUCTIONS
PHONE PRE-OP WAS DONE.  PATIENT MOTHER YVETTE VERBALIZED UNDERSTANDING OF INSTRUCTIONS AND EDUCATION.

## 2019-08-15 ENCOUNTER — HOSPITAL ENCOUNTER (OUTPATIENT)
Facility: HOSPITAL | Age: 59
Discharge: HOME | End: 2019-08-15
Attending: SURGERY | Admitting: SURGERY
Payer: MEDICARE

## 2019-08-15 ENCOUNTER — ANESTHESIA (OUTPATIENT)
Dept: SURGERY | Facility: HOSPITAL | Age: 59
End: 2019-08-15
Payer: MEDICARE

## 2019-08-15 VITALS
SYSTOLIC BLOOD PRESSURE: 181 MMHG | HEIGHT: 59 IN | BODY MASS INDEX: 27.21 KG/M2 | OXYGEN SATURATION: 100 % | DIASTOLIC BLOOD PRESSURE: 73 MMHG | HEART RATE: 72 BPM | TEMPERATURE: 98 F | WEIGHT: 135 LBS | RESPIRATION RATE: 16 BRPM

## 2019-08-15 DIAGNOSIS — Z85.9 HISTORY OF CANCER: ICD-10-CM

## 2019-08-15 DIAGNOSIS — C18.9 COLON CANCER: Primary | ICD-10-CM

## 2019-08-15 LAB
HCT VFR BLD AUTO: 32.9 % (ref 37–48.5)
HGB BLD-MCNC: 9.8 G/DL (ref 12–16)

## 2019-08-15 PROCEDURE — D9220A PRA ANESTHESIA: Mod: ANES,,, | Performed by: ANESTHESIOLOGY

## 2019-08-15 PROCEDURE — 25000003 PHARM REV CODE 250

## 2019-08-15 PROCEDURE — 36000706: Performed by: SURGERY

## 2019-08-15 PROCEDURE — D9220A PRA ANESTHESIA: Mod: CRNA,,, | Performed by: NURSE ANESTHETIST, CERTIFIED REGISTERED

## 2019-08-15 PROCEDURE — 77001 FLUOROGUIDE FOR VEIN DEVICE: CPT | Mod: 26,,, | Performed by: SURGERY

## 2019-08-15 PROCEDURE — 71000016 HC POSTOP RECOV ADDL HR: Performed by: SURGERY

## 2019-08-15 PROCEDURE — 99900104 DSU ONLY-NO CHARGE-EA ADD'L HR (STAT): Performed by: SURGERY

## 2019-08-15 PROCEDURE — 77001 CHG FLUOROGUIDE CNTRL VEN ACCESS,PLACE,REPLACE,REMOVE: ICD-10-PCS | Mod: 26,,, | Performed by: SURGERY

## 2019-08-15 PROCEDURE — 36561 INSERT TUNNELED CV CATH: CPT | Mod: 58,LT,, | Performed by: SURGERY

## 2019-08-15 PROCEDURE — D9220A PRA ANESTHESIA: ICD-10-PCS | Mod: CRNA,,, | Performed by: NURSE ANESTHETIST, CERTIFIED REGISTERED

## 2019-08-15 PROCEDURE — 99900103 DSU ONLY-NO CHARGE-INITIAL HR (STAT): Performed by: SURGERY

## 2019-08-15 PROCEDURE — 37000008 HC ANESTHESIA 1ST 15 MINUTES: Performed by: SURGERY

## 2019-08-15 PROCEDURE — 25000003 PHARM REV CODE 250: Performed by: SURGERY

## 2019-08-15 PROCEDURE — 63600175 PHARM REV CODE 636 W HCPCS: Performed by: ANESTHESIOLOGY

## 2019-08-15 PROCEDURE — 36415 COLL VENOUS BLD VENIPUNCTURE: CPT

## 2019-08-15 PROCEDURE — 36561 PR INSERT TUNNELED CV CATH WITH PORT: ICD-10-PCS | Mod: 58,LT,, | Performed by: SURGERY

## 2019-08-15 PROCEDURE — 85014 HEMATOCRIT: CPT

## 2019-08-15 PROCEDURE — 85018 HEMOGLOBIN: CPT

## 2019-08-15 PROCEDURE — 36000707: Performed by: SURGERY

## 2019-08-15 PROCEDURE — 37000009 HC ANESTHESIA EA ADD 15 MINS: Performed by: SURGERY

## 2019-08-15 PROCEDURE — 25000003 PHARM REV CODE 250: Performed by: ANESTHESIOLOGY

## 2019-08-15 PROCEDURE — 63600175 PHARM REV CODE 636 W HCPCS: Performed by: NURSE ANESTHETIST, CERTIFIED REGISTERED

## 2019-08-15 PROCEDURE — 63600175 PHARM REV CODE 636 W HCPCS: Performed by: SURGERY

## 2019-08-15 PROCEDURE — 71000039 HC RECOVERY, EACH ADD'L HOUR: Performed by: SURGERY

## 2019-08-15 PROCEDURE — D9220A PRA ANESTHESIA: ICD-10-PCS | Mod: ANES,,, | Performed by: ANESTHESIOLOGY

## 2019-08-15 PROCEDURE — 71000033 HC RECOVERY, INTIAL HOUR: Performed by: SURGERY

## 2019-08-15 PROCEDURE — C1788 PORT, INDWELLING, IMP: HCPCS | Performed by: SURGERY

## 2019-08-15 PROCEDURE — 71000015 HC POSTOP RECOV 1ST HR: Performed by: SURGERY

## 2019-08-15 DEVICE — KIT POWERPORT SINGLE 8FR: Type: IMPLANTABLE DEVICE | Site: SUBCLAVIAN | Status: FUNCTIONAL

## 2019-08-15 RX ORDER — CEFAZOLIN SODIUM 2 G/50ML
2 SOLUTION INTRAVENOUS
Status: DISCONTINUED | OUTPATIENT
Start: 2019-08-15 | End: 2019-08-15 | Stop reason: HOSPADM

## 2019-08-15 RX ORDER — BUPIVACAINE HYDROCHLORIDE AND EPINEPHRINE 2.5; 5 MG/ML; UG/ML
INJECTION, SOLUTION EPIDURAL; INFILTRATION; INTRACAUDAL; PERINEURAL
Status: DISCONTINUED | OUTPATIENT
Start: 2019-08-15 | End: 2019-08-15 | Stop reason: HOSPADM

## 2019-08-15 RX ORDER — ONDANSETRON 2 MG/ML
4 INJECTION INTRAMUSCULAR; INTRAVENOUS ONCE
Status: DISCONTINUED | OUTPATIENT
Start: 2019-08-15 | End: 2019-08-15 | Stop reason: HOSPADM

## 2019-08-15 RX ORDER — LIDOCAINE HYDROCHLORIDE 10 MG/ML
1 INJECTION, SOLUTION EPIDURAL; INFILTRATION; INTRACAUDAL; PERINEURAL ONCE
Status: COMPLETED | OUTPATIENT
Start: 2019-08-15 | End: 2019-08-15

## 2019-08-15 RX ORDER — DIPHENHYDRAMINE HYDROCHLORIDE 50 MG/ML
25 INJECTION INTRAMUSCULAR; INTRAVENOUS EVERY 6 HOURS PRN
Status: DISCONTINUED | OUTPATIENT
Start: 2019-08-15 | End: 2019-08-15 | Stop reason: HOSPADM

## 2019-08-15 RX ORDER — MIDAZOLAM HYDROCHLORIDE 1 MG/ML
INJECTION INTRAMUSCULAR; INTRAVENOUS
Status: DISCONTINUED | OUTPATIENT
Start: 2019-08-15 | End: 2019-08-15

## 2019-08-15 RX ORDER — PROPOFOL 10 MG/ML
VIAL (ML) INTRAVENOUS
Status: DISCONTINUED | OUTPATIENT
Start: 2019-08-15 | End: 2019-08-15

## 2019-08-15 RX ORDER — SODIUM CHLORIDE, SODIUM LACTATE, POTASSIUM CHLORIDE, CALCIUM CHLORIDE 600; 310; 30; 20 MG/100ML; MG/100ML; MG/100ML; MG/100ML
INJECTION, SOLUTION INTRAVENOUS CONTINUOUS
Status: DISCONTINUED | OUTPATIENT
Start: 2019-08-15 | End: 2019-08-15 | Stop reason: HOSPADM

## 2019-08-15 RX ORDER — LIDOCAINE HCL/PF 100 MG/5ML
SYRINGE (ML) INTRAVENOUS
Status: DISCONTINUED | OUTPATIENT
Start: 2019-08-15 | End: 2019-08-15

## 2019-08-15 RX ORDER — OXYCODONE HYDROCHLORIDE 5 MG/1
5 TABLET ORAL
Status: DISCONTINUED | OUTPATIENT
Start: 2019-08-15 | End: 2019-08-15 | Stop reason: HOSPADM

## 2019-08-15 RX ORDER — FENTANYL CITRATE 50 UG/ML
25 INJECTION, SOLUTION INTRAMUSCULAR; INTRAVENOUS EVERY 5 MIN PRN
Status: DISCONTINUED | OUTPATIENT
Start: 2019-08-15 | End: 2019-08-15 | Stop reason: HOSPADM

## 2019-08-15 RX ORDER — SODIUM CHLORIDE, SODIUM LACTATE, POTASSIUM CHLORIDE, CALCIUM CHLORIDE 600; 310; 30; 20 MG/100ML; MG/100ML; MG/100ML; MG/100ML
75 INJECTION, SOLUTION INTRAVENOUS CONTINUOUS
Status: DISCONTINUED | OUTPATIENT
Start: 2019-08-15 | End: 2019-08-15 | Stop reason: HOSPADM

## 2019-08-15 RX ORDER — ONDANSETRON HYDROCHLORIDE 2 MG/ML
INJECTION, SOLUTION INTRAMUSCULAR; INTRAVENOUS
Status: DISCONTINUED | OUTPATIENT
Start: 2019-08-15 | End: 2019-08-15

## 2019-08-15 RX ORDER — HYDROMORPHONE HYDROCHLORIDE 2 MG/ML
0.2 INJECTION, SOLUTION INTRAMUSCULAR; INTRAVENOUS; SUBCUTANEOUS EVERY 5 MIN PRN
Status: DISCONTINUED | OUTPATIENT
Start: 2019-08-15 | End: 2019-08-15 | Stop reason: HOSPADM

## 2019-08-15 RX ORDER — HEPARIN SODIUM,PORCINE/PF 10 UNIT/ML
SYRINGE (ML) INTRAVENOUS
Status: DISCONTINUED | OUTPATIENT
Start: 2019-08-15 | End: 2019-08-15 | Stop reason: HOSPADM

## 2019-08-15 RX ORDER — SODIUM CHLORIDE 9 MG/ML
INJECTION, SOLUTION INTRAVENOUS CONTINUOUS
Status: CANCELLED | OUTPATIENT
Start: 2019-08-15

## 2019-08-15 RX ORDER — SODIUM CHLORIDE 0.9 % (FLUSH) 0.9 %
3 SYRINGE (ML) INJECTION
Status: DISCONTINUED | OUTPATIENT
Start: 2019-08-15 | End: 2019-08-15 | Stop reason: HOSPADM

## 2019-08-15 RX ORDER — MEPERIDINE HYDROCHLORIDE 50 MG/ML
12.5 INJECTION INTRAMUSCULAR; INTRAVENOUS; SUBCUTANEOUS ONCE AS NEEDED
Status: DISCONTINUED | OUTPATIENT
Start: 2019-08-15 | End: 2019-08-15 | Stop reason: HOSPADM

## 2019-08-15 RX ORDER — LIDOCAINE HYDROCHLORIDE 10 MG/ML
1 INJECTION, SOLUTION EPIDURAL; INFILTRATION; INTRACAUDAL; PERINEURAL ONCE
Status: DISCONTINUED | OUTPATIENT
Start: 2019-08-15 | End: 2019-08-15 | Stop reason: HOSPADM

## 2019-08-15 RX ADMIN — MIDAZOLAM HYDROCHLORIDE 2 MG: 1 INJECTION, SOLUTION INTRAMUSCULAR; INTRAVENOUS at 12:08

## 2019-08-15 RX ADMIN — SODIUM CHLORIDE, SODIUM LACTATE, POTASSIUM CHLORIDE, AND CALCIUM CHLORIDE: .6; .31; .03; .02 INJECTION, SOLUTION INTRAVENOUS at 11:08

## 2019-08-15 RX ADMIN — LIDOCAINE HYDROCHLORIDE 75 MG: 20 INJECTION, SOLUTION INTRAVENOUS at 12:08

## 2019-08-15 RX ADMIN — OXYCODONE HYDROCHLORIDE 10 MG: 5 TABLET ORAL at 02:08

## 2019-08-15 RX ADMIN — PROPOFOL 150 MG: 10 INJECTION, EMULSION INTRAVENOUS at 12:08

## 2019-08-15 RX ADMIN — LIDOCAINE HYDROCHLORIDE: 10 INJECTION, SOLUTION EPIDURAL; INFILTRATION; INTRACAUDAL; PERINEURAL at 11:08

## 2019-08-15 RX ADMIN — ONDANSETRON 4 MG: 2 INJECTION, SOLUTION INTRAMUSCULAR; INTRAVENOUS at 12:08

## 2019-08-15 NOTE — PLAN OF CARE
Area /between buttocks had blood present when was not there prior to surgery and no sore was present prior to surgery per pt mother, area was cleansed and mepalex dressing applied   Per Pt mother request all care completed as the pt mother requested,  No pain present, pt tolerated  per the pt mother request and picture was taken     Pt walked to the wheel chair with her  Mom

## 2019-08-15 NOTE — PLAN OF CARE
Discharge instructions given to the pt family,  Pt is aware and answered questions approp and understood instructions    All questions were answered and concerns addressed  Pt mother stated that Dr Whitman said she did not need to see him post op    Pt site to left CW dry and intact, denies pain and nausea

## 2019-08-15 NOTE — ANESTHESIA POSTPROCEDURE EVALUATION
Anesthesia Post Evaluation    Patient: Sis Teixeira    Procedure(s) Performed: Procedure(s) (LRB):  INSERTION, VENOUS ACCESS PORT (Left)    Final Anesthesia Type: general  Patient location during evaluation: PACU  Patient participation: Yes- Able to Participate  Level of consciousness: awake and alert and oriented  Post-procedure vital signs: reviewed and stable  Pain management: adequate  Airway patency: patent  PONV status at discharge: No PONV  Anesthetic complications: no      Cardiovascular status: blood pressure returned to baseline and stable  Respiratory status: unassisted and spontaneous ventilation  Hydration status: euvolemic  Follow-up not needed.          Vitals Value Taken Time   /56 8/15/2019  2:16 PM   Temp 36.8 °C (98.3 °F) 8/15/2019  1:30 PM   Pulse 78 8/15/2019  2:19 PM   Resp 17 8/15/2019  2:19 PM   SpO2 100 % 8/15/2019  2:19 PM   Vitals shown include unvalidated device data.      Event Time     Out of Recovery 14:25:00          Pain/Storm Score: Pain Rating Prior to Med Admin: 2 (8/15/2019  2:05 PM)  Storm Score: 10 (8/15/2019  2:00 PM)

## 2019-08-15 NOTE — DISCHARGE INSTRUCTIONS
General Information:    1.  Do not drink alcoholic beverages including beer for 24 hours or as long as you are on pain medication..  2.  Do not drive a motor vehicle, operate machinery or power tools, or signs legal papers for 24 hours or as long as you are on pain medication.   3.  You may experience light-headedness, dizziness, and sleepiness following surgery. Please do not stay alone. A responsible adult should be with you for this 24 hour period.  4.  Go home and rest.    5. Progress slowly to a normal diet unless instructed.  Otherwise, begin with liquids such as soft drinks, then soup and crackers working up to solid foods. Drink plenty of nonalcoholic fluids.  6.  Certain anesthetics and pain medications produce nausea and vomiting in certain       individuals. If nausea becomes a problem at home, call you doctor.    7. A nurse will be calling you sometime after surgery. Do not be alarmed. This is our way of finding out how you are doing.    8. Several times every hour while you are awake, take 2-3 deep breaths and cough. If you had stomach surgery hold a pillow or rolled towel firmly against your stomach before you cough. This will help with any pain the cough might cause.  9. Several times every hour while you are awake, pump and flex your feet 5-6 times and do foot circles. This will help prevent blood clots.    10.Call your doctor for severe pain, bleeding, fever, or signs or symptoms of infection (pain, swelling, redness, foul odor, drainage).    Post op instructions for prevention of DVT  What is deep vein thrombosis?  Deep vein thrombosis (DVT) is the medical term for blood clots in the deep veins of the leg.  These blood clots can be dangerous.  A DVT can block a blood vessel and keep blood from getting where it needs to go.  Another problem is that the clot can travel to other parts of the body such as the lungs.  A clot that travels to the lungs is called a pulmonary embolus (PE) and can cause  serious problems with breathing which can lead to death.  Am I at risk for DVT/PE?  If you are not very active, you are at risk of DVT.  Anyone confined to bed, sitting for long periods of time, recovering from surgery, etc. increases the risk of DVT.  Other risk factors are cancer diagnosis, certain medications, estrogen replacement in any form,older age, obesity, pregnancy, smoking, history of clotting disorders, and dehydration.  How will I know if I have a DVT?   Swelling in the lower leg   Pain   Warmth, redness, hardness or bulging of the vein  If you have any of these symptoms, call your doctors office right away.  Some people will not have any symptoms until the clot moves to the lungs.  What are the symptoms of a PE?   Panting, shortness of breath, or trouble breathing   Sharp, knife-like chest pain when you breathe   Coughing or coughing up blood   Rapid heartbeat  If you have any of these symptoms or get worse quickly, call 911 for emergency treatment.  How can I prevent a DVT?   Avoid long periods of inactivity and dont cross your legs--get up and walk around every hour or so.   Stay active--walking after surgery is highly encouraged.  This means you should get out of the house and walk in the neighborhood.  Going up and down stairs will not impair healing (unless advised against such activity by your doctor).     Drink plenty of noncaffeinated, nonalcoholic fluids each day to prevent dehydration.   Wear special support stockings, if they have been advised by your doctor.   If you travel, stop at least once an hour and walk around.   Avoid smoking (assistance with stopping is available through your healthcare provider)  Always notify your doctor if you are not able to follow the post operative instructions that are given to you at the time of discharge.  It may be necessary to prescribe one of the medications available to prevent DVT.    Discharge Instructions: After Your  "Surgery/Procedure  Youve just had surgery. During surgery you were given medicine called anesthesia to keep you relaxed and free of pain. After surgery you may have some pain or nausea. This is common. Here are some tips for feeling better and getting well after surgery.     Stay on schedule with your medication.   Going home  Your doctor or nurse will show you how to take care of yourself when you go home. He or she will also answer your questions. Have an adult family member or friend drive you home.      For your safety we recommend these precaution for the first 24 hours after your procedure:  · Do not drive or use heavy equipment.  · Do not make important decisions or sign legal papers.  · Do not drink alcohol.  · Have someone stay with you, if needed. He or she can watch for problems and help keep you safe.  · Your concentration, balance, coordination, and judgement may be impaired for many hours after anesthesia.  Use caution when ambulating or standing up.     · You may feel weak and "washed out" after anesthesia and surgery.      Subtle residual effects of general anesthesia or sedation with regional / local anesthesia can last more than 24 hours.  Rest for the remainder of the day or longer if your Doctor/Surgeon has advised you to do so.  Although you may feel normal within the first 24 hours, your reflexes and mental ability may be impaired without you realizing it.  You may feel dizzy, lightheaded or sleepy for 24 hours or longer.      Be sure to go to all follow-up visits with your doctor. And rest after your surgery for as long as your doctor tells you to.  Coping with pain  If you have pain after surgery, pain medicine will help you feel better. Take it as told, before pain becomes severe. Also, ask your doctor or pharmacist about other ways to control pain. This might be with heat, ice, or relaxation. And follow any other instructions your surgeon or nurse gives you.  Tips for taking pain " medicine  To get the best relief possible, remember these points:  · Pain medicines can upset your stomach. Taking them with a little food may help.  · Most pain relievers taken by mouth need at least 20 to 30 minutes to start to work.  · Taking medicine on a schedule can help you remember to take it. Try to time your medicine so that you can take it before starting an activity. This might be before you get dressed, go for a walk, or sit down for dinner.  · Constipation is a common side effect of pain medicines. Call your doctor before taking any medicines such as laxatives or stool softeners to help ease constipation. Also ask if you should skip any foods. Drinking lots of fluids and eating foods such as fruits and vegetables that are high in fiber can also help. Remember, do not take laxatives unless your surgeon has prescribed them.  · Drinking alcohol and taking pain medicine can cause dizziness and slow your breathing. It can even be deadly. Do not drink alcohol while taking pain medicine.  · Pain medicine can make you react more slowly to things. Do not drive or run machinery while taking pain medicine.  Your health care provider may tell you to take acetaminophen to help ease your pain. Ask him or her how much you are supposed to take each day. Acetaminophen or other pain relievers may interact with your prescription medicines or other over-the-counter (OTC) drugs. Some prescription medicines have acetaminophen and other ingredients. Using both prescription and OTC acetaminophen for pain can cause you to overdose. Read the labels on your OTC medicines with care. This will help you to clearly know the list of ingredients, how much to take, and any warnings. It may also help you not take too much acetaminophen. If you have questions or do not understand the information, ask your pharmacist or health care provider to explain it to you before you take the OTC medicine.  Managing nausea  Some people have an upset  stomach after surgery. This is often because of anesthesia, pain, or pain medicine, or the stress of surgery. These tips will help you handle nausea and eat healthy foods as you get better. If you were on a special food plan before surgery, ask your doctor if you should follow it while you get better. These tips may help:  · Do not push yourself to eat. Your body will tell you when to eat and how much.  · Start off with clear liquids and soup. They are easier to digest.  · Next try semi-solid foods, such as mashed potatoes, applesauce, and gelatin, as you feel ready.  · Slowly move to solid foods. Dont eat fatty, rich, or spicy foods at first.  · Do not force yourself to have 3 large meals a day. Instead eat smaller amounts more often.  · Take pain medicines with a small amount of solid food, such as crackers or toast, to avoid nausea.     Call your surgeon if  · You still have pain an hour after taking medicine. The medicine may not be strong enough.  · You feel too sleepy, dizzy, or groggy. The medicine may be too strong.  · You have side effects like nausea, vomiting, or skin changes, such as rash, itching, or hives.       If you have obstructive sleep apnea  You were given anesthesia medicine during surgery to keep you comfortable and free of pain. After surgery, you may have more apnea spells because of this medicine and other medicines you were given. The spells may last longer than usual.   At home:  · Keep using the continuous positive airway pressure (CPAP) device when you sleep. Unless your health care provider tells you not to, use it when you sleep, day or night. CPAP is a common device used to treat obstructive sleep apnea.  · Talk with your provider before taking any pain medicine, muscle relaxants, or sedatives. Your provider will tell you about the possible dangers of taking these medicines.  © 8294-4551 [facility]. [address] [place] [address], [place] [address]. All rights reserved. This  information is not intended as a substitute for professional medical care. Always follow your healthcare professional's instructions.

## 2019-08-15 NOTE — PLAN OF CARE
Report to Lisa . Patient in no distress, ice to dressing to left chest, dry, intact, no drainage.VS stable, family at bedside.

## 2019-08-15 NOTE — ANESTHESIA PREPROCEDURE EVALUATION
08/15/2019  Sis Teixeira is a 59 y.o., female.    Anesthesia Evaluation    I have reviewed the Patient Summary Reports.    I have reviewed the Nursing Notes.   I have reviewed the Medications.     Review of Systems  Anesthesia Hx:  No problems with previous Anesthesia    Social:  Non-Smoker    Hematology/Oncology:        Current/Recent Cancer.   Cardiovascular:  Cardiovascular Normal     Pulmonary:   Pneumonia    Renal/:  Renal/ Normal     Neurological:  Neurology Normal Congenital microcephaly   Endocrine:   Diabetes        Physical Exam  General:  Well nourished    Airway/Jaw/Neck:  Airway Findings: Mouth Opening: Normal Tongue: Normal  General Airway Assessment: Adult  Oropharynx Findings:  Mallampati: II  Jaw/Neck Findings:  Neck ROM: Normal ROM     Eyes/Ears/Nose:  Eyes/Ears/Nose Findings:    Dental:  Dental Findings:   Chest/Lungs:  Chest/Lungs Findings: Normal Respiratory Rate     Heart/Vascular:  Heart Findings: Rate: Normal  Rhythm: Regular Rhythm        Mental Status:  Mental Status Findings:  Cooperative, Alert and Oriented         Anesthesia Plan  Type of Anesthesia, risks & benefits discussed:  Anesthesia Type:  general  Patient's Preference:   Intra-op Monitoring Plan: standard ASA monitors  Intra-op Monitoring Plan Comments:   Post Op Pain Control Plan: multimodal analgesia  Post Op Pain Control Plan Comments:   Induction:   IV  Beta Blocker:  Patient is not currently on a Beta-Blocker (No further documentation required).       Informed Consent: Patient understands risks and agrees with Anesthesia plan.  Questions answered. Anesthesia consent signed with patient.  ASA Score: 4     Day of Surgery Review of History & Physical:  There are no significant changes.   H&P completed by Anesthesiologist.   Anesthesia Plan Notes: Pt with 2 newly diagnosed cancers.        Ready For Surgery From  Anesthesia Perspective.

## 2019-08-15 NOTE — DISCHARGE SUMMARY
Discharge Summary  General Surgery      Admit Date: 8/15/2019    Discharge Date :8/15/2019    Attending Physician: Edmundo Whitman     Discharge Physician: Edmundo Whitman    Discharged Condition: good    Discharge Diagnosis: History of cancer [Z85.9]    Treatments/Procedures: Procedure(s) (LRB):  INSERTION, VENOUS ACCESS PORT (N/A)    Hospital Course: Uneventful; Discharged home from Recovery    Significant Diagnostic Studies: none    Disposition: Home or Self Care    Diet: Regular    Follow up: Office 10-14 days    Activity: No heavy lifting till seen in office.    Patient Instructions:   Current Discharge Medication List      CONTINUE these medications which have NOT CHANGED    Details   acetaminophen (TYLENOL) 500 MG tablet Take 2 tablets (1,000 mg total) by mouth every 6 (six) hours as needed.  Refills: 0      azelastine (ASTELIN) 137 mcg (0.1 %) nasal spray       brinzolamide (AZOPT) 1 % ophthalmic suspension 1 drop 3 (three) times daily.      calcium carbonate (OS-WALLY) 600 mg calcium (1,500 mg) Tab Take 600 mg by mouth once.      cholecalciferol, vitamin D3, (VITAMIN D3) 1,000 unit capsule Take 1,000 Units by mouth once daily.      CHOLESTYRAMINE LIGHT 4 gram packet       docusate sodium (COLACE) 100 MG capsule Take 100 mg by mouth 2 (two) times daily.      dorzolamide-timolol 2-0.5% (COSOPT) 22.3-6.8 mg/mL ophthalmic solution       dorzolamide-timolol, PF, (COSOPT PF) 2-0.5 % Dpet ophthalmic solution 1 drop 2 (two) times daily.      !! fenofibrate (LOFIBRA) 160 MG Tab Take 160 mg by mouth once daily.      fluticasone (FLONASE) 50 mcg/actuation nasal spray 1 spray by Each Nare route once daily.      furosemide (LASIX) 20 MG tablet Take 20 mg by mouth 2 (two) times daily.      HYDROcodone-acetaminophen (NORCO) 5-325 mg per tablet Take 1 tablet by mouth every 6 (six) hours as needed for Pain.  Qty: 20 tablet, Refills: 0      latanoprost 0.005 % ophthalmic solution 1 drop every evening.      levothyroxine  (SYNTHROID) 25 MCG tablet Take 25 mcg by mouth once daily.      ondansetron (ZOFRAN) 8 MG tablet Take 8 mg by mouth every 8 (eight) hours.      pantoprazole (PROTONIX) 40 MG tablet Take 1 tablet by mouth once daily.      PAZEO 0.7 % Drop       !! prochlorperazine (COMPAZINE) 10 MG tablet Take 10 mg by mouth 3 (three) times daily.      ranitidine (ZANTAC) 150 MG capsule Take 150 mg by mouth 2 (two) times daily.      rosuvastatin (CRESTOR) 10 MG tablet Take 10 mg by mouth once daily.      simvastatin (ZOCOR) 10 MG tablet Take 10 mg by mouth every evening.      sodium bicarbonate 650 MG tablet Take 650 mg by mouth 4 (four) times daily.      !! fenofibrate 160 MG Tab Take 1 tablet by mouth once daily.      ondansetron (ZOFRAN-ODT) 8 MG TbDL Take 1 tablet (8 mg total) by mouth every 12 (twelve) hours as needed.  Qty: 30 tablet, Refills: 1    Associated Diagnoses: Congenital small head; Surgical wound breakdown, sequela      !! prochlorperazine (COMPAZINE) 10 MG tablet Take 1 tablet (10 mg total) by mouth every 6 (six) hours as needed.  Qty: 30 tablet, Refills: 1    Associated Diagnoses: Congenital small head; Surgical wound breakdown, sequela      sterile water SolP 342.86 mL with parenteral amino acid 10% No.2 10 % SolP 60 g, dextrose 70% SolP 179.998 g Inject 50 mL/hr into the vein continuous.  Qty: 1000 mL, Refills: 0       !! - Potential duplicate medications found. Please discuss with provider.          Discharge Procedure Orders   X-Ray Chest 1 View   Standing Status: Future Standing Exp. Date: 08/15/20     Order Specific Question Answer Comments   May the Radiologist modify the order per protocol to meet the clinical needs of the patient? Yes      Diet general

## 2019-08-15 NOTE — TRANSFER OF CARE
"Anesthesia Transfer of Care Note    Patient: Sis Teixeira    Procedure(s) Performed: Procedure(s) (LRB):  INSERTION, VENOUS ACCESS PORT (Left)    Patient location: PACU    Anesthesia Type: general    Transport from OR: Transported from OR on 2-3 L/min O2 by NC with adequate spontaneous ventilation    Post pain: adequate analgesia    Post assessment: no apparent anesthetic complications and tolerated procedure well    Post vital signs: stable    Level of consciousness: sedated    Nausea/Vomiting: no nausea/vomiting    Complications: none    Transfer of care protocol was followed      Last vitals:   Visit Vitals  BP (!) 181/73 (BP Location: Left arm, Patient Position: Sitting)   Pulse 72   Temp 36.8 °C (98.3 °F) (Temporal)   Resp 16   Ht 4' 11" (1.499 m)   Wt 61.2 kg (135 lb)   LMP  (LMP Unknown)   SpO2 100%   Breastfeeding? No   BMI 27.27 kg/m²     "

## 2019-08-16 ENCOUNTER — OFFICE VISIT (OUTPATIENT)
Dept: HEMATOLOGY/ONCOLOGY | Facility: CLINIC | Age: 59
End: 2019-08-16
Payer: MEDICARE

## 2019-08-16 VITALS
BODY MASS INDEX: 28.13 KG/M2 | HEIGHT: 59 IN | WEIGHT: 139.56 LBS | DIASTOLIC BLOOD PRESSURE: 61 MMHG | TEMPERATURE: 99 F | OXYGEN SATURATION: 100 % | SYSTOLIC BLOOD PRESSURE: 136 MMHG | HEART RATE: 72 BPM | RESPIRATION RATE: 16 BRPM

## 2019-08-16 DIAGNOSIS — D49.0 COLORECTAL NEOPLASM: ICD-10-CM

## 2019-08-16 DIAGNOSIS — D50.0 IRON DEFICIENCY ANEMIA DUE TO CHRONIC BLOOD LOSS: ICD-10-CM

## 2019-08-16 DIAGNOSIS — C18.7 MALIGNANT NEOPLASM OF SIGMOID COLON: Primary | ICD-10-CM

## 2019-08-16 DIAGNOSIS — C17.8: ICD-10-CM

## 2019-08-16 PROBLEM — D50.9 IDA (IRON DEFICIENCY ANEMIA): Status: ACTIVE | Noted: 2019-08-16

## 2019-08-16 PROCEDURE — 99999 PR PBB SHADOW E&M-EST. PATIENT-LVL V: ICD-10-PCS | Mod: PBBFAC,,, | Performed by: INTERNAL MEDICINE

## 2019-08-16 PROCEDURE — 99215 OFFICE O/P EST HI 40 MIN: CPT | Mod: S$PBB,,, | Performed by: INTERNAL MEDICINE

## 2019-08-16 PROCEDURE — 99215 PR OFFICE/OUTPT VISIT, EST, LEVL V, 40-54 MIN: ICD-10-PCS | Mod: S$PBB,,, | Performed by: INTERNAL MEDICINE

## 2019-08-16 PROCEDURE — 99215 OFFICE O/P EST HI 40 MIN: CPT | Mod: PBBFAC,PO | Performed by: INTERNAL MEDICINE

## 2019-08-16 PROCEDURE — 99999 PR PBB SHADOW E&M-EST. PATIENT-LVL V: CPT | Mod: PBBFAC,,, | Performed by: INTERNAL MEDICINE

## 2019-08-16 RX ORDER — FLUOROURACIL 50 MG/ML
400 INJECTION, SOLUTION INTRAVENOUS
Status: CANCELLED | OUTPATIENT
Start: 2019-08-16

## 2019-08-16 RX ORDER — SODIUM CHLORIDE 0.9 % (FLUSH) 0.9 %
10 SYRINGE (ML) INJECTION
Status: CANCELLED | OUTPATIENT
Start: 2019-08-16

## 2019-08-16 RX ORDER — SODIUM CHLORIDE 0.9 % (FLUSH) 0.9 %
10 SYRINGE (ML) INJECTION
Status: CANCELLED | OUTPATIENT
Start: 2019-08-20

## 2019-08-16 RX ORDER — PROCHLORPERAZINE MALEATE 10 MG
10 TABLET ORAL EVERY 6 HOURS PRN
Qty: 30 TABLET | Refills: 1 | Status: SHIPPED | OUTPATIENT
Start: 2019-08-16 | End: 2020-08-15

## 2019-08-16 RX ORDER — DIPHENHYDRAMINE HYDROCHLORIDE 50 MG/ML
50 INJECTION INTRAMUSCULAR; INTRAVENOUS ONCE AS NEEDED
Status: CANCELLED | OUTPATIENT
Start: 2019-08-16

## 2019-08-16 RX ORDER — HEPARIN 100 UNIT/ML
500 SYRINGE INTRAVENOUS
Status: CANCELLED | OUTPATIENT
Start: 2019-08-20

## 2019-08-16 RX ORDER — ONDANSETRON HYDROCHLORIDE 8 MG/1
8 TABLET, FILM COATED ORAL EVERY 12 HOURS PRN
Qty: 30 TABLET | Refills: 2 | Status: SHIPPED | OUTPATIENT
Start: 2019-08-16 | End: 2020-08-15

## 2019-08-16 RX ORDER — HEPARIN 100 UNIT/ML
500 SYRINGE INTRAVENOUS
Status: CANCELLED | OUTPATIENT
Start: 2019-08-16

## 2019-08-16 RX ORDER — EPINEPHRINE 0.3 MG/.3ML
0.3 INJECTION SUBCUTANEOUS ONCE AS NEEDED
Status: CANCELLED | OUTPATIENT
Start: 2019-08-16

## 2019-08-16 NOTE — PROGRESS NOTES
Pt states intermittent throbbing pain to (R) temporal area/ eye since Tuesday night,  Some photosensitivity- refusing lights to be dim as he is reading at this time.  Denies blurred vision.    Subjective:       Patient ID: Sis Teixeira is a 59 y.o. female.    Chief Complaint:  Metastatic colon cancer here for chemotherapy cycle 1 day 1 clearance  Oncologic History:  Had visceral peritoneal involvement of colon cancer adjuvant chemotherapy was recommended after the patient had adequately recovered postoperatively patient has disabilities and parents decided that they would not go ahead with adjuvant therapy but did not call to let us know. she developed a lump on the right flank which has been removed came back positive for metastatic colon cancer     Oncologic History     Oncologic Treatment     Pathology  July 5, 2019  FINAL PATHOLOGIC DIAGNOSIS  Hernia sac, hernia repair:  Hernia sac, positive for focus of metastatic adenocarcinoma, consistent with patient's prior history of colonic  adenocarcinoma.  See comment.  Comment:    June 28, 2018  FINAL PATHOLOGIC DIAGNOSIS    2. Right colon:  Invasive moderately differentiated adenocarcinoma, 6.5 cm in greatest dimension, penetrating muscular wall  extending into pericolonic adipose tissue and to the external peritoneal surface.  Pericolonic abscess formation identified.  Intestinal and mesenteric margins free of tumor.  Random sections of ileum and colon with no significant histopathologic abnormalities.  14 regional lymph nodes negative for metastatic carcinoma.    BRAF wild type, KRS mutated   MSI stable  HPI:     Social History     Socioeconomic History    Marital status: Single     Spouse name: Not on file    Number of children: Not on file    Years of education: Not on file    Highest education level: Not on file   Occupational History    Not on file   Social Needs    Financial resource strain: Not on file    Food insecurity:     Worry: Not on file     Inability: Not on file    Transportation needs:     Medical: Not on file     Non-medical: Not on file   Tobacco Use    Smoking status: Never Smoker    Smokeless tobacco: Never Used    Substance and Sexual Activity    Alcohol use: No    Drug use: No    Sexual activity: Never   Lifestyle    Physical activity:     Days per week: Not on file     Minutes per session: Not on file    Stress: Not on file   Relationships    Social connections:     Talks on phone: Not on file     Gets together: Not on file     Attends [employer] service: Not on file     Active member of club or organization: Not on file     Attends meetings of clubs or organizations: Not on file     Relationship status: Not on file   Other Topics Concern    Not on file   Social History Narrative    Not on file     No family history on file.  Past Surgical History:   Procedure Laterality Date    CHOLECYSTECTOMY N/A 6/28/2018    [facility]formed [facility]    COLON SURGERY      HEMICOLECTOMY, RIGHT N/A 6/28/2018    [facility]    HERNIA REPAIR  07/05/2019    ventral    INCISION AND DRAINAGE, ABSCESS Right 6/21/2018    Performed [facility] OR    INSERTION, JEJUNOSTOMY TUBE N/A 6/28/2018    [facility]    REPAIR, HERNIA, VENTRAL, RECURRENT Right 7/5/2019    Per[facility] Ed[facility]undo [facility]man [facility] at [facility] OR     Past Medical History:   Diagnosis Date    Cancer     colon    Congenital small head     Thyroid disease        Current Outpatient Medications:     acetaminophen (TYLENOL) 500 MG tablet, Take 2 tablets (1,000 mg total) by mouth every 6 (six) hours as needed., Disp: , Rfl: 0    azelastine (ASTELIN) 137 mcg (0.1 %) nasal spray, , Disp: , Rfl:     brinzolamide (AZOPT) 1 % ophthalmic suspension, 1 drop 3 (three) times daily., Disp: , Rfl:     calcium carbonate (OS-WALLY) 600 mg calcium (1,500 mg) Tab, Take 600 mg by mouth once., Disp: , Rfl:     cholecalciferol, vitamin D3, (VITAMIN D3) 1,000 unit capsule, Take 1,000 Units by mouth once daily., Disp: , Rfl:     CHOLESTYRAMINE LIGHT 4 gram packet, , Disp: , Rfl:     docusate sodium  (COLACE) 100 MG capsule, Take 100 mg by mouth 2 (two) times daily., Disp: , Rfl:     dorzolamide-timolol 2-0.5% (COSOPT) 22.3-6.8 mg/mL ophthalmic solution, , Disp: , Rfl:     dorzolamide-timolol, PF, (COSOPT PF) 2-0.5 % Dpet ophthalmic solution, 1 drop 2 (two) times daily., Disp: , Rfl:     fenofibrate (LOFIBRA) 160 MG Tab, Take 160 mg by mouth once daily., Disp: , Rfl:     fenofibrate 160 MG Tab, Take 1 tablet by mouth once daily., Disp: , Rfl:     fluticasone (FLONASE) 50 mcg/actuation nasal spray, 1 spray by Each Nare route once daily., Disp: , Rfl:     furosemide (LASIX) 20 MG tablet, Take 20 mg by mouth 2 (two) times daily., Disp: , Rfl:     HYDROcodone-acetaminophen (NORCO) 5-325 mg per tablet, Take 1 tablet by mouth every 6 (six) hours as needed for Pain., Disp: 20 tablet, Rfl: 0    latanoprost 0.005 % ophthalmic solution, 1 drop every evening., Disp: , Rfl:     levothyroxine (SYNTHROID) 25 MCG tablet, Take 25 mcg by mouth once daily., Disp: , Rfl:     ondansetron (ZOFRAN) 8 MG tablet, Take 8 mg by mouth every 8 (eight) hours., Disp: , Rfl:     ondansetron (ZOFRAN-ODT) 8 MG TbDL, Take 1 tablet (8 mg total) by mouth every 12 (twelve) hours as needed., Disp: 30 tablet, Rfl: 1    pantoprazole (PROTONIX) 40 MG tablet, Take 1 tablet by mouth once daily., Disp: , Rfl:     PAZEO 0.7 % Drop, , Disp: , Rfl:     prochlorperazine (COMPAZINE) 10 MG tablet, Take 10 mg by mouth 3 (three) times daily., Disp: , Rfl:     prochlorperazine (COMPAZINE) 10 MG tablet, Take 1 tablet (10 mg total) by mouth every 6 (six) hours as needed., Disp: 30 tablet, Rfl: 1    ranitidine (ZANTAC) 150 MG capsule, Take 150 mg by mouth 2 (two) times daily., Disp: , Rfl:     rosuvastatin (CRESTOR) 10 MG tablet, Take 10 mg by mouth once daily., Disp: , Rfl:     simvastatin (ZOCOR) 10 MG tablet, Take 10 mg by mouth every evening., Disp: , Rfl:     sodium bicarbonate 650 MG tablet, Take 650 mg by mouth 4 (four) times daily., Disp: ,  Rfl:     sterile water SolP 342.86 mL with parenteral amino acid 10% No.2 10 % SolP 60 g, dextrose 70% SolP 179.998 g, Inject 50 mL/hr into the vein continuous., Disp: 1000 mL, Rfl: 0  No current facility-administered medications for this visit.   Review of patient's allergies indicates:   Allergen Reactions    Sulfa (sulfonamide antibiotics) Rash    Tetracyclines Rash         REVIEW OF SYSTEMS:     Primary historians are patient's parents.   specially the mother  Mother decided not to proceed with adjuvant chemotherapy about a year ago  but our office was not notified hence patient was lost to follow-up now there is a ventral hernia reported on scan and this is causing pain and discomfort to patient and the hope is that this can be resected tomorrow.  With clearance from Oncology  Patient congenitally has deformities physically and a little bit of mental growth issues however she seems very capable of understanding discussion very dependent on her parents for decision making  Mother reports black tarry stools and patient is lying about a lot  PHYSICAL EXAM:     Wt Readings from Last 3 Encounters:   08/16/19 63.3 kg (139 lb 8.8 oz)   08/15/19 61.2 kg (135 lb)   08/07/19 61.5 kg (135 lb 9.3 oz)     Temp Readings from Last 3 Encounters:   08/16/19 98.6 °F (37 °C)   08/15/19 98.3 °F (36.8 °C) (Temporal)   08/07/19 98.4 °F (36.9 °C) (Oral)     BP Readings from Last 3 Encounters:   08/16/19 136/61   08/15/19 (!) 181/73   08/07/19 (!) 165/66     Pulse Readings from Last 3 Encounters:   08/16/19 72   08/15/19 72   08/07/19 71     GENERAL:  Small head small hand facial deformity.  Awake, alert and oriented to time, person and place.  No anxiety, or agitation.      HEENT: Normal conjunctivae and eyelids. WNL.  PERRLA 3 to 4 mm. No icterus, no pallor, no congestion, and no discharge noted.     NECK:  Supple. Trachea is central.  No crepitus.  No JVD or masses.    RESPIRATORY:  No intercostal retractions.  No dullness to  percussion.  Chest is clear to auscultation.  No rales, rhonchi or wheezes.  No crepitus.  Good air entry bilaterally.    CARDIOVASCULAR:  S1 and S2 are normally heard without murmurs or gallops.  All peripheral pulses are present.    ABDOMEN:  Normal abdomen.  No hepatosplenomegaly.  No free fluid.  Bowel sounds are present.  No hernia noted. No masses.  No rebound or tenderness.  No guarding or rigidity.  Umbilicus is midline.  Scar of surgery have well-healed.  Right flank reveals firm mass that is not reduced    LYMPHATICS:  No axillary, cervical, supraclavicular, submental, or inguinal lymphadenopathy.    SKIN/MUSCULOSKELETAL:  There is no evidence of excoriation marks or ecchmosis.  No rashes.  No cyanosis.  No clubbing.  No joint or skeletal deformities noted.  Normal range of motion.    NEUROLOGIC:  Higher functions are appropriate.  No cranial nerve deficits.  Normal minh.  Normal strength.  Motor and sensory functions are normal.  Deep tendon reflexes are normal.    GENITAL/RECTAL:  Exams are deferred.      Laboratory:     CBC:  Lab Results   Component Value Date    WBC 10.02 07/26/2019    RBC 2.90 (L) 07/26/2019    HGB 9.8 (L) 08/15/2019    HCT 32.9 (L) 08/15/2019    MCV 88 07/26/2019    MCH 24.8 (L) 07/26/2019    MCHC 28.2 (L) 07/26/2019    RDW 15.2 (H) 07/26/2019     (H) 07/26/2019    MPV 10.3 07/26/2019    GRAN 5.8 07/26/2019    GRAN 58.0 07/26/2019    LYMPH 3.0 07/26/2019    LYMPH 29.7 07/26/2019    MONO 0.9 07/26/2019    MONO 8.7 07/26/2019    EOS 0.3 07/26/2019    BASO 0.05 07/26/2019    EOSINOPHIL 2.8 07/26/2019    BASOPHIL 0.5 07/26/2019       BMP: BMP  Lab Results   Component Value Date     07/26/2019    K 4.1 07/26/2019     07/26/2019    CO2 26 07/26/2019    BUN 36 (H) 07/26/2019    CREATININE 1.9 (H) 07/26/2019    CALCIUM 9.8 07/26/2019    ANIONGAP 9 07/26/2019    ESTGFRAFRICA 33 (A) 07/26/2019    EGFRNONAA 29 (A) 07/26/2019       LFT:   Lab Results   Component Value Date     ALT 9 (L) 07/26/2019    AST 30 07/26/2019    ALKPHOS 48 (L) 07/26/2019    BILITOT 0.7 07/26/2019     CEA elevated to 1400  Impression PET scan August 2019       1. New FDG avid 11 mm right lung base nodule consistent with lung metastasis.  2. New FDG uptake along the left hepatic lobe without definitive CT correlate suspicious for new hepatic metastasis or metastasis along the liver capsule.  3. Multifocal intra-abdominal and pelvic soft tissue masses which are FDG avid and consistent with new metastatic disease.  4. Superficial subcutaneous nodularity with FDG uptake superior to umbilicus along midline laparotomy incision.  Postsurgical scarring with inflammatory FDG uptake or metastatic disease could give this appearance.  5. FDG uptake diffusely involving left abdominal ostomy site without definitive abnormal CT correlate.  This may simply be inflammatory in nature.  6. Multifocal FDG uptake among loops of bowel and abdomen as discussed.  Although this could represent physiologic intestinal activity, nonvisualized metastasis are also a consideration.   cea 14 40    Assessment/Plan:     metastatic colorectal carcinoma with mets to pelvic abdominal area abdominal wall /liver  Transfused 1 unit PRBC for severe anemia  Showing iron deficiency anemia replace injected for 2 doses  Proceed with FOLFOX Avastin urine protein evaluation done  Antiemetics sent to pharmacy  Return to clinic in 2 weeks CBC CMP and CEA  Port has been placed  For the 1st 4 cycles I will avoid Avastin due to recent hernia repair.    Mother reports black stools has been seen by GI   Advance Care Planning     Living Will  During her past visit, I engaged the patient's parents in the advance care planning process.  They and I reviewed the role for advance directives and their purpose in directing future healthcare as the patient's unable to speak for herself.  At this point in time, the patient does not have full decision-making capacity.  We  discussed different extreme health states that she could experience, and reviewed what kind of medical care she would want in those situations.  The patient's parents communicated that if she were comatose and had little chance of a meaningful recovery, they  Would not  want machines/life-susing treatments used.parents have a living will on her behalf to reflect these preferences.  The parents  Are already designated a healthcare power of  to make decisions on her behalf.

## 2019-08-19 ENCOUNTER — INFUSION (OUTPATIENT)
Dept: INFUSION THERAPY | Facility: HOSPITAL | Age: 59
End: 2019-08-19
Attending: INTERNAL MEDICINE
Payer: MEDICARE

## 2019-08-19 ENCOUNTER — DOCUMENTATION ONLY (OUTPATIENT)
Dept: HEMATOLOGY/ONCOLOGY | Facility: CLINIC | Age: 59
End: 2019-08-19

## 2019-08-19 VITALS
BODY MASS INDEX: 27.71 KG/M2 | DIASTOLIC BLOOD PRESSURE: 66 MMHG | TEMPERATURE: 99 F | HEIGHT: 59 IN | RESPIRATION RATE: 18 BRPM | WEIGHT: 137.44 LBS | HEART RATE: 70 BPM | SYSTOLIC BLOOD PRESSURE: 132 MMHG

## 2019-08-19 DIAGNOSIS — D49.0 COLORECTAL NEOPLASM: Primary | ICD-10-CM

## 2019-08-19 PROCEDURE — 63600175 PHARM REV CODE 636 W HCPCS: Mod: PO | Performed by: INTERNAL MEDICINE

## 2019-08-19 PROCEDURE — 96416 CHEMO PROLONG INFUSE W/PUMP: CPT

## 2019-08-19 PROCEDURE — 96366 THER/PROPH/DIAG IV INF ADDON: CPT | Mod: PO

## 2019-08-19 PROCEDURE — 96367 TX/PROPH/DG ADDL SEQ IV INF: CPT

## 2019-08-19 PROCEDURE — 96413 CHEMO IV INFUSION 1 HR: CPT | Mod: PO

## 2019-08-19 PROCEDURE — 96368 THER/DIAG CONCURRENT INF: CPT | Mod: PO

## 2019-08-19 PROCEDURE — 96411 CHEMO IV PUSH ADDL DRUG: CPT | Mod: PO

## 2019-08-19 RX ORDER — HEPARIN 100 UNIT/ML
500 SYRINGE INTRAVENOUS
Status: DISCONTINUED | OUTPATIENT
Start: 2019-08-19 | End: 2019-08-19 | Stop reason: HOSPADM

## 2019-08-19 RX ORDER — SODIUM CHLORIDE 0.9 % (FLUSH) 0.9 %
10 SYRINGE (ML) INJECTION
Status: DISCONTINUED | OUTPATIENT
Start: 2019-08-19 | End: 2019-08-19 | Stop reason: HOSPADM

## 2019-08-19 RX ORDER — FLUOROURACIL 50 MG/ML
400 INJECTION, SOLUTION INTRAVENOUS
Status: COMPLETED | OUTPATIENT
Start: 2019-08-19 | End: 2019-08-19

## 2019-08-19 RX ORDER — EPINEPHRINE 0.3 MG/.3ML
0.3 INJECTION SUBCUTANEOUS ONCE AS NEEDED
Status: DISCONTINUED | OUTPATIENT
Start: 2019-08-19 | End: 2019-08-19 | Stop reason: HOSPADM

## 2019-08-19 RX ORDER — DIPHENHYDRAMINE HYDROCHLORIDE 50 MG/ML
50 INJECTION INTRAMUSCULAR; INTRAVENOUS ONCE AS NEEDED
Status: DISCONTINUED | OUTPATIENT
Start: 2019-08-19 | End: 2019-08-19 | Stop reason: HOSPADM

## 2019-08-19 RX ADMIN — PALONOSETRON HYDROCHLORIDE: 0.05 INJECTION INTRAVENOUS at 10:08

## 2019-08-19 RX ADMIN — FLUOROURACIL 660 MG: 50 INJECTION, SOLUTION INTRAVENOUS at 01:08

## 2019-08-19 RX ADMIN — DEXTROSE 660 MG: 5 SOLUTION INTRAVENOUS at 10:08

## 2019-08-19 RX ADMIN — OXALIPLATIN 140 MG: 5 INJECTION, SOLUTION INTRAVENOUS at 10:08

## 2019-08-19 RX ADMIN — FLUOROURACIL 3960 MG: 50 INJECTION, SOLUTION INTRAVENOUS at 01:08

## 2019-08-19 NOTE — PROGRESS NOTES
CHEMO SCHOOL- NUTRITION    8/5/19    Sis is a 59 yr old female with colorectal cancer. She will be receiving folfox with avastin. I met with pt's mother & 2 caregivers for chemo school.     CW: 133#. Sis's mother stated that she is currently eating well. History of T2DM.     Plan:   1. Reviewed chemo school packet & gave copy to pt's mother.   2. Discussed safe food handling during treatment.   3. Informed mother of possible changes in taste & diarrhea due to treatment plan. Educated on diet for diarrhea.   4. Educated on importance of maintaining wt & staying hydrated.   5. Provided RD contact info.

## 2019-08-21 ENCOUNTER — INFUSION (OUTPATIENT)
Dept: INFUSION THERAPY | Facility: HOSPITAL | Age: 59
End: 2019-08-21
Attending: INTERNAL MEDICINE
Payer: MEDICARE

## 2019-08-21 VITALS
DIASTOLIC BLOOD PRESSURE: 81 MMHG | HEART RATE: 89 BPM | TEMPERATURE: 99 F | BODY MASS INDEX: 27.42 KG/M2 | HEIGHT: 59 IN | SYSTOLIC BLOOD PRESSURE: 156 MMHG | RESPIRATION RATE: 18 BRPM | WEIGHT: 136 LBS

## 2019-08-21 DIAGNOSIS — D49.0 COLORECTAL NEOPLASM: Primary | ICD-10-CM

## 2019-08-21 PROCEDURE — 63600175 PHARM REV CODE 636 W HCPCS: Performed by: INTERNAL MEDICINE

## 2019-08-21 PROCEDURE — 96523 IRRIG DRUG DELIVERY DEVICE: CPT

## 2019-08-21 RX ORDER — SODIUM CHLORIDE 0.9 % (FLUSH) 0.9 %
10 SYRINGE (ML) INJECTION
Status: DISCONTINUED | OUTPATIENT
Start: 2019-08-21 | End: 2019-08-21 | Stop reason: HOSPADM

## 2019-08-21 RX ORDER — HEPARIN 100 UNIT/ML
500 SYRINGE INTRAVENOUS
Status: DISCONTINUED | OUTPATIENT
Start: 2019-08-21 | End: 2019-08-21 | Stop reason: HOSPADM

## 2019-08-21 RX ADMIN — HEPARIN 500 UNITS: 100 SYRINGE at 12:08

## 2019-08-26 ENCOUNTER — INFUSION (OUTPATIENT)
Dept: INFUSION THERAPY | Facility: HOSPITAL | Age: 59
End: 2019-08-26
Attending: INTERNAL MEDICINE
Payer: MEDICARE

## 2019-08-26 VITALS
HEIGHT: 59 IN | SYSTOLIC BLOOD PRESSURE: 132 MMHG | HEART RATE: 67 BPM | WEIGHT: 134.38 LBS | BODY MASS INDEX: 27.09 KG/M2 | RESPIRATION RATE: 18 BRPM | TEMPERATURE: 97 F | DIASTOLIC BLOOD PRESSURE: 72 MMHG

## 2019-08-26 DIAGNOSIS — D49.0 COLORECTAL NEOPLASM: ICD-10-CM

## 2019-08-26 DIAGNOSIS — D50.0 IRON DEFICIENCY ANEMIA DUE TO CHRONIC BLOOD LOSS: Primary | ICD-10-CM

## 2019-08-26 PROCEDURE — 96365 THER/PROPH/DIAG IV INF INIT: CPT

## 2019-08-26 PROCEDURE — 96375 TX/PRO/DX INJ NEW DRUG ADDON: CPT

## 2019-08-26 PROCEDURE — S0028 INJECTION, FAMOTIDINE, 20 MG: HCPCS | Mod: PO | Performed by: INTERNAL MEDICINE

## 2019-08-26 PROCEDURE — 25000003 PHARM REV CODE 250: Mod: PO | Performed by: INTERNAL MEDICINE

## 2019-08-26 PROCEDURE — 63600175 PHARM REV CODE 636 W HCPCS: Performed by: INTERNAL MEDICINE

## 2019-08-26 PROCEDURE — 96366 THER/PROPH/DIAG IV INF ADDON: CPT

## 2019-08-26 PROCEDURE — 96367 TX/PROPH/DG ADDL SEQ IV INF: CPT

## 2019-08-26 PROCEDURE — 96376 TX/PRO/DX INJ SAME DRUG ADON: CPT

## 2019-08-26 RX ORDER — ACETAMINOPHEN 325 MG/1
1000 TABLET ORAL
Status: CANCELLED | OUTPATIENT
Start: 2019-08-26

## 2019-08-26 RX ORDER — ACETAMINOPHEN 500 MG
1000 TABLET ORAL
Status: COMPLETED | OUTPATIENT
Start: 2019-08-26 | End: 2019-08-26

## 2019-08-26 RX ORDER — HEPARIN 100 UNIT/ML
500 SYRINGE INTRAVENOUS
OUTPATIENT
Start: 2019-09-05

## 2019-08-26 RX ORDER — FAMOTIDINE 10 MG/ML
20 INJECTION INTRAVENOUS 2 TIMES DAILY
Status: DISCONTINUED | OUTPATIENT
Start: 2019-08-26 | End: 2019-08-26 | Stop reason: HOSPADM

## 2019-08-26 RX ORDER — FAMOTIDINE 10 MG/ML
20 INJECTION INTRAVENOUS 2 TIMES DAILY
Status: CANCELLED
Start: 2019-08-26

## 2019-08-26 RX ORDER — SODIUM CHLORIDE 9 MG/ML
10 INJECTION, SOLUTION INTRAMUSCULAR; INTRAVENOUS; SUBCUTANEOUS
Status: CANCELLED | OUTPATIENT
Start: 2019-09-05

## 2019-08-26 RX ORDER — HEPARIN 100 UNIT/ML
500 SYRINGE INTRAVENOUS
Status: DISCONTINUED | OUTPATIENT
Start: 2019-08-26 | End: 2019-08-26 | Stop reason: HOSPADM

## 2019-08-26 RX ORDER — ACETAMINOPHEN 500 MG
1000 TABLET ORAL
Status: CANCELLED | OUTPATIENT
Start: 2019-09-05

## 2019-08-26 RX ORDER — HEPARIN 100 UNIT/ML
500 SYRINGE INTRAVENOUS
Status: CANCELLED | OUTPATIENT
Start: 2019-08-26

## 2019-08-26 RX ORDER — SODIUM CHLORIDE 9 MG/ML
10 INJECTION, SOLUTION INTRAMUSCULAR; INTRAVENOUS; SUBCUTANEOUS
Status: CANCELLED | OUTPATIENT
Start: 2019-08-26

## 2019-08-26 RX ORDER — SODIUM CHLORIDE 9 MG/ML
10 INJECTION, SOLUTION INTRAMUSCULAR; INTRAVENOUS; SUBCUTANEOUS
Status: DISCONTINUED | OUTPATIENT
Start: 2019-08-26 | End: 2019-08-26 | Stop reason: HOSPADM

## 2019-08-26 RX ADMIN — HEPARIN 500 UNITS: 100 SYRINGE at 04:08

## 2019-08-26 RX ADMIN — FAMOTIDINE 20 MG: 10 INJECTION INTRAVENOUS at 08:08

## 2019-08-26 RX ADMIN — ACETAMINOPHEN 1000 MG: 500 TABLET ORAL at 08:08

## 2019-08-26 RX ADMIN — IRON DEXTRAN 25 MG: 50 INJECTION INTRAMUSCULAR; INTRAVENOUS at 08:08

## 2019-08-26 RX ADMIN — IRON DEXTRAN 475 MG: 50 INJECTION INTRAMUSCULAR; INTRAVENOUS at 09:08

## 2019-08-26 RX ADMIN — SODIUM CHLORIDE: 9 INJECTION, SOLUTION INTRAVENOUS at 08:08

## 2019-08-26 RX ADMIN — DIPHENHYDRAMINE HYDROCHLORIDE 50 MG: 50 INJECTION INTRAMUSCULAR; INTRAVENOUS at 08:08

## 2019-08-26 NOTE — PLAN OF CARE
Problem: Anemia  Goal: Anemia Symptom Improvement  Outcome: Ongoing (interventions implemented as appropriate)  Infed given per MD order

## 2019-08-27 ENCOUNTER — LAB VISIT (OUTPATIENT)
Dept: LAB | Facility: HOSPITAL | Age: 59
End: 2019-08-27
Attending: INTERNAL MEDICINE
Payer: MEDICARE

## 2019-08-27 DIAGNOSIS — D49.0 COLORECTAL NEOPLASM: ICD-10-CM

## 2019-08-27 DIAGNOSIS — C17.8: ICD-10-CM

## 2019-08-27 LAB
ALBUMIN SERPL BCP-MCNC: 3 G/DL (ref 3.5–5.2)
ALP SERPL-CCNC: 55 U/L (ref 55–135)
ALT SERPL W/O P-5'-P-CCNC: 10 U/L (ref 10–44)
ANION GAP SERPL CALC-SCNC: 8 MMOL/L (ref 8–16)
AST SERPL-CCNC: 35 U/L (ref 10–40)
BASOPHILS # BLD AUTO: 0.01 K/UL (ref 0–0.2)
BASOPHILS NFR BLD: 0.1 % (ref 0–1.9)
BILIRUB SERPL-MCNC: 0.7 MG/DL (ref 0.1–1)
BUN SERPL-MCNC: 26 MG/DL (ref 6–20)
CALCIUM SERPL-MCNC: 8.8 MG/DL (ref 8.7–10.5)
CEA SERPL-MCNC: 1930.1 NG/ML (ref 0–5)
CHLORIDE SERPL-SCNC: 113 MMOL/L (ref 95–110)
CO2 SERPL-SCNC: 19 MMOL/L (ref 23–29)
CREAT SERPL-MCNC: 1.4 MG/DL (ref 0.5–1.4)
DIFFERENTIAL METHOD: ABNORMAL
EOSINOPHIL # BLD AUTO: 0.1 K/UL (ref 0–0.5)
EOSINOPHIL NFR BLD: 1.1 % (ref 0–8)
ERYTHROCYTE [DISTWIDTH] IN BLOOD BY AUTOMATED COUNT: 18.5 % (ref 11.5–14.5)
EST. GFR  (AFRICAN AMERICAN): 48 ML/MIN/1.73 M^2
EST. GFR  (NON AFRICAN AMERICAN): 41 ML/MIN/1.73 M^2
GLUCOSE SERPL-MCNC: 145 MG/DL (ref 70–110)
HCT VFR BLD AUTO: 24.2 % (ref 37–48.5)
HGB BLD-MCNC: 7.5 G/DL (ref 12–16)
IMM GRANULOCYTES # BLD AUTO: 0.05 K/UL (ref 0–0.04)
LYMPHOCYTES # BLD AUTO: 1.7 K/UL (ref 1–4.8)
LYMPHOCYTES NFR BLD: 21.3 % (ref 18–48)
MCH RBC QN AUTO: 29 PG (ref 27–31)
MCHC RBC AUTO-ENTMCNC: 31 G/DL (ref 32–36)
MCV RBC AUTO: 93 FL (ref 82–98)
MONOCYTES # BLD AUTO: 0.1 K/UL (ref 0.3–1)
MONOCYTES NFR BLD: 1 % (ref 4–15)
NEUTROPHILS # BLD AUTO: 6 K/UL (ref 1.8–7.7)
NEUTROPHILS NFR BLD: 75.9 % (ref 38–73)
NRBC BLD-RTO: 0 /100 WBC
PLATELET # BLD AUTO: 172 K/UL (ref 150–350)
PMV BLD AUTO: 10.3 FL (ref 9.2–12.9)
POTASSIUM SERPL-SCNC: 4 MMOL/L (ref 3.5–5.1)
PROT SERPL-MCNC: 6.7 G/DL (ref 6–8.4)
RBC # BLD AUTO: 2.59 M/UL (ref 4–5.4)
SODIUM SERPL-SCNC: 140 MMOL/L (ref 136–145)
WBC # BLD AUTO: 7.97 K/UL (ref 3.9–12.7)

## 2019-08-27 PROCEDURE — 85025 COMPLETE CBC W/AUTO DIFF WBC: CPT

## 2019-08-27 PROCEDURE — 36415 COLL VENOUS BLD VENIPUNCTURE: CPT

## 2019-08-27 PROCEDURE — 80053 COMPREHEN METABOLIC PANEL: CPT

## 2019-08-27 PROCEDURE — 82378 CARCINOEMBRYONIC ANTIGEN: CPT

## 2019-08-30 ENCOUNTER — DOCUMENTATION ONLY (OUTPATIENT)
Dept: HEMATOLOGY/ONCOLOGY | Facility: CLINIC | Age: 59
End: 2019-08-30

## 2019-08-30 ENCOUNTER — OFFICE VISIT (OUTPATIENT)
Dept: HEMATOLOGY/ONCOLOGY | Facility: CLINIC | Age: 59
End: 2019-08-30
Payer: MEDICARE

## 2019-08-30 VITALS
BODY MASS INDEX: 27.15 KG/M2 | DIASTOLIC BLOOD PRESSURE: 64 MMHG | HEART RATE: 86 BPM | WEIGHT: 134.69 LBS | SYSTOLIC BLOOD PRESSURE: 140 MMHG | HEIGHT: 59 IN

## 2019-08-30 DIAGNOSIS — C17.8: Primary | ICD-10-CM

## 2019-08-30 DIAGNOSIS — E08.00 DIABETES MELLITUS DUE TO UNDERLYING CONDITION WITH HYPEROSMOLARITY WITHOUT COMA, WITHOUT LONG-TERM CURRENT USE OF INSULIN: ICD-10-CM

## 2019-08-30 DIAGNOSIS — D50.0 IRON DEFICIENCY ANEMIA DUE TO CHRONIC BLOOD LOSS: ICD-10-CM

## 2019-08-30 PROCEDURE — 99214 OFFICE O/P EST MOD 30 MIN: CPT | Mod: PBBFAC,PO | Performed by: INTERNAL MEDICINE

## 2019-08-30 PROCEDURE — 99214 PR OFFICE/OUTPT VISIT, EST, LEVL IV, 30-39 MIN: ICD-10-PCS | Mod: S$PBB,,, | Performed by: INTERNAL MEDICINE

## 2019-08-30 PROCEDURE — 99214 OFFICE O/P EST MOD 30 MIN: CPT | Mod: S$PBB,,, | Performed by: INTERNAL MEDICINE

## 2019-08-30 PROCEDURE — 99999 PR PBB SHADOW E&M-EST. PATIENT-LVL IV: CPT | Mod: PBBFAC,,, | Performed by: INTERNAL MEDICINE

## 2019-08-30 PROCEDURE — 99999 PR PBB SHADOW E&M-EST. PATIENT-LVL IV: ICD-10-PCS | Mod: PBBFAC,,, | Performed by: INTERNAL MEDICINE

## 2019-08-30 NOTE — PROGRESS NOTES
Pt was seen in clinic today by . Per pt and family request they have decided to stop all treatment and pt is now under hospice care. Per dr moreno all chemo is discontinued.

## 2019-09-03 NOTE — PROGRESS NOTES
Subjective:       Patient ID: Sis Teixeira is a 59 y.o. female.    Chief Complaint:  Metastatic colon cancer received 1st cycle of chemotherapy with significant side effects weakness does not want any further treatment feeling very short of breath on ambulation she is anemic having black tarry stools all treatments to be canceled here to let us know.     Oncologic History:  Had visceral peritoneal involvement of colon cancer adjuvant chemotherapy was recommended after the patient had adequately recovered postoperatively patient has disabilities and parents decided that they would not go ahead with adjuvant therapy but did not call to let us know. she developed a lump on the right flank which has been removed came back positive for metastatic colon cancer     Oncologic History     Oncologic Treatment     Pathology  July 5, 2019  FINAL PATHOLOGIC DIAGNOSIS  Hernia sac, hernia repair:  Hernia sac, positive for focus of metastatic adenocarcinoma, consistent with patient's prior history of colonic  adenocarcinoma.  See comment.  Comment:    June 28, 2018  FINAL PATHOLOGIC DIAGNOSIS    2. Right colon:  Invasive moderately differentiated adenocarcinoma, 6.5 cm in greatest dimension, penetrating muscular wall  extending into pericolonic adipose tissue and to the external peritoneal surface.  Pericolonic abscess formation identified.  Intestinal and mesenteric margins free of tumor.  Random sections of ileum and colon with no significant histopathologic abnormalities.  14 regional lymph nodes negative for metastatic carcinoma.    BRAF wild type, KRS mutated   MSI stable  HPI:     Social History     Socioeconomic History    Marital status: Single     Spouse name: Not on file    Number of children: Not on file    Years of education: Not on file    Highest education level: Not on file   Occupational History    Not on file   Social Needs    Financial resource strain: Not on file    Food insecurity:     Worry: Not  on file     Inability: Not on file    Transportation needs:     Medical: Not on file     Non-medical: Not on file   Tobacco Use    Smoking status: Never Smoker    Smokeless tobacco: Never Used   Substance and Sexual Activity    Alcohol use: No    Drug use: No    Sexual activity: Never   Lifestyle    Physical activity:     Days per week: Not on file     Minutes per session: Not on file    Stress: Not on file   Relationships    Social connections:     Talks on phone: Not on file     Gets together: Not on file     Attends Muslim service: Not on file     Active member of club or organization: Not on file     Attends meetings of clubs or organizations: Not on file     Relationship status: Not on file   Other Topics Concern    Not on file   Social History Narrative    Not on file     No family history on file.  Past Surgical History:   Procedure Laterality Date    CHOLECYSTECTOMY N/A 6/28/2018    [facility]    COLON SURGERY      HEMICOLECTOMY, RIGHT N/A 6/28/2018    [facility]    HERNIA REPAIR  07/05/2019    ventral    INCISION AND DRAINAGE, ABSCESS Right 6/21/2018    [facility]    INSERTION, JEJUNOSTOMY TUBE N/A 6/28/2018    [facility]    INSERTION, VENOUS ACCESS PORT Left 8/15/2019    [facility]    REPAIR, HERNIA, VENTRAL, RECURRENT Right 7/5/2019    [facility]     Past Medical History:   Diagnosis Date    Cancer     colon    Congenital small head     Thyroid disease        Current Outpatient Medications:     acetaminophen (TYLENOL) 500 MG tablet, Take 2 tablets (1,000 mg total) by mouth every 6 (six) hours as needed., Disp: , Rfl: 0    azelastine (ASTELIN) 137 mcg (0.1 %) nasal spray, , Disp: , Rfl:     brinzolamide (AZOPT) 1 % ophthalmic suspension, 1 drop 3 (three) times daily., Disp: , Rfl:      calcium carbonate (OS-WALLY) 600 mg calcium (1,500 mg) Tab, Take 600 mg by mouth once., Disp: , Rfl:     cholecalciferol, vitamin D3, (VITAMIN D3) 1,000 unit capsule, Take 1,000 Units by mouth once daily., Disp: , Rfl:     CHOLESTYRAMINE LIGHT 4 gram packet, , Disp: , Rfl:     docusate sodium (COLACE) 100 MG capsule, Take 100 mg by mouth 2 (two) times daily., Disp: , Rfl:     dorzolamide-timolol 2-0.5% (COSOPT) 22.3-6.8 mg/mL ophthalmic solution, , Disp: , Rfl:     dorzolamide-timolol, PF, (COSOPT PF) 2-0.5 % Dpet ophthalmic solution, 1 drop 2 (two) times daily., Disp: , Rfl:     fenofibrate (LOFIBRA) 160 MG Tab, Take 160 mg by mouth once daily., Disp: , Rfl:     fenofibrate 160 MG Tab, Take 1 tablet by mouth once daily., Disp: , Rfl:     fluticasone (FLONASE) 50 mcg/actuation nasal spray, 1 spray by Each Nare route once daily., Disp: , Rfl:     furosemide (LASIX) 20 MG tablet, Take 20 mg by mouth 2 (two) times daily., Disp: , Rfl:     HYDROcodone-acetaminophen (NORCO) 5-325 mg per tablet, Take 1 tablet by mouth every 6 (six) hours as needed for Pain., Disp: 20 tablet, Rfl: 0    latanoprost 0.005 % ophthalmic solution, 1 drop every evening., Disp: , Rfl:     levothyroxine (SYNTHROID) 25 MCG tablet, Take 25 mcg by mouth once daily., Disp: , Rfl:     ondansetron (ZOFRAN) 8 MG tablet, Take 8 mg by mouth every 8 (eight) hours., Disp: , Rfl:     ondansetron (ZOFRAN) 8 MG tablet, Take 1 tablet (8 mg total) by mouth every 12 (twelve) hours as needed for Nausea., Disp: 30 tablet, Rfl: 2    pantoprazole (PROTONIX) 40 MG tablet, Take 1 tablet by mouth once daily., Disp: , Rfl:     PAZEO 0.7 % Drop, , Disp: , Rfl:     prochlorperazine (COMPAZINE) 10 MG tablet, Take 10 mg by mouth 3 (three) times daily., Disp: , Rfl:     prochlorperazine (COMPAZINE) 10 MG tablet, Take 1 tablet (10 mg total) by mouth every 6 (six) hours as needed., Disp: 30 tablet, Rfl: 1    ranitidine (ZANTAC) 150 MG capsule, Take 150 mg by  mouth 2 (two) times daily., Disp: , Rfl:     rosuvastatin (CRESTOR) 10 MG tablet, Take 10 mg by mouth once daily., Disp: , Rfl:     simvastatin (ZOCOR) 10 MG tablet, Take 10 mg by mouth every evening., Disp: , Rfl:     sodium bicarbonate 650 MG tablet, Take 650 mg by mouth 4 (four) times daily., Disp: , Rfl:     sterile water SolP 342.86 mL with parenteral amino acid 10% No.2 10 % SolP 60 g, dextrose 70% SolP 179.998 g, Inject 50 mL/hr into the vein continuous., Disp: 1000 mL, Rfl: 0  Review of patient's allergies indicates:   Allergen Reactions    Sulfa (sulfonamide antibiotics) Rash    Tetracyclines Rash         REVIEW OF SYSTEMS:     Primary historians are patient's parents.   specially the mother  Mother decided not to proceed with adjuvant chemotherapy about a year ago  but our office was not notified hence patient was lost to follow-up now there is a ventral hernia reported on scan and this is causing pain and discomfort to patient and the hope is that this can be resected tomorrow.  With clearance from Oncology  Patient congenitally has deformities physically and a little bit of mental growth issues however she seems very capable of understanding discussion very dependent on her parents for decision making  Mother reports black tarry stools and patient is lying about a lot  PHYSICAL EXAM:     Wt Readings from Last 3 Encounters:   08/30/19 61.1 kg (134 lb 11.2 oz)   08/26/19 61 kg (134 lb 5.9 oz)   08/21/19 61.7 kg (136 lb 0.4 oz)     Temp Readings from Last 3 Encounters:   08/26/19 97.4 °F (36.3 °C)   08/21/19 98.8 °F (37.1 °C)   08/19/19 98.6 °F (37 °C)     BP Readings from Last 3 Encounters:   08/30/19 (!) 140/64   08/26/19 132/72   08/21/19 (!) 156/81     Pulse Readings from Last 3 Encounters:   08/30/19 86   08/26/19 67   08/21/19 89     GENERAL:  Small head small hand facial deformity.  Awake, alert and oriented to time, person and place.  No anxiety, or agitation.    Patient has a port but does  not want to be axis  She is wanting no further treatment wants to go to her aunt's house in being the company of all  the animals and people not come back to clinic or infusion       Laboratory:     CBC:  Lab Results   Component Value Date    WBC 7.97 08/27/2019    RBC 2.59 (L) 08/27/2019    HGB 7.5 (L) 08/27/2019    HCT 24.2 (L) 08/27/2019    MCV 93 08/27/2019    MCH 29.0 08/27/2019    MCHC 31.0 (L) 08/27/2019    RDW 18.5 (H) 08/27/2019     08/27/2019    MPV 10.3 08/27/2019    GRAN 6.0 08/27/2019    GRAN 75.9 (H) 08/27/2019    LYMPH 1.7 08/27/2019    LYMPH 21.3 08/27/2019    MONO 0.1 (L) 08/27/2019    MONO 1.0 (L) 08/27/2019    EOS 0.1 08/27/2019    BASO 0.01 08/27/2019    EOSINOPHIL 1.1 08/27/2019    BASOPHIL 0.1 08/27/2019       BMP: BMP  Lab Results   Component Value Date     08/27/2019    K 4.0 08/27/2019     (H) 08/27/2019    CO2 19 (L) 08/27/2019    BUN 26 (H) 08/27/2019    CREATININE 1.4 08/27/2019    CALCIUM 8.8 08/27/2019    ANIONGAP 8 08/27/2019    ESTGFRAFRICA 48 (A) 08/27/2019    EGFRNONAA 41 (A) 08/27/2019       LFT:   Lab Results   Component Value Date    ALT 10 08/27/2019    AST 35 08/27/2019    ALKPHOS 55 08/27/2019    BILITOT 0.7 08/27/2019     CEA elevated to 1400  Impression PET scan August 2019       1. New FDG avid 11 mm right lung base nodule consistent with lung metastasis.  2. New FDG uptake along the left hepatic lobe without definitive CT correlate suspicious for new hepatic metastasis or metastasis along the liver capsule.  3. Multifocal intra-abdominal and pelvic soft tissue masses which are FDG avid and consistent with new metastatic disease.  4. Superficial subcutaneous nodularity with FDG uptake superior to umbilicus along midline laparotomy incision.  Postsurgical scarring with inflammatory FDG uptake or metastatic disease could give this appearance.  5. FDG uptake diffusely involving left abdominal ostomy site without definitive abnormal CT correlate.  This may  simply be inflammatory in nature.  6. Multifocal FDG uptake among loops of bowel and abdomen as discussed.  Although this could represent physiologic intestinal activity, nonvisualized metastasis are also a consideration.   cea 14 40    Assessment/Plan:     widely metastatic colorectal carcinoma patient making an interim visit because family would like to discontinue all treatments patient does not want IV axis she has physical and mental disabilities it is hard to bring her to the clinic we would like the disease to take its own course they have contemplated on this multiple times with family members as patient is also in agreement unclear how much she comprehends  Mother reports black stools has been seen by GI  She is anemic they would rather not transfuse either as she has ongoing GI bleeding discontinue all treatments and hospice/palliative care will be solved we will assist in all of the above discharged from clinic

## 2019-09-04 ENCOUNTER — TELEPHONE (OUTPATIENT)
Dept: HEMATOLOGY/ONCOLOGY | Facility: CLINIC | Age: 59
End: 2019-09-04

## 2019-09-04 NOTE — TELEPHONE ENCOUNTER
----- Message from Oumou Andrews LPN sent at 9/3/2019  5:01 PM CDT -----  Contact: Rachel with Dr. Ashok Oneill  ----- Message -----  From: Kulwant Agosto  Sent: 9/3/2019   4:53 PM  To: Suresh CONTRERAS Staff    Type: Needs Medical Advice    Who Called:  Rachel    Best Call Back Number: [phone]  Additional Information: Pt's mother called and the pt passed away this morning. Pt's mother asked Rachel to pass the news to the office.

## 2024-02-19 NOTE — INTERVAL H&P NOTE
The patient has been examined and the H&P has been reviewed:    I concur with the findings and no changes have occurred since H&P was written.    Anesthesia/Surgery risks, benefits and alternative options discussed and understood by patient/family.          Active Hospital Problems    Diagnosis  POA    Ventral hernia without obstruction or gangrene [K43.9]  Yes      Resolved Hospital Problems   No resolved problems to display.     
<-- Click to add NO significant Past Surgical History
